# Patient Record
Sex: MALE | Race: WHITE | Employment: OTHER | URBAN - METROPOLITAN AREA
[De-identification: names, ages, dates, MRNs, and addresses within clinical notes are randomized per-mention and may not be internally consistent; named-entity substitution may affect disease eponyms.]

---

## 2019-08-26 ENCOUNTER — IP HISTORICAL/CONVERTED ENCOUNTER (OUTPATIENT)
Dept: OTHER | Age: 59
End: 2019-08-26

## 2019-11-04 ENCOUNTER — APPOINTMENT (OUTPATIENT)
Dept: NON INVASIVE DIAGNOSTICS | Age: 59
DRG: 286 | End: 2019-11-04
Attending: HOSPITALIST
Payer: COMMERCIAL

## 2019-11-04 ENCOUNTER — APPOINTMENT (OUTPATIENT)
Dept: GENERAL RADIOLOGY | Age: 59
DRG: 286 | End: 2019-11-04
Attending: EMERGENCY MEDICINE
Payer: COMMERCIAL

## 2019-11-04 ENCOUNTER — HOSPITAL ENCOUNTER (INPATIENT)
Age: 59
LOS: 3 days | Discharge: HOME | DRG: 286 | End: 2019-11-07
Attending: EMERGENCY MEDICINE | Admitting: INTERNAL MEDICINE
Payer: COMMERCIAL

## 2019-11-04 DIAGNOSIS — I50.31 ACUTE DIASTOLIC (CONGESTIVE) HEART FAILURE (HCC): ICD-10-CM

## 2019-11-04 DIAGNOSIS — R60.9 PERIPHERAL EDEMA: ICD-10-CM

## 2019-11-04 DIAGNOSIS — R06.02 SHORTNESS OF BREATH: ICD-10-CM

## 2019-11-04 DIAGNOSIS — I48.91 ATRIAL FIBRILLATION WITH RVR (HCC): Primary | ICD-10-CM

## 2019-11-04 PROBLEM — G47.33 OSA ON CPAP: Status: ACTIVE | Noted: 2019-11-04

## 2019-11-04 PROBLEM — Z99.89 OSA ON CPAP: Status: ACTIVE | Noted: 2019-11-04

## 2019-11-04 PROBLEM — E66.01 MORBID OBESITY (HCC): Status: ACTIVE | Noted: 2019-11-04

## 2019-11-04 PROBLEM — N17.9 AKI (ACUTE KIDNEY INJURY) (HCC): Status: ACTIVE | Noted: 2019-11-04

## 2019-11-04 LAB
ALBUMIN SERPL-MCNC: 3.5 G/DL (ref 3.5–5)
ALBUMIN/GLOB SERPL: 1.3 {RATIO} (ref 1.1–2.2)
ALP SERPL-CCNC: 80 U/L (ref 45–117)
ALT SERPL-CCNC: 136 U/L (ref 12–78)
ANION GAP SERPL CALC-SCNC: 6 MMOL/L (ref 5–15)
APPEARANCE UR: CLEAR
AST SERPL-CCNC: 37 U/L (ref 15–37)
ATRIAL RATE: 129 BPM
AV VELOCITY RATIO: 0.59
BACTERIA URNS QL MICRO: NEGATIVE /HPF
BASOPHILS # BLD: 0 K/UL (ref 0–0.1)
BASOPHILS NFR BLD: 0 % (ref 0–1)
BILIRUB SERPL-MCNC: 0.6 MG/DL (ref 0.2–1)
BILIRUB UR QL: NEGATIVE
BNP SERPL-MCNC: 1258 PG/ML
BUN SERPL-MCNC: 26 MG/DL (ref 6–20)
BUN/CREAT SERPL: 17 (ref 12–20)
CALCIUM SERPL-MCNC: 8.4 MG/DL (ref 8.5–10.1)
CALCULATED R AXIS, ECG10: -20 DEGREES
CALCULATED T AXIS, ECG11: 161 DEGREES
CHLORIDE SERPL-SCNC: 107 MMOL/L (ref 97–108)
CO2 SERPL-SCNC: 28 MMOL/L (ref 21–32)
COLOR UR: ABNORMAL
CREAT SERPL-MCNC: 1.52 MG/DL (ref 0.7–1.3)
DIAGNOSIS, 93000: NORMAL
DIFFERENTIAL METHOD BLD: ABNORMAL
ECHO AO ROOT DIAM: 3.78 CM
ECHO AV AREA PEAK VELOCITY: 2 CM2
ECHO AV AREA/BSA PEAK VELOCITY: 0.7 CM2/M2
ECHO AV PEAK GRADIENT: 6.5 MMHG
ECHO AV PEAK VELOCITY: 127.24 CM/S
ECHO EST RA PRESSURE: 10 MMHG
ECHO LA AREA 4C: 23.4 CM2
ECHO LA MAJOR AXIS: 4.72 CM
ECHO LA TO AORTIC ROOT RATIO: 1.25
ECHO LA VOL 4C: 60.12 ML (ref 18–58)
ECHO LA VOLUME INDEX A4C: 22.81 ML/M2 (ref 16–28)
ECHO LV EDV TEICHHOLZ: 0.88 ML
ECHO LV ESV TEICHHOLZ: 0.71 ML
ECHO LV INTERNAL DIMENSION DIASTOLIC: 5.78 CM (ref 4.2–5.9)
ECHO LV INTERNAL DIMENSION SYSTOLIC: 5.26 CM
ECHO LV IVSD: 2.89 CM (ref 0.6–1)
ECHO LV MASS 2D: 800.1 G (ref 88–224)
ECHO LV MASS INDEX 2D: 303.6 G/M2 (ref 49–115)
ECHO LV POSTERIOR WALL DIASTOLIC: 1.24 CM (ref 0.6–1)
ECHO LV POSTERIOR WALL SYSTOLIC: 1.42 CM
ECHO LVOT DIAM: 2.08 CM
ECHO LVOT PEAK GRADIENT: 2.2 MMHG
ECHO LVOT PEAK VELOCITY: 74.46 CM/S
ECHO LVOT SV: 44.4 ML
ECHO LVOT VTI: 13.07 CM
ECHO MV AREA VTI: 1.4 CM2
ECHO MV MAX VELOCITY: 192.54 CM/S
ECHO MV MEAN GRADIENT: 5.9 MMHG
ECHO MV MEAN INFLOW VELOCITY: 1.06 M/S
ECHO MV PEAK GRADIENT: 14.8 MMHG
ECHO MV REGURGITANT RADIUS PISA: 1.09 CM
ECHO MV VTI: 30.96 CM
ECHO PULMONARY ARTERY SYSTOLIC PRESSURE (PASP): 56.1 MMHG
ECHO RA AREA 4C: 20.34 CM2
ECHO RIGHT VENTRICULAR SYSTOLIC PRESSURE (RVSP): 56.1 MMHG
ECHO TV REGURGITANT MAX VELOCITY: 339.6 CM/S
ECHO TV REGURGITANT PEAK GRADIENT: 46.1 MMHG
EOSINOPHIL # BLD: 0 K/UL (ref 0–0.4)
EOSINOPHIL NFR BLD: 0 % (ref 0–7)
EPITH CASTS URNS QL MICRO: ABNORMAL /LPF
ERYTHROCYTE [DISTWIDTH] IN BLOOD BY AUTOMATED COUNT: 15.1 % (ref 11.5–14.5)
GLOBULIN SER CALC-MCNC: 2.6 G/DL (ref 2–4)
GLUCOSE SERPL-MCNC: 116 MG/DL (ref 65–100)
GLUCOSE UR STRIP.AUTO-MCNC: NEGATIVE MG/DL
HCT VFR BLD AUTO: 44.5 % (ref 36.6–50.3)
HGB BLD-MCNC: 14.4 G/DL (ref 12.1–17)
HGB UR QL STRIP: NEGATIVE
HYALINE CASTS URNS QL MICRO: ABNORMAL /LPF (ref 0–5)
IMM GRANULOCYTES # BLD AUTO: 0.1 K/UL (ref 0–0.04)
IMM GRANULOCYTES NFR BLD AUTO: 1 % (ref 0–0.5)
KETONES UR QL STRIP.AUTO: NEGATIVE MG/DL
LEUKOCYTE ESTERASE UR QL STRIP.AUTO: NEGATIVE
LVFS 2D: 9.09 %
LVOT MG: 1.29 MMHG
LVOT MV: 0.53 CM/S
LVSV (TEICH): 11.96 ML
LYMPHOCYTES # BLD: 0.8 K/UL (ref 0.8–3.5)
LYMPHOCYTES NFR BLD: 6 % (ref 12–49)
MCH RBC QN AUTO: 30.3 PG (ref 26–34)
MCHC RBC AUTO-ENTMCNC: 32.4 G/DL (ref 30–36.5)
MCV RBC AUTO: 93.7 FL (ref 80–99)
MONOCYTES # BLD: 0.8 K/UL (ref 0–1)
MONOCYTES NFR BLD: 6 % (ref 5–13)
NEUTS SEG # BLD: 12 K/UL (ref 1.8–8)
NEUTS SEG NFR BLD: 87 % (ref 32–75)
NITRITE UR QL STRIP.AUTO: NEGATIVE
NRBC # BLD: 0 K/UL (ref 0–0.01)
NRBC BLD-RTO: 0 PER 100 WBC
PH UR STRIP: 6.5 [PH] (ref 5–8)
PLATELET # BLD AUTO: 237 K/UL (ref 150–400)
PMV BLD AUTO: 11.4 FL (ref 8.9–12.9)
POTASSIUM SERPL-SCNC: 4.3 MMOL/L (ref 3.5–5.1)
PROT SERPL-MCNC: 6.1 G/DL (ref 6.4–8.2)
PROT UR STRIP-MCNC: 30 MG/DL
Q-T INTERVAL, ECG07: 352 MS
QRS DURATION, ECG06: 106 MS
QTC CALCULATION (BEZET), ECG08: 485 MS
RBC # BLD AUTO: 4.75 M/UL (ref 4.1–5.7)
RBC #/AREA URNS HPF: ABNORMAL /HPF (ref 0–5)
SODIUM SERPL-SCNC: 141 MMOL/L (ref 136–145)
SP GR UR REFRACTOMETRY: 1.02 (ref 1–1.03)
TROPONIN I SERPL-MCNC: 0.18 NG/ML
TROPONIN I SERPL-MCNC: 0.24 NG/ML
TSH SERPL DL<=0.05 MIU/L-ACNC: 2.08 UIU/ML (ref 0.36–3.74)
UA: UC IF INDICATED,UAUC: ABNORMAL
UROBILINOGEN UR QL STRIP.AUTO: 1 EU/DL (ref 0.2–1)
VENTRICULAR RATE, ECG03: 114 BPM
WBC # BLD AUTO: 13.7 K/UL (ref 4.1–11.1)
WBC URNS QL MICRO: ABNORMAL /HPF (ref 0–4)

## 2019-11-04 PROCEDURE — 65270000029 HC RM PRIVATE

## 2019-11-04 PROCEDURE — 99218 HC RM OBSERVATION: CPT

## 2019-11-04 PROCEDURE — 74011250637 HC RX REV CODE- 250/637: Performed by: EMERGENCY MEDICINE

## 2019-11-04 PROCEDURE — 81001 URINALYSIS AUTO W/SCOPE: CPT

## 2019-11-04 PROCEDURE — 93306 TTE W/DOPPLER COMPLETE: CPT

## 2019-11-04 PROCEDURE — 71046 X-RAY EXAM CHEST 2 VIEWS: CPT

## 2019-11-04 PROCEDURE — 74011250636 HC RX REV CODE- 250/636: Performed by: HOSPITALIST

## 2019-11-04 PROCEDURE — 83880 ASSAY OF NATRIURETIC PEPTIDE: CPT

## 2019-11-04 PROCEDURE — 94761 N-INVAS EAR/PLS OXIMETRY MLT: CPT

## 2019-11-04 PROCEDURE — 84484 ASSAY OF TROPONIN QUANT: CPT

## 2019-11-04 PROCEDURE — 85025 COMPLETE CBC W/AUTO DIFF WBC: CPT

## 2019-11-04 PROCEDURE — 74011000258 HC RX REV CODE- 258: Performed by: HOSPITALIST

## 2019-11-04 PROCEDURE — 96374 THER/PROPH/DIAG INJ IV PUSH: CPT

## 2019-11-04 PROCEDURE — 99285 EMERGENCY DEPT VISIT HI MDM: CPT

## 2019-11-04 PROCEDURE — 74011000250 HC RX REV CODE- 250: Performed by: HOSPITALIST

## 2019-11-04 PROCEDURE — 80053 COMPREHEN METABOLIC PANEL: CPT

## 2019-11-04 PROCEDURE — 94660 CPAP INITIATION&MGMT: CPT

## 2019-11-04 PROCEDURE — 74011250637 HC RX REV CODE- 250/637: Performed by: HOSPITALIST

## 2019-11-04 PROCEDURE — 84443 ASSAY THYROID STIM HORMONE: CPT

## 2019-11-04 PROCEDURE — 93005 ELECTROCARDIOGRAM TRACING: CPT

## 2019-11-04 PROCEDURE — 36415 COLL VENOUS BLD VENIPUNCTURE: CPT

## 2019-11-04 PROCEDURE — 96375 TX/PRO/DX INJ NEW DRUG ADDON: CPT

## 2019-11-04 PROCEDURE — 74011000250 HC RX REV CODE- 250: Performed by: EMERGENCY MEDICINE

## 2019-11-04 PROCEDURE — 74011250636 HC RX REV CODE- 250/636: Performed by: EMERGENCY MEDICINE

## 2019-11-04 RX ORDER — DILTIAZEM HYDROCHLORIDE 5 MG/ML
20 INJECTION INTRAVENOUS
Status: COMPLETED | OUTPATIENT
Start: 2019-11-04 | End: 2019-11-04

## 2019-11-04 RX ORDER — MONTELUKAST SODIUM 10 MG/1
10 TABLET ORAL EVERY EVENING
COMMUNITY
End: 2021-05-27

## 2019-11-04 RX ORDER — ASPIRIN 325 MG
325 TABLET ORAL
Status: COMPLETED | OUTPATIENT
Start: 2019-11-04 | End: 2019-11-04

## 2019-11-04 RX ORDER — MONTELUKAST SODIUM 10 MG/1
10 TABLET ORAL EVERY EVENING
Status: DISCONTINUED | OUTPATIENT
Start: 2019-11-04 | End: 2019-11-07 | Stop reason: HOSPADM

## 2019-11-04 RX ORDER — ASPIRIN 81 MG/1
81 TABLET ORAL DAILY
Status: DISCONTINUED | OUTPATIENT
Start: 2019-11-05 | End: 2019-11-07

## 2019-11-04 RX ORDER — DEXTROAMPHETAMINE SACCHARATE, AMPHETAMINE ASPARTATE, DEXTROAMPHETAMINE SULFATE AND AMPHETAMINE SULFATE 7.5; 7.5; 7.5; 7.5 MG/1; MG/1; MG/1; MG/1
30 TABLET ORAL 2 TIMES DAILY
COMMUNITY

## 2019-11-04 RX ORDER — SODIUM CHLORIDE 0.9 % (FLUSH) 0.9 %
5-40 SYRINGE (ML) INJECTION EVERY 8 HOURS
Status: DISCONTINUED | OUTPATIENT
Start: 2019-11-04 | End: 2019-11-07 | Stop reason: HOSPADM

## 2019-11-04 RX ORDER — METOPROLOL TARTRATE 5 MG/5ML
5 INJECTION INTRAVENOUS
Status: DISCONTINUED | OUTPATIENT
Start: 2019-11-04 | End: 2019-11-04

## 2019-11-04 RX ORDER — METHYLPREDNISOLONE 4 MG/1
6 TABLET ORAL 2 TIMES DAILY
Status: DISCONTINUED | OUTPATIENT
Start: 2019-11-04 | End: 2019-11-07 | Stop reason: HOSPADM

## 2019-11-04 RX ORDER — TRIAMTERENE AND HYDROCHLOROTHIAZIDE 37.5; 25 MG/1; MG/1
1 CAPSULE ORAL DAILY
COMMUNITY
End: 2019-11-07

## 2019-11-04 RX ORDER — PROPRANOLOL/HYDROCHLOROTHIAZID 40 MG-25MG
900 TABLET ORAL 2 TIMES DAILY
COMMUNITY
End: 2019-11-07

## 2019-11-04 RX ORDER — LORATADINE 10 MG/1
10 TABLET ORAL DAILY
Status: DISCONTINUED | OUTPATIENT
Start: 2019-11-05 | End: 2019-11-07 | Stop reason: HOSPADM

## 2019-11-04 RX ORDER — ENOXAPARIN SODIUM 100 MG/ML
40 INJECTION SUBCUTANEOUS EVERY 24 HOURS
Status: DISCONTINUED | OUTPATIENT
Start: 2019-11-04 | End: 2019-11-04

## 2019-11-04 RX ORDER — DOCUSATE SODIUM 100 MG/1
100 CAPSULE, LIQUID FILLED ORAL 2 TIMES DAILY
Status: DISCONTINUED | OUTPATIENT
Start: 2019-11-04 | End: 2019-11-07 | Stop reason: HOSPADM

## 2019-11-04 RX ORDER — NALTREXONE 100 %
3 POWDER (GRAM) MISCELLANEOUS
Status: DISCONTINUED | OUTPATIENT
Start: 2019-11-04 | End: 2019-11-07 | Stop reason: HOSPADM

## 2019-11-04 RX ORDER — METOPROLOL TARTRATE 5 MG/5ML
5 INJECTION INTRAVENOUS
Status: DISCONTINUED | OUTPATIENT
Start: 2019-11-04 | End: 2019-11-07 | Stop reason: HOSPADM

## 2019-11-04 RX ORDER — ASPIRIN 81 MG/1
81 TABLET ORAL DAILY
COMMUNITY
End: 2019-11-07

## 2019-11-04 RX ORDER — DESLORATADINE 5 MG/1
5 TABLET ORAL DAILY
COMMUNITY
End: 2021-05-27

## 2019-11-04 RX ORDER — DILTIAZEM HYDROCHLORIDE 30 MG/1
30 TABLET, FILM COATED ORAL EVERY 6 HOURS
Status: DISCONTINUED | OUTPATIENT
Start: 2019-11-04 | End: 2019-11-04

## 2019-11-04 RX ORDER — ALBUTEROL SULFATE 90 UG/1
2 AEROSOL, METERED RESPIRATORY (INHALATION)
COMMUNITY
End: 2020-02-18

## 2019-11-04 RX ORDER — METHYLPREDNISOLONE 4 MG/1
8 TABLET ORAL 2 TIMES DAILY
Status: DISCONTINUED | OUTPATIENT
Start: 2019-11-04 | End: 2019-11-04

## 2019-11-04 RX ORDER — SODIUM CHLORIDE 0.9 % (FLUSH) 0.9 %
5-40 SYRINGE (ML) INJECTION AS NEEDED
Status: DISCONTINUED | OUTPATIENT
Start: 2019-11-04 | End: 2019-11-07 | Stop reason: HOSPADM

## 2019-11-04 RX ORDER — ENOXAPARIN SODIUM 150 MG/ML
150 INJECTION SUBCUTANEOUS ONCE
Status: COMPLETED | OUTPATIENT
Start: 2019-11-04 | End: 2019-11-04

## 2019-11-04 RX ORDER — FUROSEMIDE 10 MG/ML
40 INJECTION INTRAMUSCULAR; INTRAVENOUS
Status: COMPLETED | OUTPATIENT
Start: 2019-11-04 | End: 2019-11-04

## 2019-11-04 RX ORDER — FUROSEMIDE 10 MG/ML
40 INJECTION INTRAMUSCULAR; INTRAVENOUS DAILY
Status: DISCONTINUED | OUTPATIENT
Start: 2019-11-05 | End: 2019-11-07 | Stop reason: HOSPADM

## 2019-11-04 RX ORDER — ENOXAPARIN SODIUM 150 MG/ML
150 INJECTION SUBCUTANEOUS EVERY 12 HOURS
Status: DISCONTINUED | OUTPATIENT
Start: 2019-11-05 | End: 2019-11-05

## 2019-11-04 RX ORDER — METHYLPREDNISOLONE 8 MG/1
4 TABLET ORAL 2 TIMES DAILY
COMMUNITY
End: 2019-12-04 | Stop reason: ALTCHOICE

## 2019-11-04 RX ORDER — DILTIAZEM HYDROCHLORIDE 30 MG/1
30 TABLET, FILM COATED ORAL
Status: DISCONTINUED | OUTPATIENT
Start: 2019-11-04 | End: 2019-11-04

## 2019-11-04 RX ORDER — ACETAMINOPHEN 325 MG/1
650 TABLET ORAL
Status: DISCONTINUED | OUTPATIENT
Start: 2019-11-04 | End: 2019-11-07 | Stop reason: HOSPADM

## 2019-11-04 RX ORDER — ONDANSETRON 2 MG/ML
4 INJECTION INTRAMUSCULAR; INTRAVENOUS
Status: DISCONTINUED | OUTPATIENT
Start: 2019-11-04 | End: 2019-11-07 | Stop reason: HOSPADM

## 2019-11-04 RX ADMIN — Medication 3 MG: at 21:06

## 2019-11-04 RX ADMIN — Medication 10 ML: at 17:13

## 2019-11-04 RX ADMIN — MONTELUKAST 10 MG: 10 TABLET, FILM COATED ORAL at 17:15

## 2019-11-04 RX ADMIN — ASPIRIN 325 MG: 325 TABLET, FILM COATED ORAL at 14:13

## 2019-11-04 RX ADMIN — DILTIAZEM HYDROCHLORIDE 7.5 MG/HR: 5 INJECTION INTRAVENOUS at 17:01

## 2019-11-04 RX ADMIN — Medication 10 ML: at 21:05

## 2019-11-04 RX ADMIN — METOPROLOL TARTRATE 5 MG: 5 INJECTION INTRAVENOUS at 17:11

## 2019-11-04 RX ADMIN — DOCUSATE SODIUM 100 MG: 100 CAPSULE, LIQUID FILLED ORAL at 17:15

## 2019-11-04 RX ADMIN — DILTIAZEM HYDROCHLORIDE 30 MG: 30 TABLET, FILM COATED ORAL at 14:13

## 2019-11-04 RX ADMIN — DILTIAZEM HYDROCHLORIDE 20 MG: 5 INJECTION INTRAVENOUS at 11:37

## 2019-11-04 RX ADMIN — ENOXAPARIN SODIUM 150 MG: 150 INJECTION SUBCUTANEOUS at 16:46

## 2019-11-04 RX ADMIN — FUROSEMIDE 40 MG: 10 INJECTION, SOLUTION INTRAMUSCULAR; INTRAVENOUS at 11:36

## 2019-11-04 RX ADMIN — METHYLPREDNISOLONE 6 MG: 4 TABLET ORAL at 21:04

## 2019-11-04 NOTE — ED PROVIDER NOTES
EMERGENCY DEPARTMENT HISTORY AND PHYSICAL EXAM      Date: 11/4/2019  Patient Name: Shelby Delgado  Patient Age and Sex: 60 y.o. male     History of Presenting Illness     Chief Complaint   Patient presents with    Ankle swelling     Bilateral over the past month    Hand Swelling    Shortness of Breath     On HCTZ and noticed a decrease in urine output over the past few weeks. History Provided By: Patient     HPI: Shelby Delgado Is a 62-year-old male with past medical history of hypertension presenting today with shortness of breath that is been steadily progressive over several years and acutely worsening in the past 2 months. He reports that he has had fluid buildup in his legs to the point that he cannot put his boots on. He reports waking up in the middle of the night gasping for breath. He has to get up and catch his breath for several minutes before trying to go back to sleep. He states that he has had atrial fibrillation before, but he has been poorly followed by physicians reporting a history of allergic reaction to gadolinium dye complicating his medical work-up. No fevers or cough. Denies chest pain. There are no other complaints, changes, or physical findings at this time. PCP: Jean Paul Christianson MD    No current facility-administered medications on file prior to encounter. Current Outpatient Medications on File Prior to Encounter   Medication Sig Dispense Refill    methylPREDNISolone (MEDROL) 8 mg tablet Take 4 mg by mouth two (2) times a day.  desloratadine (CLARINEX) 5 mg tablet Take 5 mg by mouth daily.  NALTREXONE Take 3 mg by mouth nightly.  triamterene-hydroCHLOROthiazide (DYAZIDE) 37.5-25 mg per capsule Take 1 Cap by mouth daily.  montelukast (SINGULAIR) 10 mg tablet Take 10 mg by mouth every evening.  aspirin delayed-release 81 mg tablet Take 81 mg by mouth daily.       albuterol (PROAIR HFA) 90 mcg/actuation inhaler Take 2 Puffs by inhalation every four (4) hours as needed for Wheezing or Shortness of Breath.  dextroamphetamine-amphetamine (ADDERALL) 30 mg tablet Take 30 mg by mouth two (2) times a day.  turmeric-turmeric root extract 450-50 mg cap Take 900 mg by mouth two (2) times a day. Past History     Past Medical History:  Past Medical History:   Diagnosis Date    Allergic reaction to contrast material     galodinium    Hypertension     Irregular heart rhythm     Mercury poisoning     ALYSSA on CPAP     Post concussion syndrome        Past Surgical History:  Past Surgical History:   Procedure Laterality Date    HX SHOULDER ARTHROSCOPY      HX UROLOGICAL Left     hydrocoele resection       Family History:  Family History   Problem Relation Age of Onset    Colon Cancer Mother     Hypertension Father     Other Father         multiple birth defects    Hypertension Paternal Grandfather        Social History:  Social History     Tobacco Use    Smoking status: Never Smoker    Smokeless tobacco: Never Used   Substance Use Topics    Alcohol use: Not Currently    Drug use: Never       Allergies: Allergies   Allergen Reactions    Gadolinium-Containing Contrast Media Rash         Review of Systems   Constitutional: No  fever,  No  headache  Skin: No  rash, No  jaundice  HEENT: No  nasal congestion, No  eye drainage. Resp: No cough,  No  Wheezing, + shortness of breath  CV: No chest pain, No  palpitations  GI: No vomiting,  No  diarrhea.,  No  constipation  : No dysuria,  No  hematuria  MSK: No joint pain,  No  trauma  Neuro: No numbness, No  tingling  Psych: No suicidal, No  paranoid      Physical Exam     Patient Vitals for the past 12 hrs:   Temp Pulse Resp BP SpO2   11/04/19 1145  (!) 108 14 (!) 140/91 95 %   11/04/19 1110  99 16 (!) 162/93 97 %   11/04/19 1053 97.7 °F (36.5 °C) 62 16 (!) 154/110 98 %     General: alert, No acute distress  Eyes: EOMI, normal conjunctiva  ENT: moist mucous membranes.   Neck: Active, full ROM of neck. Skin: No rashes. no jaundice              Lungs: Equal chest expansion. no respiratory distress. clear to auscultation bilaterally No accessory muscle usage  Heart: tachycardic with an irregularly irregular rhythm     2+ pitting edema bilaterally   2+ radial pulses and DPs bilaterally  Abd:  distended soft, nontender. No rebound tenderness. No guarding  Back: Full ROM  MSK: Full, active ROM in all 4 extremities. Neuro: Person, Place, Time and Situation; normal speech;   Psych: Cooperative with exam; Appropriate mood and affect             Diagnostic Study Results     Labs -     Recent Results (from the past 12 hour(s))   EKG, 12 LEAD, INITIAL    Collection Time: 11/04/19 10:59 AM   Result Value Ref Range    Ventricular Rate 114 BPM    Atrial Rate 129 BPM    QRS Duration 106 ms    Q-T Interval 352 ms    QTC Calculation (Bezet) 485 ms    Calculated R Axis -20 degrees    Calculated T Axis 161 degrees    Diagnosis       Atrial fibrillation with rapid ventricular response  Moderate voltage criteria for LVH, may be normal variant  ST & T wave abnormality, consider lateral ischemia  No previous ECGs available  Confirmed by El Martinez ([id number]) on 11/4/2019 12:32:05 PM     CBC WITH AUTOMATED DIFF    Collection Time: 11/04/19 11:20 AM   Result Value Ref Range    WBC 13.7 (H) 4.1 - 11.1 K/uL    RBC 4.75 4.10 - 5.70 M/uL    HGB 14.4 12.1 - 17.0 g/dL    HCT 44.5 36.6 - 50.3 %    MCV 93.7 80.0 - 99.0 FL    MCH 30.3 26.0 - 34.0 PG    MCHC 32.4 30.0 - 36.5 g/dL    RDW 15.1 (H) 11.5 - 14.5 %    PLATELET 663 438 - 888 K/uL    MPV 11.4 8.9 - 12.9 FL    NRBC 0.0 0  WBC    ABSOLUTE NRBC 0.00 0.00 - 0.01 K/uL    NEUTROPHILS 87 (H) 32 - 75 %    LYMPHOCYTES 6 (L) 12 - 49 %    MONOCYTES 6 5 - 13 %    EOSINOPHILS 0 0 - 7 %    BASOPHILS 0 0 - 1 %    IMMATURE GRANULOCYTES 1 (H) 0.0 - 0.5 %    ABS. NEUTROPHILS 12.0 (H) 1.8 - 8.0 K/UL    ABS. LYMPHOCYTES 0.8 0.8 - 3.5 K/UL    ABS. MONOCYTES 0.8 0.0 - 1.0 K/UL    ABS. EOSINOPHILS 0.0 0.0 - 0.4 K/UL    ABS. BASOPHILS 0.0 0.0 - 0.1 K/UL    ABS. IMM. GRANS. 0.1 (H) 0.00 - 0.04 K/UL    DF AUTOMATED     METABOLIC PANEL, COMPREHENSIVE    Collection Time: 11/04/19 11:20 AM   Result Value Ref Range    Sodium 141 136 - 145 mmol/L    Potassium 4.3 3.5 - 5.1 mmol/L    Chloride 107 97 - 108 mmol/L    CO2 28 21 - 32 mmol/L    Anion gap 6 5 - 15 mmol/L    Glucose 116 (H) 65 - 100 mg/dL    BUN 26 (H) 6 - 20 MG/DL    Creatinine 1.52 (H) 0.70 - 1.30 MG/DL    BUN/Creatinine ratio 17 12 - 20      GFR est AA 57 (L) >60 ml/min/1.73m2    GFR est non-AA 47 (L) >60 ml/min/1.73m2    Calcium 8.4 (L) 8.5 - 10.1 MG/DL    Bilirubin, total 0.6 0.2 - 1.0 MG/DL    ALT (SGPT) 136 (H) 12 - 78 U/L    AST (SGOT) 37 15 - 37 U/L    Alk. phosphatase 80 45 - 117 U/L    Protein, total 6.1 (L) 6.4 - 8.2 g/dL    Albumin 3.5 3.5 - 5.0 g/dL    Globulin 2.6 2.0 - 4.0 g/dL    A-G Ratio 1.3 1.1 - 2.2     TROPONIN I    Collection Time: 11/04/19 11:20 AM   Result Value Ref Range    Troponin-I, Qt. 0.18 (H) <0.05 ng/mL   NT-PRO BNP    Collection Time: 11/04/19 11:20 AM   Result Value Ref Range    NT pro-BNP 1,258 (H) <125 PG/ML   TSH 3RD GENERATION    Collection Time: 11/04/19 11:20 AM   Result Value Ref Range    TSH 2.08 0.36 - 3.74 uIU/mL       Radiologic Studies -   XR CHEST PA LAT   Final Result   IMPRESSION:   1. There is moderate cardiomegaly. The lungs are clear. CT Results  (Last 48 hours)    None        CXR Results  (Last 48 hours)               11/04/19 1208  XR CHEST PA LAT Final result    Impression:  IMPRESSION:   1. There is moderate cardiomegaly. The lungs are clear. Narrative:  Exam:  2 view chest       Indication: Dyspnea. COMPARISON: None       PA and lateral views demonstrate normal moderate cardiomegaly. The patient is on   a cardiac monitor. The lungs are clear. No pleural effusions. There are   degenerative changes of the thoracic spine.                    Medical Decision Making Differential Diagnosis: Atrial fibrillation with rapid ventricular response, ACS, pneumonia, pneumothorax, heart failure    I reviewed the vital signs, available nursing notes, past medical history, past surgical history, family history and social history and old medical records. On my interpretation, Laboratory workup is significant for white blood cell count is 13.7, normal hemoglobin, creatinine slightly elevated 1.52, troponin is 0.18, BNP 1258  On my interpretation of the radiology studies chest x-ray without significant evidence of fluid overload, cardia megaly  On my interpretation of the EKG atrial for ablation with RVR, T wave inversions in multiple leads, rate is 114, QTc 45, no ST elevation or depression    Management/ED course: Patient presents today with progressive dyspnea over the past several months, paroxysmal nocturnal dyspnea, and atrial for ablation with RVR. The patient has not been seeing a doctor for many years. I treated him with 1 dose of 20 mg IV diltiazem which improved his rate from the 120s to the high 100s. I followed this up with a immediate release p.o. dose of diltiazem. That improved his rate to the 80s. And fortunately his rate increased again, will rebolus and placed on diltiazem drip. Patient does have an elevation in the troponin 0.18, which I think is likely attributed to heart strain related to the atrial for ablation. Treated with 324 mg of aspirin. Patient is ultimately admitted to the inpatient service. I did speak with on-call cardiology who will evaluate the patient while he is admitted. ED Course:   Initial assessment performed. The patients presenting problems have been discussed, and they are in agreement with the care plan formulated and outlined with them. I have encouraged them to ask questions as they arise throughout their visit.          Procedures:    Critical Care Time:   CRITICAL CARE NOTE :    11:21 AM    IMPENDING DETERIORATION -Cardiovascular  ASSOCIATED RISK FACTORS - Dysrhythmia  MANAGEMENT- Bedside Assessment and Supervision of Care  INTERPRETATION -  Xrays, ECG and Blood Pressure  INTERVENTIONS - hemodynamic mngmt  CASE REVIEW - Hospitalist, Medical Sub-Specialist and Nursing  TREATMENT RESPONSE -Improved  PERFORMED BY - Self    NOTES   :    I have spent 42 minutes of critical care time involved in lab review, consultations with specialist, family decision- making, bedside attention and documentation. During this entire length of time I was immediately available to the patient . Disposition: Admitted    Admission Note:  Patient is being admitted to the hospital by Service: Hospitalist.  The results of their tests and reasons for their admission have been discussed with them and available family. They convey agreement and understanding for the need to be admitted and for their admission diagnosis. Diagnosis     Clinical Impression:   1. Atrial fibrillation with RVR ([address]    2. Shortness of breath    3. Peripheral edema        Attestations:  Scott Hernandes MD        Please note that this dictation was completed with [employer], the computer voice recognition software. Quite often unanticipated grammatical, syntax, homophones, and other interpretive errors are inadvertently transcribed by the computer software. Please disregard these errors. Please excuse any errors that have escaped final proofreading. Thank you.

## 2019-11-04 NOTE — PROGRESS NOTES
Pharmacy - Enoxaparin (Lovenox®) Monitoring      Indication: a fib     Current Dose: Enoxaparin 140 mg subcutaneously every 24 hours    Creatinine Clearance (mL/min): 60.8 ml/min    Labs:  Recent Labs     11/04/19  1120   CREA 1.52*   HGB 14.4        Wt Readings from Last 1 Encounters:   11/04/19 142.8 kg (314 lb 13.1 oz)     Ht Readings from Last 1 Encounters:   11/04/19 188 cm (74\")       Impression/Plan:   Change to 150 mg subcutaneously every 12 hours per enoxaparin dose rounding protocol    BMP daily, CBC w/o diff QOD     Thanks,  Noemi Goldberg, PHARMD

## 2019-11-04 NOTE — Clinical Note
Right radial clipped, prepped with ChloraPrep and draped. Wet prep solution applied at: 733. Wet prep solution dried at: 735. Wet prep elapsed drying time: 2 mins.

## 2019-11-04 NOTE — PROGRESS NOTES
Pharmacy Clarification of the Prior to Admission Medication Regimen Retrospective to the Admission Medication Reconciliation    The patient was interviewed regarding clarification of the prior to admission medication regimen. Leonora Taveras, present in room and obtained permission from patient to discuss drug regimen with visitor(s) present. Patient was questioned regarding use of any other inhalers, topical products, over the counter medications, herbal medications, vitamin products or ophthalmic/nasal/otic medication use. Information Obtained From: Patient, prescription bottles, RX Query    Recommendations/Findings: The following amendments were made to the patient's active medication list on file at [address] [facility]:     1) Additions:   albuterol (PROAIR HFA) 90 mcg/actuation inhaler  dextroamphetamine-amphetamine (ADDERALL) 30 mg tablet  turmeric-turmeric root extract 450-50 mg cap    2) Removals: NONE    3) Changes:  methylPREDNISolone (MEDROL) 8 mg tablet (Old regimen: 8 mg BID /New regimen: 4 mg BID)    4) Pertinent Pharmacy Findings:  Updated patients preferred outpatient pharmacy to: [address]   dextroamphetamine-amphetamine (ADDERALL) 30 mg tablet: Patient stated he has not taken this agent in about 2 weeks due to him not feeling well and not working     PTA medication list was corrected to the following:     Prior to Admission Medications   Prescriptions Last Dose Informant Patient Reported? Taking? NALTREXONE 11/3/2019 at Unknown time Other Yes Yes   Sig: Take 3 mg by mouth nightly. albuterol (PROAIR HFA) 90 mcg/actuation inhaler 11/2/2019 at Unknown time Self Yes Yes   Sig: Take 2 Puffs by inhalation every four (4) hours as needed for Wheezing or Shortness of Breath. aspirin delayed-release 81 mg tablet 11/4/2019 at Unknown time Self Yes Yes   Sig: Take 81 mg by mouth daily.    desloratadine (CLARINEX) 5 mg tablet 11/4/2019 at Unknown time Other Yes Yes Sig: Take 5 mg by mouth daily. dextroamphetamine-amphetamine (ADDERALL) 30 mg tablet 10/21/2019 at Unknown time Self Yes Yes   Sig: Take 30 mg by mouth two (2) times a day. methylPREDNISolone (MEDROL) 8 mg tablet 11/4/2019 at Unknown time Other Yes Yes   Sig: Take 4 mg by mouth two (2) times a day. montelukast (SINGULAIR) 10 mg tablet 11/3/2019 at Unknown time Other Yes Yes   Sig: Take 10 mg by mouth every evening. triamterene-hydroCHLOROthiazide (DYAZIDE) 37.5-25 mg per capsule 11/4/2019 at Unknown time Other Yes Yes   Sig: Take 1 Cap by mouth daily. turmeric-turmeric root extract 450-50 mg cap 11/3/2019 at Unknown time Self Yes Yes   Sig: Take 900 mg by mouth two (2) times a day.       Facility-Administered Medications: None          Thank you,  Zachery Adams CPhT  Medication History Pharmacy Technician

## 2019-11-04 NOTE — ED NOTES
TRANSFER - OUT REPORT:    Verbal report given to Polina Faith RN (name) on [facility]  being transferred to [facility] (unit) for routine progression of care       Report consisted of patients Situation, Background, Assessment and   Recommendations(SBAR). Information from the following report(s) SBAR, Kardex, ED Summary, Intake/Output, MAR, Recent Results and Med Rec Status was reviewed with the receiving nurse. Lines:   Peripheral IV 11/04/19 Left Antecubital (Active)   Site Assessment Clean, dry, & intact 11/4/2019 11:37 AM   Phlebitis Assessment 0 11/4/2019 11:37 AM   Infiltration Assessment 0 11/4/2019 11:37 AM   Dressing Status Clean, dry, & intact 11/4/2019 11:37 AM   Dressing Type Transparent;Tape 11/4/2019 11:37 AM   Hub Color/Line Status Capped;Flushed;Patent 11/4/2019 11:37 AM   Action Taken Blood drawn 11/4/2019 11:37 AM        Opportunity for questions and clarification was provided.       Patient transported with:   [employer]

## 2019-11-04 NOTE — Clinical Note
TRANSFER - OUT REPORT:  
 
Verbal report given to: Khris Rocha. Report consisted of patient's Situation, Background, Assessment and  
Recommendations(SBAR). Opportunity for questions and clarification was provided. Patient transported with a Registered Nurse. Patient transported to: [facility].

## 2019-11-04 NOTE — PROGRESS NOTES
Primary Nurse Vern Rosa RN and Micheline Olszewski, RN performed a dual skin assessment on this patient Impairment noted- see wound doc flow sheet    Back red and blanching  Heels and elbows red and jami in 4 seconds  Scattered scabbing over body  Scattered scarring over body  Calloused feet  Calloused knees  Toes pink-red and jami in 4 seconds  L upper back has small bump    Michel score is 19

## 2019-11-04 NOTE — PROGRESS NOTES
Pharmacy Dosing Services     Enoxaparin 1 mg/kg dose adjusted to 150mg per protocol for weight of 142.8kg.     Thanks,  Kyle Fernandez, PatrickD

## 2019-11-04 NOTE — H&P
Hospitalist Admission Note    NAME: Nichole Walker   :  1960   MRN:  [id number]     Date/Time:  2019 12:39 PM    Patient PCP: Elvin Ross MD  ______________________________________________________________________   Assessment & Plan:  New onset afib with RVR, POA  Acute diastolic chf, POA  HTN  Leukocytosis WBC 13.7  JUANJO Cr 1.5, baseline unknown  Morbid obesity  Hx mercury poisoning and gadolinium allergy, has had chelation therapy  ALYSSA on cpap    --observation  --start diltiazem 30mg q6h, lovenox 1mg/kg x 1, echo, tsh, cardiology consult  --diurese with lasix  --continue solumedrol (being treated for allergy by pcp) but would wean down to 6mg bid, singular, naltrexone, desloratidine  --hold triamterene hctz  --diurese with lasix 40mg IV daily  --cpap ordered [id number]. Neighbor cannot bring in machine today as he lives too far away. Body mass index is 40.42 kg/m². Code: full  DVT prophylaxis: lovenox  Surrogate decision maker:  Daughter Carine Coronado        Subjective:   CHIEF COMPLAINT:  Leg swelling, SOB    HISTORY OF PRESENT ILLNESS:     Nichole Walker is a 60 y.o. male with PMH mercury poisoning, gadolinium contrast allergy, HTN, \"irregular heart rhythm\" presents with 2 months of progressive SOB, feet swelling initially thought he has an allergic reaction. +orthopnea, ELIZONDO. Unable to fit feet into shoes due to edema. He reports his body has always reacted paradoxically to medications and has been weary of taking meds as a result. His pcp put him on solumedrol for past month for possible allergy. He was also recently put on triamterene-hctz but this has not helped with edema. He had chelation therapy last month in [place] which he gets periodically for prior mercury and gadolinium allergy. Found to be in afib with RVR. He denies having afib but has had irregular heart rhythm in past and told he should see cardiologist but he did not follow up.     We were asked to admit for work up and evaluation of the above problems. Past Medical History:   Diagnosis Date    Allergic reaction to contrast material     galodinium    Hypertension     Irregular heart rhythm     Mercury poisoning     Post concussion syndrome     hx recurrent uti    Past Surgical History:   Procedure Laterality Date    HX SHOULDER ARTHROSCOPY      HX UROLOGICAL Left     hydrocoele resection     Social History     Tobacco Use    Smoking status: Never Smoker    Smokeless tobacco: Never Used   Substance Use Topics    Alcohol use: Not Currently    Drug use:  Denies  Lives alone,     Family History   Problem Relation Age of Onset    Colon Cancer Mother     Hypertension Father     Other Father         multiple birth defects    Hypertension Paternal Grandfather       Allergies   Allergen Reactions    Gadolinium-Containing Contrast Media Rash        Prior to Admission medications    Medication Sig Start Date End Date Taking? Authorizing Provider   methylPREDNISolone (MEDROL) 8 mg tablet Take 4 mg by mouth two (2) times a day. Yes Provider, Historical   desloratadine (CLARINEX) 5 mg tablet Take 5 mg by mouth daily. Yes Provider, Historical   NALTREXONE Take 3 mg by mouth nightly. Yes Provider, Historical   triamterene-hydroCHLOROthiazide (DYAZIDE) 37.5-25 mg per capsule Take 1 Cap by mouth daily. Yes Provider, Historical   montelukast (SINGULAIR) 10 mg tablet Take 10 mg by mouth every evening. Yes Provider, Historical   aspirin delayed-release 81 mg tablet Take 81 mg by mouth daily. Yes Provider, Historical   albuterol (PROAIR HFA) 90 mcg/actuation inhaler Take 2 Puffs by inhalation every four (4) hours as needed for Wheezing or Shortness of Breath. Yes Provider, Historical   dextroamphetamine-amphetamine (ADDERALL) 30 mg tablet Take 30 mg by mouth two (2) times a day.    Yes Provider, Historical   turmeric-turmeric root extract 450-50 mg cap Take 900 mg by mouth two (2) times a day. Yes Provider, Historical     REVIEW OF SYSTEMS:  POSITIVE= Bold. Negative = normal text  General:  fever, chills, sweats, generalized weakness, weight loss/gain, loss of appetite  Eyes:  blurred vision, eye pain, loss of vision, diplopia  Ear Nose and Throat:  rhinorrhea, pharyngitis  Respiratory:   cough, sputum production, SOB, wheezing, ELIZONDO, pleuritic pain  Cardiology:  chest pain, palpitations, orthopnea, PND, edema, syncope   Gastrointestinal:  abdominal pain, N/V, dysphagia, diarrhea, constipation, bleeding  Genitourinary:  frequency, urgency, dysuria, hematuria, incontinence  Muskuloskeletal :  arthralgia, myalgia  Hematology:  easy bruising, bleeding, lymphadenopathy  Dermatological:  rash, ulceration, pruritis  Endocrine:  hot flashes or polydipsia  Neurological:  headache, dizziness, confusion, focal weakness, paresthesia, memory loss, gait disturbance  Psychological: anxiety, depression, agitation      Objective:   VITALS:    Visit Vitals  BP (!) 140/91   Pulse (!) 108   Temp 97.7 °F (36.5 °C)   Resp 14   Ht 6' 2\" (1.88 m)   Wt 142.8 kg (314 lb 13.1 oz)   SpO2 95%   BMI 40.42 kg/m²     Temp (24hrs), Av.7 °F (36.5 °C), Min:97.7 °F (36.5 °C), Max:97.7 °F (36.5 °C)    Body mass index is 40.42 kg/m². PHYSICAL EXAM:    General:    Alert, obese male, cooperative, no distress, appears stated age. HEENT: Atraumatic, anicteric sclerae, pink conjunctivae     No oral ulcers, mucosa moist, throat clear. Hearing intact. Neck:  Supple, symmetrical,  thyroid: non tender  Lungs:   Clear to auscultation bilaterally. No Wheezing or Rhonchi. No rales. Chest wall:  No tenderness  No Accessory muscle use. Heart:   irregular  rhythm,  No  murmur   No gallop. 2+ edema lower legs  Abdomen:   Soft, non-tender. Not distended. Bowel sounds normal. No masses  Extremities: No cyanosis.   No clubbing  Skin:     Not pale Not Jaundiced  Macular nonpruritic red rash on face and abdomen and right lower leg   Psych:  Not depressed. Not anxious or agitated. Neurologic: EOMs intact. No facial asymmetry. No aphasia or slurred speech. Symmetrical strength, Alert and oriented X 3. IMAGING RESULTS:   []       I have personally reviewed the actual   []     CXR  []     CT scan  CXR:  CT :  EKG:   ________________________________________________________________________  Care Plan discussed with:    Comments   Patient y    [facility]:      ________________________________________________________________________  Prophylaxis:  GI none   DVT lovenox   ________________________________________________________________________  Recommended Disposition:   Home with Family y   HH/PT/OT/RN y   SNF/LTC    HUANG    ________________________________________________________________________  Code Status:  Full Code y   DNR/DNI    ________________________________________________________________________  TOTAL TIME:  50 minutes    ______________________________________________________________________  Lyndon Strong MD      Procedures: see electronic medical records for all procedures/Xrays and details which were not copied into this note but were reviewed prior to creation of Plan.     LAB DATA REVIEWED:    Recent Results (from the past 24 hour(s))   EKG, 12 LEAD, INITIAL    Collection Time: 11/04/19 10:59 AM   Result Value Ref Range    Ventricular Rate 114 BPM    Atrial Rate 129 BPM    QRS Duration 106 ms    Q-T Interval 352 ms    QTC Calculation (Bezet) 485 ms    Calculated R Axis -20 degrees    Calculated T Axis 161 degrees    Diagnosis       Atrial fibrillation with rapid ventricular response  Moderate voltage criteria for LVH, may be normal variant  ST & T wave abnormality, consider lateral ischemia  No previous ECGs available  Confirmed by Heavenly Castro ([id number]) on 11/4/2019 12:32:05 PM     CBC WITH AUTOMATED DIFF    Collection Time: 11/04/19 11:20 AM   Result Value Ref Range    WBC 13.7 (H) 4.1 - 11.1 K/uL    RBC 4.75 4.10 - 5.70 M/uL    HGB 14.4 12.1 - 17.0 g/dL    HCT 44.5 36.6 - 50.3 %    MCV 93.7 80.0 - 99.0 FL    MCH 30.3 26.0 - 34.0 PG    MCHC 32.4 30.0 - 36.5 g/dL    RDW 15.1 (H) 11.5 - 14.5 %    PLATELET 613 620 - 002 K/uL    MPV 11.4 8.9 - 12.9 FL    NRBC 0.0 0  WBC    ABSOLUTE NRBC 0.00 0.00 - 0.01 K/uL    NEUTROPHILS 87 (H) 32 - 75 %    LYMPHOCYTES 6 (L) 12 - 49 %    MONOCYTES 6 5 - 13 %    EOSINOPHILS 0 0 - 7 %    BASOPHILS 0 0 - 1 %    IMMATURE GRANULOCYTES 1 (H) 0.0 - 0.5 %    ABS. NEUTROPHILS 12.0 (H) 1.8 - 8.0 K/UL    ABS. LYMPHOCYTES 0.8 0.8 - 3.5 K/UL    ABS. MONOCYTES 0.8 0.0 - 1.0 K/UL    ABS. EOSINOPHILS 0.0 0.0 - 0.4 K/UL    ABS. BASOPHILS 0.0 0.0 - 0.1 K/UL    ABS. IMM. GRANS. 0.1 (H) 0.00 - 0.04 K/UL    DF AUTOMATED     METABOLIC PANEL, COMPREHENSIVE    Collection Time: 11/04/19 11:20 AM   Result Value Ref Range    Sodium 141 136 - 145 mmol/L    Potassium 4.3 3.5 - 5.1 mmol/L    Chloride 107 97 - 108 mmol/L    CO2 28 21 - 32 mmol/L    Anion gap 6 5 - 15 mmol/L    Glucose 116 (H) 65 - 100 mg/dL    BUN 26 (H) 6 - 20 MG/DL    Creatinine 1.52 (H) 0.70 - 1.30 MG/DL    BUN/Creatinine ratio 17 12 - 20      GFR est AA 57 (L) >60 ml/min/1.73m2    GFR est non-AA 47 (L) >60 ml/min/1.73m2    Calcium 8.4 (L) 8.5 - 10.1 MG/DL    Bilirubin, total 0.6 0.2 - 1.0 MG/DL    ALT (SGPT) 136 (H) 12 - 78 U/L    AST (SGOT) 37 15 - 37 U/L    Alk.  phosphatase 80 45 - 117 U/L    Protein, total 6.1 (L) 6.4 - 8.2 g/dL    Albumin 3.5 3.5 - 5.0 g/dL    Globulin 2.6 2.0 - 4.0 g/dL    A-G Ratio 1.3 1.1 - 2.2     TROPONIN I    Collection Time: 11/04/19 11:20 AM   Result Value Ref Range    Troponin-I, Qt. 0.18 (H) <0.05 ng/mL   NT-PRO BNP    Collection Time: 11/04/19 11:20 AM   Result Value Ref Range    NT pro-BNP 1,258 (H) <125 PG/ML   TSH 3RD GENERATION    Collection Time: 11/04/19 11:20 AM   Result Value Ref Range    TSH 2.08 0.36 - 3.74 uIU/mL

## 2019-11-04 NOTE — Clinical Note
Single view of the left ventricle obtained using power injection. Total volume = 25 mL. Rate = 15 mL/sec. Pressure = 900 PSI.

## 2019-11-04 NOTE — PROGRESS NOTES
Bedside and Verbal shift change report GIVEN TO DEIDRE dunham. Report included the following information SBAR, Kardex, ED Summary, Procedure Summary, Intake/Output, MAR and Recent Results. 444: rn spoke to joseph in ED who will look for pt's dilt gtt and tube if present.      731: rn paged on call cards about trop of 0.24

## 2019-11-04 NOTE — CONSULTS
[address] [facility]     Date of  Admission: 11/4/2019 10:54 AM     Admission type:Emergency    Consult for: afib with RVR  Consult by: Hospitalist     Subjective:     Guevara Monaco is a 60 y.o. male admitted for Atrial fibrillation with RVR ([facility] [E02.19], Acute diastolic CHF (congestive heart failure) ([facility] [I50.31]  JUANJO (acute kidney injury) ([facility] [N17.9]. Admitted with c/o SOB, ELIZONDO x 2 months. ECG afib with RVR. Received IVP Dilt 20mg with slowing of rate, remains in afib. Patient denies CP, palpitations, dizziness, lightheadedness, fever, recent infection. States compliance with CPAP. Just started on Dyazide 3 days ago, does not like taking any medications due to sensitivities and adverse reactions. ECG afib with RVR, troponin 0.18, NTproBNP 1458, TSH normal.     No previous documented hx, patient states HTN but noncompliance with medications due to adverse reactions. Cardiac risk factors: obesity, sedentary life style, male gender, hypertension.       Patient Active Problem List    Diagnosis Date Noted    JUANJO (acute kidney injury) ([facility] 11/04/2019    Atrial fibrillation with RVR ([facility] 11/04/2019    Acute diastolic CHF (congestive heart failure) ([facility] 11/04/2019    Morbid obesity ([facility] 11/04/2019    ALYSSA on CPAP 11/04/2019      Delon Horan MD  Past Medical History:   Diagnosis Date    Allergic reaction to contrast material     galodinium    Hypertension     Irregular heart rhythm     Mercury poisoning     Morbid obesity ([facility] 11/4/2019    ALYSSA on CPAP     ALYSSA on CPAP 11/4/2019    Post concussion syndrome       Social History     Socioeconomic History    Marital status:      Spouse name: Not on file    Number of children: Not on file    Years of education: Not on file    Highest education level: Not on file   Occupational History    Occupation:  Tobacco Use    Smoking status: Never Smoker    Smokeless tobacco: Never Used   Substance and Sexual Activity    Alcohol use: Not Currently    Drug use: Never     Allergies   Allergen Reactions    Ace Inhibitors Cough    Gadolinium-Containing Contrast Media Rash      Family History   Problem Relation Age of Onset    Colon Cancer Mother     Hypertension Father     Other Father         multiple birth defects    Hypertension Paternal Grandfather       Current Facility-Administered Medications   Medication Dose Route Frequency    acetaminophen (TYLENOL) tablet 650 mg  650 mg Oral Q6H PRN    sodium chloride (NS) flush 5-40 mL  5-40 mL IntraVENous Q8H    sodium chloride (NS) flush 5-40 mL  5-40 mL IntraVENous PRN    ondansetron (ZOFRAN) injection 4 mg  4 mg IntraVENous Q6H PRN    docusate sodium (COLACE) capsule 100 mg  100 mg Oral BID    enoxaparin (LOVENOX) injection 150 mg  150 mg SubCUTAneous ONCE    [START ON 11/5/2019] aspirin delayed-release tablet 81 mg  81 mg Oral DAILY    montelukast (SINGULAIR) tablet 10 mg  10 mg Oral QPM    Naltrexone (Bulk) 100 % powd 3 mg (Patient Supplied)  3 mg Oral QHS    [START ON 11/5/2019] loratadine (CLARITIN) tablet 10 mg  10 mg Oral DAILY    dilTIAZem (CARDIZEM) IR tablet 30 mg  30 mg Oral Q6H    [START ON 11/5/2019] furosemide (LASIX) injection 40 mg  40 mg IntraVENous DAILY    methylPREDNISolone (MEDROL) tablet 6 mg  6 mg Oral BID    dilTIAZem (CARDIZEM) 125 mg in dextrose 5% 125 mL infusion  5 mg/hr IntraVENous TITRATE     Current Outpatient Medications   Medication Sig    methylPREDNISolone (MEDROL) 8 mg tablet Take 4 mg by mouth two (2) times a day.  desloratadine (CLARINEX) 5 mg tablet Take 5 mg by mouth daily.  NALTREXONE Take 3 mg by mouth nightly.  triamterene-hydroCHLOROthiazide (DYAZIDE) 37.5-25 mg per capsule Take 1 Cap by mouth daily.  montelukast (SINGULAIR) 10 mg tablet Take 10 mg by mouth every evening.     aspirin delayed-release 81 mg tablet Take 81 mg by mouth daily.  albuterol (PROAIR HFA) 90 mcg/actuation inhaler Take 2 Puffs by inhalation every four (4) hours as needed for Wheezing or Shortness of Breath.  dextroamphetamine-amphetamine (ADDERALL) 30 mg tablet Take 30 mg by mouth two (2) times a day.  turmeric-turmeric root extract 450-50 mg cap Take 900 mg by mouth two (2) times a day. Review of Symptoms:   11 systems reviewed, negative other than as stated in the HPI        Objective:      Visit Vitals  BP (!) 140/91   Pulse (!) 108   Temp 97.7 °F (36.5 °C)   Resp 14   Ht 6' 2\" (1.88 m)   Wt 142.8 kg (314 lb 13.1 oz)   SpO2 95%   BMI 40.42 kg/m²       Physical:   General: morbidly obese  male in no acute distress  Heart: irr, irr, , no m/S3  Lungs: diminished  Abdomen: morbidly obeses, Soft, +BS, NTND   Extremities: LE joe +2 edema, multiple scratch marks on both legs  Neurologic: Grossly normal    Data Review:   Recent Labs     11/04/19  1120   WBC 13.7*   HGB 14.4   HCT 44.5        Recent Labs     11/04/19  1120      K 4.3      CO2 28   *   BUN 26*   CREA 1.52*   CA 8.4*   ALB 3.5   TBILI 0.6   SGOT 37   *       Recent Labs     11/04/19  1120   TROIQ 0.18*         Intake/Output Summary (Last 24 hours) at 11/4/2019 1600  Last data filed at 11/4/2019 1413  Gross per 24 hour   Intake    Output 1900 ml   Net -1900 ml        Cardiographics    Telemetry: afib with RVR  ECG: afib with RVR, no other acute changes    Echocardiogram: pending    CXRAY:moderate cardiomegaly. The lungs are clear. Assessment:       Active Problems:    JUANJO (acute kidney injury) ([facility] (11/4/2019)      Atrial fibrillation with RVR ([facility] (11/4/2019)      Acute diastolic CHF (congestive heart failure) ([facility] (11/4/2019)      Morbid obesity ([facility] (11/4/2019)      ALYSSA on CPAP (11/4/2019)         Plan:     Afib with RVR:  Rate controlled now on PO Dilt.  PRN BB as needed  Lovenox full dose  CHADS2 vasc score:  2. Recommend NOAC if patient can afford. Will have CM verify  If patient does not convert, consider RUFINO and cardioversion. First, the patient will have to agree to compliance with medications, ie anticoagulants and AV pat blocking agents. Echo pending    Elevated troponin:  Follow trend, likely r/t supply/demand  Does have risk factors for CAD, will continue to monitor and consider further ischemic evaluation  Check lipid panel. Continue ASA. Thank you for consulting [address] [place] [address], NP       [facility]    11/4/2019         Patient seen, examined by me personally. Plan discussed as detailed. Agree with note as outlined by  NP. I confirm findings in history and physical exam. No additional findings noted. Agree with plan as outlined above. Difficult  due to multiple unknown allergies to medications per patient. Real v/s perceived. He is unable to name any except lisinopril and gadolinium.     Aveyr Mcneill MD

## 2019-11-04 NOTE — PROGRESS NOTES
TRANSFER - IN REPORT:    Verbal report received from ori (name) on Donnette Ormond  being received from ED (unit) for routine progression of care      Report consisted of patients Situation, Background, Assessment and   Recommendations(SBAR). Information from the following report(s) SBAR, Kardex, ED Summary, Procedure Summary, Intake/Output, MAR and Recent Results was reviewed with the receiving nurse. Opportunity for questions and clarification was provided. Assessment completed upon patients arrival to unit and care assumed.

## 2019-11-04 NOTE — PROGRESS NOTES
Reason for Admission:   Atrial fibrillation with RVR                   RRAT Score:   2                  Plan for utilizing home health:     Pt has never utilized home health in the past, pt states he is not interested in home health services at discharge and would prefer to go home with assistance of family and friends. Current Advanced Directive/Advance Care Plan: No ACP on file. Upon discussion with CM pt stated he is not interested in addressing ACP or MPOA at this time. Pt states he resides alone, is independent of ADL's/IADL's and drives. Pt states family(Lexus Frazier) or friend Maribel Miller) will be available to transport at [place]. Pt states he has access to cane or crutched in the home for DME if needed. Pt states he currently uses a home CPAP machine and would need CPAP while admitted. CM did make Attending, Dr. Farnaz Prieto aware of pt request for CPAP. Pt states he has never used SNF or HH in the past and did not feel these services will be needed at [place]. Pt states pharmacy of choice is [facility] [address] [place] [address]. Transition of Care Plan:             Pt in ED awaiting OBS admission  Disposition   Home with family  Family/friend available to transport at [place]  Pt would benefit from F/U apt at [facility] Duy Gov[facility]. [facility]  PCP Verified by CM: Yes  Mode of Transport at Discharge: Other (see comment)(family will be available to transport at [place])  Transition of Care Consult (CM Consult): Discharge Planning  Discharge Durable Medical Equipment: No(cane, crutches)  Physical Therapy Consult: No  Occupational Therapy Consult: No  Speech Therapy Consult: No  Current Support Network: Own Home, Family Lives Nearby, Lives Alone  Confirm Follow Up Transport: Self  Plan discussed with Pt/Family/Caregiver: Yes(CM spoke with pt at bedside)  Discharge Location  Discharge Placement: Home     CM to remain available for support as needed    Cheryle Rilee.  RN, [address] [place] [address] Manager  [phone]

## 2019-11-05 ENCOUNTER — APPOINTMENT (OUTPATIENT)
Dept: NON INVASIVE DIAGNOSTICS | Age: 59
DRG: 286 | End: 2019-11-05
Attending: NURSE PRACTITIONER
Payer: COMMERCIAL

## 2019-11-05 PROBLEM — I48.91 ATRIAL FIBRILLATION WITH RAPID VENTRICULAR RESPONSE (HCC): Status: ACTIVE | Noted: 2019-11-05

## 2019-11-05 PROBLEM — N17.9 ACUTE KIDNEY INJURY (HCC): Status: ACTIVE | Noted: 2019-11-05

## 2019-11-05 PROBLEM — I50.9 CHF (CONGESTIVE HEART FAILURE) (HCC): Status: ACTIVE | Noted: 2019-11-05

## 2019-11-05 LAB
ANION GAP SERPL CALC-SCNC: 5 MMOL/L (ref 5–15)
BUN SERPL-MCNC: 25 MG/DL (ref 6–20)
BUN/CREAT SERPL: 20 (ref 12–20)
CALCIUM SERPL-MCNC: 8.2 MG/DL (ref 8.5–10.1)
CHLORIDE SERPL-SCNC: 107 MMOL/L (ref 97–108)
CHOLEST SERPL-MCNC: 167 MG/DL
CO2 SERPL-SCNC: 27 MMOL/L (ref 21–32)
CREAT SERPL-MCNC: 1.23 MG/DL (ref 0.7–1.3)
ECHO EST RA PRESSURE: 10 MMHG
ECHO PULMONARY ARTERY SYSTOLIC PRESSURE (PASP): 33.9 MMHG
ECHO RIGHT VENTRICULAR SYSTOLIC PRESSURE (RVSP): 33.9 MMHG
ECHO TV REGURGITANT MAX VELOCITY: 244.24 CM/S
ECHO TV REGURGITANT PEAK GRADIENT: 23.9 MMHG
ERYTHROCYTE [DISTWIDTH] IN BLOOD BY AUTOMATED COUNT: 15.4 % (ref 11.5–14.5)
GLUCOSE SERPL-MCNC: 99 MG/DL (ref 65–100)
HCT VFR BLD AUTO: 43.4 % (ref 36.6–50.3)
HDLC SERPL-MCNC: 38 MG/DL
HDLC SERPL: 4.4 {RATIO} (ref 0–5)
HGB BLD-MCNC: 13.6 G/DL (ref 12.1–17)
LDLC SERPL CALC-MCNC: 113.6 MG/DL (ref 0–100)
LIPID PROFILE,FLP: ABNORMAL
MAGNESIUM SERPL-MCNC: 2 MG/DL (ref 1.6–2.4)
MCH RBC QN AUTO: 29.9 PG (ref 26–34)
MCHC RBC AUTO-ENTMCNC: 31.3 G/DL (ref 30–36.5)
MCV RBC AUTO: 95.4 FL (ref 80–99)
NRBC # BLD: 0 K/UL (ref 0–0.01)
NRBC BLD-RTO: 0 PER 100 WBC
PHOSPHATE SERPL-MCNC: 3 MG/DL (ref 2.6–4.7)
PLATELET # BLD AUTO: 223 K/UL (ref 150–400)
PMV BLD AUTO: 11.6 FL (ref 8.9–12.9)
POTASSIUM SERPL-SCNC: 4.3 MMOL/L (ref 3.5–5.1)
RBC # BLD AUTO: 4.55 M/UL (ref 4.1–5.7)
SODIUM SERPL-SCNC: 139 MMOL/L (ref 136–145)
TRIGL SERPL-MCNC: 77 MG/DL (ref ?–150)
TROPONIN I SERPL-MCNC: 0.11 NG/ML
VLDLC SERPL CALC-MCNC: 15.4 MG/DL
WBC # BLD AUTO: 13.1 K/UL (ref 4.1–11.1)

## 2019-11-05 PROCEDURE — 5A2204Z RESTORATION OF CARDIAC RHYTHM, SINGLE: ICD-10-PCS | Performed by: INTERNAL MEDICINE

## 2019-11-05 PROCEDURE — 99152 MOD SED SAME PHYS/QHP 5/>YRS: CPT | Performed by: INTERNAL MEDICINE

## 2019-11-05 PROCEDURE — 74011250636 HC RX REV CODE- 250/636: Performed by: HOSPITALIST

## 2019-11-05 PROCEDURE — 85027 COMPLETE CBC AUTOMATED: CPT

## 2019-11-05 PROCEDURE — 84100 ASSAY OF PHOSPHORUS: CPT

## 2019-11-05 PROCEDURE — 65660000000 HC RM CCU STEPDOWN

## 2019-11-05 PROCEDURE — 74011250637 HC RX REV CODE- 250/637: Performed by: NURSE PRACTITIONER

## 2019-11-05 PROCEDURE — 74011250637 HC RX REV CODE- 250/637: Performed by: HOSPITALIST

## 2019-11-05 PROCEDURE — 94760 N-INVAS EAR/PLS OXIMETRY 1: CPT

## 2019-11-05 PROCEDURE — 92960 CARDIOVERSION ELECTRIC EXT: CPT | Performed by: INTERNAL MEDICINE

## 2019-11-05 PROCEDURE — 99218 HC RM OBSERVATION: CPT

## 2019-11-05 PROCEDURE — 80061 LIPID PANEL: CPT

## 2019-11-05 PROCEDURE — 77010033678 HC OXYGEN DAILY

## 2019-11-05 PROCEDURE — 93312 ECHO TRANSESOPHAGEAL: CPT

## 2019-11-05 PROCEDURE — 84484 ASSAY OF TROPONIN QUANT: CPT

## 2019-11-05 PROCEDURE — 74011000258 HC RX REV CODE- 258: Performed by: HOSPITALIST

## 2019-11-05 PROCEDURE — 74011250636 HC RX REV CODE- 250/636: Performed by: INTERNAL MEDICINE

## 2019-11-05 PROCEDURE — 99153 MOD SED SAME PHYS/QHP EA: CPT | Performed by: INTERNAL MEDICINE

## 2019-11-05 PROCEDURE — 80048 BASIC METABOLIC PNL TOTAL CA: CPT

## 2019-11-05 PROCEDURE — 83735 ASSAY OF MAGNESIUM: CPT

## 2019-11-05 PROCEDURE — 77030018729 HC ELECTRD DEFIB PAD CARD -B: Performed by: INTERNAL MEDICINE

## 2019-11-05 PROCEDURE — 65270000029 HC RM PRIVATE

## 2019-11-05 PROCEDURE — 36415 COLL VENOUS BLD VENIPUNCTURE: CPT

## 2019-11-05 PROCEDURE — 93005 ELECTROCARDIOGRAM TRACING: CPT

## 2019-11-05 PROCEDURE — 74011000250 HC RX REV CODE- 250: Performed by: HOSPITALIST

## 2019-11-05 RX ORDER — SOTALOL HYDROCHLORIDE 80 MG/1
80 TABLET ORAL EVERY 12 HOURS
Status: DISCONTINUED | OUTPATIENT
Start: 2019-11-06 | End: 2019-11-07 | Stop reason: HOSPADM

## 2019-11-05 RX ORDER — SOTALOL HYDROCHLORIDE 80 MG/1
80 TABLET ORAL EVERY 12 HOURS
Status: DISCONTINUED | OUTPATIENT
Start: 2019-11-05 | End: 2019-11-05

## 2019-11-05 RX ORDER — FENTANYL CITRATE 50 UG/ML
INJECTION, SOLUTION INTRAMUSCULAR; INTRAVENOUS AS NEEDED
Status: DISCONTINUED | OUTPATIENT
Start: 2019-11-05 | End: 2019-11-06

## 2019-11-05 RX ORDER — MIDAZOLAM HYDROCHLORIDE 1 MG/ML
INJECTION, SOLUTION INTRAMUSCULAR; INTRAVENOUS AS NEEDED
Status: DISCONTINUED | OUTPATIENT
Start: 2019-11-05 | End: 2019-11-06

## 2019-11-05 RX ORDER — METOPROLOL TARTRATE 25 MG/1
25 TABLET, FILM COATED ORAL EVERY 12 HOURS
Status: DISCONTINUED | OUTPATIENT
Start: 2019-11-05 | End: 2019-11-05

## 2019-11-05 RX ORDER — LIDOCAINE HYDROCHLORIDE 20 MG/ML
SOLUTION OROPHARYNGEAL AS NEEDED
Status: DISCONTINUED | OUTPATIENT
Start: 2019-11-05 | End: 2019-11-06

## 2019-11-05 RX ORDER — ENOXAPARIN SODIUM 150 MG/ML
135 INJECTION SUBCUTANEOUS EVERY 12 HOURS
Status: DISCONTINUED | OUTPATIENT
Start: 2019-11-05 | End: 2019-11-05

## 2019-11-05 RX ORDER — ENOXAPARIN SODIUM 150 MG/ML
135 INJECTION SUBCUTANEOUS EVERY 12 HOURS
Status: COMPLETED | OUTPATIENT
Start: 2019-11-05 | End: 2019-11-05

## 2019-11-05 RX ADMIN — Medication 10 ML: at 18:26

## 2019-11-05 RX ADMIN — LIDOCAINE HYDROCHLORIDE 15 ML: 20 SOLUTION OROPHARYNGEAL at 16:00

## 2019-11-05 RX ADMIN — MIDAZOLAM HYDROCHLORIDE 1 MG: 1 INJECTION, SOLUTION INTRAMUSCULAR; INTRAVENOUS at 16:18

## 2019-11-05 RX ADMIN — FENTANYL CITRATE 25 MCG: 50 INJECTION, SOLUTION INTRAMUSCULAR; INTRAVENOUS at 16:02

## 2019-11-05 RX ADMIN — MONTELUKAST 10 MG: 10 TABLET, FILM COATED ORAL at 18:19

## 2019-11-05 RX ADMIN — MIDAZOLAM HYDROCHLORIDE 1 MG: 1 INJECTION, SOLUTION INTRAMUSCULAR; INTRAVENOUS at 16:22

## 2019-11-05 RX ADMIN — Medication 10 ML: at 21:58

## 2019-11-05 RX ADMIN — Medication 10 ML: at 05:09

## 2019-11-05 RX ADMIN — ENOXAPARIN SODIUM 135 MG: 150 INJECTION SUBCUTANEOUS at 18:19

## 2019-11-05 RX ADMIN — ENOXAPARIN SODIUM 150 MG: 150 INJECTION SUBCUTANEOUS at 05:09

## 2019-11-05 RX ADMIN — METHYLPREDNISOLONE 6 MG: 4 TABLET ORAL at 09:31

## 2019-11-05 RX ADMIN — DOCUSATE SODIUM 100 MG: 100 CAPSULE, LIQUID FILLED ORAL at 18:19

## 2019-11-05 RX ADMIN — METOPROLOL TARTRATE 25 MG: 25 TABLET ORAL at 09:34

## 2019-11-05 RX ADMIN — MIDAZOLAM HYDROCHLORIDE 1 MG: 1 INJECTION, SOLUTION INTRAMUSCULAR; INTRAVENOUS at 16:23

## 2019-11-05 RX ADMIN — FUROSEMIDE 40 MG: 10 INJECTION, SOLUTION INTRAMUSCULAR; INTRAVENOUS at 09:37

## 2019-11-05 RX ADMIN — METHYLPREDNISOLONE 6 MG: 4 TABLET ORAL at 21:56

## 2019-11-05 RX ADMIN — LORATADINE 10 MG: 10 TABLET ORAL at 09:34

## 2019-11-05 RX ADMIN — DILTIAZEM HYDROCHLORIDE 7.5 MG/HR: 5 INJECTION INTRAVENOUS at 05:34

## 2019-11-05 RX ADMIN — MIDAZOLAM HYDROCHLORIDE 1 MG: 1 INJECTION, SOLUTION INTRAMUSCULAR; INTRAVENOUS at 16:02

## 2019-11-05 RX ADMIN — FENTANYL CITRATE 25 MCG: 50 INJECTION, SOLUTION INTRAMUSCULAR; INTRAVENOUS at 16:18

## 2019-11-05 RX ADMIN — MIDAZOLAM HYDROCHLORIDE 2 MG: 1 INJECTION, SOLUTION INTRAMUSCULAR; INTRAVENOUS at 16:02

## 2019-11-05 RX ADMIN — Medication 3 MG: at 21:56

## 2019-11-05 RX ADMIN — ASPIRIN 81 MG: 81 TABLET, COATED ORAL at 09:31

## 2019-11-05 NOTE — PROGRESS NOTES
BRANDEE: Home with Follow-Up Appointments    1:30pm-CM met with pt and pt's friend at beside. CM introduced self and role. Oral and Written notification given to patient and/or caregiver informing them that they are currently an Outpatient receiving care in our facility. Outpatient services include Observation Services under [place] and [place] requirements. CM will continue to follow patient for discharge planning needs and arrange for services as deemed necessary.     Huma Barrera [address] [place] [address]  [phone]

## 2019-11-05 NOTE — PROGRESS NOTES
[address] [place] [address]  [phone]      Cardiology Progress Note      11/5/2019 9:00AM    Admit Date: 11/4/2019    Admit Diagnosis:   Atrial fibrillation with RVR ([facility] [C36.87]  Acute diastolic CHF (congestive heart failure) ([facility] [I50.31]  JUANJO (acute kidney injury) ([facility] [N17.9]    Subjective:     Ghazal Haider has no c/p CP, SOB. Remains in Afib, rate controlled, restarted IV Dilt last evening. Overnight did not sleep well due to alarms, attempted to wear CPAP. Long discussion with patient reference RUFINO/cardioversion, and to proceed with cardiac cath today as well for ischemic evaluation. Patient discussed at length his aversion to a cardiac cath due to possible elements within the dye used during cath, that may interact with the metal toxins in his blood. He produced a Lab result from a Dr. Carlie Lazaro in [facility] that he sees for chelation therapy. I have attempted to contact this MD, but there is no contact phone # and the email I attempted to send would not submit. Will not proceed with cardiac cath.         Visit Vitals  /88   Pulse 71   Temp 97.4 °F (36.3 °C)   Resp 20   Ht 6' 2\" (1.88 m)   Wt 138.6 kg (305 lb 8.9 oz)   SpO2 94%   BMI 39.23 kg/m²       Current Facility-Administered Medications   Medication Dose Route Frequency    metoprolol tartrate (LOPRESSOR) tablet 25 mg  25 mg Oral Q12H    [Held by provider] enoxaparin (LOVENOX) injection 135 mg ++partial syringe++  135 mg SubCUTAneous Q12H    acetaminophen (TYLENOL) tablet 650 mg  650 mg Oral Q6H PRN    sodium chloride (NS) flush 5-40 mL  5-40 mL IntraVENous Q8H    sodium chloride (NS) flush 5-40 mL  5-40 mL IntraVENous PRN    ondansetron (ZOFRAN) injection 4 mg  4 mg IntraVENous Q6H PRN    docusate sodium (COLACE) capsule 100 mg  100 mg Oral BID    aspirin delayed-release tablet 81 mg  81 mg Oral DAILY    montelukast (SINGULAIR) tablet 10 mg  10 mg Oral QPM    Naltrexone (Bulk) 100 % powd 3 mg  (Patient Supplied)  3 mg Oral QHS    loratadine (CLARITIN) tablet 10 mg  10 mg Oral DAILY    furosemide (LASIX) injection 40 mg  40 mg IntraVENous DAILY    methylPREDNISolone (MEDROL) tablet 6 mg  6 mg Oral BID    dilTIAZem (CARDIZEM) 125 mg in dextrose 5% 125 mL infusion  7.5 mg/hr IntraVENous CONTINUOUS    metoprolol (LOPRESSOR) injection 5 mg  5 mg IntraVENous Q6H PRN       Objective:      Physical Exam:  General Appearance:  morbidly obese  male in no acute distress  Chest:   basilar rales  Cardiovascular:  irr, irr  no murmur. Abdomen:   Soft, non-tender, bowel sounds are active. Extremities: joe LE edema +2  Skin:  Warm and dry. Data Review:   Recent Labs     11/05/19  0441 11/04/19  1120   WBC 13.1* 13.7*   HGB 13.6 14.4   HCT 43.4 44.5    237     Recent Labs     11/05/19  0441 11/04/19  1120    141   K 4.3 4.3    107   CO2 27 28   GLU 99 116*   BUN 25* 26*   CREA 1.23 1.52*   CA 8.2* 8.4*   MG 2.0  --    PHOS 3.0  --    ALB  --  3.5   TBILI  --  0.6   SGOT  --  37   ALT  --  136*       Recent Labs     11/05/19  0813 11/04/19  1827 11/04/19  1120   TROIQ 0.11* 0.24* 0.18*         Intake/Output Summary (Last 24 hours) at 11/5/2019 1210  Last data filed at 11/5/2019 0534  Gross per 24 hour   Intake 720 ml   Output 3425 ml   Net -2705 ml        Telemetry: afib rate controlled 90s    Echo: 11/4/19  · Left Ventricle: Normal cavity size. Mild concentric hypertrophy. Mild systolic dysfunction. Estimated left ventricular ejection fraction is 46 - 50%. · Right Ventricle: Mildly dilated right ventricle. Mildly reduced systolic function. · Left Atrium: Mildly dilated left atrium. · Mitral Valve: Mild mitral valve regurgitation is present. · Tricuspid Valve: Mild tricuspid valve regurgitation is present. · Pulmonary Artery: Moderate pulmonary hypertension. · Right Atrium: Mildly dilated right atrium.          Assessment:     Active Problems:    JUANJO (acute kidney injury) ([facility] (11/4/2019)      Atrial fibrillation with RVR ([facility] (11/4/2019)      Acute diastolic CHF (congestive heart failure) ([facility] (11/4/2019)      Morbid obesity ([facility] (11/4/2019)      ALYSSA on CPAP (11/4/2019)        Plan:       Afib with RVR:  Rate controlled now on IV Dilt. Starting PO BB this AM for rate and BP control. Lovenox full dose  CHADS2 vasc score:  2. Recommend NOAC if patient can afford. Will have CM verify  RUFINO and cardioversion today. The patient is very hesitant to take medications, c/o interactions with the different metal toxins within his body. He is agreeing currently to take the meds. Agree to compliance with medications, ie anticoagulants and AV pat blocking agents.      Elevated troponin:  Follow trend, likely r/t supply/demand  Does have risk factors for CAD, will continue to monitor and consider further ischemic evaluation  Offered cardiac cath today, but defer for a later date once patient clarifies his concerns with IV dye and possible interactions with metal toxins, specifically gadolinium. Continue ASA. Jacqueline Farrar ACNP  Cardiology      [facility]    11/5/2019         Patient seen, examined by me personally. Plan discussed as detailed. Agree with note as outlined by  NP. I confirm findings in history and physical exam. No additional findings noted. Agree with plan as outlined above. Literature reviewed. Does not lis any contra indications.  Will hold off per patient request.    Shantell Root MD

## 2019-11-05 NOTE — PROGRESS NOTES
RUFINO showed EF 50-55%. Moderate PML prolapse, moderate MR. No thrombus. [address] x 1 to sinus rhythm. Start sotalol. Cath/PCI in AM. Discussed with patient prior to sedation.

## 2019-11-05 NOTE — PROGRESS NOTES
Pharmacy Monitoring of Sotalol for Arrhythmias    Indication: AF    Sotalol dose ordered: 80 mg every 12 hours  Baseline QTc interval (on admission prior to dose) for new starts only: Ordered  Creatinine clearance (ml/min): 75    Labs:  Recent Labs     11/05/19  0441 11/04/19  1120   K 4.3 4.3   MG 2.0  --    CREA 1.23 1.52*       Pulse Readings from Last 3 Encounters:   11/05/19 71       Significant drug interactions: None    Electrolyte replacement:   Potassium supplementation ordered (Y/N): None  Magnesium supplementation ordered (Y/N): None      Impression/Plan:   QTc at 8 pm tonight before 1st dose and 2 hours post dose q12h  BMP/magnesium daily     Thanks,  Pradeep Aponte, [facility]    [email or web address]. pdf

## 2019-11-05 NOTE — PROGRESS NOTES
TRANSFER - OUT REPORT:    Verbal report given to Anish(name) on Shelby Delgado being transferred [id number](unit) for routine progression of care       Report consisted of patient's Situation, Background, Assessment and   Recommendations(SBAR). Information from the following report(s) SBAR was reviewed with the receiving nurse. Opportunity for questions and clarification was provided.       Patient transported with:  Nurse

## 2019-11-05 NOTE — PROGRESS NOTES
Hospitalist Progress Note    NAME: Kassidy Bocanegra   :  1960   MRN:  [id number]       Assessment / Plan:  New onset afib with RVR, POA c/w diltiazem drip and monitor. Due for RUFINO and Cardioversion today  Acute diastolic chf, POA: EF 95% with some systolic dysfunction, c/w diuretic, monitor I/O and weight. HTN on diltiazem drip at this time, monitor  Leukocytosis: no signs of infection, monitor  JUANJO Cr 1.5, baseline unknown, so far creatinine trending down, monitor  Morbid obesity BMI 40.42 counseled. Hx mercury poisoning and gadolinium allergy, has had chelation therapy  ALYSSA on cpap  Surrogate decision maker:  Daughter Lowell Levin  Code status: Full  Prophylaxis: Lovenox  Recommended Disposition: Home w/Family     Subjective:     Chief Complaint / Reason for Physician Visit  \"I feel a little better\". Discussed with RN events overnight. Review of Systems:  Symptom Y/N Comments  Symptom Y/N Comments   Fever/Chills    Chest Pain     Poor Appetite    Edema     Cough    Abdominal Pain     Sputum    Joint Pain     SOB/ELIZONDO y   Pruritis/Rash     Nausea/vomit    Tolerating PT/OT     Diarrhea    Tolerating Diet y    Constipation    Other       Could NOT obtain due to:      Objective:     VITALS:   Last 24hrs VS reviewed since prior progress note.  Most recent are:  Patient Vitals for the past 24 hrs:   Temp Pulse Resp BP SpO2   19 0931  71  142/88    19 0757 97.4 °F (36.3 °C) 62 20 (!) 148/112 94 %   19 0358     98 %   19 0330 97 °F (36.1 °C) 73 20 124/90 98 %   19 2315 97.1 °F (36.2 °C) 75 20 (!) 153/95 99 %   19 2125     99 %   19 1949 97.4 °F (36.3 °C) 86 22 124/67 95 %   19 1819    (!) 160/108    19 1742    (!) 170/117    19 1703    (!) 189/110    19 1637 97.2 °F (36.2 °C) 95 23 (!) 171/104 95 %   19 1515  (!) 101 28  93 %   19 1400  (!) 102 25  96 %   19 1300  90 23  96 % Intake/Output Summary (Last 24 hours) at 11/5/2019 1203  Last data filed at 11/5/2019 0534  Gross per 24 hour   Intake 720 ml   Output 3425 ml   Net -2705 ml        PHYSICAL EXAM:  General: WD, WN. Alert, cooperative, SOB   EENT:  EOMI. Anicteric sclerae. MMM  Resp:  Coarse BS, rhonchi  CV:  Regular  rhythm,  trace  edema  GI:  Soft, Non distended, Non tender.  +Bowel sounds  Neurologic:  Alert and oriented X 3, normal speech,   Psych:   Good insight. Not anxious nor agitated  Skin:  No rashes. No jaundice    Reviewed most current lab test results and cultures  YES  Reviewed most current radiology test results   YES  Review and summation of old records today    NO  Reviewed patient's current orders and MAR    YES  PMH/ reviewed - no change compared to H&P  ________________________________________________________________________  Care Plan discussed with:    Comments   Patient y    Family      RN y    Care Manager     Consultant                        Multidiciplinary team rounds were held today with , nursing, pharmacist and clinical coordinator. Patient's plan of care was discussed; medications were reviewed and discharge planning was addressed. ________________________________________________________________________  Total NON critical care TIME: 35   Minutes    Total CRITICAL CARE TIME Spent:   Minutes non procedure based      Comments   >50% of visit spent in counseling and coordination of care y    ________________________________________________________________________  Harshil Treadwell MD     Procedures: see electronic medical records for all procedures/Xrays and details which were not copied into this note but were reviewed prior to creation of Plan. LABS:  I reviewed today's most current labs and imaging studies.   Pertinent labs include:  Recent Labs     11/05/19  0441 11/04/19  1120   WBC 13.1* 13.7*   HGB 13.6 14.4   HCT 43.4 44.5    237     Recent Labs 11/05/19  0441 11/04/19  1120    141   K 4.3 4.3    107   CO2 27 28   GLU 99 116*   BUN 25* 26*   CREA 1.23 1.52*   CA 8.2* 8.4*   MG 2.0  --    PHOS 3.0  --    ALB  --  3.5   TBILI  --  0.6   SGOT  --  37   ALT  --  136*       Signed: Helen Posada MD

## 2019-11-05 NOTE — PROGRESS NOTES
Pharmacy - Enoxaparin (Lovenox®) Monitoring      Indication: a fib     Current Dose: Enoxaparin 150 mg subcutaneously every 24 hours    Creatinine Clearance (mL/min): 75 mL/min    Labs:  Recent Labs     11/05/19  0441 11/04/19  1120   CREA 1.23 1.52*   HGB 13.6 14.4    237     Wt Readings from Last 1 Encounters:   11/05/19 138.6 kg (305 lb 8.9 oz)     Ht Readings from Last 1 Encounters:   11/04/19 188 cm (74\")       Impression/Plan:   Change to 135 mg subcutaneously every 12 hours per enoxaparin dose rounding protocol    BMP daily, CBC w/o diff every other day      Thanks,  Mack Garcia, PHARMD      [email or web address]/[email or web address]%[email or web address]. pdf

## 2019-11-05 NOTE — PROGRESS NOTES
Problem: Falls - Risk of  Goal: *Absence of Falls  Description  Document Karen Dear Fall Risk and appropriate interventions in the flowsheet.   Outcome: Progressing Towards Goal  Note:   Fall Risk Interventions:            Medication Interventions: Teach patient to arise slowly, Evaluate medications/consider consulting pharmacy, Patient to call before getting OOB    Elimination Interventions: Call light in reach, Patient to call for help with toileting needs, Urinal in reach

## 2019-11-05 NOTE — PROGRESS NOTES
2030: Spoke with Dr. Terell Ortega about patient's increased troponin of 0.24. Patient not symptomatic and per Dr. Terell Ortega no other follow up troponin labs need to be drawn at this time. Will continue to monitor.

## 2019-11-05 NOTE — WOUND CARE
Wound and Skin Care Consult for this patient who has \"scabs and abrasions all over his skin\". This was present on admission and is a chronic skin problem with him as he is a . He wears steel toe shoes and has callouses from this. He cares for his feet as much as possible but I gave him some home remedies to try as well. Apparently patient has some kind of heavy metal toxicity that causes him to sweat profusely and to form small pimple like knots on the skin. When he \"pops\" them there is a cheesy drainage that is usually fairly large. He stated that he has seen dermatologist and podiatrist in the past but without relief from these symptoms. No pain currently and no open wounds at this time. Recommend daily skin cleansing and moisturize the skin.   
Yari Azul RN, BSN, [employer]

## 2019-11-05 NOTE — PROGRESS NOTES
Bedside shift change report GIVEN TO Liborio Phoenix RN. Report included the following information SBAR and Kardex. SIGNIFICANT CHANGES DURING SHIFT:   
2030: Spoke with Dr. Chelsea Cardenas about patient's increased troponin of 0.24. Patient not symptomatic and per Dr. Chelsea Cardenas no other follow up troponin labs need to be drawn at this time. Will continue to monitor. CONCERNS TO ADDRESS WITH MD:   
 
 
 
 
[facility] NURSING NOTE Admission Date 11/4/2019 Admission Diagnosis Atrial fibrillation with RVR ([facility] [I48.91] Acute diastolic CHF (congestive heart failure) ([facility] [I50.31] JUANJO (acute kidney injury) ([facility] [N17.9] Consults IP CONSULT TO CARDIOLOGY 
IP CONSULT TO HOSPITALIST Cardiac Monitoring [] Yes [] No  
  
Purposeful Hourly Rounding [] Yes   
Narciso Score Total Score: 1 Narciso score 3 or > [] Bed Alarm [] Avasys [] 1:1 sitter [] Patient refused (Signed refusal form in chart) Michel Score Michel Score: 19 Michel score 14 or < [] PMT consult [] Wound Care consult  
 []  Specialty bed  [] Nutrition consult Influenza Vaccine Received Flu Vaccine for Current Season (usually Sept-March): No  
 Patient/Guardian Refused (Notify MD): Yes Oxygen needs? [] Room air Oxygen @  []1L    []2L    []3L   []4L    []5L   []6L via NC Chronic home O2 use? [] Yes [] No 
Perform O2 challenge test and document in progress note using smartphrase (.Homeoxygen) Last bowel movement Last Bowel Movement Date: 11/03/19 Urinary Catheter LDAs Peripheral IV 11/04/19 Left Antecubital (Active) Site Assessment Clean, dry, & intact 11/4/2019  7:49 PM  
Phlebitis Assessment 0 11/4/2019  7:49 PM  
Infiltration Assessment 0 11/4/2019  7:49 PM  
Dressing Status Clean, dry, & intact 11/4/2019  7:49 PM  
Dressing Type Tape;Transparent 11/4/2019  7:49 PM  
Hub Color/Line Status Pink; Infusing 11/4/2019  7:49 PM  
Action Taken Blood drawn 11/4/2019 11:37 AM  
                  
  
 Readmission Risk Assessment Tool Score Low Risk   
      
 5 Total Score 3 Has Seen PCP in Last 6 Months (Yes=3, No=0) 2 Charlson Comorbidity Score (Age + Comorbid Conditions) Criteria that do not apply:  
 . Living with Significant Other. Assisted Living. LTAC. SNF. or  
Rehab Patient Length of Stay (>5 days = 3) IP Visits Last 12 Months (1-3=4, 4=9, >4=11) Pt. Coverage (Medicare=5 , Medicaid, or Self-Pay=4) Expected Length of Stay - - - Actual Length of Stay 0

## 2019-11-06 LAB
ALBUMIN SERPL-MCNC: 3.6 G/DL (ref 3.5–5)
ALBUMIN/GLOB SERPL: 1.4 {RATIO} (ref 1.1–2.2)
ALP SERPL-CCNC: 65 U/L (ref 45–117)
ALT SERPL-CCNC: 107 U/L (ref 12–78)
ANION GAP SERPL CALC-SCNC: 5 MMOL/L (ref 5–15)
AST SERPL-CCNC: 26 U/L (ref 15–37)
ATRIAL RATE: 79 BPM
ATRIAL RATE: 80 BPM
BASOPHILS # BLD: 0 K/UL (ref 0–0.1)
BASOPHILS NFR BLD: 0 % (ref 0–1)
BILIRUB SERPL-MCNC: 1.2 MG/DL (ref 0.2–1)
BUN SERPL-MCNC: 24 MG/DL (ref 6–20)
BUN/CREAT SERPL: 20 (ref 12–20)
CALCIUM SERPL-MCNC: 8.3 MG/DL (ref 8.5–10.1)
CALCULATED P AXIS, ECG09: -97 DEGREES
CALCULATED P AXIS, ECG09: 62 DEGREES
CALCULATED R AXIS, ECG10: -14 DEGREES
CALCULATED R AXIS, ECG10: -23 DEGREES
CALCULATED T AXIS, ECG11: 77 DEGREES
CALCULATED T AXIS, ECG11: 83 DEGREES
CHLORIDE SERPL-SCNC: 105 MMOL/L (ref 97–108)
CO2 SERPL-SCNC: 28 MMOL/L (ref 21–32)
CREAT SERPL-MCNC: 1.19 MG/DL (ref 0.7–1.3)
DIAGNOSIS, 93000: NORMAL
DIAGNOSIS, 93000: NORMAL
DIFFERENTIAL METHOD BLD: ABNORMAL
EOSINOPHIL # BLD: 0 K/UL (ref 0–0.4)
EOSINOPHIL NFR BLD: 0 % (ref 0–7)
ERYTHROCYTE [DISTWIDTH] IN BLOOD BY AUTOMATED COUNT: 15.1 % (ref 11.5–14.5)
GLOBULIN SER CALC-MCNC: 2.5 G/DL (ref 2–4)
GLUCOSE SERPL-MCNC: 96 MG/DL (ref 65–100)
HCT VFR BLD AUTO: 45.2 % (ref 36.6–50.3)
HGB BLD-MCNC: 14.5 G/DL (ref 12.1–17)
IMM GRANULOCYTES # BLD AUTO: 0.1 K/UL (ref 0–0.04)
IMM GRANULOCYTES NFR BLD AUTO: 1 % (ref 0–0.5)
LYMPHOCYTES # BLD: 0.8 K/UL (ref 0.8–3.5)
LYMPHOCYTES NFR BLD: 6 % (ref 12–49)
MAGNESIUM SERPL-MCNC: 2.2 MG/DL (ref 1.6–2.4)
MCH RBC QN AUTO: 30.5 PG (ref 26–34)
MCHC RBC AUTO-ENTMCNC: 32.1 G/DL (ref 30–36.5)
MCV RBC AUTO: 95.2 FL (ref 80–99)
MONOCYTES # BLD: 0.8 K/UL (ref 0–1)
MONOCYTES NFR BLD: 6 % (ref 5–13)
NEUTS SEG # BLD: 11.2 K/UL (ref 1.8–8)
NEUTS SEG NFR BLD: 87 % (ref 32–75)
NRBC # BLD: 0 K/UL (ref 0–0.01)
NRBC BLD-RTO: 0 PER 100 WBC
P-R INTERVAL, ECG05: 154 MS
P-R INTERVAL, ECG05: 158 MS
PLATELET # BLD AUTO: 245 K/UL (ref 150–400)
PMV BLD AUTO: 11.4 FL (ref 8.9–12.9)
POTASSIUM SERPL-SCNC: 4.2 MMOL/L (ref 3.5–5.1)
PROT SERPL-MCNC: 6.1 G/DL (ref 6.4–8.2)
Q-T INTERVAL, ECG07: 426 MS
Q-T INTERVAL, ECG07: 430 MS
QRS DURATION, ECG06: 102 MS
QRS DURATION, ECG06: 106 MS
QTC CALCULATION (BEZET), ECG08: 491 MS
QTC CALCULATION (BEZET), ECG08: 493 MS
RBC # BLD AUTO: 4.75 M/UL (ref 4.1–5.7)
SODIUM SERPL-SCNC: 138 MMOL/L (ref 136–145)
VENTRICULAR RATE, ECG03: 79 BPM
VENTRICULAR RATE, ECG03: 80 BPM
WBC # BLD AUTO: 12.9 K/UL (ref 4.1–11.1)

## 2019-11-06 PROCEDURE — 4A023N7 MEASUREMENT OF CARDIAC SAMPLING AND PRESSURE, LEFT HEART, PERCUTANEOUS APPROACH: ICD-10-PCS | Performed by: INTERNAL MEDICINE

## 2019-11-06 PROCEDURE — 74011250637 HC RX REV CODE- 250/637: Performed by: HOSPITALIST

## 2019-11-06 PROCEDURE — 77030010221 HC SPLNT WR POS TELE -B: Performed by: INTERNAL MEDICINE

## 2019-11-06 PROCEDURE — 99153 MOD SED SAME PHYS/QHP EA: CPT | Performed by: INTERNAL MEDICINE

## 2019-11-06 PROCEDURE — 74011636320 HC RX REV CODE- 636/320: Performed by: INTERNAL MEDICINE

## 2019-11-06 PROCEDURE — 77030019569 HC BND COMPR RAD TERU -B: Performed by: INTERNAL MEDICINE

## 2019-11-06 PROCEDURE — 93458 L HRT ARTERY/VENTRICLE ANGIO: CPT | Performed by: INTERNAL MEDICINE

## 2019-11-06 PROCEDURE — 74011000250 HC RX REV CODE- 250: Performed by: INTERNAL MEDICINE

## 2019-11-06 PROCEDURE — C1894 INTRO/SHEATH, NON-LASER: HCPCS | Performed by: INTERNAL MEDICINE

## 2019-11-06 PROCEDURE — 74011250636 HC RX REV CODE- 250/636: Performed by: INTERNAL MEDICINE

## 2019-11-06 PROCEDURE — 77010033678 HC OXYGEN DAILY

## 2019-11-06 PROCEDURE — 80053 COMPREHEN METABOLIC PANEL: CPT

## 2019-11-06 PROCEDURE — 77030008543 HC TBNG MON PRSS MRTM -A: Performed by: INTERNAL MEDICINE

## 2019-11-06 PROCEDURE — 65270000029 HC RM PRIVATE

## 2019-11-06 PROCEDURE — 74011000250 HC RX REV CODE- 250: Performed by: HOSPITALIST

## 2019-11-06 PROCEDURE — 94760 N-INVAS EAR/PLS OXIMETRY 1: CPT

## 2019-11-06 PROCEDURE — 77030015766: Performed by: INTERNAL MEDICINE

## 2019-11-06 PROCEDURE — 93005 ELECTROCARDIOGRAM TRACING: CPT

## 2019-11-06 PROCEDURE — 74011250637 HC RX REV CODE- 250/637: Performed by: INTERNAL MEDICINE

## 2019-11-06 PROCEDURE — 83735 ASSAY OF MAGNESIUM: CPT

## 2019-11-06 PROCEDURE — 99152 MOD SED SAME PHYS/QHP 5/>YRS: CPT | Performed by: INTERNAL MEDICINE

## 2019-11-06 PROCEDURE — 65660000000 HC RM CCU STEPDOWN

## 2019-11-06 PROCEDURE — 36415 COLL VENOUS BLD VENIPUNCTURE: CPT

## 2019-11-06 PROCEDURE — 85025 COMPLETE CBC W/AUTO DIFF WBC: CPT

## 2019-11-06 PROCEDURE — B2111ZZ FLUOROSCOPY OF MULTIPLE CORONARY ARTERIES USING LOW OSMOLAR CONTRAST: ICD-10-PCS | Performed by: INTERNAL MEDICINE

## 2019-11-06 PROCEDURE — 77030004549 HC CATH ANGI DX PRF MRTM -A: Performed by: INTERNAL MEDICINE

## 2019-11-06 PROCEDURE — C1887 CATHETER, GUIDING: HCPCS | Performed by: INTERNAL MEDICINE

## 2019-11-06 PROCEDURE — C1769 GUIDE WIRE: HCPCS | Performed by: INTERNAL MEDICINE

## 2019-11-06 PROCEDURE — 74011250636 HC RX REV CODE- 250/636: Performed by: HOSPITALIST

## 2019-11-06 RX ORDER — HEPARIN SODIUM 200 [USP'U]/100ML
INJECTION, SOLUTION INTRAVENOUS
Status: DISCONTINUED | OUTPATIENT
Start: 2019-11-06 | End: 2019-11-06

## 2019-11-06 RX ORDER — MIDAZOLAM HYDROCHLORIDE 1 MG/ML
INJECTION, SOLUTION INTRAMUSCULAR; INTRAVENOUS AS NEEDED
Status: DISCONTINUED | OUTPATIENT
Start: 2019-11-06 | End: 2019-11-06

## 2019-11-06 RX ORDER — LIDOCAINE HYDROCHLORIDE 10 MG/ML
INJECTION, SOLUTION EPIDURAL; INFILTRATION; INTRACAUDAL; PERINEURAL AS NEEDED
Status: DISCONTINUED | OUTPATIENT
Start: 2019-11-06 | End: 2019-11-06

## 2019-11-06 RX ORDER — VERAPAMIL HYDROCHLORIDE 2.5 MG/ML
INJECTION, SOLUTION INTRAVENOUS AS NEEDED
Status: DISCONTINUED | OUTPATIENT
Start: 2019-11-06 | End: 2019-11-06

## 2019-11-06 RX ORDER — GUAIFENESIN 100 MG/5ML
LIQUID (ML) ORAL AS NEEDED
Status: DISCONTINUED | OUTPATIENT
Start: 2019-11-06 | End: 2019-11-06

## 2019-11-06 RX ORDER — HEPARIN SODIUM 1000 [USP'U]/ML
INJECTION, SOLUTION INTRAVENOUS; SUBCUTANEOUS AS NEEDED
Status: DISCONTINUED | OUTPATIENT
Start: 2019-11-06 | End: 2019-11-06

## 2019-11-06 RX ADMIN — Medication 3 MG: at 21:22

## 2019-11-06 RX ADMIN — Medication 10 ML: at 05:04

## 2019-11-06 RX ADMIN — APIXABAN 5 MG: 5 TABLET, FILM COATED ORAL at 21:20

## 2019-11-06 RX ADMIN — DOCUSATE SODIUM 100 MG: 100 CAPSULE, LIQUID FILLED ORAL at 09:50

## 2019-11-06 RX ADMIN — METHYLPREDNISOLONE 6 MG: 4 TABLET ORAL at 23:55

## 2019-11-06 RX ADMIN — Medication 10 ML: at 23:55

## 2019-11-06 RX ADMIN — METOPROLOL TARTRATE 5 MG: 5 INJECTION INTRAVENOUS at 09:50

## 2019-11-06 RX ADMIN — FUROSEMIDE 40 MG: 10 INJECTION, SOLUTION INTRAMUSCULAR; INTRAVENOUS at 09:50

## 2019-11-06 RX ADMIN — SOTALOL HYDROCHLORIDE 80 MG: 80 TABLET ORAL at 23:55

## 2019-11-06 RX ADMIN — METHYLPREDNISOLONE 6 MG: 4 TABLET ORAL at 15:20

## 2019-11-06 RX ADMIN — LORATADINE 10 MG: 10 TABLET ORAL at 09:50

## 2019-11-06 RX ADMIN — MONTELUKAST 10 MG: 10 TABLET, FILM COATED ORAL at 17:41

## 2019-11-06 RX ADMIN — SOTALOL HYDROCHLORIDE 80 MG: 80 TABLET ORAL at 13:05

## 2019-11-06 RX ADMIN — ASPIRIN 81 MG: 81 TABLET, COATED ORAL at 09:50

## 2019-11-06 NOTE — PROGRESS NOTES
CM consults in place to determine patient insurance status and ability to pay for Eliquis. Chart review shows patient has Richard Laughter (a change in the chart this morning). [facility] in [place] requires hard scrip for pricing. NP Tye Velázquez sent escrip and provided 30 day free trial card. Call placed to pharmacy, Mariela 45 codes and ID provided for 30 day card. Patient's price for Eliquis will be $0 for the first month. Additional $10 co-pay card provided by cardiology will allow patient to pay $35 next month when he activates the card. NP updated. Patient provided with cards and explanation. 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 Patient states if he needs to go on short term disability because of this he would need documentation from the hospital.  CM encouraged patient to speak to physician about what work restrictions he will have on medication. 1400:  Patient asking physician for different PCP. List of BS physicians provided to patient per MD request.    0270:  CM consult for patient to discharge tomorrow, needs new PCP. BS physician list printed and provided to patient for follow up appointment.         Jana Trujillo RN, BSN  [facility]  [phone]

## 2019-11-06 NOTE — PROGRESS NOTES
Hospitalist Progress Note    NAME: Gordo Lind   :  1960   MRN:  [id number]       Assessment / Plan:  New onset afib with RVR, POA off diltiazem drip, s/p RUFINO and Cardioversion back on NSR, Cardiology help appreciated. Start Eliquis  Acute diastolic chf, POA: EF 43% with some systolic dysfunction, c/w diuretic, monitor I/O and weight. So far keeping negative balances  HTN off diltiazem drip at this time, monitor, c/w sotalol  Leukocytosis: no signs of infection, monitor  JUANJO Cr 1.5, baseline unknown, so far creatinine trending down, monitor  Morbid obesity BMI 40.42 counseled. Hx mercury poisoning and gadolinium allergy, has had chelation therapy  ALYSSA on cpap  Surrogate decision maker:  Daughter Jose Carlos Carmichael  Code status: Full  Prophylaxis: Eliquis  Recommended Disposition: Home w/Family     D/c plan likely for tomorrow     Subjective:     Chief Complaint / Reason for Physician Visit  \"I feel way better\". Discussed with RN events overnight. Review of Systems:  Symptom Y/N Comments  Symptom Y/N Comments   Fever/Chills    Chest Pain     Poor Appetite    Edema     Cough    Abdominal Pain     Sputum    Joint Pain     SOB/ELIZONDO n   Pruritis/Rash     Nausea/vomit    Tolerating PT/OT     Diarrhea    Tolerating Diet y    Constipation    Other       Could NOT obtain due to:      Objective:     VITALS:   Last 24hrs VS reviewed since prior progress note.  Most recent are:  Patient Vitals for the past 24 hrs:   Temp Pulse Resp BP SpO2   19 1300    124/76    19 1230    (!) 145/94 96 %   19 1200    145/64    19 1130  88  (!) 163/109 97 %   19 1100    (!) 198/111    19 1045    (!) 175/108 96 %   19 1030    (!) 195/119    19 1015    (!) 178/122    19 0955  83 18 (!) 192/175 96 %   19 0950    (!) 200/124    19 0945  90 26 (!) 183/135 97 %   19 0940    (!) 170/116    19 0935    (!) 182/116  11/06/19 0930  84 (!) 31 (!) 172/110 94 %   11/06/19 0915  86 23 (!) 178/116 93 %   11/06/19 0900  90 25 (!) 156/112 96 %   11/06/19 0847    (!) 154/98    11/06/19 0830  79 10 142/82 97 %   11/06/19 0822 97.2 °F (36.2 °C) 83 18 (!) 155/111 97 %   11/06/19 0403 98 °F (36.7 °C) 75 18 158/86 96 %   11/05/19 2325 97.7 °F (36.5 °C) 74 20 (!) 158/93 99 %   11/05/19 1948 97.5 °F (36.4 °C) 74 20 158/79 97 %   11/05/19 1704 97.5 °F (36.4 °C) 74 20 (!) 149/95 94 %       Intake/Output Summary (Last 24 hours) at 11/6/2019 1402  Last data filed at 11/6/2019 1207  Gross per 24 hour   Intake 400 ml   Output 3000 ml   Net -2600 ml        PHYSICAL EXAM:  General: WD, WN. Alert, cooperative, in no distress  EENT:  EOMI. Anicteric sclerae. MMM  Resp:  Coarse BS, rhonchi  CV:  Regular  rhythm,  trace  edema  GI:  Soft, Non distended, Non tender.  +Bowel sounds  Neurologic:  Alert and oriented X 3, normal speech,   Psych:   Good insight. Not anxious nor agitated  Skin:  No rashes. No jaundice    Reviewed most current lab test results and cultures  YES  Reviewed most current radiology test results   YES  Review and summation of old records today    NO  Reviewed patient's current orders and MAR    YES  PMH/ reviewed - no change compared to H&P  ________________________________________________________________________  Care Plan discussed with:    Comments   Patient y    Family      RN y    Care Manager     Consultant                        Multidiciplinary team rounds were held today with , nursing, pharmacist and clinical coordinator. Patient's plan of care was discussed; medications were reviewed and discharge planning was addressed.      ________________________________________________________________________  Total NON critical care TIME: 25   Minutes    Total CRITICAL CARE TIME Spent:   Minutes non procedure based      Comments   >50% of visit spent in counseling and coordination of care y ________________________________________________________________________  Harshil Treadwell MD     Procedures: see electronic medical records for all procedures/Xrays and details which were not copied into this note but were reviewed prior to creation of Plan. LABS:  I reviewed today's most current labs and imaging studies.   Pertinent labs include:  Recent Labs     11/06/19 0421 11/05/19 0441 11/04/19  1120   WBC 12.9* 13.1* 13.7*   HGB 14.5 13.6 14.4   HCT 45.2 43.4 44.5    223 237     Recent Labs     11/06/19 0421 11/05/19 0441 11/04/19  1120    139 141   K 4.2 4.3 4.3    107 107   CO2 28 27 28   GLU 96 99 116*   BUN 24* 25* 26*   CREA 1.19 1.23 1.52*   CA 8.3* 8.2* 8.4*   MG 2.2 2.0  --    PHOS  --  3.0  --    ALB 3.6  --  3.5   TBILI 1.2*  --  0.6   SGOT 26  --  37   *  --  136*       Signed: Harshil Treadwell MD

## 2019-11-06 NOTE — PROGRESS NOTES
Patient visited by [facility] Partner Volunteer on Interventional Cardiology Unit on 11/6/2019. Rev.  Ramírez Solano MDiv, [facility], [address] [facility] paging service: [phone])

## 2019-11-06 NOTE — PROGRESS NOTES
TRANSFER - OUT REPORT:    Verbal report given to nai(name) on [facility]  being transferred to ivcu (unit) for routine post - op       Report consisted of patients Situation, Background, Assessment and   Recommendations(SBAR). Information from the following report(s) SBAR, Kardex, ED Summary, Procedure Summary, Intake/Output and MAR was reviewed with the receiving nurse. Lines:   Peripheral IV 11/04/19 Left Antecubital (Active)   Site Assessment Clean, dry, & intact 11/6/2019  4:03 AM   Phlebitis Assessment 0 11/6/2019  4:03 AM   Infiltration Assessment 0 11/6/2019  4:03 AM   Dressing Status Clean, dry, & intact 11/6/2019  4:03 AM   Dressing Type Transparent 11/6/2019  4:03 AM   Hub Color/Line Status Pink 11/6/2019  4:03 AM   Action Taken Blood drawn 11/4/2019 11:37 AM       Peripheral IV 11/04/19 Right Antecubital (Active)   Site Assessment Clean, dry, & intact 11/6/2019  4:03 AM   Phlebitis Assessment 0 11/6/2019  4:03 AM   Infiltration Assessment 0 11/6/2019  4:03 AM   Dressing Status Clean, dry, & intact 11/6/2019  4:03 AM   Dressing Type Transparent 11/6/2019  4:03 AM   Hub Color/Line Status Pink 11/6/2019  4:03 AM        Opportunity for questions and clarification was provided.       Patient transported with:   Monitor

## 2019-11-06 NOTE — PROGRESS NOTES
Problem: Heart Failure: Day 2  Goal: Activity/Safety  Outcome: Progressing Towards Goal  Goal: Consults, if ordered  Outcome: Progressing Towards Goal  Goal: Diagnostic Test/Procedures  Outcome: Progressing Towards Goal  Goal: Nutrition/Diet  Outcome: Progressing Towards Goal  Goal: Medications  Outcome: Progressing Towards Goal  Goal: Respiratory  Outcome: Progressing Towards Goal

## 2019-11-06 NOTE — PROGRESS NOTES
Bedside shift change report GIVEN TO [facility] RN. Report included the following information SBAR. SIGNIFICANT CHANGES DURING SHIFT:    2000    [place] 0.15   QRS 0.10   RR 0.78   QT 0.43   QTc 0.48    2115 spoke to Marisa from pharmacy regarding sotalol verification. Was told based on QTc they are holding until AM until a pharmacist can speak to Dr. Radha Bernal. CONCERNS TO ADDRESS WITH MD:              [facility] NURSING NOTE   Admission Date 11/4/2019   Admission Diagnosis Atrial fibrillation with RVR (HCC) [J22.54]  Acute diastolic CHF (congestive heart failure) ([address] [I50.31]  JUANJO (acute kidney injury) ([facility] [N17.9]  Atrial fibrillation with rapid ventricular response (HCC) [I48.91]  Acute kidney injury ([facility] [N17.9]  CHF (congestive heart failure) ([facility] [I50.9]   Consults IP CONSULT TO CARDIOLOGY  IP CONSULT TO HOSPITALIST      Cardiac Monitoring [x] Yes [] No      Purposeful Hourly Rounding [x] Yes    Narciso Score Total Score: 1   Narciso score 3 or > [] Bed Alarm [] Avasys [] 1:1 sitter [] Patient refused (Signed refusal form in chart)   Michel Score Michel Score: 20   Michel score 14 or < [] PMT consult [] Wound Care consult    []  Specialty bed  [] Nutrition consult      Influenza Vaccine Received Flu Vaccine for Current Season (usually Sept-March): No    Patient/Guardian Refused (Notify MD): Yes      Oxygen needs? [x] Room air  CPAP Oxygen @  []1L    []2L    []3L   []4L    []5L   []6L via  NC   Chronic home O2 use?  [] Yes [] No  Perform O2 challenge test and document in progress note using smartphrase (.Homeoxygen)      Last bowel movement Last Bowel Movement Date: 11/04/19      Urinary Catheter             LDAs               Peripheral IV 11/04/19 Left Antecubital (Active)   Site Assessment Clean, dry, & intact 11/5/2019  7:48 PM   Phlebitis Assessment 0 11/5/2019  7:48 PM   Infiltration Assessment 0 11/5/2019  7:48 PM   Dressing Status Clean, dry, & intact 11/5/2019  7:48 PM   Dressing Type Transparent 11/5/2019  7:48 PM   Hub Color/Line Status Pink 11/5/2019  7:48 PM   Action Taken Blood drawn 11/4/2019 11:37 AM       Peripheral IV 11/04/19 Right Antecubital (Active)   Site Assessment Clean, dry, & intact 11/5/2019  7:48 PM   Phlebitis Assessment 0 11/5/2019  7:48 PM   Infiltration Assessment 0 11/5/2019  7:48 PM   Dressing Status Clean, dry, & intact 11/5/2019  7:48 PM   Dressing Type Transparent 11/5/2019  7:48 PM   Hub Color/Line Status Pink 11/5/2019  7:48 PM                         Readmission Risk Assessment Tool Score Low Risk            5       Total Score        3 Has Seen PCP in Last 6 Months (Yes=3, No=0)    2 Charlson Comorbidity Score (Age + Comorbid Conditions)        Criteria that do not apply:    . Living with Significant Other. Assisted Living. LTAC. SNF. or   Rehab    Patient Length of Stay (>5 days = 3)    IP Visits Last 12 Months (1-3=4, 4=9, >4=11)    Pt.  Coverage (Medicare=5 , Medicaid, or Self-Pay=4)       Expected Length of Stay - - -   Actual Length of Stay 0

## 2019-11-06 NOTE — PROGRESS NOTES
[address] [place] [address]  [phone]      Cardiology Progress Note      11/6/2019 9:00AM    Admit Date: 11/4/2019    Admit Diagnosis:   Atrial fibrillation with RVR ([facility] [F82.71]  Acute diastolic CHF (congestive heart failure) ([facility] [I50.31]  JUANJO (acute kidney injury) ([facility] [address] [facility] [N17.9]  Atrial fibrillation with rapid ventricular response ([facility] [facility] [I48.91]  Acute kidney injury ([facility] [address] [N17.9]  CHF (congestive heart failure) ([facility] [address] [I50.9]    Subjective:     Betty Guerrero has no c/p CP, SOB. S/p RUFINO and successful cardioversion yesterday, remains in SR. Tolerating sotalol. his AM had cardiac cath with nonobstructive disease.       Visit Vitals  BP (!) 153/111   Pulse 82   Temp 97.2 °F (36.2 °C)   Resp 18   Ht 6' 2\" (1.88 m)   Wt 136.4 kg (300 lb 12.8 oz)   SpO2 96%   BMI 38.62 kg/m²       Current Facility-Administered Medications   Medication Dose Route Frequency    apixaban (ELIQUIS) tablet 5 mg  5 mg Oral Q12H    Sotalol QTc Interval Measurement  1 Each Other Q12H    sotalol (BETAPACE) tablet 80 mg  80 mg Oral Q12H    acetaminophen (TYLENOL) tablet 650 mg  650 mg Oral Q6H PRN    sodium chloride (NS) flush 5-40 mL  5-40 mL IntraVENous Q8H    sodium chloride (NS) flush 5-40 mL  5-40 mL IntraVENous PRN    ondansetron (ZOFRAN) injection 4 mg  4 mg IntraVENous Q6H PRN    docusate sodium (COLACE) capsule 100 mg  100 mg Oral BID    aspirin delayed-release tablet 81 mg  81 mg Oral DAILY    montelukast (SINGULAIR) tablet 10 mg  10 mg Oral QPM    Naltrexone (Bulk) 100 % powd 3 mg  (Patient Supplied)  3 mg Oral QHS    loratadine (CLARITIN) tablet 10 mg  10 mg Oral DAILY    furosemide (LASIX) injection 40 mg  40 mg IntraVENous DAILY    methylPREDNISolone (MEDROL) tablet 6 mg  6 mg Oral BID    metoprolol (LOPRESSOR) injection 5 mg  5 mg IntraVENous Q6H PRN       Objective:      Physical Exam:  General Appearance:  morbidly obese  male in no acute distress  Chest:   basilar rales improving  Cardiovascular:  RRR  no murmur. Abdomen:   Soft, non-tender, bowel sounds are active. Extremities: joe LE edema +2  Skin:  Warm and dry. Data Review:   Recent Labs     11/06/19 0421 11/05/19  0441 11/04/19  1120   WBC 12.9* 13.1* 13.7*   HGB 14.5 13.6 14.4   HCT 45.2 43.4 44.5    223 237     Recent Labs     11/06/19  0421 11/05/19  0441 11/04/19  1120    139 141   K 4.2 4.3 4.3    107 107   CO2 28 27 28   GLU 96 99 116*   BUN 24* 25* 26*   CREA 1.19 1.23 1.52*   CA 8.3* 8.2* 8.4*   MG 2.2 2.0  --    PHOS  --  3.0  --    ALB 3.6  --  3.5   TBILI 1.2*  --  0.6   SGOT 26  --  37   *  --  136*       Recent Labs     11/05/19  0813 11/04/19  1827 11/04/19  1120   TROIQ 0.11* 0.24* 0.18*     Results for Galindo Jefferson (MRN [id number]) as of 11/6/2019 15:04   Ref. Range 11/5/2019 04:41   Triglyceride Latest Ref Range: <150 MG/DL 77   Cholesterol, total Latest Ref Range: <200 MG/   HDL Cholesterol Latest Units: MG/DL 38   CHOL/HDL Ratio Latest Ref Range: 0.0 - 5.0   4.4   LDL, calculated Latest Ref Range: 0 - 100 MG/.6 (H)       Intake/Output Summary (Last 24 hours) at 11/6/2019 1455  Last data filed at 11/6/2019 1207  Gross per 24 hour   Intake 400 ml   Output 3000 ml   Net -2600 ml        Telemetry: afib rate controlled 90s    Echo: 11/4/19  · Left Ventricle: Normal cavity size. Mild concentric hypertrophy. Mild systolic dysfunction. Estimated left ventricular ejection fraction is 46 - 50%. · Right Ventricle: Mildly dilated right ventricle. Mildly reduced systolic function. · Left Atrium: Mildly dilated left atrium. · Mitral Valve: Mild mitral valve regurgitation is present. · Tricuspid Valve: Mild tricuspid valve regurgitation is present. · Pulmonary Artery: Moderate pulmonary hypertension. · Right Atrium: Mildly dilated right atrium.          Assessment:     Active Problems:    JUANJO (acute kidney injury) ([facility] (11/4/2019)      Atrial fibrillation with RVR ([facility] (11/4/2019)      Acute diastolic CHF (congestive heart failure) ([address] (11/4/2019)      Morbid obesity ([address] (11/4/2019)      ALYSSA on CPAP (11/4/2019)      CHF (congestive heart failure) ([address] (11/5/2019)      Atrial fibrillation with rapid ventricular response ([address] (11/5/2019)      Acute kidney injury ([address] (11/5/2019)        Plan:     Afib with RVR:  Successful RUFINO and cardioversion, remains in SR. Starting on sotalol 80mg BID. QTc 436, instructed to only hold dose and call if there is a 15% increase in QT  CHADS2 vasc score:  2. Start on Eliquis 5mg BID, affordable. Elevated troponin:  Cardiac cath with no obstructive CAD  Elevation likely r/t supply/demand  Continue ASA. [facility]  [facility]      [facility]    11/6/2019         Patient seen, examined by me personally. Plan discussed as detailed. Agree with note as outlined by  NP. I confirm findings in history and physical exam. No additional findings noted. Agree with plan as outlined above. Cath normal. LVEDP elevated, continue diuresis. Home tomorrow if stable.     Jese Coffey MD

## 2019-11-06 NOTE — ROUTINE PROCESS
0900: Bedside report received from [facility] 
 
1800: Pt ambulated long distance in hallway with RN, tolerated well, site CDI, no complaints. Pt dyspneic w/exertion, this is baseline.

## 2019-11-07 VITALS
HEART RATE: 74 BPM | TEMPERATURE: 97.7 F | DIASTOLIC BLOOD PRESSURE: 97 MMHG | WEIGHT: 294.09 LBS | RESPIRATION RATE: 18 BRPM | HEIGHT: 74 IN | SYSTOLIC BLOOD PRESSURE: 146 MMHG | OXYGEN SATURATION: 98 % | BODY MASS INDEX: 37.74 KG/M2

## 2019-11-07 LAB
ALBUMIN SERPL-MCNC: 3.1 G/DL (ref 3.5–5)
ALBUMIN/GLOB SERPL: 1.5 {RATIO} (ref 1.1–2.2)
ALP SERPL-CCNC: 58 U/L (ref 45–117)
ALT SERPL-CCNC: 82 U/L (ref 12–78)
ANION GAP SERPL CALC-SCNC: 4 MMOL/L (ref 5–15)
AST SERPL-CCNC: 23 U/L (ref 15–37)
ATRIAL RATE: 68 BPM
BASOPHILS # BLD: 0 K/UL (ref 0–0.1)
BASOPHILS NFR BLD: 0 % (ref 0–1)
BILIRUB SERPL-MCNC: 1.5 MG/DL (ref 0.2–1)
BUN SERPL-MCNC: 25 MG/DL (ref 6–20)
BUN/CREAT SERPL: 19 (ref 12–20)
CALCIUM SERPL-MCNC: 7.9 MG/DL (ref 8.5–10.1)
CALCULATED P AXIS, ECG09: 50 DEGREES
CALCULATED R AXIS, ECG10: -17 DEGREES
CALCULATED T AXIS, ECG11: 83 DEGREES
CHLORIDE SERPL-SCNC: 106 MMOL/L (ref 97–108)
CO2 SERPL-SCNC: 31 MMOL/L (ref 21–32)
CREAT SERPL-MCNC: 1.31 MG/DL (ref 0.7–1.3)
DIAGNOSIS, 93000: NORMAL
DIFFERENTIAL METHOD BLD: ABNORMAL
EOSINOPHIL # BLD: 0 K/UL (ref 0–0.4)
EOSINOPHIL NFR BLD: 0 % (ref 0–7)
ERYTHROCYTE [DISTWIDTH] IN BLOOD BY AUTOMATED COUNT: 15 % (ref 11.5–14.5)
GLOBULIN SER CALC-MCNC: 2.1 G/DL (ref 2–4)
GLUCOSE SERPL-MCNC: 90 MG/DL (ref 65–100)
HCT VFR BLD AUTO: 42.6 % (ref 36.6–50.3)
HGB BLD-MCNC: 13.8 G/DL (ref 12.1–17)
IMM GRANULOCYTES # BLD AUTO: 0.1 K/UL (ref 0–0.04)
IMM GRANULOCYTES NFR BLD AUTO: 0 % (ref 0–0.5)
LYMPHOCYTES # BLD: 0.9 K/UL (ref 0.8–3.5)
LYMPHOCYTES NFR BLD: 7 % (ref 12–49)
MAGNESIUM SERPL-MCNC: 2.2 MG/DL (ref 1.6–2.4)
MCH RBC QN AUTO: 30.6 PG (ref 26–34)
MCHC RBC AUTO-ENTMCNC: 32.4 G/DL (ref 30–36.5)
MCV RBC AUTO: 94.5 FL (ref 80–99)
MONOCYTES # BLD: 0.7 K/UL (ref 0–1)
MONOCYTES NFR BLD: 6 % (ref 5–13)
NEUTS SEG # BLD: 10.6 K/UL (ref 1.8–8)
NEUTS SEG NFR BLD: 87 % (ref 32–75)
NRBC # BLD: 0 K/UL (ref 0–0.01)
NRBC BLD-RTO: 0 PER 100 WBC
P-R INTERVAL, ECG05: 162 MS
PLATELET # BLD AUTO: 223 K/UL (ref 150–400)
PMV BLD AUTO: 11.4 FL (ref 8.9–12.9)
POTASSIUM SERPL-SCNC: 4.2 MMOL/L (ref 3.5–5.1)
PROT SERPL-MCNC: 5.2 G/DL (ref 6.4–8.2)
Q-T INTERVAL, ECG07: 472 MS
QRS DURATION, ECG06: 106 MS
QTC CALCULATION (BEZET), ECG08: 501 MS
RBC # BLD AUTO: 4.51 M/UL (ref 4.1–5.7)
SODIUM SERPL-SCNC: 141 MMOL/L (ref 136–145)
VENTRICULAR RATE, ECG03: 68 BPM
WBC # BLD AUTO: 12.2 K/UL (ref 4.1–11.1)

## 2019-11-07 PROCEDURE — 74011250637 HC RX REV CODE- 250/637: Performed by: INTERNAL MEDICINE

## 2019-11-07 PROCEDURE — 74011250637 HC RX REV CODE- 250/637: Performed by: HOSPITALIST

## 2019-11-07 PROCEDURE — 83735 ASSAY OF MAGNESIUM: CPT

## 2019-11-07 PROCEDURE — 74011250637 HC RX REV CODE- 250/637: Performed by: NURSE PRACTITIONER

## 2019-11-07 PROCEDURE — 93005 ELECTROCARDIOGRAM TRACING: CPT

## 2019-11-07 PROCEDURE — 74011250636 HC RX REV CODE- 250/636: Performed by: HOSPITALIST

## 2019-11-07 PROCEDURE — 85025 COMPLETE CBC W/AUTO DIFF WBC: CPT

## 2019-11-07 PROCEDURE — 36415 COLL VENOUS BLD VENIPUNCTURE: CPT

## 2019-11-07 PROCEDURE — 80053 COMPREHEN METABOLIC PANEL: CPT

## 2019-11-07 RX ORDER — BISACODYL 5 MG
10 TABLET, DELAYED RELEASE (ENTERIC COATED) ORAL
Status: DISCONTINUED | OUTPATIENT
Start: 2019-11-07 | End: 2019-11-07 | Stop reason: HOSPADM

## 2019-11-07 RX ORDER — AMLODIPINE BESYLATE 10 MG/1
10 TABLET ORAL DAILY
Qty: 30 TAB | Refills: 11 | Status: SHIPPED | OUTPATIENT
Start: 2019-11-08 | End: 2020-02-25 | Stop reason: ALTCHOICE

## 2019-11-07 RX ORDER — SOTALOL HYDROCHLORIDE 80 MG/1
80 TABLET ORAL EVERY 12 HOURS
Qty: 60 TAB | Refills: 1 | Status: SHIPPED | OUTPATIENT
Start: 2019-11-07 | End: 2020-01-07

## 2019-11-07 RX ORDER — AMLODIPINE BESYLATE 5 MG/1
5 TABLET ORAL DAILY
Qty: 30 TAB | Refills: 1 | Status: SHIPPED | OUTPATIENT
Start: 2019-11-07 | End: 2019-11-07

## 2019-11-07 RX ORDER — CEFUROXIME AXETIL 500 MG/1
500 TABLET ORAL 2 TIMES DAILY
Qty: 14 TAB | Refills: 0 | Status: SHIPPED | OUTPATIENT
Start: 2019-11-07 | End: 2019-11-14

## 2019-11-07 RX ORDER — ACETAMINOPHEN 325 MG/1
650 TABLET ORAL
Qty: 40 TAB | Refills: 0 | Status: SHIPPED | OUTPATIENT
Start: 2019-11-07 | End: 2020-02-18

## 2019-11-07 RX ORDER — AMLODIPINE BESYLATE 5 MG/1
10 TABLET ORAL DAILY
Status: DISCONTINUED | OUTPATIENT
Start: 2019-11-07 | End: 2019-11-07 | Stop reason: HOSPADM

## 2019-11-07 RX ORDER — ASPIRIN 81 MG/1
81 TABLET ORAL DAILY
Qty: 30 TAB | Refills: 1 | Status: SHIPPED | OUTPATIENT
Start: 2019-11-07 | End: 2019-11-07

## 2019-11-07 RX ORDER — FUROSEMIDE 40 MG/1
40 TABLET ORAL DAILY
Qty: 30 TAB | Refills: 1 | Status: SHIPPED | OUTPATIENT
Start: 2019-11-07 | End: 2019-11-19

## 2019-11-07 RX ORDER — AMLODIPINE BESYLATE 5 MG/1
5 TABLET ORAL DAILY
Status: DISCONTINUED | OUTPATIENT
Start: 2019-11-07 | End: 2019-11-07

## 2019-11-07 RX ADMIN — METHYLPREDNISOLONE 6 MG: 4 TABLET ORAL at 08:44

## 2019-11-07 RX ADMIN — SOTALOL HYDROCHLORIDE 80 MG: 80 TABLET ORAL at 09:22

## 2019-11-07 RX ADMIN — LORATADINE 10 MG: 10 TABLET ORAL at 08:44

## 2019-11-07 RX ADMIN — APIXABAN 5 MG: 5 TABLET, FILM COATED ORAL at 08:42

## 2019-11-07 RX ADMIN — AMLODIPINE BESYLATE 10 MG: 5 TABLET ORAL at 08:42

## 2019-11-07 RX ADMIN — Medication 10 ML: at 06:16

## 2019-11-07 RX ADMIN — DOCUSATE SODIUM 100 MG: 100 CAPSULE, LIQUID FILLED ORAL at 08:42

## 2019-11-07 RX ADMIN — FUROSEMIDE 40 MG: 10 INJECTION, SOLUTION INTRAMUSCULAR; INTRAVENOUS at 08:44

## 2019-11-07 NOTE — PROGRESS NOTES
Problem: Falls - Risk of  Goal: *Absence of Falls  Description  Document Rea Carlson Fall Risk and appropriate interventions in the flowsheet.   Outcome: Progressing Towards Goal  Note:   Fall Risk Interventions:            Medication Interventions: Patient to call before getting OOB, Teach patient to arise slowly    Elimination Interventions: Call light in reach, Urinal in reach              Problem: Patient Education: Go to Patient Education Activity  Goal: Patient/Family Education  Outcome: Resolved/Met     Problem: Afib Pathway: Day 1  Goal: Off Pathway (Use only if patient is Off Pathway)  Outcome: Resolved/Met  Goal: Activity/Safety  Outcome: Resolved/Met  Goal: Consults, if ordered  Outcome: Resolved/Met  Goal: Diagnostic Test/Procedures  Outcome: Resolved/Met  Goal: Nutrition/Diet  Outcome: Resolved/Met  Goal: Discharge Planning  Outcome: Resolved/Met  Goal: Medications  Outcome: Resolved/Met  Goal: Respiratory  Outcome: Resolved/Met  Goal: Treatments/Interventions/Procedures  Outcome: Resolved/Met  Goal: Psychosocial  Outcome: Resolved/Met  Goal: *Optimal pain control at patient's stated goal  Outcome: Resolved/Met  Goal: *Hemodynamically stable  Outcome: Resolved/Met  Goal: *Stable cardiac rhythm  Outcome: Resolved/Met  Goal: *Lungs clear or at baseline  Outcome: Resolved/Met  Goal: *Labs within defined limits  Outcome: Resolved/Met  Goal: *Describes available resources and support systems  Outcome: Resolved/Met     Problem: Patient Education: Go to Patient Education Activity  Goal: Patient/Family Education  Outcome: Resolved/Met     Problem: Heart Failure: Day 1  Goal: Off Pathway (Use only if patient is Off Pathway)  Outcome: Resolved/Met  Goal: Activity/Safety  Outcome: Resolved/Met  Goal: Consults, if ordered  Outcome: Resolved/Met  Goal: Diagnostic Test/Procedures  Outcome: Resolved/Met  Goal: Nutrition/Diet  Outcome: Resolved/Met  Goal: Discharge Planning  Outcome: Resolved/Met  Goal: Medications  Outcome: Resolved/Met  Goal: Respiratory  Outcome: Resolved/Met  Goal: Treatments/Interventions/Procedures  Outcome: Resolved/Met  Goal: Psychosocial  Outcome: Resolved/Met  Goal: *Oxygen saturation within defined limits  Outcome: Resolved/Met  Goal: *Hemodynamically stable  Outcome: Resolved/Met  Goal: *Optimal pain control at patient's stated goal  Outcome: Resolved/Met  Goal: *Anxiety reduced or absent  Outcome: Resolved/Met     Problem: Cath Lab Procedures: Pre-Procedure  Goal: Off Pathway (Use only if patient is Off Pathway)  Outcome: Resolved/Met  Goal: Activity/Safety  Outcome: Resolved/Met  Goal: Consults, if ordered  Outcome: Resolved/Met  Goal: Diagnostic Test/Procedures  Outcome: Resolved/Met  Goal: Nutrition/Diet  Outcome: Resolved/Met  Goal: Discharge Planning  Outcome: Resolved/Met  Goal: Medications  Outcome: Resolved/Met  Goal: Respiratory  Outcome: Resolved/Met  Goal: Treatments/Interventions/Procedures  Outcome: Resolved/Met  Goal: Psychosocial  Outcome: Resolved/Met  Goal: *Verbalize description of procedure  Outcome: Resolved/Met  Goal: *Consent signed  Outcome: Resolved/Met     Problem: Cath Lab Procedures: Post-Cath Day of Procedure (Initiate SCIP Measures for Post-Op Care)  Goal: Off Pathway (Use only if patient is Off Pathway)  Outcome: Resolved/Met  Goal: Activity/Safety  Outcome: Resolved/Met  Goal: Consults, if ordered  Outcome: Resolved/Met  Goal: Diagnostic Test/Procedures  Outcome: Resolved/Met  Goal: Nutrition/Diet  Outcome: Resolved/Met  Goal: Discharge Planning  Outcome: Resolved/Met  Goal: Medications  Outcome: Resolved/Met  Goal: Respiratory  Outcome: Resolved/Met  Goal: Treatments/Interventions/Procedures  Outcome: Resolved/Met  Goal: Psychosocial  Outcome: Resolved/Met  Goal: *Procedure site is without bleeding and signs of infection six hours post sheath removal  Outcome: Resolved/Met  Goal: *Hemodynamically stable  Outcome: Resolved/Met  Goal: *Optimal pain control at patient's stated goal  Outcome: Resolved/Met

## 2019-11-07 NOTE — PROGRESS NOTES
09:15 - Spoke with Flory Ledbetter NP regarding rhythm overnight with intermittent inverted P waves. QTc 470 now, ok to give AM dose of Sotalol. 11:00 - QTc 470.      11:10 - Discharge instructions given to patient. Verbalized understanding, however, he does not think he can adhere to the 1500FR. He is also upset that he cannot take the turmeric for his joint pain. He was advised of the need to limit his fluid intake and rationale. Also,if his joint pain becomes severe and Tylenol does not help, to call his PCP before taking the turmeric. PIVs and tele removed. Rx given, patient has Eliquis cards.

## 2019-11-07 NOTE — DISCHARGE INSTRUCTIONS
HOSPITALIST DISCHARGE INSTRUCTIONS  NAME: Rashi Faustin   :  1960   MRN:  [id number]     Date/Time:  2019 7:43 AM    ADMIT DATE: 2019     DISCHARGE DATE: 2019     DIAGNOSIS:  A Fib RVR, CHF, Leukocytosis, Rash, JUANJO, Morbid Obesity, H/O Mercury and Gadolinium Allergy, ALYSSA,     MEDICATIONS:                As per medication reconciliation    · It is important that you take the medication exactly as they are prescribed. · Keep your medication in the bottles provided by the pharmacist and keep a list of the medication names, dosages, and times to be taken in your wallet. · Do not take other medications without consulting your doctor. Pain Management: per above medications    What to do at Home    Recommended diet:  Cardiac Diet    Recommended activity: Activity as tolerated and No lifting, Driving, or Strenuous exercise for 2 weeks    If you experience any of the following symptoms then please call your primary care physician or return to the emergency room if you cannot get hold of your doctor:  Fever, chills, nausea, vomiting, diarrhea, change in mentation, falling, bleeding, shortness of breath. Weight:  · Daily weights, Notify your Doctor if Wt gain of 3 lb in a day or 5 lb in a week  · Daily Intake & Output    Diet :  · Low salt cardiac diet   · Fluid restriction of 1500 ml daily                           [address]  [phone]        Patient ID:  Rashi Faustin  [id number]  72 y.o.  1960    Admit Date: 2019    Discharge Date: 2019       Admission Diagnoses:   Atrial fibrillation with RVR ([facility] [W82.13]  Acute diastolic CHF (congestive heart failure) ([facility] [address] [facility] [I50.31]  JUANJO (acute kidney injury) ([facility] [address] [facility] [N17.9]  Atrial fibrillation with rapid ventricular response (HCC) [I48.91]  Acute kidney injury ([address] [N17.9]  CHF (congestive heart failure) ([facility] [I50.9]    Discharge Diagnoses:    Active Problems:    JUANJO (acute kidney [facility]) ([facility] (11/4/2019)      Atrial fibrillation with RVR ([facility] (11/4/2019)      Acute diastolic CHF (congestive heart failure) ([facility] (11/4/2019)      Morbid obesity ([facility] (11/4/2019)      ALYSSA on CPAP (11/4/2019)      CHF (congestive heart failure) ([facility] (11/5/2019)      Atrial fibrillation with rapid ventricular response ([facility] (11/5/2019)      Acute kidney injury ([facility] (11/5/2019)    Cardiology Procedures this Admission:  Diagnostic left heart catheterization  EchoCardiogram  RUFINO/Cardioversion    Disposition: home    Reference discharge instructions provided by nursing for diet and activity. Signed:  Lonnie Conner NP  11/7/2019  11:03 AM      Radial Cardiac Catheterization/Angiography Discharge Instructions    It is normal to feel tired the first couple days. Take it easy and follow the physicians instructions. CHECK THE CATHETER INSERTION SITE DAILY:    Remove the wrist dressing 24 hours after the procedure. You may shower 24 hours after the procedure. Wash with soap and water and pat dry. Gentle cleaning of the site with soap and water is sufficient, cover with a dry clean dressing or bandage. Do not apply creams or powders to the area. No soaking the wrist for 3 days. Leave the puncture site open to air after 24 hours post-procedure. CALL THE PHYSICIANS:     If the site becomes red, swollen or feels warm to the touch  If there is bleeding or drainage or if there is unusual pain at the radial site. If there is any minor oozing, you may apply a band-aid and remove after 12 hours. If the bleeding continues, hold pressure with the middle finger against the puncture site and the thumb against the back of the wrist,call 911 to be transported to the hospital.  DO NOT DRIVE YOURSELF, [place] [address].     ACTIVITY:   For the first 24 hours do not manipulate the wrist.  No lifting, pushing or pulling over 3-5 pounds with the affected wrist for 7 daysand no straining the insertion site. Do not life grocery bags or the garbage can, do not run the vacuum  or  for 7 days. Start with short walks as in the hospital and gradually increase as tolerated each day. It is recommended to walk 30 minutes 5-7 days per week. Follow your physicians instructions on activity. Avoid walking outside in extremes of heat or cold. Walk inside when it is cold and windy or hot and humid. Things to keep in mind:  No driving for at least 24 hours, or as designated by your physician. Limit the number of times you go up and down the stairs  Take rests and pace yourself with activity. Be careful and do not strain with bowel movements. MEDICATIONS:    Take all medications as prescribed  Call your physician if you have any questions  Keep an updated list of your medications with you at all times and give a list to your physician and pharmacist    SIGNS AND SYMPTOMS:   Be cautious of symptoms of angina or recurrent symptoms such as chest discomfort, unusual shortness of breath or fatigue. These could be symptoms of restenosis, a new blockage or a heart attack. If your symptoms are relieved with rest it is still recommended that you notify your physician of recurrent chest pain or discomfort. For CHEST PAIN or symptoms of angina not relieved with rest:  If the discomfort is not relieved with rest, and you have been prescribed Nitroglycerin, take as directed (taken under the tongue, one at a time 5 minutes apart for a total of 3 doses). If the discomfort is not relieved after the 3rd nitroglycerin, call 911. If you have not been prescribed Nitroglycerin  and your chest discomfort is not relieved with rest, call 911. AFTER CARE:   Follow up with your physician as instructed. Follow a heart healthy diet with proper portion control, daily stress management, daily exercise, blood pressure and cholesterol control , and smoking cessation.                     Follow Up:   PCP you are to call and set up an appointment to see them in 2 week. F/U Cardiology      Information obtained by :  I understand that if any problems occur once I am at home I am to contact my physician. I understand and acknowledge receipt of the instructions indicated above.                                                                                                                                            Physician's or R.N.'s Signature                                                                  Date/Time                                                                                                                                              Patient or Representative Signature                                                          Date/Time

## 2019-11-07 NOTE — DISCHARGE SUMMARY
Hospitalist Discharge Summary     Patient ID:  Remedios Conde  [id number]  60 y.o.  1960 11/4/2019    PCP on record: Francisco Nguyen MD    Admit date: 11/4/2019  Discharge date and time: 11/7/2019    DISCHARGE DIAGNOSIS:    A Fib RVR, CHF, Leukocytosis, Rash, JUANJO, Morbid Obesity, H/O Mercury and Gadolinium Allergy, ALYSSA,     CONSULTATIONS:  IP CONSULT TO CARDIOLOGY  IP CONSULT TO HOSPITALIST    Excerpted HPI from H&P of Marcell Lou MD:  Remedios Conde is a 60 y.o. male with PMH mercury poisoning, gadolinium contrast allergy, HTN, \"irregular heart rhythm\" presents with 2 months of progressive SOB, feet swelling initially thought he has an allergic reaction. +orthopnea, ELIZONDO. Unable to fit feet into shoes due to edema.       He reports his body has always reacted paradoxically to medications and has been weary of taking meds as a result. His pcp put him on solumedrol for past month for possible allergy. He was also recently put on triamterene-hctz but this has not helped with edema. He had chelation therapy last month in [place] which he gets periodically for prior mercury and gadolinium allergy.     Found to be in afib with RVR. He denies having afib but has had irregular heart rhythm in past and told he should see cardiologist but he did not follow up.     We were asked to admit for work up and evaluation of the above problems. ______________________________________________________________________  DISCHARGE SUMMARY/HOSPITAL COURSE:  for full details see H&P, daily progress notes, labs, consult notes. New onset afib with RVR, POA off diltiazem drip, s/p RUFINO and Cardioversion back on NSR, Cardiology help appreciated. Start Eliquis  Acute diastolic chf, POA: EF 91% with some systolic dysfunction, c/w diuretic, monitor I/O and weight.  So far keeping negative balances  HTN off diltiazem drip at this time, monitor, c/w sotalol  Leukocytosis: no signs of infection, monitor  Pustular lesions in hands: will give a course of Ceftin  JUANJO Cr 1.5, baseline unknown, so far creatinine trending down, monitor  Morbid obesity BMI 40.42 counseled. Hx mercury poisoning and gadolinium allergy, has had chelation therapy  ALYSSA on cpap  Surrogate decision maker:  Daughter Palak Mckeon  Code status: Full  Prophylaxis: Eliquis  Recommended Disposition: Home w/Family      D/c Home and F/U with PCP and Cardiology    _______________________________________________________________________  Patient seen and examined by me on discharge day. Pertinent Findings:  Gen:    Not in distress  Chest: Clear lungs  CVS:   Regular rhythm. trace edema  Abd:  Soft, not distended, not tender  Neuro:  Alert, GCS 15  _______________________________________________________________________  DISCHARGE MEDICATIONS:   Current Discharge Medication List      START taking these medications    Details   acetaminophen (TYLENOL) 325 mg tablet Take 2 Tabs by mouth every six (6) hours as needed for Pain or Fever. Qty: 40 Tab, Refills: 0      furosemide (LASIX) 40 mg tablet Take 1 Tab by mouth daily. Qty: 30 Tab, Refills: 1      sotalol (BETAPACE) 80 mg tablet Take 1 Tab by mouth every twelve (12) hours. Qty: 60 Tab, Refills: 1      apixaban (ELIQUIS) 5 mg tablet Take 1 Tab by mouth two (2) times a day. Qty: 60 Tab, Refills: 1      cefUROXime (CEFTIN) 500 mg tablet Take 1 Tab by mouth two (2) times a day for 7 days. Qty: 14 Tab, Refills: 0      amLODIPine (NORVASC) 10 mg tablet Take 1 Tab by mouth daily. Qty: 30 Tab, Refills: 11         CONTINUE these medications which have NOT CHANGED    Details   methylPREDNISolone (MEDROL) 8 mg tablet Take 4 mg by mouth two (2) times a day. desloratadine (CLARINEX) 5 mg tablet Take 5 mg by mouth daily. NALTREXONE Take 3 mg by mouth nightly. montelukast (SINGULAIR) 10 mg tablet Take 10 mg by mouth every evening.       albuterol (PROAIR HFA) 90 mcg/actuation inhaler Take 2 Puffs by inhalation every four (4) hours as needed for Wheezing or Shortness of Breath. dextroamphetamine-amphetamine (ADDERALL) 30 mg tablet Take 30 mg by mouth two (2) times a day. STOP taking these medications       triamterene-hydroCHLOROthiazide (DYAZIDE) 37.5-25 mg per capsule Comments:   Reason for Stopping:         aspirin delayed-release 81 mg tablet Comments:   Reason for Stopping:         turmeric-turmeric root extract 450-50 mg cap Comments:   Reason for Stopping:                 Patient Follow Up Instructions: Activity: Activity as tolerated  Diet: Cardiac Diet  Wound Care: None needed    Follow-up with PCP and Cardiology in 2 weeks. Follow-up tests/labs none  Follow-up Information     Follow up With Specialties Details Why Contact Info    Patient's Own Medication is located in the Patient's:  Robin Cohen MD General Practice Go on 11/12/2019 Hospital follow-up scheduled at 2:00pm ( If you have questions or need to reschedule please call [facility] 110 [facility] 87      Farhad Decker NP Nurse Practitioner Go on 11/19/2019 Cardiology follow up at 1:45pm [address] [address] [phone] [facility]  On 1/9/2020 PCP - New Patient - Arrive 30 mins early. January 9th first available.   2:00 pm  with Sina Flores NP [address]  [phone]        ________________________________________________________________    Risk of deterioration: Moderate    Condition at Discharge:  Stable  __________________________________________________________________    Disposition  Home with family, no needs    ____________________________________________________________________    Code Status: Full Code  ___________________________________________________________________      Total time in minutes spent coordinating this discharge (includes going over instructions, follow-up, prescriptions, and preparing report for sign off to her PCP) :  35 minutes    Signed:  Jae Dueñas MD

## 2019-11-07 NOTE — PROGRESS NOTES
PCP BRANDEE appt scheduled with Dr. Adryan Mascorro on 11/12/2019 at 2:00pm  Appt added to AVS. Kate Murrell, CM Specialist

## 2019-11-07 NOTE — PROGRESS NOTES
BRANDEE  Home  Follow up appointments    Chart review. Patient is requesting a new PCP. This CM met with patient to confirm. He has requested [facility].  informed. CM verified with [facility] we do not service [facility], patient location for [facility]. Nursing is scheduling Cardiology follow up.    1036am  This CM contacted [facility] at patient request.  First NP appointment January 2020. This CM will ask patient to keep present follow up appointment with current PCP and transition to new provider. Appt with New provider noted on AVS.     1044am  Patient is agreeable to plan. Nursing informed CM tasks complete.      [address] [facility]  Ext [phone]

## 2019-11-07 NOTE — PROGRESS NOTES
[address] [facility]  [phone]  Fax: [phone]        To Whom may it concern. This letter is to certify that  Wilmer Cat   was admitted  11/04/19  and Discharged   11/07/19               . Wilmer Cat  may return to work/school on 11/11/19      If you have any questions, please do not hesitate to contact me.             Dr. Ainsley Holloway  [id number]  [phone]

## 2019-11-07 NOTE — PROGRESS NOTES
[address] [place] [address]  [phone]      Cardiology Progress Note      11/7/2019 9:00AM    Admit Date: 11/4/2019    Admit Diagnosis:   Atrial fibrillation with RVR ([facility] [W76.42]  Acute diastolic CHF (congestive heart failure) ([address] [I50.31]  JUANJO (acute kidney injury) ([facility] [N17.9]  Atrial fibrillation with rapid ventricular response ([address] [I48.91]  Acute kidney injury ([address] [N17.9]  CHF (congestive heart failure) ([facility] [I50.9]    Subjective:     Skylar Navas has no c/p CP, SOB. S/p RUFINO and successful cardioversion, normal cardiac cath with nonobstructive disease.       Visit Vitals  BP (!) 146/97   Pulse 74   Temp 97.7 °F (36.5 °C)   Resp 18   Ht 6' 2\" (1.88 m)   Wt 133.4 kg (294 lb 1.5 oz)   SpO2 98%   BMI 37.76 kg/m²       Current Facility-Administered Medications   Medication Dose Route Frequency    bisacodyl (DULCOLAX) tablet 10 mg  10 mg Oral NOW    amLODIPine (NORVASC) tablet 10 mg  10 mg Oral DAILY    apixaban (ELIQUIS) tablet 5 mg  5 mg Oral Q12H    Sotalol QTc Interval Measurement  1 Each Other Q12H    sotalol (BETAPACE) tablet 80 mg  80 mg Oral Q12H    acetaminophen (TYLENOL) tablet 650 mg  650 mg Oral Q6H PRN    sodium chloride (NS) flush 5-40 mL  5-40 mL IntraVENous Q8H    sodium chloride (NS) flush 5-40 mL  5-40 mL IntraVENous PRN    ondansetron (ZOFRAN) injection 4 mg  4 mg IntraVENous Q6H PRN    docusate sodium (COLACE) capsule 100 mg  100 mg Oral BID    montelukast (SINGULAIR) tablet 10 mg  10 mg Oral QPM    Naltrexone (Bulk) 100 % powd 3 mg  (Patient Supplied)  3 mg Oral QHS    loratadine (CLARITIN) tablet 10 mg  10 mg Oral DAILY    furosemide (LASIX) injection 40 mg  40 mg IntraVENous DAILY    methylPREDNISolone (MEDROL) tablet 6 mg  6 mg Oral BID    metoprolol (LOPRESSOR) injection 5 mg  5 mg IntraVENous Q6H PRN       Objective:      Physical Exam:  General Appearance:  morbidly obese  male in no acute distress  Chest:   basilar rales resolved  Cardiovascular:  RRR  no murmur. Abdomen:   Soft, non-tender, bowel sounds are active. Extremities: joe LE edema +2  Skin:  Warm and dry. Data Review:   Recent Labs     11/07/19  0600 11/06/19  0421 11/05/19  0441   WBC 12.2* 12.9* 13.1*   HGB 13.8 14.5 13.6   HCT 42.6 45.2 43.4    245 223     Recent Labs     11/07/19  0600 11/06/19  0421 11/05/19  0441 11/04/19  1120    138 139 141   K 4.2 4.2 4.3 4.3    105 107 107   CO2 31 28 27 28   GLU 90 96 99 116*   BUN 25* 24* 25* 26*   CREA 1.31* 1.19 1.23 1.52*   CA 7.9* 8.3* 8.2* 8.4*   MG 2.2 2.2 2.0  --    PHOS  --   --  3.0  --    ALB 3.1* 3.6  --  3.5   TBILI 1.5* 1.2*  --  0.6   SGOT 23 26  --  37   ALT 82* 107*  --  136*       Recent Labs     11/05/19  0813 11/04/19  1827 11/04/19  1120   TROIQ 0.11* 0.24* 0.18*     Results for Mary Shen (MRN [id number]) as of 11/6/2019 15:04   Ref. Range 11/5/2019 04:41   Triglyceride Latest Ref Range: <150 MG/DL 77   Cholesterol, total Latest Ref Range: <200 MG/   HDL Cholesterol Latest Units: MG/DL 38   CHOL/HDL Ratio Latest Ref Range: 0.0 - 5.0   4.4   LDL, calculated Latest Ref Range: 0 - 100 MG/.6 (H)       Intake/Output Summary (Last 24 hours) at 11/7/2019 1058  Last data filed at 11/7/2019 1677  Gross per 24 hour   Intake 380 ml   Output 2950 ml   Net -2570 ml        Telemetry: SR, with PACs, and occ possible retrograde p waves vs junctional rhythm    Echo: 11/4/19  · Left Ventricle: Normal cavity size. Mild concentric hypertrophy. Mild systolic dysfunction. Estimated left ventricular ejection fraction is 46 - 50%. · Right Ventricle: Mildly dilated right ventricle. Mildly reduced systolic function. · Left Atrium: Mildly dilated left atrium. · Mitral Valve: Mild mitral valve regurgitation is present. · Tricuspid Valve: Mild tricuspid valve regurgitation is present. · Pulmonary Artery: Moderate pulmonary hypertension.   · Right Atrium: Mildly dilated right atrium. Assessment:     Active Problems:    JUANJO (acute kidney injury) ([facility] (11/4/2019)      Atrial fibrillation with RVR ([address] (11/4/2019)      Acute diastolic CHF (congestive heart failure) ([address] (11/4/2019)      Morbid obesity ([address] (11/4/2019)      ALYSSA on CPAP (11/4/2019)      CHF (congestive heart failure) ([address] (11/5/2019)      Atrial fibrillation with rapid ventricular response ([address] (11/5/2019)      Acute kidney injury ([address] (11/5/2019)        Plan:     Afib with RVR:  Successful RUFINO and cardioversion, remains in SR. Continue on sotalol 80mg BID. QTc stable,  CHADS2 vasc score:  2.  continue Eliquis 5mg BID, affordable. Elevated troponin:  Cardiac cath with no obstructive CAD  Elevation likely r/t supply/demand  No ASA indicated. OK for discharge. F/u with Dr. Ny Beck office 2 weeks. [facility]  Cardiology      [facility]    11/7/2019         Patient seen, examined by me personally. Plan discussed as detailed. Agree with note as outlined by  NP. I confirm findings in history and physical exam. No additional findings noted. Agree with plan as outlined above.      Justina Nath MD

## 2019-11-19 ENCOUNTER — OFFICE VISIT (OUTPATIENT)
Dept: CARDIOLOGY CLINIC | Age: 59
End: 2019-11-19

## 2019-11-19 VITALS
SYSTOLIC BLOOD PRESSURE: 160 MMHG | RESPIRATION RATE: 20 BRPM | DIASTOLIC BLOOD PRESSURE: 90 MMHG | BODY MASS INDEX: 38.37 KG/M2 | WEIGHT: 299 LBS | OXYGEN SATURATION: 96 % | HEART RATE: 73 BPM | HEIGHT: 74 IN

## 2019-11-19 DIAGNOSIS — I25.10 CORONARY ARTERY DISEASE DUE TO LIPID RICH PLAQUE: ICD-10-CM

## 2019-11-19 DIAGNOSIS — I50.30 DIASTOLIC CONGESTIVE HEART FAILURE, UNSPECIFIED HF CHRONICITY (HCC): ICD-10-CM

## 2019-11-19 DIAGNOSIS — I25.83 CORONARY ARTERY DISEASE DUE TO LIPID RICH PLAQUE: ICD-10-CM

## 2019-11-19 DIAGNOSIS — R06.02 SOB (SHORTNESS OF BREATH): ICD-10-CM

## 2019-11-19 DIAGNOSIS — G47.33 OSA (OBSTRUCTIVE SLEEP APNEA): ICD-10-CM

## 2019-11-19 DIAGNOSIS — E78.2 MIXED HYPERLIPIDEMIA: ICD-10-CM

## 2019-11-19 DIAGNOSIS — I48.0 PAF (PAROXYSMAL ATRIAL FIBRILLATION) (HCC): Primary | ICD-10-CM

## 2019-11-19 DIAGNOSIS — I10 ESSENTIAL HYPERTENSION: ICD-10-CM

## 2019-11-19 RX ORDER — TRIAMTERENE/HYDROCHLOROTHIAZID 37.5-25 MG
TABLET ORAL
Refills: 0 | COMMUNITY
Start: 2019-10-31 | End: 2019-11-19

## 2019-11-19 RX ORDER — POTASSIUM CHLORIDE 750 MG/1
CAPSULE, EXTENDED RELEASE ORAL
Refills: 0 | COMMUNITY
Start: 2019-10-07 | End: 2019-11-19

## 2019-11-19 RX ORDER — ANASTROZOLE 1 MG/1
1 TABLET ORAL
Refills: 0 | COMMUNITY
Start: 2019-10-09 | End: 2021-05-27

## 2019-11-19 RX ORDER — DIPHENHYDRAMINE HYDROCHLORIDE 12.5 MG/5ML
LIQUID ORAL
Refills: 0 | COMMUNITY
Start: 2019-08-23 | End: 2019-11-19

## 2019-11-19 RX ORDER — FLUTICASONE PROPIONATE AND SALMETEROL 50; 250 UG/1; UG/1
POWDER RESPIRATORY (INHALATION)
Refills: 0 | COMMUNITY
Start: 2019-08-20 | End: 2019-11-19

## 2019-11-19 RX ORDER — FUROSEMIDE 40 MG/1
40 TABLET ORAL 2 TIMES DAILY
Qty: 60 TAB | Refills: 1 | Status: SHIPPED | OUTPATIENT
Start: 2019-11-19 | End: 2020-01-03

## 2019-11-19 NOTE — PROGRESS NOTES
1. Have you been to the ER, urgent care clinic since your last visit? Hospitalized since your last visit? Yes, on 11//19 for left heart cath    2. Have you seen or consulted any other health care providers outside of the [address] [place] [address] since your last visit? Include any pap smears or colon screening. No     Chief Complaint   Patient presents with    Leg Swelling     bilateral    Breathing Problem     gets sob on exertion     Per pt, since hospitalization on 11/6/19 lost about 25 lbs.

## 2019-11-19 NOTE — PROGRESS NOTES
[address] [address] [address] [facility]  [phone]     Subjective:      Clement Posadas is a 60 y.o. male presents for post hospital f/u where he was admitted 11/4/19 - 11/7/19 for new onset AF underwent success RUFINO/DCC. Cardiac cath showed nonsobstructive CAD. He reports feeling better at discharge, lost about 22 lbs. However he started retaining fluid last Sunday, has some more swelling on his legs and c/o sob. The patient denies chest pain, orthopnea, PND,  palpitations, syncope, or presyncope.        Patient Active Problem List    Diagnosis Date Noted    CHF (congestive heart failure) ([facility] 11/05/2019    Atrial fibrillation with rapid ventricular response ([address] 11/05/2019    Acute kidney injury ([address] 11/05/2019    JUANJO (acute kidney injury) ([address] 11/04/2019    Atrial fibrillation with RVR ([address] 11/04/2019    Acute diastolic CHF (congestive heart failure) ([address] 11/04/2019    Morbid obesity ([address] 11/04/2019    ALYSSA on CPAP 11/04/2019      Archie Leslie MD  Past Medical History:   Diagnosis Date    Allergic reaction to contrast material     galodinium    Hypertension     Irregular heart rhythm     Mercury poisoning     Morbid obesity ([address] 11/4/2019    ALYSSA on CPAP     ALYSSA on CPAP 11/4/2019    Post concussion syndrome       Past Surgical History:   Procedure Laterality Date    HX SHOULDER ARTHROSCOPY      HX UROLOGICAL Left     hydrocoele resection     Allergies   Allergen Reactions    Ace Inhibitors Cough    Gadolinium-Containing Contrast Media Rash      Family History   Problem Relation Age of Onset    Colon Cancer Mother     Hypertension Father     Other Father         multiple birth defects    Hypertension Paternal Grandfather       Social History     Socioeconomic History    Marital status:      Spouse name: Not on file    Number of children: Not on file    Years of education: Not on file    Highest education level: Not on file Occupational History    Occupation:    Social Needs    Financial resource strain: Not on file    Food insecurity:     Worry: Not on file     Inability: Not on file    Transportation needs:     Medical: Not on file     Non-medical: Not on file   Tobacco Use    Smoking status: Never Smoker    Smokeless tobacco: Never Used   Substance and Sexual Activity    Alcohol use: Not Currently    Drug use: Never    Sexual activity: Not on file   Lifestyle    Physical activity:     Days per week: Not on file     Minutes per session: Not on file    Stress: Not on file   Relationships    Social connections:     Talks on phone: Not on file     Gets together: Not on file     Attends Judaism service: Not on file     Active member of club or organization: Not on file     Attends meetings of clubs or organizations: Not on file     Relationship status: Not on file    Intimate partner violence:     Fear of current or ex partner: Not on file     Emotionally abused: Not on file     Physically abused: Not on file     Forced sexual activity: Not on file   Other Topics Concern    Not on file   Social History Narrative    Not on file      Current Outpatient Medications   Medication Sig    anastrozole (ARIMIDEX) 1 mg tablet take 1 tablet by mouth once daily    triamterene-hydroCHLOROthiazide (MAXZIDE) 37.5-25 mg per tablet take 1 tablet by mouth once daily    acetaminophen (TYLENOL) 325 mg tablet Take 2 Tabs by mouth every six (6) hours as needed for Pain or Fever.  furosemide (LASIX) 40 mg tablet Take 1 Tab by mouth daily.  sotalol (BETAPACE) 80 mg tablet Take 1 Tab by mouth every twelve (12) hours.  apixaban (ELIQUIS) 5 mg tablet Take 1 Tab by mouth two (2) times a day.  amLODIPine (NORVASC) 10 mg tablet Take 1 Tab by mouth daily.  methylPREDNISolone (MEDROL) 8 mg tablet Take 4 mg by mouth two (2) times a day.  desloratadine (CLARINEX) 5 mg tablet Take 5 mg by mouth daily.     NALTREXONE Take 3 mg by mouth nightly.  montelukast (SINGULAIR) 10 mg tablet Take 10 mg by mouth every evening.  albuterol (PROAIR HFA) 90 mcg/actuation inhaler Take 2 Puffs by inhalation every four (4) hours as needed for Wheezing or Shortness of Breath.  dextroamphetamine-amphetamine (ADDERALL) 30 mg tablet Take 30 mg by mouth two (2) times a day. No current facility-administered medications for this visit. Review of Symptoms:  11 systems reviewed, negative other than as stated in the HPI    Physical ExamPhysical Exam:    Vitals:    11/19/19 1036 11/19/19 1048   BP: 152/82 160/90   Pulse: 73    Resp: 20    SpO2: 96%    Weight: 299 lb (135.6 kg)    Height: 6' 2\" (1.88 m)      Body mass index is 38.39 kg/m². General PE  Gen:  NAD  Mental Status - Alert. General Appearance - Not in acute distress. HEENT:  PERRL, no carotid bruits or JVD  Chest and Lung Exam   Inspection: Accessory muscles - No use of accessory muscles in breathing. Auscultation:   Breath sounds: - Normal.   Cardiovascular   Inspection: Jugular vein - Bilateral - Inspection Normal.   Palpation/Percussion:   Apical Impulse: - Normal.   Auscultation: Rhythm - Regular. Heart Sounds - S1 WNL and S2 WNL. No S3 or S4. Murmurs & Other Heart Sounds: Auscultation of the heart reveals - No Murmurs. Peripheral Vascular   Upper Extremity: Inspection - Bilateral - No Cyanotic nailbeds or Digital clubbing. Lower Extremity:   Palpation: Edema - Bilateral - + 2   Abdomen:   Soft, non-tender, bowel sounds are active.   Neuro: A&O times 3, CN and motor grossly WNL right radial cardiac cath site soft/nt    Labs:   Lab Results   Component Value Date/Time    Cholesterol, total 167 11/05/2019 04:41 AM    HDL Cholesterol 38 11/05/2019 04:41 AM    LDL, calculated 113.6 (H) 11/05/2019 04:41 AM    Triglyceride 77 11/05/2019 04:41 AM    CHOL/HDL Ratio 4.4 11/05/2019 04:41 AM     No results found for: CPK, CPKX, CPX  Lab Results   Component Value Date/Time    Sodium 141 11/07/2019 06:00 AM    Potassium 4.2 11/07/2019 06:00 AM    Chloride 106 11/07/2019 06:00 AM    CO2 31 11/07/2019 06:00 AM    Anion gap 4 (L) 11/07/2019 06:00 AM    Glucose 90 11/07/2019 06:00 AM    BUN 25 (H) 11/07/2019 06:00 AM    Creatinine 1.31 (H) 11/07/2019 06:00 AM    BUN/Creatinine ratio 19 11/07/2019 06:00 AM    GFR est AA >60 11/07/2019 06:00 AM    GFR est non-AA 56 (L) 11/07/2019 06:00 AM    Calcium 7.9 (L) 11/07/2019 06:00 AM    Bilirubin, total 1.5 (H) 11/07/2019 06:00 AM    AST (SGOT) 23 11/07/2019 06:00 AM    Alk. phosphatase 58 11/07/2019 06:00 AM    Protein, total 5.2 (L) 11/07/2019 06:00 AM    Albumin 3.1 (L) 11/07/2019 06:00 AM    Globulin 2.1 11/07/2019 06:00 AM    A-G Ratio 1.5 11/07/2019 06:00 AM    ALT (SGPT) 82 (H) 11/07/2019 06:00 AM       EKG:  SR PAC     Assessment:     Assessment:      1. PAF (paroxysmal atrial fibrillation) ([facility]    2. SOB (shortness of breath)    3. Essential hypertension    4. Diastolic congestive heart failure, unspecified HF chronicity ([facility]    5. ALYSSA (obstructive sleep apnea)    6. Coronary artery disease due to lipid rich plaque    7.  Mixed hyperlipidemia        Orders Placed This Encounter    AMB POC EKG ROUTINE W/ 12 LEADS, INTER & REP     Order Specific Question:   Reason for Exam:     Answer:   Routine    DISCONTD: ALLERGY MEDICATION 25 mg capsule     Sig: take 1 capsule by mouth IN THE EVENING     Refill:  0    anastrozole (ARIMIDEX) 1 mg tablet     Sig: take 1 tablet by mouth once daily     Refill:  0    DISCONTD: ADVAIR DISKUS 250-50 mcg/dose diskus inhaler     Refill:  0    DISCONTD: potassium chloride SA (MICRO-K) 10 mEq capsule     Sig: take 1 capsule by mouth twice a day     Refill:  0    triamterene-hydroCHLOROthiazide (MAXZIDE) 37.5-25 mg per tablet     Sig: take 1 tablet by mouth once daily     Refill:  0        Plan:     Patient presents for post hospital f/u where he was admitted 11/4/19 - 11/7/19 for new onset AF underwent success RUFINO/DCC. Cardiac cath showed nonsobstructive CAD. He reports feeling better at discharge, lost about 22 lbs. However he started retaining fluid last Sunday, has some more swelling on his legs and c/o sob. PAF s/p CV  Maintaining SR  Continue Sotalol 80 mg BID  Continue Eliquis 5 mg BID    Nonobstructive CAD per cath 11/19: 1st Mrg lesion 30% stenosed. Continue BB  Defers statin    Diastolic HF  EF 73-57 per RUFINO 11/19  C/o leg swelling, wt gain  Cr 1.3 in 11/7/19, stable  Increase Lasix 40 mg BID until swelling improves then can go back to daily dose  Check labs prior to f/u    HTN  Should improve with diuretic adjustment  Continue BB Amlodipine  Reiterated low sodium diet    HLD  11/19 LDL at 113. Defers statin therapy    ALYSSA  On cpap therapy      Continue current care and f/u in 12/4/19      Corinna Noguera NP       [address] [facility]    11/19/2019         Patient seen, examined by me personally. Plan discussed as detailed. Agree with note as outlined by  NP. I confirm findings in history and physical exam. No additional findings noted. Agree with plan as outlined above. He can resume his OTC turmeric, helps with his arthritis.     Carolina Ojeda MD

## 2019-11-29 DIAGNOSIS — I10 ESSENTIAL HYPERTENSION: ICD-10-CM

## 2019-11-29 DIAGNOSIS — E78.2 MIXED HYPERLIPIDEMIA: ICD-10-CM

## 2019-11-29 DIAGNOSIS — I25.83 CORONARY ARTERY DISEASE DUE TO LIPID RICH PLAQUE: ICD-10-CM

## 2019-11-29 DIAGNOSIS — R06.02 SOB (SHORTNESS OF BREATH): ICD-10-CM

## 2019-11-29 DIAGNOSIS — I48.0 PAF (PAROXYSMAL ATRIAL FIBRILLATION) (HCC): ICD-10-CM

## 2019-11-29 DIAGNOSIS — I25.10 CORONARY ARTERY DISEASE DUE TO LIPID RICH PLAQUE: ICD-10-CM

## 2019-11-29 DIAGNOSIS — G47.33 OSA (OBSTRUCTIVE SLEEP APNEA): ICD-10-CM

## 2019-11-29 DIAGNOSIS — I50.30 DIASTOLIC CONGESTIVE HEART FAILURE, UNSPECIFIED HF CHRONICITY (HCC): ICD-10-CM

## 2019-12-04 ENCOUNTER — OFFICE VISIT (OUTPATIENT)
Dept: CARDIOLOGY CLINIC | Age: 59
End: 2019-12-04

## 2019-12-04 VITALS
RESPIRATION RATE: 16 BRPM | OXYGEN SATURATION: 96 % | DIASTOLIC BLOOD PRESSURE: 80 MMHG | BODY MASS INDEX: 38.18 KG/M2 | SYSTOLIC BLOOD PRESSURE: 158 MMHG | HEIGHT: 74 IN | WEIGHT: 297.5 LBS | HEART RATE: 83 BPM

## 2019-12-04 DIAGNOSIS — I50.31 ACUTE DIASTOLIC CHF (CONGESTIVE HEART FAILURE) (HCC): Primary | ICD-10-CM

## 2019-12-04 DIAGNOSIS — I48.0 PAF (PAROXYSMAL ATRIAL FIBRILLATION) (HCC): ICD-10-CM

## 2019-12-04 DIAGNOSIS — I10 ESSENTIAL HYPERTENSION: ICD-10-CM

## 2019-12-04 NOTE — PROGRESS NOTES
1. Have you been to the ER, urgent care clinic since your last visit? Hospitalized since your last visit? NO    2. Have you seen or consulted any other health care providers outside of the [address] [place] [address] since your last visit? Include any pap smears or colon screening. YES, PCP. HOSPITAL FOLLOW. C/O SWELLING IN BLE, SOB.

## 2019-12-04 NOTE — PROGRESS NOTES
Ludin Eisenberg, GRISELDA-BC    Subjective/HPI:     Mr. Roman is a 60 y.o. male is here for routine f/u. He has a PMHx of PAF, HTN, HLD, ALYSSA on CPAP and galodinium poisoning. At last visit, we increased lasix to 40 mg BID for lower extremity edema. He has noted improvement in symptoms, but still has some minimal edema. He cannot follow a low sodium diet because this causes abdominal and leg cramps. He drinks plenty of water throughout the day. He reports all his issues stem from his heavy metal poisoning, for which he is seeking treatment at [facility].          PCP Provider  Tanner Goltz, MD  Past Medical History:   Diagnosis Date    Allergic reaction to contrast material     galodinium    Hypertension     Irregular heart rhythm     Mercury poisoning     Morbid obesity ([facility] [address] 11/4/2019    ALYSSA on CPAP     ALYSSA on CPAP 11/4/2019    Post concussion syndrome       Past Surgical History:   Procedure Laterality Date    HX SHOULDER ARTHROSCOPY      HX UROLOGICAL Left     hydrocoele resection     Family History   Problem Relation Age of Onset    Colon Cancer Mother     Hypertension Father     Other Father         multiple birth defects    Hypertension Paternal Grandfather      Social History     Socioeconomic History    Marital status:      Spouse name: Not on file    Number of children: Not on file    Years of education: Not on file    Highest education level: Not on file   Occupational History    Occupation:    Social Needs    Financial resource strain: Not on file    Food insecurity:     Worry: Not on file     Inability: Not on file    Transportation needs:     Medical: Not on file     Non-medical: Not on file   Tobacco Use    Smoking status: Never Smoker    Smokeless tobacco: Never Used   Substance and Sexual Activity    Alcohol use: Not Currently    Drug use: Never    Sexual activity: Not on file   Lifestyle    Physical activity: Days per week: Not on file     Minutes per session: Not on file    Stress: Not on file   Relationships    Social connections:     Talks on phone: Not on file     Gets together: Not on file     Attends [employer] service: Not on file     Active member of club or organization: Not on file     Attends meetings of clubs or organizations: Not on file     Relationship status: Not on file    Intimate partner violence:     Fear of current or ex partner: Not on file     Emotionally abused: Not on file     Physically abused: Not on file     Forced sexual activity: Not on file   Other Topics Concern    Not on file   Social History Narrative    Not on file       Allergies   Allergen Reactions    Ace Inhibitors Cough    Gadolinium-Containing Contrast Media Rash        Current Outpatient Medications   Medication Sig    anastrozole (ARIMIDEX) 1 mg tablet take 1 tablet by mouth once daily    furosemide (LASIX) 40 mg tablet Take 1 Tab by mouth two (2) times a day.  acetaminophen (TYLENOL) 325 mg tablet Take 2 Tabs by mouth every six (6) hours as needed for Pain or Fever.  sotalol (BETAPACE) 80 mg tablet Take 1 Tab by mouth every twelve (12) hours.  apixaban (ELIQUIS) 5 mg tablet Take 1 Tab by mouth two (2) times a day.  amLODIPine (NORVASC) 10 mg tablet Take 1 Tab by mouth daily.  desloratadine (CLARINEX) 5 mg tablet Take 5 mg by mouth daily.  montelukast (SINGULAIR) 10 mg tablet Take 10 mg by mouth every evening.  albuterol (PROAIR HFA) 90 mcg/actuation inhaler Take 2 Puffs by inhalation every four (4) hours as needed for Wheezing or Shortness of Breath.  dextroamphetamine-amphetamine (ADDERALL) 30 mg tablet Take 30 mg by mouth two (2) times a day. No current facility-administered medications for this visit. I have reviewed the problem list, allergy list, medical history, family, social history and medications.     Review of Symptoms:    Review of Systems   Constitutional: Negative for chills, fever and weight loss. HENT: Negative for nosebleeds. Eyes: Negative for blurred vision and double vision. Respiratory: Negative for cough, shortness of breath and wheezing. Cardiovascular: Positive for leg swelling. Negative for chest pain, palpitations, orthopnea and PND. Gastrointestinal: Negative for abdominal pain, blood in stool, diarrhea, nausea and vomiting. Musculoskeletal: Negative for joint pain. Skin: Negative for rash. Neurological: Negative for dizziness, tingling and loss of consciousness. Endo/Heme/Allergies: Does not bruise/bleed easily. Physical Exam:      General: Well developed, in no acute distress, cooperative and alert  HEENT: No carotid bruits, no JVD, trach is midline. Neck Supple, PEERL, EOM intact. Heart:  reg rate and rhythm; normal S1/S2; no murmurs, gallops or rubs. Respiratory: Clear bilaterally x 4, no wheezing or rales  Abdomen:   Soft, non-tender, no distention, no masses. + BS. Extremities:  Normal cap refill, no cyanosis, atraumatic. Mild LE edema, bilaterally  Neuro: A&Ox3, speech clear, gait stable. Skin: Skin color is normal. No rashes or lesions.  Non diaphoretic  Vascular: 2+ pulses symmetric in all extremities    Vitals:    12/04/19 1150 12/04/19 1157   BP: 160/82 158/80   Pulse: 83    Resp: 16    SpO2: 96%    Weight: 297 lb 8 oz (134.9 kg)    Height: 6' 2\" (1.88 m)        Cardiographics    ECG: sinus rhythm  Results for orders placed or performed during the hospital encounter of 11/04/19   EKG, 12 LEAD, INITIAL   Result Value Ref Range    Ventricular Rate 68 BPM    Atrial Rate 68 BPM    P-R Interval 162 ms    QRS Duration 106 ms    Q-T Interval 472 ms    QTC Calculation (Bezet) 501 ms    Calculated P Axis 50 degrees    Calculated R Axis -17 degrees    Calculated T Axis 83 degrees    Diagnosis       Sinus rhythm with marked sinus arrhythmia  Left ventricular hypertrophy      Prolonged QT    When compared with ECG of 06-NOV-2019 04:28,  No significant change was found  Confirmed by Marybeth Ryan ([id number]) on 11/7/2019 9:01:28 AM         Cardiology Labs:  Lab Results   Component Value Date/Time    Cholesterol, total 167 11/05/2019 04:41 AM    HDL Cholesterol 38 11/05/2019 04:41 AM    LDL, calculated 113.6 (H) 11/05/2019 04:41 AM    Triglyceride 77 11/05/2019 04:41 AM    CHOL/HDL Ratio 4.4 11/05/2019 04:41 AM       Lab Results   Component Value Date/Time    Sodium 141 11/07/2019 06:00 AM    Potassium 4.2 11/07/2019 06:00 AM    Chloride 106 11/07/2019 06:00 AM    CO2 31 11/07/2019 06:00 AM    Anion gap 4 (L) 11/07/2019 06:00 AM    Glucose 90 11/07/2019 06:00 AM    BUN 25 (H) 11/07/2019 06:00 AM    Creatinine 1.31 (H) 11/07/2019 06:00 AM    BUN/Creatinine ratio 19 11/07/2019 06:00 AM    GFR est AA >60 11/07/2019 06:00 AM    GFR est non-AA 56 (L) 11/07/2019 06:00 AM    Calcium 7.9 (L) 11/07/2019 06:00 AM    Bilirubin, total 1.5 (H) 11/07/2019 06:00 AM    AST (SGOT) 23 11/07/2019 06:00 AM    Alk. phosphatase 58 11/07/2019 06:00 AM    Protein, total 5.2 (L) 11/07/2019 06:00 AM    Albumin 3.1 (L) 11/07/2019 06:00 AM    Globulin 2.1 11/07/2019 06:00 AM    A-G Ratio 1.5 11/07/2019 06:00 AM    ALT (SGPT) 82 (H) 11/07/2019 06:00 AM           Assessment:     Assessment:       ICD-10-CM ICD-9-CM    1. Acute diastolic CHF (congestive heart failure) (HCC) I50.31 428.31 AMB POC EKG ROUTINE W/ 12 LEADS, INTER & REP     428.0    2. PAF (paroxysmal atrial fibrillation) (HCC) I48.0 427.31    3. Essential hypertension I10 401.9         Plan:     1. Acute diastolic CHF (congestive heart failure) (HCC)  Edema improved; obtain labs ordered  Will not make further changes as he cites multiple intolerances to medications due to his \"heavy metal poisoning\". Discussed use of compression stockings to minimize lower extremity edema  Continue conservative measures    2.  PAF (paroxysmal atrial fibrillation) (HCC)  Maintaining sinus rhythm  S/p DCCV  Continue sotalol & eliquis    3. Essential hypertension  Not following low sodium diet  Intolerant to multiple medications  Continue amlodipine    F/u with Dr. Chavo Wallace in 3 months    Lita Salamanca NP       [facility]    12/5/2019         Patient seen, examined by me personally. Plan discussed as detailed. Agree with note as outlined by  NP. I confirm findings in history and physical exam. No additional findings noted. Agree with plan as outlined above.      Milton Hogan MD

## 2019-12-05 LAB
BUN SERPL-MCNC: 15 MG/DL (ref 6–24)
BUN/CREAT SERPL: 17 (ref 9–20)
CALCIUM SERPL-MCNC: 10.8 MG/DL (ref 8.7–10.2)
CHLORIDE SERPL-SCNC: 97 MMOL/L (ref 96–106)
CO2 SERPL-SCNC: 28 MMOL/L (ref 20–29)
CREAT SERPL-MCNC: 0.9 MG/DL (ref 0.76–1.27)
GLUCOSE SERPL-MCNC: 93 MG/DL (ref 65–99)
POTASSIUM SERPL-SCNC: 4.3 MMOL/L (ref 3.5–5.2)
SODIUM SERPL-SCNC: 142 MMOL/L (ref 134–144)

## 2019-12-06 NOTE — PROGRESS NOTES
Left message for patient to return my call.
Left message on pt's v/m per his request advising him to continue taking Lasix 40 mg BID.
Tamara Alva,    Please call patient and inform that labwork looks good. Kidney function is back to normal.  Would continue with Lasix 40 mg BID for edema.     Thanks,  [employer]
FEVER

## 2020-01-09 ENCOUNTER — OFFICE VISIT (OUTPATIENT)
Dept: FAMILY MEDICINE CLINIC | Age: 60
End: 2020-01-09

## 2020-01-09 VITALS
BODY MASS INDEX: 38.76 KG/M2 | WEIGHT: 302 LBS | HEIGHT: 74 IN | OXYGEN SATURATION: 98 % | SYSTOLIC BLOOD PRESSURE: 153 MMHG | RESPIRATION RATE: 20 BRPM | DIASTOLIC BLOOD PRESSURE: 71 MMHG | TEMPERATURE: 97.7 F | HEART RATE: 80 BPM

## 2020-01-09 DIAGNOSIS — I48.91 ATRIAL FIBRILLATION WITH RAPID VENTRICULAR RESPONSE (HCC): Primary | ICD-10-CM

## 2020-01-09 DIAGNOSIS — I50.9 CONGESTIVE HEART FAILURE, UNSPECIFIED HF CHRONICITY, UNSPECIFIED HEART FAILURE TYPE (HCC): ICD-10-CM

## 2020-01-09 RX ORDER — DEXTROAMPHETAMINE SACCHARATE, AMPHETAMINE ASPARTATE, DEXTROAMPHETAMINE SULFATE AND AMPHETAMINE SULFATE 7.5; 7.5; 7.5; 7.5 MG/1; MG/1; MG/1; MG/1
30 TABLET ORAL 2 TIMES DAILY
Status: CANCELLED | OUTPATIENT
Start: 2020-01-09

## 2020-01-09 RX ORDER — TESTOSTERONE CYPIONATE 100 MG/ML
200 INJECTION, SOLUTION INTRAMUSCULAR EVERY 2 WEEKS
COMMUNITY

## 2020-01-09 RX ORDER — METHYLPREDNISOLONE 8 MG/1
TABLET ORAL
COMMUNITY
End: 2020-01-09 | Stop reason: ALTCHOICE

## 2020-01-09 RX ORDER — CEFUROXIME AXETIL 500 MG/1
TABLET ORAL 2 TIMES DAILY
COMMUNITY
End: 2020-01-09 | Stop reason: ALTCHOICE

## 2020-01-09 NOTE — PROGRESS NOTES
Chief Complaint   Patient presents with    Establish Care     Pt in [facility] today for establish care  -pt states he had poisoning and was in the hospital  Pt needs refill of adderall  Pt states he also was seen because of heart issues  1. Have you been to the ER, urgent care clinic since your last visit? Hospitalized since your last visit? yes    2. Have you seen or consulted any other health care providers outside of the [address] [place] [address] since your last visit? Include any pap smears or colon screening.  No    Pt has no other concerns

## 2020-01-10 ENCOUNTER — OFFICE VISIT (OUTPATIENT)
Dept: CARDIOLOGY CLINIC | Age: 60
End: 2020-01-10

## 2020-01-10 VITALS
SYSTOLIC BLOOD PRESSURE: 170 MMHG | OXYGEN SATURATION: 98 % | HEART RATE: 83 BPM | HEIGHT: 74 IN | BODY MASS INDEX: 38.76 KG/M2 | DIASTOLIC BLOOD PRESSURE: 94 MMHG | RESPIRATION RATE: 20 BRPM | WEIGHT: 302 LBS

## 2020-01-10 DIAGNOSIS — G47.33 OSA (OBSTRUCTIVE SLEEP APNEA): ICD-10-CM

## 2020-01-10 DIAGNOSIS — I48.0 PAF (PAROXYSMAL ATRIAL FIBRILLATION) (HCC): ICD-10-CM

## 2020-01-10 DIAGNOSIS — I10 ESSENTIAL HYPERTENSION: ICD-10-CM

## 2020-01-10 DIAGNOSIS — I48.91 ATRIAL FIBRILLATION WITH RVR (HCC): Primary | ICD-10-CM

## 2020-01-10 DIAGNOSIS — E78.2 MIXED HYPERLIPIDEMIA: ICD-10-CM

## 2020-01-10 NOTE — PROGRESS NOTES
NAME:  Marilin Cisneros   :   1960   MRN:   [id number]   PCP:  Dianna Arroyo MD           Subjective: The patient is a 60y.o. year old male  who returns for a routine follow-up. Since the last visit, patient reports no change in exercise tolerance, chest pain, edema, medication intolerance, palpitations, shortness of breath, PND/orthopnea wheezing, sputum, syncope, dizziness or light headedness. Ran out of sotalol few days ago. Just  started taking it yesterday. Past Medical History:   Diagnosis Date    Allergic reaction to contrast material     galodinium    Hypertension     Irregular heart rhythm     Mercury poisoning     Morbid obesity ([facility] [address] 2019    ALYSSA on CPAP     ALYSSA on CPAP 2019    Post concussion syndrome         ICD-10-CM ICD-9-CM    1. Atrial fibrillation with RVR ([facility]) I48.91 427.31 AMB POC EKG ROUTINE W/ 12 LEADS, INTER & REP   2. PAF (paroxysmal atrial fibrillation) ([facility]) I48.0 427.31    3. Mixed hyperlipidemia E78.2 272.2    4. Essential hypertension I10 401.9    5. ALYSSA (obstructive sleep apnea) G47.33 327.23       Social History     Tobacco Use    Smoking status: Never Smoker    Smokeless tobacco: Never Used   Substance Use Topics    Alcohol use: Not Currently      Family History   Problem Relation Age of Onset    Colon Cancer Mother     Hypertension Father     Other Father         multiple birth defects    Hypertension Paternal Grandfather         Review of Systems  General: Pt denies excessive weight gain or loss. Pt is able to conduct ADL's  HEENT: Denies blurred vision, headaches, epistaxis and difficulty swallowing. Respiratory: Denies shortness of breath, ELIZONDO, wheezing or stridor.   Cardiovascular: Denies precordial pain, palpitations, edema or PND  Gastrointestinal: Denies poor appetite, indigestion, abdominal pain or blood in stool  Musculoskeletal: Denies pain or swelling from muscles or joints  Neurologic: Denies tremor, paresthesias, or sensory motor disturbance  Skin: Denies rash, itching or texture change. Objective:       Vitals:    01/10/20 1040 01/10/20 1041 01/10/20 1042   BP: 162/88 152/86 (!) 170/94   Pulse: 72  83   Resp: 20     SpO2: 98%     Weight: 302 lb (137 kg)     Height: 6' 2\" (1.88 m)      Body mass index is 38.77 kg/m². General PE  Mental Status - Alert. General Appearance - Not in acute distress. Chest and Lung Exam   Inspection: Accessory muscles - No use of accessory muscles in breathing. Auscultation:   Breath sounds: - Normal.    Cardiovascular   Inspection: Jugular vein - Bilateral - Inspection Normal.  Palpation/Percussion:   Apical Impulse: - Normal.  Auscultation: Rhythm - Regular. Heart Sounds - S1 WNL and S2 WNL. No S3 or S4. Murmurs & Other Heart Sounds: Auscultation of the heart reveals - No Murmurs. Peripheral Vascular   Upper Extremity: Inspection - Bilateral - No Cyanotic nailbeds or Digital clubbing. Lower Extremity:   Palpation: Edema - Bilateral - No edema. Data Review:     EKG -EKG: atrial fibrillation, rate 100. Medications reviewed  Current Outpatient Medications   Medication Sig    testosterone cypionate (DEPO-TESTOSTERONE) 100 mg/mL injection 200 mg by IntraMUSCular route Once every 2 weeks.  sotalol (BETAPACE) 80 mg tablet take 1 tablet by mouth every 12 hours    furosemide (LASIX) 40 mg tablet take 1 tablet by mouth twice a day    anastrozole (ARIMIDEX) 1 mg tablet Take 1 mg by mouth every seven (7) days.  acetaminophen (TYLENOL) 325 mg tablet Take 2 Tabs by mouth every six (6) hours as needed for Pain or Fever.  apixaban (ELIQUIS) 5 mg tablet Take 1 Tab by mouth two (2) times a day.  amLODIPine (NORVASC) 10 mg tablet Take 1 Tab by mouth daily.  desloratadine (CLARINEX) 5 mg tablet Take 5 mg by mouth daily.  montelukast (SINGULAIR) 10 mg tablet Take 10 mg by mouth every evening.     albuterol (PROAIR HFA) 90 mcg/actuation inhaler Take 2 Puffs by inhalation every four (4) hours as needed for Wheezing or Shortness of Breath.  dextroamphetamine-amphetamine (ADDERALL) 30 mg tablet Take 30 mg by mouth two (2) times a day. No current facility-administered medications for this visit. Assessment:       ICD-10-CM ICD-9-CM    1. Atrial fibrillation with RVR ([facility]) I48.91 427.31 AMB POC EKG ROUTINE W/ 12 LEADS, INTER & REP   2. PAF (paroxysmal atrial fibrillation) ([facility]) I48.0 427.31    3. Mixed hyperlipidemia E78.2 272.2    4. Essential hypertension I10 401.9    5. ALYSSA (obstructive sleep apnea) G47.33 327.23         Plan:     Patient presents doing well and stable from cardiac standpoint. He is in rate controlled afib, asymptomatic. Back on sotalol since yesterday. Taking eliquis. Continue current care and follow up in 2-3 weeks. Repeat DCC if still in afib.     Maryuri Brian MD

## 2020-01-10 NOTE — PROGRESS NOTES
Verified patient with 2 identifiers   Reviewed record in preparation for visit and obtained necessary documentation. Chief Complaint   Patient presents with    Irregular Heart Beat     Saw Tavon Ortiz Np yesterday - patient states she would not write script for Adderall because he was in a fib- states she said he could die right now and that he needed to go to hospital. Patient dtates he did not feel anyhing-states he was off the sotalol     Dizziness     when bends over to tie shoes and stands back up      1. Have you been to the ER, urgent care clinic since your last visit? Hospitalized since your last visit? No     2. Have you seen or consulted any other health care providers outside of the [address] [place] [address] since your last visit? Include any pap smears or colon screening.   No

## 2020-01-13 NOTE — PROGRESS NOTES
HISTORY OF PRESENT ILLNESS  Randa More is a 60 y.o. male. HPI  New patient to the practice today  He is requesting refills of Adderall  Reviewed med list and has significant heart dx, afib and CHF  Is down playing the severity of his heart dx, states that all of his illnesses are related to copper/gadalinium poisoning from dental work  He states that having chelation therapy will cure his heart disease  Has been having sweats, fatigue, sob spells  Refuses to take any accountability for his health, very argumentative during the visit and we spent a lot of time talking over eachother. Advised that I would not filled controlled without ADD testing and given his heart health would not be safe to take anyway. The FamHx, SocHx, MedHx, Medication, and Allergy lists have been reviewed and updated in the chart. ROS  A comprehensive review of system was obtained and negative except findings in the HPI    Visit Vitals  /71 (BP 1 Location: Left arm, BP Patient Position: Sitting)   Pulse 80   Temp 97.7 °F (36.5 °C) (Oral)   Resp 20   Ht 6' 2\" (1.88 m)   Wt 302 lb (137 kg)   SpO2 98%   BMI 38.77 kg/m²     Physical Exam  Vitals signs and nursing note reviewed. Constitutional:       Appearance: Normal appearance. He is obese. He is diaphoretic. Cardiovascular:      Rate and Rhythm: Normal rate. Rhythm irregular. Heart sounds: No murmur. Comments: He is in active a fib - he states he was cardioverted inpatient at [facility]  Pulmonary:      Breath sounds: Normal breath sounds. Skin:     General: Skin is warm. Neurological:      General: No focal deficit present. Mental Status: He is alert and oriented to person, place, and time. ASSESSMENT and PLAN  Encounter Diagnoses   Name Primary?     Atrial fibrillation with rapid ventricular response (HCC) Yes    Congestive heart failure, unspecified HF chronicity, unspecified heart failure type ([address]      Advised that he needs to go to the ER for seymour for a. Fib  He refused and states that he is not interested in giving the hospital more money  \"might go on Monday\" - advised of risks of not going: MI/CVA  He announced he wouldn't be back since I did not listen to his poisoning concerns    I have discussed the diagnosis with the patient and the intended plan as seen in the above orders. The patient has received an after-visit summary and questions were answered concerning future plans. Patient conveyed understanding of the plan at the time of the visit.     Charla Quezada, MSN, ANP  1/12/2020

## 2020-01-29 ENCOUNTER — OFFICE VISIT (OUTPATIENT)
Dept: CARDIOLOGY CLINIC | Age: 60
End: 2020-01-29

## 2020-01-29 VITALS
BODY MASS INDEX: 39.66 KG/M2 | OXYGEN SATURATION: 96 % | HEIGHT: 74 IN | DIASTOLIC BLOOD PRESSURE: 100 MMHG | WEIGHT: 309 LBS | HEART RATE: 120 BPM | SYSTOLIC BLOOD PRESSURE: 170 MMHG | RESPIRATION RATE: 18 BRPM

## 2020-01-29 DIAGNOSIS — I48.91 ATRIAL FIBRILLATION WITH RVR (HCC): Primary | ICD-10-CM

## 2020-01-29 DIAGNOSIS — I10 ESSENTIAL HYPERTENSION: ICD-10-CM

## 2020-01-29 DIAGNOSIS — E78.2 MIXED HYPERLIPIDEMIA: ICD-10-CM

## 2020-01-29 DIAGNOSIS — I25.10 CORONARY ARTERY DISEASE DUE TO LIPID RICH PLAQUE: ICD-10-CM

## 2020-01-29 DIAGNOSIS — G47.33 OSA (OBSTRUCTIVE SLEEP APNEA): ICD-10-CM

## 2020-01-29 DIAGNOSIS — I48.0 PAF (PAROXYSMAL ATRIAL FIBRILLATION) (HCC): ICD-10-CM

## 2020-01-29 DIAGNOSIS — I25.83 CORONARY ARTERY DISEASE DUE TO LIPID RICH PLAQUE: ICD-10-CM

## 2020-01-29 RX ORDER — SOTALOL HYDROCHLORIDE 120 MG/1
120 TABLET ORAL 2 TIMES DAILY
Qty: 60 TAB | Refills: 3 | Status: SHIPPED | OUTPATIENT
Start: 2020-01-29 | End: 2020-03-05

## 2020-01-29 NOTE — LETTER
1/29/20 Patient: Lena Davis YOB: 1960 Date of Visit: 1/29/2020 Santos Jeter MD 
[address] [address] VIA Facsimile: [phone] Dear Santos Jeter MD, Thank you for referring Mr. Donald Bowden to [address] [place] [address] for evaluation. My notes for this consultation are attached. If you have questions, please do not hesitate to call me. I look forward to following your patient along with you. Sincerely, Monik Larsen MD

## 2020-01-29 NOTE — PROGRESS NOTES
1. Have you been to the ER, urgent care clinic since your last visit? Hospitalized since your last visit? No    2. Have you seen or consulted any other health care providers outside of the [address] [place] [address] since your last visit? Include any pap smears or colon screening. No    Chief Complaint   Patient presents with    Irregular Heart Beat     2 wk fu back on Sotalol. C/O SOB with exertion.

## 2020-01-29 NOTE — PROGRESS NOTES
[address] [place] [address], [address] [facility]  [phone]     Subjective:      Theadore Sender is a [address] y.o. male is here for 2 week follow up. He has a PMHx of PAF, HTN, HLD, ALYSSA on CPAP and galodinium poisoning. He remains in AF, rate 116. He is compliant with his medication regimen  Including eliquis. He reports improved leg swelling, still has some sob with heavy exertion. He feels better after chelation therapy last week. The patient denies chest pain, orthopnea, PND,  palpitations, syncope, or presyncope.        Patient Active Problem List    Diagnosis Date Noted    Essential hypertension 12/04/2019    CHF (congestive heart failure) ([address] 11/05/2019    Atrial fibrillation with rapid ventricular response ([address] 11/05/2019    Acute kidney injury ([address] 11/05/2019    JUANJO (acute kidney injury) ([address] 11/04/2019    Atrial fibrillation with RVR ([address] 11/04/2019    Acute diastolic CHF (congestive heart failure) ([address] 11/04/2019    Morbid obesity ([address] 11/04/2019    ALYSSA on CPAP 11/04/2019      Mary Ruiz MD  Past Medical History:   Diagnosis Date    Allergic reaction to contrast material     galodinium    Hypertension     Irregular heart rhythm     Mercury poisoning     Morbid obesity ([address] 11/4/2019    ALYSSA on CPAP     ALYSSA on CPAP 11/4/2019    Post concussion syndrome       Past Surgical History:   Procedure Laterality Date    HX SHOULDER ARTHROSCOPY      HX UROLOGICAL Left     hydrocoele resection     Allergies   Allergen Reactions    Ace Inhibitors Cough    Gadolinium-Containing Contrast Media Rash      Family History   Problem Relation Age of Onset    Colon Cancer Mother     Hypertension Father     Other Father         multiple birth defects    Hypertension Paternal Grandfather       Social History     Socioeconomic History    Marital status:      Spouse name: Not on file    Number of children: Not on file    Years of education: Not on file   Dori Hernandez Highest education level: Not on file   Occupational History    Occupation:    Social Needs    Financial resource strain: Not on file    Food insecurity:     Worry: Not on file     Inability: Not on file    Transportation needs:     Medical: Not on file     Non-medical: Not on file   Tobacco Use    Smoking status: Never Smoker    Smokeless tobacco: Never Used   Substance and Sexual Activity    Alcohol use: Not Currently    Drug use: Never    Sexual activity: Not on file   Lifestyle    Physical activity:     Days per week: Not on file     Minutes per session: Not on file    Stress: Not on file   Relationships    Social connections:     Talks on phone: Not on file     Gets together: Not on file     Attends Druze service: Not on file     Active member of club or organization: Not on file     Attends meetings of clubs or organizations: Not on file     Relationship status: Not on file    Intimate partner violence:     Fear of current or ex partner: Not on file     Emotionally abused: Not on file     Physically abused: Not on file     Forced sexual activity: Not on file   Other Topics Concern    Not on file   Social History Narrative    Not on file      Current Outpatient Medications   Medication Sig    HCG 8000IU/4 ML 2,000 Units by SubCUTAneous route. HCG   Benzyl Alcohol   Sodium phosphate   Sterile water   (may use HCl or NaOH  to make adjustments to the [address])    testosterone cypionate (DEPO-TESTOSTERONE) 100 mg/mL injection 200 mg by IntraMUSCular route Once every 2 weeks.  sotalol (BETAPACE) 80 mg tablet take 1 tablet by mouth every 12 hours    furosemide (LASIX) 40 mg tablet take 1 tablet by mouth twice a day    anastrozole (ARIMIDEX) 1 mg tablet Take 1 mg by mouth every seven (7) days.  acetaminophen (TYLENOL) 325 mg tablet Take 2 Tabs by mouth every six (6) hours as needed for Pain or Fever.  apixaban (ELIQUIS) 5 mg tablet Take 1 Tab by mouth two (2) times a day.  amLODIPine (NORVASC) 10 mg tablet Take 1 Tab by mouth daily.  desloratadine (CLARINEX) 5 mg tablet Take 5 mg by mouth daily.  montelukast (SINGULAIR) 10 mg tablet Take 10 mg by mouth every evening.  albuterol (PROAIR HFA) 90 mcg/actuation inhaler Take 2 Puffs by inhalation every four (4) hours as needed for Wheezing or Shortness of Breath.  dextroamphetamine-amphetamine (ADDERALL) 30 mg tablet Take 30 mg by mouth two (2) times a day. No current facility-administered medications for this visit. Review of Symptoms:  11 systems reviewed, negative other than as stated in the HPI    Physical ExamPhysical Exam:    Vitals:    01/29/20 1117 01/29/20 1125   BP: (!) 180/100 (!) 170/100   Pulse: (!) 120    Resp: 18    SpO2: 96%    Weight: 309 lb (140.2 kg)    Height: 6' 2\" (1.88 m)      Body mass index is 39.67 kg/m². General PE  Gen:  NAD  Mental Status - Alert. General Appearance - Not in acute distress. HEENT:  PERRL, no carotid bruits or JVD  Chest and Lung Exam   Inspection: Accessory muscles - No use of accessory muscles in breathing. Auscultation:   Breath sounds: - Normal.   Cardiovascular   Inspection: Jugular vein - Bilateral - Inspection Normal.   Palpation/Percussion:   Apical Impulse: - Normal.   Auscultation: Rhythm - IRRegular. Heart Sounds - S1 WNL and S2 WNL. No S3 or S4. Murmurs & Other Heart Sounds: Auscultation of the heart reveals - No Murmurs. Peripheral Vascular   Upper Extremity: Inspection - Bilateral - No Cyanotic nailbeds or Digital clubbing. Lower Extremity:   Palpation: Edema - Bilateral -  + 1  Abdomen:   Soft, non-tender, bowel sounds are active.   Neuro: A&O times 3, CN and motor grossly WNL    Labs:   Lab Results   Component Value Date/Time    Cholesterol, total 167 11/05/2019 04:41 AM    HDL Cholesterol 38 11/05/2019 04:41 AM    LDL, calculated 113.6 (H) 11/05/2019 04:41 AM    Triglyceride 77 11/05/2019 04:41 AM    CHOL/HDL Ratio 4.4 11/05/2019 04:41 AM No results found for: CPK, CPKX, CPX  Lab Results   Component Value Date/Time    Sodium 142 2019 12:35 PM    Potassium 4.3 2019 12:35 PM    Chloride 97 2019 12:35 PM    CO2 28 2019 12:35 PM    Anion gap 4 (L) 2019 06:00 AM    Glucose 93 2019 12:35 PM    BUN 15 2019 12:35 PM    Creatinine 0.90 2019 12:35 PM    BUN/Creatinine ratio 17 2019 12:35 PM    GFR est  2019 12:35 PM    GFR est non-AA 93 2019 12:35 PM    Calcium 10.8 (H) 2019 12:35 PM    Bilirubin, total 1.5 (H) 2019 06:00 AM    AST (SGOT) 23 2019 06:00 AM    Alk. phosphatase 58 2019 06:00 AM    Protein, total 5.2 (L) 2019 06:00 AM    Albumin 3.1 (L) 2019 06:00 AM    Globulin 2.1 2019 06:00 AM    A-G Ratio 1.5 2019 06:00 AM    ALT (SGPT) 82 (H) 2019 06:00 AM       EKG:  AF     Assessment:     Assessment:      1. Atrial fibrillation with RVR ([facility]    2. PAF (paroxysmal atrial fibrillation) ([facility]    3. Mixed hyperlipidemia    4. Essential hypertension    5. ALYSSA (obstructive sleep apnea)    6. Coronary artery disease due to lipid rich plaque        Orders Placed This Encounter    AMB POC EKG ROUTINE W/ 12 LEADS, INTER & REP     Order Specific Question:   Reason for Exam:     Answer:   routine    HCG 8000IU/4 ML     Si,000 Units by SubCUTAneous route. HCG   Benzyl Alcohol   Sodium phosphate   Sterile water   (may use HCl or NaOH  to make adjustments to the [facility])        Plan:     Patient presents for 2 weeks f/u. He remains in AF, rate 116. He is compliant with his medication regimen  Including eliquis. He reports improved leg swelling, still has some sob with heavy exertion. He feels better after chelation therapy last week.       PAF s/p CV in   Back in AF, rate 116  Will increase Sotalol 120 mg BID  Continue Eliquis 5 mg BID  Will check labs and repeat DCC--cpt code 79304    Diastolic HFpEF 61-80 per RUFINO   Wt up 7 lbs Cr 0.9 in 12/19, stable  Continue Lasix 40 mg BID       Nonobstructive CAD per cath 11/19: 1st Mrg lesion 30% stenosed. Continue BB  Defers statin     ALYSSA  On cpap therapy       Continue current care and f/u after procedure      [place] NP       [facility]    1/29/2020         Patient seen, examined by me personally. Plan discussed as detailed. Agree with note as outlined by  NP. I confirm findings in history and physical exam. No additional findings noted. Agree with plan as outlined above. Has undergone chelation in [place] since last visit. Reluctant to do any med changes for BP but agrees to take higher dose of sotalol, DCC. He will bring noted from [place] physician regarding his chelation therapy.     Lindsey Cool MD

## 2020-01-30 LAB
BUN SERPL-MCNC: 25 MG/DL (ref 8–27)
BUN/CREAT SERPL: 17 (ref 10–24)
CALCIUM SERPL-MCNC: 9.4 MG/DL (ref 8.6–10.2)
CHLORIDE SERPL-SCNC: 98 MMOL/L (ref 96–106)
CO2 SERPL-SCNC: 23 MMOL/L (ref 20–29)
CREAT SERPL-MCNC: 1.43 MG/DL (ref 0.76–1.27)
ERYTHROCYTE [DISTWIDTH] IN BLOOD BY AUTOMATED COUNT: 14.9 % (ref 11.6–15.4)
GLUCOSE SERPL-MCNC: 97 MG/DL (ref 65–99)
HCT VFR BLD AUTO: 42.8 % (ref 37.5–51)
HGB BLD-MCNC: 14.6 G/DL (ref 13–17.7)
INR PPP: 1 (ref 0.8–1.2)
INTERPRETATION: NORMAL
MAGNESIUM SERPL-MCNC: 2.2 MG/DL (ref 1.6–2.3)
MCH RBC QN AUTO: 32.2 PG (ref 26.6–33)
MCHC RBC AUTO-ENTMCNC: 34.1 G/DL (ref 31.5–35.7)
MCV RBC AUTO: 94 FL (ref 79–97)
PLATELET # BLD AUTO: 351 X10E3/UL (ref 150–450)
POTASSIUM SERPL-SCNC: 4.6 MMOL/L (ref 3.5–5.2)
PROTHROMBIN TIME: 10.5 SEC (ref 9.1–12)
RBC # BLD AUTO: 4.54 X10E6/UL (ref 4.14–5.8)
SODIUM SERPL-SCNC: 140 MMOL/L (ref 134–144)
WBC # BLD AUTO: 20.1 X10E3/UL (ref 3.4–10.8)

## 2020-02-03 ENCOUNTER — DOCUMENTATION ONLY (OUTPATIENT)
Dept: CARDIOLOGY CLINIC | Age: 60
End: 2020-02-03

## 2020-02-03 DIAGNOSIS — I48.91 ATRIAL FIBRILLATION WITH RVR (HCC): Primary | ICD-10-CM

## 2020-02-03 NOTE — PROGRESS NOTES
WBC up -- ? From his recent chelation therapy. Will need repeat CBC -- he does have mild leukocytosis chronically. Order placed; please have this done this week, before procedure.

## 2020-02-06 ENCOUNTER — HOSPITAL ENCOUNTER (OUTPATIENT)
Dept: NON INVASIVE DIAGNOSTICS | Age: 60
Discharge: HOME | End: 2020-02-06
Payer: SELF-PAY

## 2020-02-06 VITALS
DIASTOLIC BLOOD PRESSURE: 94 MMHG | HEIGHT: 74 IN | RESPIRATION RATE: 18 BRPM | OXYGEN SATURATION: 94 % | HEART RATE: 76 BPM | SYSTOLIC BLOOD PRESSURE: 138 MMHG | WEIGHT: 308 LBS | BODY MASS INDEX: 39.53 KG/M2

## 2020-02-06 DIAGNOSIS — I48.91 ATRIAL FIBRILLATION, UNSPECIFIED TYPE (HCC): ICD-10-CM

## 2020-02-06 LAB
ATRIAL RATE: 79 BPM
CALCULATED P AXIS, ECG09: 37 DEGREES
CALCULATED R AXIS, ECG10: -16 DEGREES
CALCULATED T AXIS, ECG11: 141 DEGREES
DIAGNOSIS, 93000: NORMAL
P-R INTERVAL, ECG05: 136 MS
Q-T INTERVAL, ECG07: 456 MS
QRS DURATION, ECG06: 110 MS
QTC CALCULATION (BEZET), ECG08: 522 MS
VENTRICULAR RATE, ECG03: 79 BPM

## 2020-02-06 PROCEDURE — 93005 ELECTROCARDIOGRAM TRACING: CPT

## 2020-02-06 PROCEDURE — 92960 CARDIOVERSION ELECTRIC EXT: CPT

## 2020-02-06 PROCEDURE — 77030018729 HC ELECTRD DEFIB PAD CARD -B

## 2020-02-06 PROCEDURE — 74011250636 HC RX REV CODE- 250/636: Performed by: INTERNAL MEDICINE

## 2020-02-06 RX ORDER — FENTANYL CITRATE 50 UG/ML
12.5-5 INJECTION, SOLUTION INTRAMUSCULAR; INTRAVENOUS
Status: DISCONTINUED | OUTPATIENT
Start: 2020-02-06 | End: 2020-02-06

## 2020-02-06 RX ORDER — MIDAZOLAM HYDROCHLORIDE 1 MG/ML
.5-2 INJECTION, SOLUTION INTRAMUSCULAR; INTRAVENOUS
Status: DISCONTINUED | OUTPATIENT
Start: 2020-02-06 | End: 2020-02-06

## 2020-02-06 RX ADMIN — FENTANYL CITRATE 25 MCG: 50 INJECTION, SOLUTION INTRAMUSCULAR; INTRAVENOUS at 08:13

## 2020-02-06 RX ADMIN — MIDAZOLAM 2 MG: 1 INJECTION INTRAMUSCULAR; INTRAVENOUS at 08:12

## 2020-02-06 RX ADMIN — MIDAZOLAM 2 MG: 1 INJECTION INTRAMUSCULAR; INTRAVENOUS at 08:14

## 2020-02-06 NOTE — DISCHARGE INSTRUCTIONS
DISCHARGE SUMMARY       The following personal items collected during your admission are returned to you:    Clothing:  Suspenders, shirt        PATIENT INSTRUCTIONS: Continue taking all the same medications as previously directed. The cardioversion procedure can cause redness to the skin where the patches were placed. You may treat this with Aloe or Cortisone cream over the counter lotion. If the skin appears very reddened or blistered contact your physician. Call to make an appointment with Dr. Kiet Bernal in 2 week(s). What to do at Home:  Recommended activity: No driving today      The discharge information has been reviewed with the PATIENT/friend . The PATIENT  verbalized understanding.

## 2020-02-06 NOTE — PROGRESS NOTES
Pt sedated with 4mg Versed and 25mcg Fentanyl for Cardioversion, given 1 synchronized shock(s) at 360 Joules, Afib converted to NSR. (monitored sedation from 0811 to 0820)

## 2020-02-06 NOTE — PROGRESS NOTES
Discharge instructions reviewed with patient and friend. Allowed adequate time to ask questions, all questions answered. Printed copy of AVS given to patient. All belongings gathered, IV and tele discontinued. Transported via wheelchair to main entrance and into care of family.

## 2020-02-18 ENCOUNTER — OFFICE VISIT (OUTPATIENT)
Dept: CARDIOLOGY CLINIC | Age: 60
End: 2020-02-18

## 2020-02-18 VITALS
HEART RATE: 130 BPM | SYSTOLIC BLOOD PRESSURE: 150 MMHG | OXYGEN SATURATION: 97 % | HEIGHT: 74 IN | WEIGHT: 314.5 LBS | RESPIRATION RATE: 20 BRPM | BODY MASS INDEX: 40.36 KG/M2 | DIASTOLIC BLOOD PRESSURE: 84 MMHG

## 2020-02-18 DIAGNOSIS — G47.33 OSA (OBSTRUCTIVE SLEEP APNEA): ICD-10-CM

## 2020-02-18 DIAGNOSIS — I25.83 CORONARY ARTERY DISEASE DUE TO LIPID RICH PLAQUE: ICD-10-CM

## 2020-02-18 DIAGNOSIS — I10 ESSENTIAL HYPERTENSION: ICD-10-CM

## 2020-02-18 DIAGNOSIS — I48.0 PAF (PAROXYSMAL ATRIAL FIBRILLATION) (HCC): Primary | ICD-10-CM

## 2020-02-18 DIAGNOSIS — I25.10 CORONARY ARTERY DISEASE DUE TO LIPID RICH PLAQUE: ICD-10-CM

## 2020-02-18 DIAGNOSIS — I50.30 DIASTOLIC CONGESTIVE HEART FAILURE, UNSPECIFIED HF CHRONICITY (HCC): ICD-10-CM

## 2020-02-18 DIAGNOSIS — E78.2 MIXED HYPERLIPIDEMIA: ICD-10-CM

## 2020-02-18 NOTE — PROGRESS NOTES
1. Have you been to the ER, urgent care clinic since your last visit? Hospitalized since your last visit? Yes 2/2020    2. Have you seen or consulted any other health care providers outside of the [address] [place] [address] since your last visit? Include any pap smears or colon screening. Quirino CALI for integrative medical clinic prn for infusion . Chief Complaint   Patient presents with   [facility] Follow Up     S/P cardioversion     Patient C/O unable to breathe due to coughing and vocal cords irritated believes this is medication related and Betapace more stomach irritation. He has noted abnormal heart rhythm with activity.

## 2020-02-18 NOTE — PROGRESS NOTES
[address] [place] [address] [address] [facility]  [phone]     Subjective:      Sara Patton is a 60 y.o. male  presents for hospital follow up: underwent [address] on 2/6/2020. He is maintaining SR. Last seen in clinic 1/29/20: was in AF, rate 116. Sotalol was increased to 120 mg BID. Had chelation therapy in [place] week of 1/20/20. Today he is back in AF, rate 130s. He denies any palpitation, reports unchanged tucker with heavy exertion, improved leg swelling. He is going back to [place] on Thursday for his 4th chelation therapy. The patient denies chest pain, orthopnea, PND, LE edema, palpitations, syncope, or presyncope.        Patient Active Problem List    Diagnosis Date Noted    Essential hypertension 12/04/2019    CHF (congestive heart failure) ([address] 11/05/2019    Atrial fibrillation with rapid ventricular response ([address] 11/05/2019    Acute kidney injury ([address] 11/05/2019    JUANJO (acute kidney injury) ([address] 11/04/2019    Atrial fibrillation with RVR ([address] 11/04/2019    Acute diastolic CHF (congestive heart failure) ([address] 11/04/2019    Morbid obesity ([address] 11/04/2019    ALYSSA on CPAP 11/04/2019      Krista Robin MD  Past Medical History:   Diagnosis Date    A-fib [facility])     Allergic reaction to contrast material     galodinium    Hypertension     Irregular heart rhythm     Mercury poisoning     Morbid obesity ([address] 11/4/2019    ALYSSA on CPAP     ALYSSA on CPAP 11/4/2019    Post concussion syndrome       Past Surgical History:   Procedure Laterality Date    HX SHOULDER ARTHROSCOPY      HX UROLOGICAL Left     hydrocoele resection     Allergies   Allergen Reactions    Ace Inhibitors Cough    Gadolinium-Containing Contrast Media Rash      Family History   Problem Relation Age of Onset    Colon Cancer Mother     Hypertension Father     Other Father         multiple birth defects    Hypertension Paternal Grandfather       Social History     Socioeconomic History    Marital status:      Spouse name: Not on file    Number of children: Not on file    Years of education: Not on file    Highest education level: Not on file   Occupational History    Occupation:    Social Needs    Financial resource strain: Not on file    Food insecurity:     Worry: Not on file     Inability: Not on file    Transportation needs:     Medical: Not on file     Non-medical: Not on file   Tobacco Use    Smoking status: Never Smoker    Smokeless tobacco: Never Used   Substance and Sexual Activity    Alcohol use: Not Currently    Drug use: Never    Sexual activity: Not on file   Lifestyle    Physical activity:     Days per week: Not on file     Minutes per session: Not on file    Stress: Not on file   Relationships    Social connections:     Talks on phone: Not on file     Gets together: Not on file     Attends Restoration service: Not on file     Active member of club or organization: Not on file     Attends meetings of clubs or organizations: Not on file     Relationship status: Not on file    Intimate partner violence:     Fear of current or ex partner: Not on file     Emotionally abused: Not on file     Physically abused: Not on file     Forced sexual activity: Not on file   Other Topics Concern    Not on file   Social History Narrative    Not on file      Current Outpatient Medications   Medication Sig    HCG 8000IU/4 ML 2,000 Units by SubCUTAneous route. HCG   Benzyl Alcohol   Sodium phosphate   Sterile water   (may use HCl or NaOH  to make adjustments to the PH)    sotalol (BETAPACE) 120 mg tablet Take 1 Tab by mouth two (2) times a day.  testosterone cypionate (DEPO-TESTOSTERONE) 100 mg/mL injection 200 mg by IntraMUSCular route Once every 2 weeks.  furosemide (LASIX) 40 mg tablet take 1 tablet by mouth twice a day    anastrozole (ARIMIDEX) 1 mg tablet Take 1 mg by mouth every seven (7) days.     apixaban (ELIQUIS) 5 mg tablet Take 1 Tab by mouth two (2) times a day.  amLODIPine (NORVASC) 10 mg tablet Take 1 Tab by mouth daily.  desloratadine (CLARINEX) 5 mg tablet Take 5 mg by mouth daily.  montelukast (SINGULAIR) 10 mg tablet Take 10 mg by mouth every evening.  dextroamphetamine-amphetamine (ADDERALL) 30 mg tablet Take 30 mg by mouth two (2) times a day. No current facility-administered medications for this visit. Review of Symptoms:  11 systems reviewed, negative other than as stated in the HPI    Physical ExamPhysical Exam:    Vitals:    02/18/20 1359 02/18/20 1413   BP: (!) 180/100 150/84   Pulse: (!) 130    Resp: 20    SpO2: 97%    Weight: 314 lb 8 oz (142.7 kg)    Height: 6' 2\" (1.88 m)      Body mass index is 40.38 kg/m². General PE  Gen:  NAD  Mental Status - Alert. General Appearance - Not in acute distress. HEENT:  PERRL, no carotid bruits or JVD  Chest and Lung Exam   Inspection: Accessory muscles - No use of accessory muscles in breathing. Auscultation:   Breath sounds: - Normal.   Cardiovascular   Inspection: Jugular vein - Bilateral - Inspection Normal.   Palpation/Percussion:   Apical Impulse: - Normal.   Auscultation: Rhythm - IRRegular. Heart Sounds - S1 WNL and S2 WNL. No S3 or S4. Murmurs & Other Heart Sounds: Auscultation of the heart reveals - No Murmurs. Peripheral Vascular   Upper Extremity: Inspection - Bilateral - No Cyanotic nailbeds or Digital clubbing. Lower Extremity:   Palpation: Edema - Bilateral - + 1  Abdomen:   Soft, non-tender, bowel sounds are active.   Neuro: A&O times 3, CN and motor grossly WNL    Labs:   Lab Results   Component Value Date/Time    Cholesterol, total 167 11/05/2019 04:41 AM    HDL Cholesterol 38 11/05/2019 04:41 AM    LDL, calculated 113.6 (H) 11/05/2019 04:41 AM    Triglyceride 77 11/05/2019 04:41 AM    CHOL/HDL Ratio 4.4 11/05/2019 04:41 AM     No results found for: CPK, CPKX, CPX  Lab Results   Component Value Date/Time    Sodium 140 01/29/2020 01:01 PM    Potassium 4.6 01/29/2020 01:01 PM    Chloride 98 01/29/2020 01:01 PM    CO2 23 01/29/2020 01:01 PM    Anion gap 4 (L) 11/07/2019 06:00 AM    Glucose 97 01/29/2020 01:01 PM    BUN 25 01/29/2020 01:01 PM    Creatinine 1.43 (H) 01/29/2020 01:01 PM    BUN/Creatinine ratio 17 01/29/2020 01:01 PM    GFR est AA 61 01/29/2020 01:01 PM    GFR est non-AA 53 (L) 01/29/2020 01:01 PM    Calcium 9.4 01/29/2020 01:01 PM    Bilirubin, total 1.5 (H) 11/07/2019 06:00 AM    AST (SGOT) 23 11/07/2019 06:00 AM    Alk. phosphatase 58 11/07/2019 06:00 AM    Protein, total 5.2 (L) 11/07/2019 06:00 AM    Albumin 3.1 (L) 11/07/2019 06:00 AM    Globulin 2.1 11/07/2019 06:00 AM    A-G Ratio 1.5 11/07/2019 06:00 AM    ALT (SGPT) 82 (H) 11/07/2019 06:00 AM       EKG:  AF     Assessment:     Assessment:      1. PAF (paroxysmal atrial fibrillation) ([facility]    2. Essential hypertension    3. ALYSSA (obstructive sleep apnea)    4. Mixed hyperlipidemia    5. Diastolic congestive heart failure, unspecified HF chronicity ([facility]    6. Coronary artery disease due to lipid rich plaque        Orders Placed This Encounter    AMB POC EKG ROUTINE W/ 12 LEADS, INTER & REP     Order Specific Question:   Reason for Exam:     Answer:   routine        Plan:     Patient presents for hospital follow up: underwent [address] on 2/6/2020. He is maintaining SR. Last seen in clinic 1/29/20: was in AF, rate 116. Sotalol was increased to 120 mg BID. Had chelation therapy in [place] week of 1/20/20. Today he is back in AF, rate 130s. He denies any palpitation, reports unchanged tucker with heavy exertion, improved leg swelling. He is going back to [place] on Thursday for his 4th chelation therapy.          PAF s/p CV in 11/19  Back in AF rate 130  Continue Sotalol 120 mg BID--defers adjustment   Continue Eliquis 5 mg BID  Recommend adding diltiazem for rate control, defers  He does not want to take any more medications as he believes that the chelation therapy will resolve  His issues     Diastolic HFpEF 51-55 per RUFINO 11/19  Wt up 5 lbs, leg swelling has improved  Cr 1.43 in 1/2020, mildly up but stable; Cr 0.9 in 12/19, Cr 1.3 in 11/19  Continue Lasix 40 mg BID      HTN  Elevated --- defers any adjustment to his therapy and wants to come off of all his meds     Nonobstructive CAD per cath 11/19: 1st Mrg lesion 30% stenosed.    Continue BB  Defers statin     ALYSSA  On cpap therapy        Continue current care and f/u with Dr Juliano Guillaume next week    Ray Pollack, LUISANA

## 2020-02-25 ENCOUNTER — OFFICE VISIT (OUTPATIENT)
Dept: CARDIOLOGY CLINIC | Age: 60
End: 2020-02-25

## 2020-02-25 VITALS
HEIGHT: 74 IN | OXYGEN SATURATION: 95 % | WEIGHT: 315 LBS | DIASTOLIC BLOOD PRESSURE: 96 MMHG | SYSTOLIC BLOOD PRESSURE: 146 MMHG | HEART RATE: 125 BPM | RESPIRATION RATE: 16 BRPM | BODY MASS INDEX: 40.43 KG/M2

## 2020-02-25 DIAGNOSIS — N17.9 AKI (ACUTE KIDNEY INJURY) (HCC): ICD-10-CM

## 2020-02-25 DIAGNOSIS — E78.2 MIXED HYPERLIPIDEMIA: ICD-10-CM

## 2020-02-25 DIAGNOSIS — I10 ESSENTIAL HYPERTENSION: ICD-10-CM

## 2020-02-25 DIAGNOSIS — I48.91 ATRIAL FIBRILLATION WITH RVR (HCC): Primary | ICD-10-CM

## 2020-02-25 DIAGNOSIS — I73.9 PERIPHERAL VASCULAR DISEASE (HCC): ICD-10-CM

## 2020-02-25 RX ORDER — DILTIAZEM HYDROCHLORIDE 120 MG/1
120 CAPSULE, COATED, EXTENDED RELEASE ORAL DAILY
Qty: 30 CAP | Refills: 3 | Status: SHIPPED | OUTPATIENT
Start: 2020-02-25 | End: 2020-04-04

## 2020-02-25 NOTE — PROGRESS NOTES
NAME:  nAa Collins   :   1960   MRN:   [id number]   PCP:  Zenia Quesada MD           Subjective: The patient is a 60y.o. year old male  who returns for a routine follow-up. Since the last visit, patient reports no change in exercise tolerance, chest pain, edema, medication intolerance, palpitations, shortness of breath, PND/orthopnea wheezing, sputum, syncope, dizziness or light headedness. Has chelation therapy in [place] last week and feels better. Past Medical History:   Diagnosis Date    A-fib [facility])     Allergic reaction to contrast material     galodinium    Hypertension     Irregular heart rhythm     Mercury poisoning     Morbid obesity ([facility] [address] 2019    ALYSSA on CPAP     ALYSSA on CPAP 2019    Post concussion syndrome         ICD-10-CM ICD-9-CM    1. Atrial fibrillation with RVR (HCC) I48.91 427.31    2. Mixed hyperlipidemia E78.2 272.2 AMB POC EKG ROUTINE W/ 12 LEADS, INTER & REP   3. JUANJO (acute kidney injury) ([facility] Q80.5 141.1 METABOLIC PANEL, BASIC   4. Peripheral vascular disease (HCC) I73.9 443.9    5. Essential hypertension I10 401.9       Social History     Tobacco Use    Smoking status: Never Smoker    Smokeless tobacco: Never Used   Substance Use Topics    Alcohol use: Not Currently      Family History   Problem Relation Age of Onset    Colon Cancer Mother     Hypertension Father     Other Father         multiple birth defects    Hypertension Paternal Grandfather         Review of Systems  General: Pt denies excessive weight gain or loss. Pt is able to conduct ADL's  HEENT: Denies blurred vision, headaches, epistaxis and difficulty swallowing. Respiratory: Denies shortness of breath, ELIZONDO, wheezing or stridor.   Cardiovascular: Denies precordial pain, palpitations, edema or PND  Gastrointestinal: Denies poor appetite, indigestion, abdominal pain or blood in stool  Musculoskeletal: Denies pain or swelling from muscles or joints  Neurologic: Denies tremor, paresthesias, or sensory motor disturbance  Skin: Denies rash, itching or texture change. Objective:       Vitals:    02/25/20 1009   BP: (!) 146/96   Pulse: (!) 125   Resp: 16   SpO2: 95%   Weight: 315 lb 11.2 oz (143.2 kg)   Height: 6' 2\" (1.88 m)    Body mass index is 40.53 kg/m². General PE  Mental Status - Alert. General Appearance - Not in acute distress. Chest and Lung Exam   Inspection: Accessory muscles - No use of accessory muscles in breathing. Auscultation:   Breath sounds: - Normal.    Cardiovascular   Inspection: Jugular vein - Bilateral - Inspection Normal.  Palpation/Percussion:   Apical Impulse: - Normal.  Auscultation: Rhythm - Regular. Heart Sounds - S1 WNL and S2 WNL. No S3 or S4. Murmurs & Other Heart Sounds: Auscultation of the heart reveals - No Murmurs. Peripheral Vascular   Upper Extremity: Inspection - Bilateral - No Cyanotic nailbeds or Digital clubbing. Lower Extremity:   Palpation: Edema - Bilateral - No edema. Data Review:     EKG -EKG: atrial fibrillation, rate 120. Medications reviewed  Current Outpatient Medications   Medication Sig    naltrexone HCl (NALTREXONE PO) Take  by mouth daily. Dose unknown prescribed Dr. Hakeem More    dilTIAZem CD (CARDIZEM CD) 120 mg ER capsule Take 1 Cap by mouth daily.  HCG 8000IU/4 ML 2,000 Units by SubCUTAneous route. HCG   Benzyl Alcohol   Sodium phosphate   Sterile water   (may use HCl or NaOH  to make adjustments to the PH)    sotalol (BETAPACE) 120 mg tablet Take 1 Tab by mouth two (2) times a day.  testosterone cypionate (DEPO-TESTOSTERONE) 100 mg/mL injection 200 mg by IntraMUSCular route Once every 2 weeks.  furosemide (LASIX) 40 mg tablet take 1 tablet by mouth twice a day    anastrozole (ARIMIDEX) 1 mg tablet Take 1 mg by mouth every seven (7) days.  apixaban (ELIQUIS) 5 mg tablet Take 1 Tab by mouth two (2) times a day.     desloratadine (CLARINEX) 5 mg tablet Take 5 mg by mouth daily.  montelukast (SINGULAIR) 10 mg tablet Take 10 mg by mouth every evening.  dextroamphetamine-amphetamine (ADDERALL) 30 mg tablet Take 30 mg by mouth two (2) times a day. No current facility-administered medications for this visit. Assessment:       ICD-10-CM ICD-9-CM    1. Atrial fibrillation with RVR (HCC) I48.91 427.31    2. Mixed hyperlipidemia E78.2 272.2 AMB POC EKG ROUTINE W/ 12 LEADS, INTER & REP   3. JUANJO (acute kidney injury) ([facility] [address] N56.7 374.8 METABOLIC PANEL, BASIC   4. Peripheral vascular disease (HCC) I73.9 443.9    5. Essential hypertension I10 401.9         Plan:     Patient presents with continued afib with RVR. Had chelation last week and feels somewhat better. Advised to see EP for RFA, declined. Thinks that he may have cervical disc problem which may be causing his afib. He has built a chair at home where he can invert himself and hang by the ceilinig. he will try that for few weeks. Will add cardizem for better rate control and d/c amlodipine. Strongly advised against self diagnosis and treatment. F/u in a month.     Tessy Napoles MD

## 2020-02-25 NOTE — PROGRESS NOTES
1. Have you been to the ER, urgent care clinic since your last visit? Hospitalized since your last visit? No    2. Have you seen or consulted any other health care providers outside of the [address] [place] [address] since your last visit? Include any pap smears or colon screening. No     Chief Complaint   Patient presents with    Irregular Heart Beat     1 week f/u    Shortness of Breath     exertional    Palpitations     daily     Leg Swelling     bilateral     Pt reports he had recent bloodwork done for testosterone, kidney, and LFT ordered by provider at [facility]   Also reports sotalol gives him constipation.      SOB:  On exertion; denies chest pain/tightness/pressure

## 2020-02-26 LAB
BUN SERPL-MCNC: 25 MG/DL (ref 8–27)
BUN/CREAT SERPL: 17 (ref 10–24)
CALCIUM SERPL-MCNC: 9.3 MG/DL (ref 8.6–10.2)
CHLORIDE SERPL-SCNC: 102 MMOL/L (ref 96–106)
CO2 SERPL-SCNC: 23 MMOL/L (ref 20–29)
CREAT SERPL-MCNC: 1.44 MG/DL (ref 0.76–1.27)
GLUCOSE SERPL-MCNC: 95 MG/DL (ref 65–99)
INTERPRETATION: NORMAL
POTASSIUM SERPL-SCNC: 4.3 MMOL/L (ref 3.5–5.2)
SODIUM SERPL-SCNC: 145 MMOL/L (ref 134–144)

## 2020-02-26 NOTE — PROGRESS NOTES
Kiera Burrell,    Please call the patient and inform that renal function is stable. Normal sodium and potassium levels. No changes. Keep f/u with Dr. Alena Francois in 1 month.     Thanks,  [employer]

## 2020-02-27 ENCOUNTER — DOCUMENTATION ONLY (OUTPATIENT)
Dept: CARDIOLOGY CLINIC | Age: 60
End: 2020-02-27

## 2020-02-27 ENCOUNTER — TELEPHONE (OUTPATIENT)
Dept: CARDIOLOGY CLINIC | Age: 60
End: 2020-02-27

## 2020-02-27 NOTE — PROGRESS NOTES
Patient walked into office, stating he did not feel well and needed to speak with a nurse. Pt states he feels nauseous and weak and is unable to go to work. He wants to have Atrial fibrillation ablation procedure (he previously declined this) as long as he will not have any metal placed in his body which would interfere with his chelation treatments. Pt hooked to a monitor-HR was 130's atrial fibrillation. /90. Arti Carver  N.P in to see patient. Pt scheduled to see Dr. Sean Purvis next week to discuss ablation. Pt instructed to go to the Emergency room if his symptoms get worse.

## 2020-02-27 NOTE — TELEPHONE ENCOUNTER
Patient would like to proceed with ablation what is the next step    Leave message on phone with surgery date and instructions patient will then calllback.     [phone]    Thanks  Ney Gonsalez

## 2020-02-27 NOTE — PROGRESS NOTES
Discussed with patient face-to-face. He wants to have an ablation done. He says he cannot breathe and is fatigued all the time and can no longer work. He says his medications are making him nauseous. He says all the nurses he has been seeing in [place] are telling him that he is going into liver and kidney failure and he should be coming off his Sotalol. He cannot be seen by Dr. Sheila Garner until next week. BMP done 2/25/2020 shows SCr 1.4  Last CMP done 11/2019 with normal ALT/AST, AP. I offered him the option of being admitted to the hospital for RUFINO/cardioversion and attempting rate control in an inpatient setting and having an ablation done next week, however he does not want to be admitted. Discussed further rate control, offered to increase Diltiazem further, but he does not want to take any more medications as he believes this to be the cause of his nausea. Discussed his symptoms are more likely due to persistent atrial fibrillation with tachycardia and not the medications, but he does not believe me. I ultimately told him if he is not interested in following with our recommendations, the best option I can give him is to see Dr. Sheila Garner next week to discuss AF ablation. He then tells me he will not go through with AF ablation if he has to have a pacemaker or any metal inserted as this will keep him from getting his chelation therapy. I advised him to discuss this with Dr. Sheila Garner next week. Patient was brought to the  and appointment was made to see Dr. Sheila Garner next week. Advised patient if he feels worse in the interim to go to the ER.     Edenilson Padron NP  2/27/2020

## 2020-03-05 ENCOUNTER — OFFICE VISIT (OUTPATIENT)
Dept: CARDIOLOGY CLINIC | Age: 60
End: 2020-03-05

## 2020-03-05 ENCOUNTER — HOSPITAL ENCOUNTER (OUTPATIENT)
Dept: GENERAL RADIOLOGY | Age: 60
Discharge: HOME | End: 2020-03-05
Payer: COMMERCIAL

## 2020-03-05 VITALS
SYSTOLIC BLOOD PRESSURE: 152 MMHG | OXYGEN SATURATION: 96 % | WEIGHT: 312.7 LBS | BODY MASS INDEX: 40.13 KG/M2 | HEIGHT: 74 IN | HEART RATE: 110 BPM | DIASTOLIC BLOOD PRESSURE: 120 MMHG | RESPIRATION RATE: 18 BRPM

## 2020-03-05 DIAGNOSIS — I73.9 PERIPHERAL VASCULAR DISEASE (HCC): ICD-10-CM

## 2020-03-05 DIAGNOSIS — I10 ESSENTIAL HYPERTENSION: ICD-10-CM

## 2020-03-05 DIAGNOSIS — Z86.79 HISTORY OF IRREGULAR HEARTBEAT: ICD-10-CM

## 2020-03-05 DIAGNOSIS — N17.9 ACUTE KIDNEY INJURY (HCC): ICD-10-CM

## 2020-03-05 DIAGNOSIS — I48.91 ATRIAL FIBRILLATION WITH RVR (HCC): ICD-10-CM

## 2020-03-05 DIAGNOSIS — I48.3 TYPICAL ATRIAL FLUTTER (HCC): ICD-10-CM

## 2020-03-05 DIAGNOSIS — Z86.79 HISTORY OF IRREGULAR HEARTBEAT: Primary | ICD-10-CM

## 2020-03-05 PROCEDURE — 71046 X-RAY EXAM CHEST 2 VIEWS: CPT

## 2020-03-05 NOTE — PROGRESS NOTES
Subjective:      Rebeca Malloy is a 60 y.o. male is here for EP consult. Mr Ale Lara is having sob with exertion.      Patient Active Problem List    Diagnosis Date Noted    [facility] 02/25/2020    Essential hypertension 12/04/2019    CHF (congestive heart failure) ([facility] 11/05/2019    Atrial fibrillation with rapid ventricular response ([facility] 11/05/2019    Acute kidney injury ([facility] 11/05/2019    JUANJO (acute kidney injury) ([address] 11/04/2019    Atrial fibrillation with RVR ([facility] 11/04/2019    Acute diastolic CHF (congestive heart failure) ([facility] 11/04/2019    Morbid obesity ([facility] 11/04/2019    ALYSSA on CPAP 11/04/2019      Neetu Carlos MD  Past Medical History:   Diagnosis Date    A-fib [facility])     Allergic reaction to contrast material     galodinium    Hypertension     Irregular heart rhythm     Mercury poisoning     Morbid obesity ([facility] 11/4/2019    ALYSSA on CPAP     ALYSSA on CPAP 11/4/2019    Post concussion syndrome       Past Surgical History:   Procedure Laterality Date    HX SHOULDER ARTHROSCOPY      HX UROLOGICAL Left     hydrocoele resection     Allergies   Allergen Reactions    Ace Inhibitors Cough    Gadolinium-Containing Contrast Media Rash      Family History   Problem Relation Age of Onset    Colon Cancer Mother     Hypertension Father     Other Father         multiple birth defects    Hypertension Paternal Grandfather     negative for cardiac disease  Social History     Socioeconomic History    Marital status:      Spouse name: Not on file    Number of children: Not on file    Years of education: Not on file    Highest education level: Not on file   Occupational History    Occupation:    Tobacco Use    Smoking status: Never Smoker    Smokeless tobacco: Never Used   Substance and Sexual Activity    Alcohol use: Not Currently    Drug use: Never     Current Outpatient Medications   Medication Sig    naltrexone HCl (NALTREXONE PO) Take  by mouth daily. Dose unknown prescribed Dr. Tegaue Sites    dilTIAZem CD (CARDIZEM CD) 120 mg ER capsule Take 1 Cap by mouth daily.  HCG 8000IU/4 ML 2,000 Units by SubCUTAneous route. HCG   Benzyl Alcohol   Sodium phosphate   Sterile water   (may use HCl or NaOH  to make adjustments to the [address])    testosterone cypionate (DEPO-TESTOSTERONE) 100 mg/mL injection 200 mg by IntraMUSCular route Once every 2 weeks.  furosemide (LASIX) 40 mg tablet take 1 tablet by mouth twice a day    anastrozole (ARIMIDEX) 1 mg tablet Take 1 mg by mouth every seven (7) days.  apixaban (ELIQUIS) 5 mg tablet Take 1 Tab by mouth two (2) times a day.  desloratadine (CLARINEX) 5 mg tablet Take 5 mg by mouth daily.  montelukast (SINGULAIR) 10 mg tablet Take 10 mg by mouth every evening.  dextroamphetamine-amphetamine (ADDERALL) 30 mg tablet Take 30 mg by mouth two (2) times a day. No current facility-administered medications for this visit. Vitals:    03/05/20 0925 03/05/20 0943 03/05/20 0945   BP: (!) 158/112 (!) 140/120 (!) 152/120   Pulse: (!) 110     Resp: 18     SpO2: 96%     Weight: 312 lb 11.2 oz (141.8 kg)     Height: 6' 2\" (1.88 m)         I have reviewed the nurses notes, vitals, problem list, allergy list, medical history, family, social history and medications. Review of Symptoms:    General: Pt denies excessive weight gain or loss. Pt is able to conduct ADL's  HEENT: Denies blurred vision, headaches, epistaxis and difficulty swallowing. Respiratory: + shortness of breath, ELIZONDO, denies wheezing or stridor. Cardiovascular: Denies precordial pain, palpitations, edema or PND  Gastrointestinal: Denies poor appetite, indigestion, abdominal pain or blood in stool  Urinary: Denies dysuria, pyuria  Musculoskeletal: Denies pain or swelling from muscles or joints  Neurologic: Denies tremor, paresthesias, or sensory motor disturbance  Skin: Denies rash, itching or texture change.   Psych: Denies depression      Physical Exam:      General: Well developed, in no acute distress. HEENT: Eyes - PERRL, no jvd  Heart:  Normal S1/S2 negative S3 or S4. Regular, no murmur, gallop or rub.   Respiratory: Clear bilaterally x 4, no wheezing or rales  Abdomen:   Soft, non-tender, bowel sounds are active.   Extremities:  No edema, normal cap refill, no cyanosis. Musculoskeletal: No clubbing  Neuro: A&Ox3, speech clear, gait stable. Skin: Skin color is normal. No rashes or lesions.  Non diaphoretic  Vascular: 2+ pulses symmetric in all extremities    Cardiographics    Ekg: Atypical atrial flutter with variable block    Results for orders placed or performed during the hospital encounter of 02/06/20   EKG, 12 LEAD, INITIAL   Result Value Ref Range    Ventricular Rate 79 BPM    Atrial Rate 79 BPM    P-R Interval 136 ms    QRS Duration 110 ms    Q-T Interval 456 ms    QTC Calculation (Bezet) 522 ms    Calculated P Axis 37 degrees    Calculated R Axis -16 degrees    Calculated T Axis 141 degrees    Diagnosis       Sinus rhythm with premature atrial complexes  Left ventricular hypertrophy with repolarization abnormality ( R in aVL ,   [place] product )  Prolonged QT  When compared with ECG of 07-NOV-2019 05:51,  premature atrial complexes are now present  Confirmed by Neymar Strong P.VZaira ([id number]) on 2/6/2020 3:23:30 PM           Lab Results   Component Value Date/Time    WBC 20.1 (HH) 01/29/2020 01:01 PM    HGB 14.6 01/29/2020 01:01 PM    HCT 42.8 01/29/2020 01:01 PM    PLATELET 609 09/43/3929 01:01 PM    MCV 94 01/29/2020 01:01 PM      Lab Results   Component Value Date/Time    Sodium 145 (H) 02/25/2020 11:40 AM    Potassium 4.3 02/25/2020 11:40 AM    Chloride 102 02/25/2020 11:40 AM    CO2 23 02/25/2020 11:40 AM    Anion gap 4 (L) 11/07/2019 06:00 AM    Glucose 95 02/25/2020 11:40 AM    BUN 25 02/25/2020 11:40 AM    Creatinine 1.44 (H) 02/25/2020 11:40 AM    BUN/Creatinine ratio 17 02/25/2020 11:40 AM    GFR est AA 61 02/25/2020 11:40 AM    GFR est non-AA 52 (L) 02/25/2020 11:40 AM    Calcium 9.3 02/25/2020 11:40 AM    Bilirubin, total 1.5 (H) 11/07/2019 06:00 AM    AST (SGOT) 23 11/07/2019 06:00 AM    Alk. phosphatase 58 11/07/2019 06:00 AM    Protein, total 5.2 (L) 11/07/2019 06:00 AM    Albumin 3.1 (L) 11/07/2019 06:00 AM    Globulin 2.1 11/07/2019 06:00 AM    A-G Ratio 1.5 11/07/2019 06:00 AM    ALT (SGPT) 82 (H) 11/07/2019 06:00 AM         Assessment:     Assessment:        ICD-10-CM ICD-9-CM    1. History of irregular heartbeat Z86.79 V12.59 AMB POC EKG ROUTINE W/ 12 LEADS, INTER & REP      PROTHROMBIN TIME + INR      CBC WITH AUTOMATED DIFF      METABOLIC PANEL, COMPREHENSIVE      XR CHEST PA LAT   2. Essential hypertension I10 401.9 PROTHROMBIN TIME + INR      CBC WITH AUTOMATED DIFF      METABOLIC PANEL, COMPREHENSIVE      XR CHEST PA LAT   3. Atrial fibrillation with RVR (HCC) I48.91 427.31 PROTHROMBIN TIME + INR      CBC WITH AUTOMATED DIFF      METABOLIC PANEL, COMPREHENSIVE      XR CHEST PA LAT   4. Peripheral vascular disease (HCC) I73.9 443.9 PROTHROMBIN TIME + INR      CBC WITH AUTOMATED DIFF      METABOLIC PANEL, COMPREHENSIVE      XR CHEST PA LAT   5. Acute kidney injury ([facility] N17.9 584.9 PROTHROMBIN TIME + INR      CBC WITH AUTOMATED DIFF      METABOLIC PANEL, COMPREHENSIVE      XR CHEST PA LAT   6.  Typical atrial flutter (HCC) I48.3 427.32 PROTHROMBIN TIME + INR      CBC WITH AUTOMATED DIFF      METABOLIC PANEL, COMPREHENSIVE      XR CHEST PA LAT     Orders Placed This Encounter    XR CHEST PA LAT     Standing Status:   Future     Standing Expiration Date:   4/5/2020     Order Specific Question:   Reason for Exam     Answer:   pre op    PROTHROMBIN TIME + INR    CBC WITH AUTOMATED DIFF    METABOLIC PANEL, COMPREHENSIVE    AMB POC EKG ROUTINE W/ 12 LEADS, INTER & REP     Order Specific Question:   Reason for Exam:     Answer:   routine        Plan:   Mr Coco Prado is a pleasant gentleman with symptomatic AF - now appears to be in atypical afl with variable block. He is on oac. Will stop sotalol due to decreased GRF. He is a candidate for an AF/AFL ablation. I discussed the risks/benefits/alternatives of the procedure with the patient. Risks include (but are not limited to) bleeding, heart block, infection, cva/mi/tamponade/esophageal perforation/pv stenosis/death. The patient understands that there is a 2-9% major complication rate. He believes his issues are due to elevated mercury due to dental fillings and believes that chelation therapy will help. He will tentatively schedule ablation. Thank you for this interesting consultation. Continue medical management for htn and arf. Thank you for allowing me to participate in Kristina Goncalves 's care.     Donice Osler, MD, [facility] [place], [address] [facility] [address], [id number], [id number], [id number]

## 2020-03-05 NOTE — LETTER
3/5/20 Patient: Nati Roque YOB: 1960 Date of Visit: 3/5/2020 Kadeem Leone MD 
[address] [address] VIA Facsimile: [phone] Dear Kadeem Leone MD, Thank you for referring Mr. Jyotsna Stokes to [address] [place] [address] for evaluation. My notes for this consultation are attached. If you have questions, please do not hesitate to call me. I look forward to following your patient along with you. Sincerely, Elba Bray MD

## 2020-03-05 NOTE — PROGRESS NOTES
1. Have you been to the ER, urgent care clinic since your last visit? Hospitalized since your last visit? No    2. Have you seen or consulted any other health care providers outside of the [address] [place] [address] since your last visit? Include any pap smears or colon screening. No    Chief Complaint   Patient presents with    Irregular Heart Beat     NP, ref by Caridad Pritchett NP for a fib. C/O SOB at rest or exertion. C/O dizziness.

## 2020-03-06 LAB
ALBUMIN SERPL-MCNC: 4.7 G/DL (ref 3.8–4.9)
ALBUMIN/GLOB SERPL: 2 {RATIO} (ref 1.2–2.2)
ALP SERPL-CCNC: 64 IU/L (ref 39–117)
ALT SERPL-CCNC: 38 IU/L (ref 0–44)
AST SERPL-CCNC: 24 IU/L (ref 0–40)
BASOPHILS # BLD AUTO: 0.1 X10E3/UL (ref 0–0.2)
BASOPHILS NFR BLD AUTO: 0 %
BILIRUB SERPL-MCNC: 0.5 MG/DL (ref 0–1.2)
BUN SERPL-MCNC: 21 MG/DL (ref 8–27)
BUN/CREAT SERPL: 14 (ref 10–24)
CALCIUM SERPL-MCNC: 9.5 MG/DL (ref 8.6–10.2)
CHLORIDE SERPL-SCNC: 97 MMOL/L (ref 96–106)
CO2 SERPL-SCNC: 24 MMOL/L (ref 20–29)
CREAT SERPL-MCNC: 1.5 MG/DL (ref 0.76–1.27)
EOSINOPHIL # BLD AUTO: 0 X10E3/UL (ref 0–0.4)
EOSINOPHIL NFR BLD AUTO: 0 %
ERYTHROCYTE [DISTWIDTH] IN BLOOD BY AUTOMATED COUNT: 13.4 % (ref 11.6–15.4)
GLOBULIN SER CALC-MCNC: 2.3 G/DL (ref 1.5–4.5)
GLUCOSE SERPL-MCNC: 112 MG/DL (ref 65–99)
HCT VFR BLD AUTO: 49.2 % (ref 37.5–51)
HGB BLD-MCNC: 17 G/DL (ref 13–17.7)
IMM GRANULOCYTES # BLD AUTO: 0.3 X10E3/UL (ref 0–0.1)
IMM GRANULOCYTES NFR BLD AUTO: 2 %
INR PPP: 1 (ref 0.8–1.2)
INTERPRETATION: NORMAL
LYMPHOCYTES # BLD AUTO: 1.1 X10E3/UL (ref 0.7–3.1)
LYMPHOCYTES NFR BLD AUTO: 5 %
MCH RBC QN AUTO: 32.4 PG (ref 26.6–33)
MCHC RBC AUTO-ENTMCNC: 34.6 G/DL (ref 31.5–35.7)
MCV RBC AUTO: 94 FL (ref 79–97)
MONOCYTES # BLD AUTO: 1 X10E3/UL (ref 0.1–0.9)
MONOCYTES NFR BLD AUTO: 4 %
NEUTROPHILS # BLD AUTO: 20.7 X10E3/UL (ref 1.4–7)
NEUTROPHILS NFR BLD AUTO: 89 %
PLATELET # BLD AUTO: 257 X10E3/UL (ref 150–450)
POTASSIUM SERPL-SCNC: 4 MMOL/L (ref 3.5–5.2)
PROT SERPL-MCNC: 7 G/DL (ref 6–8.5)
PROTHROMBIN TIME: 10.9 SEC (ref 9.1–12)
RBC # BLD AUTO: 5.25 X10E6/UL (ref 4.14–5.8)
SODIUM SERPL-SCNC: 144 MMOL/L (ref 134–144)
WBC # BLD AUTO: 23.2 X10E3/UL (ref 3.4–10.8)

## 2020-03-29 ENCOUNTER — APPOINTMENT (OUTPATIENT)
Dept: GENERAL RADIOLOGY | Age: 60
DRG: 242 | End: 2020-03-29
Attending: EMERGENCY MEDICINE
Payer: COMMERCIAL

## 2020-03-29 ENCOUNTER — HOSPITAL ENCOUNTER (INPATIENT)
Age: 60
LOS: 6 days | Discharge: HOME | DRG: 242 | End: 2020-04-04
Attending: EMERGENCY MEDICINE | Admitting: FAMILY MEDICINE
Payer: COMMERCIAL

## 2020-03-29 ENCOUNTER — APPOINTMENT (OUTPATIENT)
Dept: ULTRASOUND IMAGING | Age: 60
DRG: 242 | End: 2020-03-29
Attending: EMERGENCY MEDICINE
Payer: COMMERCIAL

## 2020-03-29 ENCOUNTER — APPOINTMENT (OUTPATIENT)
Dept: CT IMAGING | Age: 60
DRG: 242 | End: 2020-03-29
Attending: EMERGENCY MEDICINE
Payer: COMMERCIAL

## 2020-03-29 DIAGNOSIS — R79.89 ELEVATED LFTS: ICD-10-CM

## 2020-03-29 DIAGNOSIS — I48.91 ATRIAL FIBRILLATION, UNSPECIFIED TYPE (HCC): Primary | ICD-10-CM

## 2020-03-29 DIAGNOSIS — R17 ELEVATED BILIRUBIN: ICD-10-CM

## 2020-03-29 DIAGNOSIS — I50.30 DIASTOLIC CONGESTIVE HEART FAILURE, UNSPECIFIED HF CHRONICITY (HCC): ICD-10-CM

## 2020-03-29 PROBLEM — G47.33 OSA ON CPAP: Chronic | Status: ACTIVE | Noted: 2019-11-04

## 2020-03-29 PROBLEM — I10 ESSENTIAL HYPERTENSION: Chronic | Status: ACTIVE | Noted: 2019-12-04

## 2020-03-29 PROBLEM — E66.01 MORBID OBESITY (HCC): Chronic | Status: ACTIVE | Noted: 2019-11-04

## 2020-03-29 PROBLEM — Z99.89 OSA ON CPAP: Chronic | Status: ACTIVE | Noted: 2019-11-04

## 2020-03-29 LAB
ALBUMIN SERPL-MCNC: 3.6 G/DL (ref 3.5–5)
ALBUMIN/GLOB SERPL: 1.1 {RATIO} (ref 1.1–2.2)
ALP SERPL-CCNC: 166 U/L (ref 45–117)
ALT SERPL-CCNC: 1838 U/L (ref 12–78)
AMMONIA PLAS-SCNC: 32 UMOL/L
AMPHET UR QL SCN: NEGATIVE
ANION GAP SERPL CALC-SCNC: 14 MMOL/L (ref 5–15)
APAP SERPL-MCNC: <2 UG/ML (ref 10–30)
APTT PPP: 28.6 SEC (ref 22.1–32)
AST SERPL-CCNC: 1577 U/L (ref 15–37)
ATRIAL RATE: 163 BPM
BARBITURATES UR QL SCN: NEGATIVE
BASOPHILS # BLD: 0 K/UL (ref 0–0.1)
BASOPHILS # BLD: 0.1 K/UL (ref 0–0.1)
BASOPHILS NFR BLD: 0 % (ref 0–1)
BASOPHILS NFR BLD: 0 % (ref 0–1)
BENZODIAZ UR QL: NEGATIVE
BILIRUB SERPL-MCNC: 3.7 MG/DL (ref 0.2–1)
BNP SERPL-MCNC: 3871 PG/ML
BUN SERPL-MCNC: 35 MG/DL (ref 6–20)
BUN/CREAT SERPL: 15 (ref 12–20)
CALCIUM SERPL-MCNC: 8.7 MG/DL (ref 8.5–10.1)
CALCULATED R AXIS, ECG10: -19 DEGREES
CALCULATED T AXIS, ECG11: 158 DEGREES
CANNABINOIDS UR QL SCN: NEGATIVE
CHLORIDE SERPL-SCNC: 96 MMOL/L (ref 97–108)
CK SERPL-CCNC: 149 U/L (ref 39–308)
CK SERPL-CCNC: 194 U/L (ref 39–308)
CO2 SERPL-SCNC: 22 MMOL/L (ref 21–32)
COCAINE UR QL SCN: NEGATIVE
COMMENT, HOLDF: NORMAL
CREAT SERPL-MCNC: 2.28 MG/DL (ref 0.7–1.3)
CREAT UR-MCNC: 95.6 MG/DL
DIAGNOSIS, 93000: NORMAL
DIFFERENTIAL METHOD BLD: ABNORMAL
DIFFERENTIAL METHOD BLD: ABNORMAL
DRUG SCRN COMMENT,DRGCM: NORMAL
EOSINOPHIL # BLD: 0 K/UL (ref 0–0.4)
EOSINOPHIL # BLD: 0.3 K/UL (ref 0–0.4)
EOSINOPHIL NFR BLD: 0 % (ref 0–7)
EOSINOPHIL NFR BLD: 1 % (ref 0–7)
ERYTHROCYTE [DISTWIDTH] IN BLOOD BY AUTOMATED COUNT: 14.8 % (ref 11.5–14.5)
ERYTHROCYTE [DISTWIDTH] IN BLOOD BY AUTOMATED COUNT: 15.2 % (ref 11.5–14.5)
FERRITIN SERPL-MCNC: 1323 NG/ML (ref 26–388)
GLOBULIN SER CALC-MCNC: 3.4 G/DL (ref 2–4)
GLUCOSE SERPL-MCNC: 104 MG/DL (ref 65–100)
HCT VFR BLD AUTO: 40.1 % (ref 36.6–50.3)
HCT VFR BLD AUTO: 46.7 % (ref 36.6–50.3)
HGB BLD-MCNC: 13.3 G/DL (ref 12.1–17)
HGB BLD-MCNC: 15.5 G/DL (ref 12.1–17)
IMM GRANULOCYTES # BLD AUTO: 0 K/UL (ref 0–0.04)
IMM GRANULOCYTES # BLD AUTO: 0.4 K/UL (ref 0–0.04)
IMM GRANULOCYTES NFR BLD AUTO: 0 % (ref 0–0.5)
IMM GRANULOCYTES NFR BLD AUTO: 2 % (ref 0–0.5)
INR PPP: 1.6 (ref 0.9–1.1)
LACTATE SERPL-SCNC: 2.4 MMOL/L (ref 0.4–2)
LACTATE SERPL-SCNC: 3.7 MMOL/L (ref 0.4–2)
LIPASE SERPL-CCNC: 162 U/L (ref 73–393)
LYMPHOCYTES # BLD: 1.2 K/UL (ref 0.8–3.5)
LYMPHOCYTES # BLD: 1.4 K/UL (ref 0.8–3.5)
LYMPHOCYTES NFR BLD: 5 % (ref 12–49)
LYMPHOCYTES NFR BLD: 6 % (ref 12–49)
MCH RBC QN AUTO: 32.3 PG (ref 26–34)
MCH RBC QN AUTO: 32.5 PG (ref 26–34)
MCHC RBC AUTO-ENTMCNC: 33.2 G/DL (ref 30–36.5)
MCHC RBC AUTO-ENTMCNC: 33.2 G/DL (ref 30–36.5)
MCV RBC AUTO: 97.3 FL (ref 80–99)
MCV RBC AUTO: 97.9 FL (ref 80–99)
METAMYELOCYTES NFR BLD MANUAL: 2 %
METHADONE UR QL: NEGATIVE
MONOCYTES # BLD: 0.7 K/UL (ref 0–1)
MONOCYTES # BLD: 0.9 K/UL (ref 0–1)
MONOCYTES NFR BLD: 3 % (ref 5–13)
MONOCYTES NFR BLD: 3 % (ref 5–13)
MYELOCYTES NFR BLD MANUAL: 1 %
NEUTS BAND NFR BLD MANUAL: 8 %
NEUTS SEG # BLD: 18.5 K/UL (ref 1.8–8)
NEUTS SEG # BLD: 25.4 K/UL (ref 1.8–8)
NEUTS SEG NFR BLD: 80 % (ref 32–75)
NEUTS SEG NFR BLD: 89 % (ref 32–75)
NRBC # BLD: 0.34 K/UL (ref 0–0.01)
NRBC # BLD: 0.36 K/UL (ref 0–0.01)
NRBC BLD-RTO: 1.2 PER 100 WBC
NRBC BLD-RTO: 1.7 PER 100 WBC
OPIATES UR QL: NEGATIVE
PCP UR QL: NEGATIVE
PLATELET # BLD AUTO: 256 K/UL (ref 150–400)
PLATELET # BLD AUTO: 357 K/UL (ref 150–400)
PMV BLD AUTO: 11.7 FL (ref 8.9–12.9)
PMV BLD AUTO: 12.1 FL (ref 8.9–12.9)
POTASSIUM SERPL-SCNC: 4.6 MMOL/L (ref 3.5–5.1)
PROT SERPL-MCNC: 7 G/DL (ref 6.4–8.2)
PROTHROMBIN TIME: 15.8 SEC (ref 9–11.1)
Q-T INTERVAL, ECG07: 314 MS
QRS DURATION, ECG06: 98 MS
QTC CALCULATION (BEZET), ECG08: 512 MS
RBC # BLD AUTO: 4.12 M/UL (ref 4.1–5.7)
RBC # BLD AUTO: 4.77 M/UL (ref 4.1–5.7)
RBC MORPH BLD: ABNORMAL
SAMPLES BEING HELD,HOLD: NORMAL
SODIUM SERPL-SCNC: 132 MMOL/L (ref 136–145)
SODIUM UR-SCNC: 41 MMOL/L
THERAPEUTIC RANGE,PTTT: NORMAL SECS (ref 58–77)
TROPONIN I SERPL-MCNC: 0.44 NG/ML
TSH SERPL DL<=0.05 MIU/L-ACNC: 2.43 UIU/ML (ref 0.36–3.74)
VENTRICULAR RATE, ECG03: 160 BPM
WBC # BLD AUTO: 20.8 K/UL (ref 4.1–11.1)
WBC # BLD AUTO: 28.9 K/UL (ref 4.1–11.1)
WBC MORPH BLD: ABNORMAL

## 2020-03-29 PROCEDURE — 74011000250 HC RX REV CODE- 250: Performed by: EMERGENCY MEDICINE

## 2020-03-29 PROCEDURE — 71045 X-RAY EXAM CHEST 1 VIEW: CPT

## 2020-03-29 PROCEDURE — 74011000250 HC RX REV CODE- 250: Performed by: FAMILY MEDICINE

## 2020-03-29 PROCEDURE — 99252 IP/OBS CONSLTJ NEW/EST SF 35: CPT | Performed by: SURGERY

## 2020-03-29 PROCEDURE — 83516 IMMUNOASSAY NONANTIBODY: CPT

## 2020-03-29 PROCEDURE — 96375 TX/PRO/DX INJ NEW DRUG ADDON: CPT

## 2020-03-29 PROCEDURE — 82728 ASSAY OF FERRITIN: CPT

## 2020-03-29 PROCEDURE — 82550 ASSAY OF CK (CPK): CPT

## 2020-03-29 PROCEDURE — 74011000258 HC RX REV CODE- 258: Performed by: INTERNAL MEDICINE

## 2020-03-29 PROCEDURE — 74011250637 HC RX REV CODE- 250/637: Performed by: INTERNAL MEDICINE

## 2020-03-29 PROCEDURE — 36415 COLL VENOUS BLD VENIPUNCTURE: CPT

## 2020-03-29 PROCEDURE — 86038 ANTINUCLEAR ANTIBODIES: CPT

## 2020-03-29 PROCEDURE — 74011250636 HC RX REV CODE- 250/636: Performed by: FAMILY MEDICINE

## 2020-03-29 PROCEDURE — 84484 ASSAY OF TROPONIN QUANT: CPT

## 2020-03-29 PROCEDURE — 65660000000 HC RM CCU STEPDOWN

## 2020-03-29 PROCEDURE — 82390 ASSAY OF CERULOPLASMIN: CPT

## 2020-03-29 PROCEDURE — 83605 ASSAY OF LACTIC ACID: CPT

## 2020-03-29 PROCEDURE — 76705 ECHO EXAM OF ABDOMEN: CPT

## 2020-03-29 PROCEDURE — 83880 ASSAY OF NATRIURETIC PEPTIDE: CPT

## 2020-03-29 PROCEDURE — 74011250636 HC RX REV CODE- 250/636

## 2020-03-29 PROCEDURE — 74011250636 HC RX REV CODE- 250/636: Performed by: INTERNAL MEDICINE

## 2020-03-29 PROCEDURE — 86695 HERPES SIMPLEX TYPE 1 TEST: CPT

## 2020-03-29 PROCEDURE — 82140 ASSAY OF AMMONIA: CPT

## 2020-03-29 PROCEDURE — 93005 ELECTROCARDIOGRAM TRACING: CPT

## 2020-03-29 PROCEDURE — 84443 ASSAY THYROID STIM HORMONE: CPT

## 2020-03-29 PROCEDURE — 85730 THROMBOPLASTIN TIME PARTIAL: CPT

## 2020-03-29 PROCEDURE — 80074 ACUTE HEPATITIS PANEL: CPT

## 2020-03-29 PROCEDURE — 96365 THER/PROPH/DIAG IV INF INIT: CPT

## 2020-03-29 PROCEDURE — 96367 TX/PROPH/DG ADDL SEQ IV INF: CPT

## 2020-03-29 PROCEDURE — 86644 CMV ANTIBODY: CPT

## 2020-03-29 PROCEDURE — 94761 N-INVAS EAR/PLS OXIMETRY MLT: CPT

## 2020-03-29 PROCEDURE — 74011250636 HC RX REV CODE- 250/636: Performed by: EMERGENCY MEDICINE

## 2020-03-29 PROCEDURE — 80307 DRUG TEST PRSMV CHEM ANLYZR: CPT

## 2020-03-29 PROCEDURE — 85610 PROTHROMBIN TIME: CPT

## 2020-03-29 PROCEDURE — 74176 CT ABD & PELVIS W/O CONTRAST: CPT

## 2020-03-29 PROCEDURE — 87040 BLOOD CULTURE FOR BACTERIA: CPT

## 2020-03-29 PROCEDURE — 74011000258 HC RX REV CODE- 258: Performed by: EMERGENCY MEDICINE

## 2020-03-29 PROCEDURE — 83690 ASSAY OF LIPASE: CPT

## 2020-03-29 PROCEDURE — 80053 COMPREHEN METABOLIC PANEL: CPT

## 2020-03-29 PROCEDURE — 82570 ASSAY OF URINE CREATININE: CPT

## 2020-03-29 PROCEDURE — 84300 ASSAY OF URINE SODIUM: CPT

## 2020-03-29 PROCEDURE — 85025 COMPLETE CBC W/AUTO DIFF WBC: CPT

## 2020-03-29 PROCEDURE — 82103 ALPHA-1-ANTITRYPSIN TOTAL: CPT

## 2020-03-29 PROCEDURE — 96366 THER/PROPH/DIAG IV INF ADDON: CPT

## 2020-03-29 PROCEDURE — 86664 EPSTEIN-BARR NUCLEAR ANTIGEN: CPT

## 2020-03-29 PROCEDURE — 99285 EMERGENCY DEPT VISIT HI MDM: CPT

## 2020-03-29 RX ORDER — SODIUM CHLORIDE 0.9 % (FLUSH) 0.9 %
5-40 SYRINGE (ML) INJECTION EVERY 8 HOURS
Status: DISCONTINUED | OUTPATIENT
Start: 2020-03-29 | End: 2020-04-04 | Stop reason: HOSPADM

## 2020-03-29 RX ORDER — HEPARIN SODIUM 5000 [USP'U]/ML
4000 INJECTION, SOLUTION INTRAVENOUS; SUBCUTANEOUS ONCE
Status: COMPLETED | OUTPATIENT
Start: 2020-03-29 | End: 2020-03-29

## 2020-03-29 RX ORDER — MAGNESIUM SULFATE HEPTAHYDRATE 40 MG/ML
2 INJECTION, SOLUTION INTRAVENOUS ONCE
Status: COMPLETED | OUTPATIENT
Start: 2020-03-29 | End: 2020-03-29

## 2020-03-29 RX ORDER — ONDANSETRON 2 MG/ML
INJECTION INTRAMUSCULAR; INTRAVENOUS
Status: COMPLETED
Start: 2020-03-29 | End: 2020-03-29

## 2020-03-29 RX ORDER — FUROSEMIDE 10 MG/ML
60 INJECTION INTRAMUSCULAR; INTRAVENOUS
Status: COMPLETED | OUTPATIENT
Start: 2020-03-29 | End: 2020-03-29

## 2020-03-29 RX ORDER — DILTIAZEM HYDROCHLORIDE 5 MG/ML
25 INJECTION INTRAVENOUS
Status: COMPLETED | OUTPATIENT
Start: 2020-03-29 | End: 2020-03-29

## 2020-03-29 RX ORDER — HEPARIN SODIUM 5000 [USP'U]/ML
2000 INJECTION, SOLUTION INTRAVENOUS; SUBCUTANEOUS AS NEEDED
Status: DISCONTINUED | OUTPATIENT
Start: 2020-03-29 | End: 2020-03-31 | Stop reason: ALTCHOICE

## 2020-03-29 RX ORDER — DILTIAZEM HYDROCHLORIDE 5 MG/ML
20 INJECTION INTRAVENOUS
Status: COMPLETED | OUTPATIENT
Start: 2020-03-29 | End: 2020-03-29

## 2020-03-29 RX ORDER — HEPARIN SODIUM 5000 [USP'U]/ML
4000 INJECTION, SOLUTION INTRAVENOUS; SUBCUTANEOUS AS NEEDED
Status: DISCONTINUED | OUTPATIENT
Start: 2020-03-29 | End: 2020-03-31 | Stop reason: ALTCHOICE

## 2020-03-29 RX ORDER — HEPARIN SODIUM 10000 [USP'U]/100ML
6-25 INJECTION, SOLUTION INTRAVENOUS
Status: DISCONTINUED | OUTPATIENT
Start: 2020-03-29 | End: 2020-03-31 | Stop reason: ALTCHOICE

## 2020-03-29 RX ORDER — SODIUM CHLORIDE 0.9 % (FLUSH) 0.9 %
5-40 SYRINGE (ML) INJECTION AS NEEDED
Status: DISCONTINUED | OUTPATIENT
Start: 2020-03-29 | End: 2020-04-04 | Stop reason: HOSPADM

## 2020-03-29 RX ORDER — SODIUM CHLORIDE 0.9 % (FLUSH) 0.9 %
SYRINGE (ML) INJECTION
Status: COMPLETED
Start: 2020-03-29 | End: 2020-03-29

## 2020-03-29 RX ORDER — ONDANSETRON 2 MG/ML
4 INJECTION INTRAMUSCULAR; INTRAVENOUS
Status: COMPLETED | OUTPATIENT
Start: 2020-03-29 | End: 2020-03-29

## 2020-03-29 RX ADMIN — HEPARIN SODIUM 4000 UNITS: 5000 INJECTION INTRAVENOUS; SUBCUTANEOUS at 11:21

## 2020-03-29 RX ADMIN — Medication 10 ML: at 21:42

## 2020-03-29 RX ADMIN — DEXTROSE MONOHYDRATE 15 MG/HR: 50 INJECTION, SOLUTION INTRAVENOUS at 15:54

## 2020-03-29 RX ADMIN — MAGNESIUM SULFATE HEPTAHYDRATE 2 G: 40 INJECTION, SOLUTION INTRAVENOUS at 10:25

## 2020-03-29 RX ADMIN — DEXTROSE MONOHYDRATE 15 MG/HR: 50 INJECTION, SOLUTION INTRAVENOUS at 22:48

## 2020-03-29 RX ADMIN — DILTIAZEM HYDROCHLORIDE 20 MG: 5 INJECTION INTRAVENOUS at 09:11

## 2020-03-29 RX ADMIN — PHYTONADIONE 5 MG: 10 INJECTION, EMULSION INTRAMUSCULAR; INTRAVENOUS; SUBCUTANEOUS at 16:32

## 2020-03-29 RX ADMIN — HEPARIN SODIUM 10 UNITS/KG/HR: 10000 INJECTION, SOLUTION INTRAVENOUS at 21:42

## 2020-03-29 RX ADMIN — ONDANSETRON 4 MG: 2 INJECTION INTRAMUSCULAR; INTRAVENOUS at 08:45

## 2020-03-29 RX ADMIN — HEPARIN SODIUM 4000 UNITS: 5000 INJECTION INTRAVENOUS; SUBCUTANEOUS at 21:44

## 2020-03-29 RX ADMIN — ACETYLCYSTEINE 15000 MG: 200 INJECTION, SOLUTION INTRAVENOUS at 16:32

## 2020-03-29 RX ADMIN — FUROSEMIDE 60 MG: 10 INJECTION, SOLUTION INTRAMUSCULAR; INTRAVENOUS at 09:50

## 2020-03-29 RX ADMIN — DILTIAZEM HYDROCHLORIDE 25 MG: 5 INJECTION INTRAVENOUS at 10:15

## 2020-03-29 RX ADMIN — ACETYLCYSTEINE 5000 MG: 200 INJECTION, SOLUTION INTRAVENOUS at 17:55

## 2020-03-29 RX ADMIN — DEXTROSE MONOHYDRATE 2.5 MG/HR: 50 INJECTION, SOLUTION INTRAVENOUS at 09:19

## 2020-03-29 RX ADMIN — PIPERACILLIN AND TAZOBACTAM 3.38 G: 3; .375 INJECTION, POWDER, LYOPHILIZED, FOR SOLUTION INTRAVENOUS at 14:21

## 2020-03-29 RX ADMIN — ACETYLCYSTEINE 10000 MG: 200 INJECTION, SOLUTION INTRAVENOUS at 22:49

## 2020-03-29 RX ADMIN — MAGNESIUM SULFATE HEPTAHYDRATE 2 G: 40 INJECTION, SOLUTION INTRAVENOUS at 08:45

## 2020-03-29 RX ADMIN — HEPARIN SODIUM 6 UNITS/KG/HR: 10000 INJECTION, SOLUTION INTRAVENOUS at 11:20

## 2020-03-29 NOTE — CONSULTS
Subjective:                 [address], [place], [address] [facility]  [phone]    Date of  Admission: 3/29/2020  8:15 AM     Admission type:Emergency    Dia Velarde is a 60 y.o. male presented with nausea/emesis for the last 4-5 days. He is in af with rvr. Was seen as outpt and discussed treatment options for AF but he decided that this was secondary to metal intoxication and he was going to treat it with chelation therapy.  Denies cp    Patient Active Problem List    Diagnosis Date Noted    Peripheral vascular disease ([address] 02/25/2020    Essential hypertension 12/04/2019    CHF (congestive heart failure) ([address] 11/05/2019    Atrial fibrillation with rapid ventricular response ([address] 11/05/2019    Acute kidney injury ([address] 11/05/2019    JUNAJO (acute kidney injury) ([address] 11/04/2019    Atrial fibrillation with RVR ([address] 11/04/2019    Acute diastolic CHF (congestive heart failure) ([address] 11/04/2019    Morbid obesity ([address] 11/04/2019    ALYSSA on CPAP 11/04/2019      Tabatha Acosta MD  Past Medical History:   Diagnosis Date    A-fib [facility])     Allergic reaction to contrast material     galodinium    Hypertension     Irregular heart rhythm     Mercury poisoning     Morbid obesity ([address] 11/4/2019    ALYSSA on CPAP     ALYSSA on CPAP 11/4/2019    Post concussion syndrome       Past Surgical History:   Procedure Laterality Date    HX SHOULDER ARTHROSCOPY      HX UROLOGICAL Left     hydrocoele resection     Allergies   Allergen Reactions    Ace Inhibitors Cough    Gadolinium-Containing Contrast Media Rash      Social History     Tobacco Use    Smoking status: Never Smoker    Smokeless tobacco: Never Used   Substance Use Topics    Alcohol use: Not Currently    Drug use: Never     Family History   Problem Relation Age of Onset    Colon Cancer Mother     Hypertension Father     Other Father         multiple birth defects    Hypertension Paternal Grandfather       Current Facility-Administered Medications   Medication Dose Route Frequency    dilTIAZem (CARDIZEM) 100 mg in dextrose 5% (MBP/ADV) 100 mL infusion  0-15 mg/hr IntraVENous TITRATE    heparin (porcine) injection 4,000 Units  4,000 Units IntraVENous ONCE    heparin 25,000 units in D5W 250 ml infusion  6-25 Units/kg/hr IntraVENous TITRATE    magnesium sulfate 2 g/50 ml IVPB (premix or compounded)  2 g IntraVENous ONCE    heparin (porcine) injection 4,000 Units  4,000 Units IntraVENous PRN    heparin (porcine) injection 2,000 Units  2,000 Units IntraVENous PRN     Current Outpatient Medications   Medication Sig    naltrexone HCl (NALTREXONE PO) Take  by mouth daily. Dose unknown prescribed Dr. Alissa Bravo    dilTIAZem CD (CARDIZEM CD) 120 mg ER capsule Take 1 Cap by mouth daily.  HCG 8000IU/4 ML 2,000 Units by SubCUTAneous route. HCG   Benzyl Alcohol   Sodium phosphate   Sterile water   (may use HCl or NaOH  to make adjustments to the [facility])    testosterone cypionate (DEPO-TESTOSTERONE) 100 mg/mL injection 200 mg by IntraMUSCular route Once every 2 weeks.  furosemide (LASIX) 40 mg tablet take 1 tablet by mouth twice a day    anastrozole (ARIMIDEX) 1 mg tablet Take 1 mg by mouth every seven (7) days.  apixaban (ELIQUIS) 5 mg tablet Take 1 Tab by mouth two (2) times a day.  desloratadine (CLARINEX) 5 mg tablet Take 5 mg by mouth daily.  montelukast (SINGULAIR) 10 mg tablet Take 10 mg by mouth every evening.  dextroamphetamine-amphetamine (ADDERALL) 30 mg tablet Take 30 mg by mouth two (2) times a day.          Review of Symptoms:  Constitutional: negative  Eyes: negative  Ears, nose, mouth, throat, and face: negative  Respiratory: negative  Cardiovascular: negative  Gastrointestinal: n/v  Genitourinary: negative  Musculoskeletal: negative  Neurological: negative  Behvioral/Psych: negative  Endocrine: negative     Subjective:      Visit Vitals  BP (!) 163/101   Pulse (!) 128   Temp 99.4 °F (37.4 °C)   Resp 25   Ht 6' 2\" (1.88 m) Wt 336 lb 13.8 oz (152.8 kg)   SpO2 97%   BMI 43.25 kg/m²       Physical Exam  Abdomen: soft, non-tender. Extremities: extremities normal  Heart: irr irr, tachy  Lungs: clear to auscultation bilaterally  Pulses: 2+ and symmetric    Cardiographics    Telemetry: afib with rvr    ECG: afib with rvr      Labs:  Recent Labs     03/29/20  0834   WBC 28.9*   HGB 15.5   HCT 46.7        Recent Labs     03/29/20  0842 03/29/20  0834   NA  --  132*   K  --  4.6   CL  --  96*   CO2  --  22   GLU  --  104*   BUN  --  35*   CREA  --  2.28*   CA  --  8.7   ALB  --  3.6   TBILI  --  3.7*   SGOT  --  1,577*   ALT  --  1,838*   INR 1.6*  --        Recent Labs     03/29/20  0834   TROIQ 0.44*       No intake or output data in the 24 hours ending 03/29/20 1101      Assessment:     Assessment:       Active Problems: Morbid obesity ([address] (11/4/2019)      ALYSSA on CPAP (11/4/2019)      Atrial fibrillation with rapid ventricular response ([address] (11/5/2019)      Essential hypertension (12/4/2019)         Plan:     Marcy Swenson is with n/v, increased lfts and permanent af with rvr. Has trop leak in the setting of worsening renal fcn - had nl [facility] earlier this year. Not a candidate for av node abl/ppm bc he states that he has a metal allergy. Not a candidate for af abl due to hx of non compliance, morbid obesity and decreased efficacy in this situation (risks outweigh the benefits). Will keep npo p mn in case Dr Christina Cleveland wants to proceed with katie/cardioversion. In the meantime, will cont with iv heparin and iv cardizem gtts. Not a candidate for dig due to arg or iv amio due to non compliance with oac. Tachycardia will resolve as we treat the underlying problem (gi issue). Thank you for this consultation.      Jenna Carter MD, [facility]    3/29/2020

## 2020-03-29 NOTE — CONSULTS
Gastroenterology Consult Note  NAME: Shalonda Bhatt : 1960 MRN: [id number]   ATTG: [unfilled] PCP: Montez Smith MD  Date/Time:  3/29/2020 2:44 PM  Subjective:   REASON FOR CONSULT:      Richard Hull is a 60 y.o.  male who I was asked to see for elevated LFTs. He is a reportedly non complaint pt with a hx of getting 'chelation  therapy in [place] for dental mercury'. He is a poor hx and comes in with worseming edema, SOB and Nausea/Vomiting for the last few days. Labs are very abnormal with a WBC of 29 k, ALT of 1838,AST of 1577 and T bili of 3.7. INR is 1.6 on no anticoagulation. Lactic acid is 3.7. He denies RUQ pain. Very plethoric appearing(?Baseline). Had an [address] done showing     Thegallbladder is contracted, thick-walled with intramural fluid without apparent  stones. Correlate clinically for possible acute or chronic acalculous  cholecystitis. The bile ducts are not enlarged. Liver demonstrates normal, uniform echotexture. Right kidney appears normal.    Then had a CT showing  Features suggestive of chronic gallbladder disease-correlate clinically. 2. Nonobstructing right nephrolithiasis. 3. Generalized body wall edema. Surgery and cardiology has seen pt already. No previous hx of liver disease reportedly.       Past Medical History:   Diagnosis Date    A-fib [facility])     Allergic reaction to contrast material     galodinium    Hypertension     Irregular heart rhythm     Mercury poisoning     Morbid obesity ([facility] 2019    ALYSSA on CPAP     ALYSSA on CPAP 2019    Post concussion syndrome       Past Surgical History:   Procedure Laterality Date    HX SHOULDER ARTHROSCOPY      HX UROLOGICAL Left     hydrocoele resection     Social History     Tobacco Use    Smoking status: Never Smoker    Smokeless tobacco: Never Used   Substance Use Topics    Alcohol use: Not Currently      Family History   Problem Relation Age of Onset    Colon Cancer Mother     Hypertension Father     Other Father         multiple birth defects    Hypertension Paternal Grandfather       Allergies   Allergen Reactions    Ace Inhibitors Cough    Gadolinium-Containing Contrast Media Rash      Home Medications:  Prior to Admission Medications   Prescriptions Last Dose Informant Patient Reported? Taking? HCG 8000IU/4 ML   Yes No   Si,000 Units by SubCUTAneous route. HCG   Benzyl Alcohol   Sodium phosphate   Sterile water   (may use HCl or NaOH  to make adjustments to the [address])   METHYLPREDNISOLONE PO   Yes Yes   Sig: Take  by mouth two (2) times a day. anastrozole (ARIMIDEX) 1 mg tablet   Yes No   Sig: Take 1 mg by mouth every seven (7) days. apixaban (ELIQUIS) 5 mg tablet Not Taking at Unknown time  No No   Sig: Take 1 Tab by mouth two (2) times a day. desloratadine (CLARINEX) 5 mg tablet Unknown at Unknown time Other Yes No   Sig: Take 5 mg by mouth daily. dextroamphetamine-amphetamine (ADDERALL) 30 mg tablet Not Taking at Unknown time Self Yes No   Sig: Take 30 mg by mouth two (2) times a day. dilTIAZem CD (CARDIZEM CD) 120 mg ER capsule Not Taking at Unknown time  No No   Sig: Take 1 Cap by mouth daily. furosemide (LASIX) 40 mg tablet Not Taking at Unknown time  No No   Sig: take 1 tablet by mouth twice a day   montelukast (SINGULAIR) 10 mg tablet Unknown at Unknown time Other Yes No   Sig: Take 10 mg by mouth every evening. naltrexone HCl (NALTREXONE PO)   Yes No   Sig: Take  by mouth daily. Dose unknown prescribed Dr. George Silva   sotalol HCl (SOTALOL PO) Not Taking at Unknown time  Yes No   Sig: Take  by mouth two (2) times a day. testosterone cypionate (DEPO-TESTOSTERONE) 100 mg/mL injection   Yes No   Si mg by IntraMUSCular route Once every 2 weeks.       Facility-Administered Medications: None     Hospital medications:  Current Facility-Administered Medications   Medication Dose Route Frequency    dilTIAZem (CARDIZEM) 100 mg in dextrose 5% (MBP/ADV) 100 mL infusion 0-15 mg/hr IntraVENous TITRATE    heparin 25,000 units in D5W 250 ml infusion  6-25 Units/kg/hr IntraVENous TITRATE    heparin (porcine) injection 4,000 Units  4,000 Units IntraVENous PRN    heparin (porcine) injection 2,000 Units  2,000 Units IntraVENous PRN    piperacillin-tazobactam (ZOSYN) 3.375 g in 0.9% sodium chloride (MBP/ADV) 100 mL  3.375 g IntraVENous NOW     Current Outpatient Medications   Medication Sig    METHYLPREDNISOLONE PO Take  by mouth two (2) times a day.  sotalol HCl (SOTALOL PO) Take  by mouth two (2) times a day.  naltrexone HCl (NALTREXONE PO) Take  by mouth daily. Dose unknown prescribed Dr. Giuliana Norman    dilTIAZem CD (CARDIZEM CD) 120 mg ER capsule Take 1 Cap by mouth daily.  HCG 8000IU/4 ML 2,000 Units by SubCUTAneous route. HCG   Benzyl Alcohol   Sodium phosphate   Sterile water   (may use HCl or NaOH  to make adjustments to the [facility])    testosterone cypionate (DEPO-TESTOSTERONE) 100 mg/mL injection 200 mg by IntraMUSCular route Once every 2 weeks.  furosemide (LASIX) 40 mg tablet take 1 tablet by mouth twice a day    anastrozole (ARIMIDEX) 1 mg tablet Take 1 mg by mouth every seven (7) days.  apixaban (ELIQUIS) 5 mg tablet Take 1 Tab by mouth two (2) times a day.  desloratadine (CLARINEX) 5 mg tablet Take 5 mg by mouth daily.  montelukast (SINGULAIR) 10 mg tablet Take 10 mg by mouth every evening.  dextroamphetamine-amphetamine (ADDERALL) 30 mg tablet Take 30 mg by mouth two (2) times a day.      REVIEW OF SYSTEMS:     []     Unable to obtain  ROS due to  []    mental status change  []    sedated   []    intubated   []    Total of 11 systems reviewed as follows:  Const:  negative fever, negative chills, negative weight loss  Eyes:   negative diplopia or visual changes, negative eye pain  ENT:   negative coryza, negative sore throat  Resp:   negative cough, hemoptysis, dyspnea  Cards:  negative for chest pain, palpitations, lower extremity edema  :  negative for frequency, dysuria and hematuria  Skin:   negative for rash and pruritus  Heme:  negative for easy bruising and gum/nose bleeding  MS:  negative for myalgias, arthralgias, back pain and muscle weakness  Neurolo:  negative for headaches, dizziness, vertigo, memory problems   Psych:  negative for feelings of anxiety, depression     Pertinent Positives include :    Objective:   VITALS:    Visit Vitals  BP (!) 144/95   Pulse (!) 130   Temp 98.1 °F (36.7 °C)   Resp 28   Ht 6' 2\" (1.88 m)   Wt 152.8 kg (336 lb 13.8 oz)   SpO2 99%   BMI 43.25 kg/m²     Temp (24hrs), Av.8 °F (37.1 °C), Min:98.1 °F (36.7 °C), Max:99.4 °F (37.4 °C)    PHYSICAL EXAM:     General: mild distress ; wearing a mask. plethora  Eyes: + icterus; extraocular movements intact,   ENMT: wearing a surg mask  Chest:  Not examined  Heart: NA  Abdomen: Non-tender; bowel sounds present. Detailed exam not done 2/ COVID 19 Pandemic and his SOB. Lymphatic: NA  Neurologic: Alert and oriented. 'My liver was fine last I checked! \"  Psyc: Affect is appropriate. Some anxiety  Extremities: ++ edema       LAB DATA REVIEWED:    Recent Results (from the past 48 hour(s))   EKG, 12 LEAD, INITIAL    Collection Time: 20  8:21 AM   Result Value Ref Range    Ventricular Rate 160 BPM    Atrial Rate 163 BPM    QRS Duration 98 ms    Q-T Interval 314 ms    QTC Calculation (Bezet) 512 ms    Calculated R Axis -19 degrees    Calculated T Axis 158 degrees    Diagnosis       ** Poor data quality, interpretation may be adversely affected  Atrial fibrillation with rapid ventricular response  Minimal voltage criteria for LVH, may be normal variant  Cannot rule out Anterior infarct , age undetermined  ST & T wave abnormality, consider lateral ischemia  When compared with ECG of 2020 08:27,  Atrial fibrillation has replaced Sinus rhythm  Vent.  rate has increased BY  81 BPM  Nonspecific T wave abnormality now evident in Inferior leads  T wave inversion more evident in Lateral leads  Confirmed by Sosa Pollack ([id number]) on 3/29/2020 10:46:49 AM     CBC WITH AUTOMATED DIFF    Collection Time: 03/29/20  8:34 AM   Result Value Ref Range    WBC 28.9 (H) 4.1 - 11.1 K/uL    RBC 4.77 4.10 - 5.70 M/uL    HGB 15.5 12.1 - 17.0 g/dL    HCT 46.7 36.6 - 50.3 %    MCV 97.9 80.0 - 99.0 FL    MCH 32.5 26.0 - 34.0 PG    MCHC 33.2 30.0 - 36.5 g/dL    RDW 14.8 (H) 11.5 - 14.5 %    PLATELET 408 258 - 686 K/uL    MPV 11.7 8.9 - 12.9 FL    NRBC 1.2 (H) 0  WBC    ABSOLUTE NRBC 0.34 (H) 0.00 - 0.01 K/uL    NEUTROPHILS 80 (H) 32 - 75 %    BAND NEUTROPHILS 8 %    LYMPHOCYTES 5 (L) 12 - 49 %    MONOCYTES 3 (L) 5 - 13 %    EOSINOPHILS 1 0 - 7 %    BASOPHILS 0 0 - 1 %    METAMYELOCYTES 2 %    MYELOCYTES 1 %    IMMATURE GRANULOCYTES 0 0.0 - 0.5 %    ABS. NEUTROPHILS 25.4 (H) 1.8 - 8.0 K/UL    ABS. LYMPHOCYTES 1.4 0.8 - 3.5 K/UL    ABS. MONOCYTES 0.9 0.0 - 1.0 K/UL    ABS. EOSINOPHILS 0.3 0.0 - 0.4 K/UL    ABS. BASOPHILS 0.0 0.0 - 0.1 K/UL    ABS. IMM. GRANS. 0.0 0.00 - 0.04 K/UL    DF MANUAL      RBC COMMENTS NORMOCYTIC, NORMOCHROMIC      WBC COMMENTS TOXIC GRANULATION     METABOLIC PANEL, COMPREHENSIVE    Collection Time: 03/29/20  8:34 AM   Result Value Ref Range    Sodium 132 (L) 136 - 145 mmol/L    Potassium 4.6 3.5 - 5.1 mmol/L    Chloride 96 (L) 97 - 108 mmol/L    CO2 22 21 - 32 mmol/L    Anion gap 14 5 - 15 mmol/L    Glucose 104 (H) 65 - 100 mg/dL    BUN 35 (H) 6 - 20 MG/DL    Creatinine 2.28 (H) 0.70 - 1.30 MG/DL    BUN/Creatinine ratio 15 12 - 20      GFR est AA 36 (L) >60 ml/min/1.73m2    GFR est non-AA 29 (L) >60 ml/min/1.73m2    Calcium 8.7 8.5 - 10.1 MG/DL    Bilirubin, total 3.7 (H) 0.2 - 1.0 MG/DL    ALT (SGPT) 1,838 (H) 12 - 78 U/L    AST (SGOT) 1,577 (H) 15 - 37 U/L    Alk.  phosphatase 166 (H) 45 - 117 U/L    Protein, total 7.0 6.4 - 8.2 g/dL    Albumin 3.6 3.5 - 5.0 g/dL    Globulin 3.4 2.0 - 4.0 g/dL    A-G Ratio 1.1 1.1 - 2.2     CK W/ REFLX CKMB    Collection Time: 03/29/20  8:34 AM   Result Value Ref Range     39 - 308 U/L   TROPONIN I    Collection Time: 03/29/20  8:34 AM   Result Value Ref Range    Troponin-I, Qt. 0.44 (H) <0.05 ng/mL   NT-PRO BNP    Collection Time: 03/29/20  8:34 AM   Result Value Ref Range    NT pro-BNP 3,871 (H) <125 PG/ML   TSH 3RD GENERATION    Collection Time: 03/29/20  8:42 AM   Result Value Ref Range    TSH 2.43 0.36 - 3.74 uIU/mL   SAMPLES BEING HELD    Collection Time: 03/29/20  8:42 AM   Result Value Ref Range    SAMPLES BEING HELD  1 LAV, 1 BLUE     COMMENT        Add-on orders for these samples will be processed based on acceptable specimen integrity and analyte stability, which may vary by analyte.    PROTHROMBIN TIME + INR    Collection Time: 03/29/20  8:42 AM   Result Value Ref Range    INR 1.6 (H) 0.9 - 1.1      Prothrombin time 15.8 (H) 9.0 - 11.1 sec   LIPASE    Collection Time: 03/29/20  8:42 AM   Result Value Ref Range    Lipase 162 73 - 393 U/L   CK    Collection Time: 03/29/20  8:42 AM   Result Value Ref Range     39 - 308 U/L   LACTIC ACID    Collection Time: 03/29/20 10:31 AM   Result Value Ref Range    Lactic acid 3.7 (HH) 0.4 - 2.0 MMOL/L   ACETAMINOPHEN    Collection Time: 03/29/20 11:14 AM   Result Value Ref Range    Acetaminophen level <2 (L) 10 - 30 ug/mL   CREATININE, UR, RANDOM    Collection Time: 03/29/20 11:14 AM   Result Value Ref Range    Creatinine, urine 95.60 mg/dL     IMAGING RESULTS:   []      I have personally reviewed the actual   []    CXR  []    CT  []     US    Recommendations/Plan:    Elevated LFTs/WBC  lactic acidosis    Active Problems:    Atrial fibrillation with RVR ([facility] (11/4/2019)      Morbid obesity ([facility] (11/4/2019)      ALYSSA on CPAP (11/4/2019)      Atrial fibrillation with rapid ventricular response ([facility] (11/5/2019)      Essential hypertension (12/4/2019)       ___________________________________________________  RECOMMENDATIONS:      I spoke with BROOKLYN HERNANDEZ, Dr Melisa Kearns, Dr Jackson(cardiology) and Edwige Rebecca/Tyree. LFTs trend is suggestive of acute hepatitis and likely not biliary etiology (Such high AST/ALT are seen w/ a DDx of toxin mediated, DILI, virus/es,  lack of perfusion, Budd-Chiari, trauma ). I suggest a tox screen ASAP including Acetaminophen level. Need more hx regarding his 'chelation therapy' and OTC products he may be using. Reportedly he is a 'difficult' patient. Start IV Mucomyst/Glutathione  ASAP,  Trial of vitamin K,  Check NH3,  Repeat lactic acid tomorrow. Daily INR/LFTs  Send serologies for metabolic,genetic, autoimmune and viral causes of hepatitis. Guarded prognosis if liver gets worse. [facility] I spoke w/ hospitalist in person. Avoid hepatotoxic medications. No Tylenol  Close neuro checks daily    Thank you for entrusting me with this patient's care. Please do not hesitate to contact me with any questions or if I can be of assistance with this patient or any of your other patients' GI needs. Discussed Code Status:    [x]    Full Code      []    DNR    ___________________________________________________  Care Plan discussed with:    [x]    Patient   []    Family   [x]   ER  Nursing   [x]    Attendings     ___________________________________________________  GI: Katey Plummer MD

## 2020-03-29 NOTE — ED NOTES
MD Mata notified that pt had to be placed on 2L [place] because O2 sats dropped to 88. Pt attempting to sleep, uses CPAP at home. Patient medicated per MAR. Agreeable to taking to diltiazem after discussing risks and benefits. Patient diaphoretic which he stated is normal for him. Patient responding appropriately. Will continue to monitor and assess patient needs.

## 2020-03-29 NOTE — CONSULTS
Surgery Consult    Subjective:      Flavia Krishnan is a 60 y.o. male who presents for evaluation of worsening pedal edema. He is found to have elevated LFTs and imaging showing a contracted thick walled gallbladder and surgery is consulted to exclude cholecystitis. Pt denies any abdominal pain. He has been having emesis for 4 days. He denies any cough, fevers or chills. Past Medical History:   Diagnosis Date    A-fib ([facility] [address]     Allergic reaction to contrast material     galodinium    Hypertension     Irregular heart rhythm     Mercury poisoning     Morbid obesity ([facility] [address] 11/4/2019    ALYSSA on CPAP     ALYSSA on CPAP 11/4/2019    Post concussion syndrome      Past Surgical History:   Procedure Laterality Date    HX SHOULDER ARTHROSCOPY      HX UROLOGICAL Left     hydrocoele resection      Family History   Problem Relation Age of Onset    Colon Cancer Mother     Hypertension Father     Other Father         multiple birth defects    Hypertension Paternal Grandfather      Social History     Tobacco Use    Smoking status: Never Smoker    Smokeless tobacco: Never Used   Substance Use Topics    Alcohol use: Not Currently      Prior to Admission medications    Medication Sig Start Date End Date Taking? Authorizing Provider   METHYLPREDNISOLONE PO Take  by mouth two (2) times a day. Yes Other, MD Gavin   sotalol HCl (SOTALOL PO) Take  by mouth two (2) times a day. Other, MD Gavin   naltrexone HCl (NALTREXONE PO) Take  by mouth daily. Dose unknown prescribed Dr. Afshin Delarosa    Provider, Historical   dilTIAZem CD (CARDIZEM CD) 120 mg ER capsule Take 1 Cap by mouth daily. 2/25/20   Nohemi Negron MD   HCG 8000IU/4 ML 2,000 Units by SubCUTAneous route.   HCG   Benzyl Alcohol   Sodium phosphate   Sterile water   (may use HCl or NaOH  to make adjustments to the [facility])    Provider, Historical   testosterone cypionate (DEPO-TESTOSTERONE) 100 mg/mL injection 200 mg by IntraMUSCular route Once every 2 weeks.    Provider, Historical   furosemide (LASIX) 40 mg tablet take 1 tablet by mouth twice a day 1/3/20   Sanford Saravia MD   anastrozole (ARIMIDEX) 1 mg tablet Take 1 mg by mouth every seven (7) days. 10/9/19   Provider, Historical   apixaban (ELIQUIS) 5 mg tablet Take 1 Tab by mouth two (2) times a day. 11/7/19   Minal Curiel MD   desloratadine (CLARINEX) 5 mg tablet Take 5 mg by mouth daily. Provider, Historical   montelukast (SINGULAIR) 10 mg tablet Take 10 mg by mouth every evening. Provider, Historical   dextroamphetamine-amphetamine (ADDERALL) 30 mg tablet Take 30 mg by mouth two (2) times a day. Provider, Historical      Allergies   Allergen Reactions    Ace Inhibitors Cough    Gadolinium-Containing Contrast Media Rash       Review of Systems   Constitutional: Positive for appetite change. Negative for chills, diaphoresis and fever. Respiratory: Negative for shortness of breath and wheezing. Cardiovascular: Positive for leg swelling. Negative for chest pain and palpitations. Gastrointestinal: Negative for abdominal pain, diarrhea, nausea and vomiting. Musculoskeletal: Negative for myalgias. Hematological: Does not bruise/bleed easily.        Objective:     Patient Vitals for the past 8 hrs:   BP Temp Pulse Resp SpO2 Height Weight   03/29/20 1242 (!) 148/99  (!) 140 26 98 %     03/29/20 1215 (!) 172/120  (!) 136 25 99 %     03/29/20 1146 (!) 157/130  (!) 137 30 99 %     03/29/20 1116 (!) 154/110  (!) 145 (!) 31 98 %     03/29/20 1113 (!) 180/115  (!) 138 25 98 %     03/29/20 1054 (!) 151/126  (!) 130 28 99 %     03/29/20 1031 (!) 163/101  (!) 128 25 97 %     03/29/20 1015 (!) 142/118  (!) 152 29 99 %     03/29/20 1001 (!) 159/117  (!) 147 (!) 31 98 %     03/29/20 0946 (!) 154/130  (!) 152 26 98 %     03/29/20 0930 (!) 143/97  (!) 152 25 98 %     03/29/20 0911 (!) 161/145  (!) 154       03/29/20 0909   (!) 158 29 97 %   20 0908 (!) 179/139  (!) 156 25 97 %     20 0905   (!) 155 27 (!) 88 %     20 0830 (!) 131/117  (!) 152 29 95 %     20 0829  99.4 °F (37.4 °C)        20 0814 (!) 166/133  (!) 162 24 96 % 6' 2\" (1.88 m) 336 lb 13.8 oz (152.8 kg)       Temp (24hrs), Av.4 °F (37.4 °C), Min:99.4 °F (37.4 °C), Max:99.4 °F (37.4 °C)      Physical Exam  Constitutional:       General: He is not in acute distress. Appearance: He is well-developed. HENT:      Head: Normocephalic and atraumatic. Cardiovascular:      Rate and Rhythm: Normal rate and regular rhythm. Heart sounds: Normal heart sounds. Pulmonary:      Breath sounds: Normal breath sounds. No wheezing or rales. Abdominal:      General: Abdomen is protuberant. Bowel sounds are normal. There is no distension. Palpations: Abdomen is soft. There is no mass. Tenderness: There is no abdominal tenderness. There is no guarding or rebound. Negative signs include Granados's sign. Musculoskeletal: Normal range of motion. Comments: BLE pedal edema   Lymphadenopathy:      Cervical: No cervical adenopathy. Assessment:     61 yo man with markedly elevated transaminases and Tbili 3.7. No abdominal pain and no tenderness. Contracted gallbladder on imaging with no hard signs for cholecystitis. No ductal dilation. This is more consistent with a hepatitis picture than obstructive jaundice/cholecystitis picture. WBC 28, baseline WBC 20. No findings suggesting acute infectious process. Plan:     Recommend admission to medical service and serial LFTs. Hepatitis panel. May require MRCP and or ERCP if bili continues to rise. No indication for cholecystectomy or percutaneous drainage at this time. This is not cholecystitis. Discussed with Dr Saran Narvaez, GI, who is also consulting on patient.

## 2020-03-29 NOTE — ED NOTES
Unable to obtain patient manual BP due to HR despite attempts by two RNs. MD Mata aware. Most recent manual systolic in 843M. Will continue to monitor.

## 2020-03-29 NOTE — Clinical Note
TRANSFER - IN REPORT:     Verbal report received from: Floor RN. Report consisted of patient's Situation, Background, Assessment and   Recommendations(SBAR). Opportunity for questions and clarification was provided. Assessment completed upon patient's arrival to unit and care assumed. Patient transported with a Registered Nurse.

## 2020-03-29 NOTE — H&P
Hospitalist Admission Note    NAME: Jesús Foster   :  1960   MRN:  [id number]     Date/Time:  3/29/2020 2:12 PM    Patient PCP: Luzma Clark MD  ______________________________________________________________________  Given the patient's current clinical presentation, I have a high level of concern for decompensation if discharged from the emergency department. Complex decision making was performed, which includes reviewing the patient's available past medical records, laboratory results, and x-ray films. My assessment of this patient's clinical condition and my plan of care is as follows. Assessment / Plan:  Patient 79-year-old male admitted for atrial fibrillation RVR, acute renal injury, acute liver injury. 1.  A. fib RVR: Patient is started on Cardizem drip, heparin drip. Cardiology following, echocardiogram.  Trend troponin. Intake output. 2.  Acute hepatitis; etiology being investigated. Sent acute hepatitis panel.,  CT scan reviewed. No obstruction. LFT pattern consistent with acute liver injury. GI following  3. Hypertension; patient hypertensive resume home medication  4. Diastolic congestive heart failure; Lasix, echocardiogram, daily weight, intake output, cardiac diet        Code Status: Full code  Surrogate Decision Maker:    DVT Prophylaxis: Heparin  GI Prophylaxis: not indicated    Baseline: Independent      Subjective:   CHIEF COMPLAINT: Nausea vomiting shortness of breath    HISTORY OF PRESENT ILLNESS:     Keri Brooks is a 60 y.o.  male who presents with worsening shortness of breath, nausea vomiting. Patient reports symptoms started for 5 days ago progressively worse now is severe. 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 He states that he had medically poisoning after dental procedure. 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 He was seen by multiple specialties for heavy metal poisoning.   He has completed cheerleading agent treatment. He reports that his multiple rash on upper arms which is a his body reaction to heavy metal poisoning. His body tries to expel heavy metals. He states that he has not been able to eat even a small amount of food for last several days. Ms. states his dose of sotalol was increased recently. He states that his elevated liver enzyme is due to increased dose of sotalol. No EtOH for last 40 years. Not been taking any Tylenol. No sick contact. He also been suffering from chest pain shortness of breath inability to sleep. Upon arrival in the emergency room he has A. fib with RVR. He is found to have acute renal injury and acute hepatitis. Hospitalist consulted to admit the patient. Cardiology seeing him for A. fib RVR, GI has seen for elevated liver enzyme. Acute hepatitis panel sent. Surgery advised medical management by GI and hospitalist.  Past Medical History:   Diagnosis Date    A-fib ([facility] [address]     Allergic reaction to contrast material     galodinium    Hypertension     Irregular heart rhythm     Mercury poisoning     Morbid obesity ([facility] [address] 11/4/2019    ALYSSA on CPAP     ALYSSA on CPAP 11/4/2019    Post concussion syndrome         Past Surgical History:   Procedure Laterality Date    HX SHOULDER ARTHROSCOPY      HX UROLOGICAL Left     hydrocoele resection       Social History     Tobacco Use    Smoking status: Never Smoker    Smokeless tobacco: Never Used   Substance Use Topics    Alcohol use: Not Currently        Family History   Problem Relation Age of Onset    Colon Cancer Mother     Hypertension Father     Other Father         multiple birth defects    Hypertension Paternal Grandfather      Allergies   Allergen Reactions    Ace Inhibitors Cough    Gadolinium-Containing Contrast Media Rash        Prior to Admission medications    Medication Sig Start Date End Date Taking?  Authorizing Provider   METHYLPREDNISOLONE PO Take  by mouth two (2) times a day.   Yes Other, MD Gavin   sotalol HCl (SOTALOL PO) Take  by mouth two (2) times a day. Other, MD Gavin   naltrexone HCl (NALTREXONE PO) Take  by mouth daily. Dose unknown prescribed Dr. Yoko Olivier    Provider, Historical   dilTIAZem CD (CARDIZEM CD) 120 mg ER capsule Take 1 Cap by mouth daily. 2/25/20   Nohemi Negron MD   HCG 8000IU/4 ML 2,000 Units by SubCUTAneous route. HCG   Benzyl Alcohol   Sodium phosphate   Sterile water   (may use HCl or NaOH  to make adjustments to the [address])    Provider, Historical   testosterone cypionate (DEPO-TESTOSTERONE) 100 mg/mL injection 200 mg by IntraMUSCular route Once every 2 weeks. Provider, Historical   furosemide (LASIX) 40 mg tablet take 1 tablet by mouth twice a day 1/3/20   Cara Schuler MD   anastrozole (ARIMIDEX) 1 mg tablet Take 1 mg by mouth every seven (7) days. 10/9/19   Provider, Historical   apixaban (ELIQUIS) 5 mg tablet Take 1 Tab by mouth two (2) times a day. 11/7/19   Marah Curiel MD   desloratadine (CLARINEX) 5 mg tablet Take 5 mg by mouth daily. Provider, Historical   montelukast (SINGULAIR) 10 mg tablet Take 10 mg by mouth every evening. Provider, Historical   dextroamphetamine-amphetamine (ADDERALL) 30 mg tablet Take 30 mg by mouth two (2) times a day. Provider, Historical       REVIEW OF SYSTEMS:     I am not able to complete the review of systems because:    The patient is intubated and sedated    The patient has altered mental status due to his acute medical problems    The patient has baseline aphasia from prior stroke(s)    The patient has baseline dementia and is not reliable historian    The patient is in acute medical distress and unable to provide information           Total of 12 systems reviewed as follows:       POSITIVE= underlined text  Negative = text not underlined  General:  fever, chills, sweats, generalized weakness, weight loss/gain,      loss of appetite   Eyes:    blurred vision, eye pain, loss of vision, double vision  ENT:    rhinorrhea, pharyngitis   Respiratory:   cough, sputum production, SOB, ELIZONDO, wheezing, pleuritic pain   Cardiology:   chest pain, palpitations, orthopnea, PND, edema, syncope   Gastrointestinal:  abdominal pain , N/V, diarrhea, dysphagia, constipation, bleeding   Genitourinary:  frequency, urgency, dysuria, hematuria, incontinence   Muskuloskeletal :  arthralgia, myalgia, back pain  Hematology:  easy bruising, nose or gum bleeding, lymphadenopathy   Dermatological: rash, ulceration, pruritis, color change / jaundice  Endocrine:   hot flashes or polydipsia   Neurological:  headache, dizziness, confusion, focal weakness, paresthesia,     Speech difficulties, memory loss, gait difficulty  Psychological: Feelings of anxiety, depression, agitation    Objective:   VITALS:    Visit Vitals  BP (!) 166/121   Pulse (!) 137   Temp 98.1 °F (36.7 °C)   Resp 26   Ht 6' 2\" (1.88 m)   Wt 152.8 kg (336 lb 13.8 oz)   SpO2 98%   BMI 43.25 kg/m²       PHYSICAL EXAM:    General:    Alert, cooperative, no distress, appears stated age. HEENT: Atraumatic, anicteric sclerae, pink conjunctivae     No oral ulcers, mucosa moist, throat clear, dentition fair  Neck:  Supple, symmetrical,  thyroid: non tender  Lungs:   Clear to auscultation bilaterally. No Wheezing or Rhonchi. No rales. Chest wall:  No tenderness  No Accessory muscle use. Heart:   Regular  rhythm,  No  murmur   3+ edema  Abdomen:   Soft, non-tender. Not distended. Bowel sounds normal  Extremities: No cyanosis. No clubbing,      Skin turgor normal, Capillary refill normal, Radial dial pulse 2+  Skin:     Not pale. Not Jaundiced  No rashes   Psych:  Good insight. Not depressed. Not anxious or agitated. Neurologic: EOMs intact. No facial asymmetry. No aphasia or slurred speech. Symmetrical strength, Sensation grossly intact. Alert and oriented X 4. _______________________________________________________________________  Care Plan discussed with:    Comments   Patient     Family      RN     Care Manager                    Consultant:      _______________________________________________________________________  Expected  Disposition:   Home with Family    HH/PT/OT/RN    SNF/LTC    HUANG    ________________________________________________________________________  TOTAL TIME: 40 minutes    Critical Care Provided     Minutes non procedure based      Comments     Reviewed previous records   >50% of visit spent in counseling and coordination of care  Discussion with patient and/or family and questions answered       ________________________________________________________________________  Signed: Karishma Odom MD    Procedures: see electronic medical records for all procedures/Xrays and details which were not copied into this note but were reviewed prior to creation of Plan. LAB DATA REVIEWED:    Recent Results (from the past 24 hour(s))   EKG, 12 LEAD, INITIAL    Collection Time: 03/29/20  8:21 AM   Result Value Ref Range    Ventricular Rate 160 BPM    Atrial Rate 163 BPM    QRS Duration 98 ms    Q-T Interval 314 ms    QTC Calculation (Bezet) 512 ms    Calculated R Axis -19 degrees    Calculated T Axis 158 degrees    Diagnosis       ** Poor data quality, interpretation may be adversely affected  Atrial fibrillation with rapid ventricular response  Minimal voltage criteria for LVH, may be normal variant  Cannot rule out Anterior infarct , age undetermined  ST & T wave abnormality, consider lateral ischemia  When compared with ECG of 06-FEB-2020 08:27,  Atrial fibrillation has replaced Sinus rhythm  Vent.  rate has increased BY  81 BPM  Nonspecific T wave abnormality now evident in Inferior leads  T wave inversion more evident in Lateral leads  Confirmed by Gomez Wilhelm ([id number]) on 3/29/2020 10:46:49 AM     CBC WITH AUTOMATED DIFF    Collection Time: 03/29/20 8:34 AM   Result Value Ref Range    WBC 28.9 (H) 4.1 - 11.1 K/uL    RBC 4.77 4.10 - 5.70 M/uL    HGB 15.5 12.1 - 17.0 g/dL    HCT 46.7 36.6 - 50.3 %    MCV 97.9 80.0 - 99.0 FL    MCH 32.5 26.0 - 34.0 PG    MCHC 33.2 30.0 - 36.5 g/dL    RDW 14.8 (H) 11.5 - 14.5 %    PLATELET 633 492 - 721 K/uL    MPV 11.7 8.9 - 12.9 FL    NRBC 1.2 (H) 0  WBC    ABSOLUTE NRBC 0.34 (H) 0.00 - 0.01 K/uL    NEUTROPHILS 80 (H) 32 - 75 %    BAND NEUTROPHILS 8 %    LYMPHOCYTES 5 (L) 12 - 49 %    MONOCYTES 3 (L) 5 - 13 %    EOSINOPHILS 1 0 - 7 %    BASOPHILS 0 0 - 1 %    METAMYELOCYTES 2 %    MYELOCYTES 1 %    IMMATURE GRANULOCYTES 0 0.0 - 0.5 %    ABS. NEUTROPHILS 25.4 (H) 1.8 - 8.0 K/UL    ABS. LYMPHOCYTES 1.4 0.8 - 3.5 K/UL    ABS. MONOCYTES 0.9 0.0 - 1.0 K/UL    ABS. EOSINOPHILS 0.3 0.0 - 0.4 K/UL    ABS. BASOPHILS 0.0 0.0 - 0.1 K/UL    ABS. IMM. GRANS. 0.0 0.00 - 0.04 K/UL    DF MANUAL      RBC COMMENTS NORMOCYTIC, NORMOCHROMIC      WBC COMMENTS TOXIC GRANULATION     METABOLIC PANEL, COMPREHENSIVE    Collection Time: 03/29/20  8:34 AM   Result Value Ref Range    Sodium 132 (L) 136 - 145 mmol/L    Potassium 4.6 3.5 - 5.1 mmol/L    Chloride 96 (L) 97 - 108 mmol/L    CO2 22 21 - 32 mmol/L    Anion gap 14 5 - 15 mmol/L    Glucose 104 (H) 65 - 100 mg/dL    BUN 35 (H) 6 - 20 MG/DL    Creatinine 2.28 (H) 0.70 - 1.30 MG/DL    BUN/Creatinine ratio 15 12 - 20      GFR est AA 36 (L) >60 ml/min/1.73m2    GFR est non-AA 29 (L) >60 ml/min/1.73m2    Calcium 8.7 8.5 - 10.1 MG/DL    Bilirubin, total 3.7 (H) 0.2 - 1.0 MG/DL    ALT (SGPT) 1,838 (H) 12 - 78 U/L    AST (SGOT) 1,577 (H) 15 - 37 U/L    Alk.  phosphatase 166 (H) 45 - 117 U/L    Protein, total 7.0 6.4 - 8.2 g/dL    Albumin 3.6 3.5 - 5.0 g/dL    Globulin 3.4 2.0 - 4.0 g/dL    A-G Ratio 1.1 1.1 - 2.2     CK W/ REFLX CKMB    Collection Time: 03/29/20  8:34 AM   Result Value Ref Range     39 - 308 U/L   TROPONIN I    Collection Time: 03/29/20  8:34 AM   Result Value Ref Range Troponin-I, Qt. 0.44 (H) <0.05 ng/mL   NT-PRO BNP    Collection Time: 03/29/20  8:34 AM   Result Value Ref Range    NT pro-BNP 3,871 (H) <125 PG/ML   TSH 3RD GENERATION    Collection Time: 03/29/20  8:42 AM   Result Value Ref Range    TSH 2.43 0.36 - 3.74 uIU/mL   SAMPLES BEING HELD    Collection Time: 03/29/20  8:42 AM   Result Value Ref Range    SAMPLES BEING HELD  1 LAV, 1 BLUE     COMMENT        Add-on orders for these samples will be processed based on acceptable specimen integrity and analyte stability, which may vary by analyte.    PROTHROMBIN TIME + INR    Collection Time: 03/29/20  8:42 AM   Result Value Ref Range    INR 1.6 (H) 0.9 - 1.1      Prothrombin time 15.8 (H) 9.0 - 11.1 sec   LIPASE    Collection Time: 03/29/20  8:42 AM   Result Value Ref Range    Lipase 162 73 - 393 U/L   CK    Collection Time: 03/29/20  8:42 AM   Result Value Ref Range     39 - 308 U/L   LACTIC ACID    Collection Time: 03/29/20 10:31 AM   Result Value Ref Range    Lactic acid 3.7 (HH) 0.4 - 2.0 MMOL/L   ACETAMINOPHEN    Collection Time: 03/29/20 11:14 AM   Result Value Ref Range    Acetaminophen level <2 (L) 10 - 30 ug/mL   CREATININE, UR, RANDOM    Collection Time: 03/29/20 11:14 AM   Result Value Ref Range    Creatinine, urine 95.60 mg/dL

## 2020-03-29 NOTE — Clinical Note
TRANSFER - OUT REPORT:     Verbal report given to: IVCU staff/ bedside. Report consisted of patient's Situation, Background, Assessment and   Recommendations(SBAR). Opportunity for questions and clarification was provided. Patient transported with a Registered Nurse, Monitor and Oxygen. Oxygen used for patient = nasal cannula. Patient transported to: [facility].

## 2020-03-29 NOTE — ED NOTES
Patient presents to ED with c/o SOB, nausea and vomiting for several days. Patient reported that he has been unable to sleep for several days due to vomiting when laying flat. Patient reported that he has not been taking his sotolol, diltiazem, or eliquis since 3/17. Patient is alert, reported feeling nauseated. EKG being done and pt placed on monitor. Will attempt IV access.

## 2020-03-29 NOTE — ED NOTES
Spoke with MD Ezekiel Lockett regarding patient refusal. Per MD, pt is being accepted by Dr. Oneida Wolf who is coming to see the patient. Dr. Ezekiel Lockett stated that he would come by to talk with the patient also when he had time.

## 2020-03-29 NOTE — ED NOTES
Ok to run patient heparin and zosyn together per [employer] in pharmacy. Patient other IV in use and Zosyn not compatible with diltiazem.

## 2020-03-29 NOTE — Clinical Note
Sheath #1: sheath exchanged for [facility] SWRTZ 0.038IN 8FR -- . Hemostasis achieved.  8fr sheath RFV removed/ SR0 advanced over package wire/ aspirated and flushed

## 2020-03-29 NOTE — ED NOTES
Pt BP reading with SBp in 200s. Pt c/o SANDY. MD Mata notified. Heparin drip held from starting per MD Mata.  Will continue to monitor and assess patient needs

## 2020-03-29 NOTE — ED PROVIDER NOTES
EMERGENCY DEPARTMENT HISTORY AND PHYSICAL EXAM      Date: 3/29/2020  Patient Name: Aaron Carballo    Please note that this dictation was completed with [employer]Map, the computer voice recognition software. Quite often unanticipated grammatical, syntax, homophones, and other interpretive errors are inadvertently transcribed by the computer software. Please disregard these errors. Please excuse any errors that have escaped final proofreading. History of Presenting Illness     Chief Complaint   Patient presents with    Vomiting     vomiting all night. feels short of breath. \"I got this heart AFib thing\"    Shortness of Breath     feels so weak       History Provided By: Patient    HPI: Aaron Carballo, 60 y.o. male, with a past medical history significant for atrial fibrillation, most recent cath showed no significant coronary disease and a normal left ventricular function. Patient has been in A. fib with RVR intermittently for a long period of time, not taking anticoagulation. He states yesterday he began to have nausea and vomiting and reports inability to tolerate anything by mouth. Denies any chest pain, but admits to feeling short of breath and generally weak. Admits to lower extremity edema. He is not on any rate control medicines at this time. He reports that he is allergic to \"all the blood pressure medicines \". He states that his allergies are due to [facility] metal poisoning \". He states he cannot tolerate sotalol, cannot tire tolerate Cardizem, cannot tolerate \"any blood pressure medicine. Vomiting is made worse by taking p.o. No abdominal pain. No other exacerbating or relieving factors or associated symptoms. PCP: Ar Montenegro MD    No current facility-administered medications on file prior to encounter. Current Outpatient Medications on File Prior to Encounter   Medication Sig Dispense Refill    naltrexone HCl (NALTREXONE PO) Take  by mouth daily.  Dose unknown prescribed  [place]      dilTIAZem CD (CARDIZEM CD) 120 mg ER capsule Take 1 Cap by mouth daily. 30 Cap 3    HCG 8000IU/4 ML 2,000 Units by SubCUTAneous route. HCG   Benzyl Alcohol   Sodium phosphate   Sterile water   (may use HCl or NaOH  to make adjustments to the [address])      testosterone cypionate (DEPO-TESTOSTERONE) 100 mg/mL injection 200 mg by IntraMUSCular route Once every 2 weeks.  furosemide (LASIX) 40 mg tablet take 1 tablet by mouth twice a day 60 Tab 1    anastrozole (ARIMIDEX) 1 mg tablet Take 1 mg by mouth every seven (7) days. 0    apixaban (ELIQUIS) 5 mg tablet Take 1 Tab by mouth two (2) times a day. 60 Tab 1    desloratadine (CLARINEX) 5 mg tablet Take 5 mg by mouth daily.  montelukast (SINGULAIR) 10 mg tablet Take 10 mg by mouth every evening.  dextroamphetamine-amphetamine (ADDERALL) 30 mg tablet Take 30 mg by mouth two (2) times a day. Past History     Past Medical History:  Past Medical History:   Diagnosis Date    A-fib ([facility]     Allergic reaction to contrast material     galodinium    Hypertension     Irregular heart rhythm     Mercury poisoning     Morbid obesity ([facility] 11/4/2019    ALYSSA on CPAP     ALYSSA on CPAP 11/4/2019    Post concussion syndrome        Past Surgical History:  Past Surgical History:   Procedure Laterality Date    HX SHOULDER ARTHROSCOPY      HX UROLOGICAL Left     hydrocoele resection       Family History:  Family History   Problem Relation Age of Onset    Colon Cancer Mother     Hypertension Father     Other Father         multiple birth defects    Hypertension Paternal Grandfather        Social History:  Social History     Tobacco Use    Smoking status: Never Smoker    Smokeless tobacco: Never Used   Substance Use Topics    Alcohol use: Not Currently    Drug use: Never       Allergies:   Allergies   Allergen Reactions    Ace Inhibitors Cough    Gadolinium-Containing Contrast Media Rash         Review of Systems   Review of Systems Constitutional: Positive for fatigue. Negative for fever. Respiratory: Positive for shortness of breath. Cardiovascular: Positive for leg swelling. Negative for chest pain. Gastrointestinal: Positive for abdominal pain, nausea and vomiting. Physical Exam   Physical Exam  Vitals signs and nursing note reviewed. Constitutional:       Appearance: He is ill-appearing and diaphoretic. Comments: Morbidly obese male moderate distress   HENT:      Head: Normocephalic and atraumatic. Eyes:      General:         Right eye: No discharge. Left eye: No discharge. Conjunctiva/sclera: Conjunctivae normal.      Pupils: Pupils are equal, round, and reactive to light. Neck:      Musculoskeletal: Normal range of motion. Trachea: No tracheal deviation. Comments: Unable to assess for JVD secondary to obesity  Cardiovascular:      Heart sounds: Normal heart sounds. No murmur. Comments: Irregularly irregular rhythm with a tachycardic rate  Pulmonary:      Effort: Pulmonary effort is normal. No respiratory distress. Breath sounds: Rales present. No wheezing. Abdominal:      General: Bowel sounds are normal.      Palpations: Abdomen is soft. Tenderness: There is no abdominal tenderness. There is no guarding or rebound. Musculoskeletal: Normal range of motion. General: No tenderness or deformity. Skin:     Capillary Refill: Capillary refill takes more than 3 seconds. Comments: Pitting (3+) edema in the bilateral lower extremities up to the level of the abdomen. Neurological:      Mental Status: He is alert and oriented to person, place, and time.    Psychiatric:         Behavior: Behavior normal.         Diagnostic Study Results     Labs -     Recent Results (from the past 12 hour(s))   EKG, 12 LEAD, INITIAL    Collection Time: 03/29/20  8:21 AM   Result Value Ref Range    Ventricular Rate 160 BPM    Atrial Rate 163 BPM    QRS Duration 98 ms    Q-T Interval 314 ms    QTC Calculation (Bezet) 512 ms    Calculated R Axis -19 degrees    Calculated T Axis 158 degrees    Diagnosis       ** Poor data quality, interpretation may be adversely affected  Atrial fibrillation with rapid ventricular response  Minimal voltage criteria for LVH, may be normal variant  Cannot rule out Anterior infarct , age undetermined  ST & T wave abnormality, consider lateral ischemia  When compared with ECG of 06-FEB-2020 08:27,  Atrial fibrillation has replaced Sinus rhythm  Vent. rate has increased BY  81 BPM  Nonspecific T wave abnormality now evident in Inferior leads  T wave inversion more evident in Lateral leads         Radiologic Studies -   XR CHEST PORT    (Results Pending)     CT Results  (Last 48 hours)    None        CXR Results  (Last 48 hours)    None            Medical Decision Making   I am the first provider for this patient. I reviewed the vital signs, available nursing notes, past medical history, past surgical history, family history and social history. Vital Signs-Reviewed the patient's vital signs. Patient Vitals for the past 12 hrs:   Pulse Resp BP SpO2   03/29/20 0814 (!) 162 24 (!) 166/133 96 %       Pulse Oximetry Analysis -95% on room air    Cardiac Monitor:   A. fib with RVR, rate 160s    EKG interpretation: (Preliminary)  EKG shows atrial fibrillation with RVR, rate 160. Lateral ST changes. No evidence of ST segment elevation MI. Records Reviewed: Nursing Notes and Old Medical Records    Provider Notes (Medical Decision Making): Morbidly obese male in A. fib with RVR, he looks ill with delayed cap refill, he is diaphoretic. He does not tolerate the A. fib with RVR well at this time. He is relating to me that he is \"allergic \"to all the blood pressure medicines. He is not anticoagulated, so he does have high risk for thromboembolic event should he be cardioverted. We will try to rate control the patient.   Will assess for heart failure with labs, imaging. Will give antiemetics for vomiting. Given the significant fluid overload will hold on IV fluids at this point. I have high concern that he may be in worsening renal function. Will consult cardiology for further direction given patient's report that he has allergies to \"multiple medicines \". At this point I believe it is safer to try to rate control the patient despite his report of allergies. He states he does not develop side effects until he is on the medicine for \"months \". ED Course:   Initial assessment performed. The patients presenting problems have been discussed, and they are in agreement with the care plan formulated and outlined with them. I have encouraged them to ask questions as they arise throughout their visit. ED Course as of Mar 30 1803   Sun Mar 29, 2020   0966 Discussed with Dr. Anibal Hodge nurse practitioner. She agrees with continuing dilt. Agrees the patient will likely need another RUFINO cardioversion, recommends against cardioverting at this time due to risk of thromboembolic event. Agrees with heparin.    [AR]   [id number] Lab interpretation. Patient has a leukocytosis, but this is chronic. He does have worsening renal dysfunction and evidence of acute liver failure. We will check a right upper quadrant ultrasound to assess for gallbladder disease. Will obtain CT of the abdomen and pelvis without contrast that we will assess for further pathology and assess for ureteral or urinary tract obstruction.    [AR]   65 Paging Dr. Anselmo Caputo, General surgery regarding gallbladder disease.    [AR]      ED Course User Index  [AR] Vel Hutson DO           Critical Care Time:   I have spent 45 minutes of critical care time in evaluating and treating this patient. This includes time spent at bedside, time with family and decision makers, documentation, review of labs and imaging, and/or consultation with specialists.   It does not include time spent on separately billed procedures. This patient presents with a critical illness or injury that acutely impairs one or more vital organ systems such that there is a high probability of imminent or life threatening deterioration in the patient's condition. This case involved decision making of high complexity to assess, manipulate, and support vital organ system failure and/or to prevent further life threatening deterioration of the patient's condition. Failure to initiate these interventions on an urgent basis would likely result in sudden, clinically significant or life threatening deterioration in the patient's condition. Abnormal findings supporting critical care: Atrial fibrillation with RVR, leukocytosis, elevated liver enzymes,  Interventions to support critical care: Cardiac monitoring, rate control, EKG interpretation, lab interpretation, specialist consultation  Failure to intervene may result in: Heart failure, death, arrhythmia    Disposition:  Patient is being admitted to the hospital by Dr. Bere Siegel. The results of their tests and reasons for their admission have been discussed with them and/or available family. They convey agreement and understanding for the need to be admitted and for their admission diagnosis. Consultation has been made with the inpatient physician specialist for hospitalization. PLAN:  1. Current Discharge Medication List        2. Follow-up Information    None         Return to ED if worse     Diagnosis     Clinical Impression:   1. Atrial fibrillation, unspecified type ([address]    2. Diastolic congestive heart failure, unspecified HF chronicity (HCC)    3. Elevated LFTs    4. Elevated bilirubin        Attestations:   This note was completed by Doni Metzger DO

## 2020-03-29 NOTE — ED NOTES
Patient taken to CT, accompanied by RN. Just prior to leaving after starting heparin drip pt reported having seen blood in his stool this am. Patient heparin drip paused while talking with MD Elliott who was notified of this information and that patient had previously denied any signs of bleeding. Per MD will continue with Heparin drip and continue to monitor patient for s/s of bleeding. Patient also reported that his HA was improving.

## 2020-03-29 NOTE — ED NOTES
TRANSFER - OUT REPORT:    Verbal report given to [facility] on Sophie Black  being transferred to PCU(unit) for routine progression of care       Report consisted of patients Situation, Background, Assessment and   Recommendations(SBAR). Information from the following report(s) SBAR was reviewed with the receiving nurse. Lines:   Peripheral IV 03/29/20 Left Antecubital (Active)       Peripheral IV 03/29/20 Right;Upper Antecubital (Active)        Opportunity for questions and clarification was provided.       Patient transported with:   Monitor  Registered Nurse  Tech

## 2020-03-30 ENCOUNTER — APPOINTMENT (OUTPATIENT)
Dept: NON INVASIVE DIAGNOSTICS | Age: 60
DRG: 242 | End: 2020-03-30
Attending: FAMILY MEDICINE
Payer: COMMERCIAL

## 2020-03-30 ENCOUNTER — APPOINTMENT (OUTPATIENT)
Dept: GENERAL RADIOLOGY | Age: 60
DRG: 242 | End: 2020-03-30
Attending: FAMILY MEDICINE
Payer: COMMERCIAL

## 2020-03-30 PROBLEM — I50.33 ACUTE ON CHRONIC DIASTOLIC HF (HEART FAILURE) (HCC): Status: ACTIVE | Noted: 2019-11-04

## 2020-03-30 LAB
ALBUMIN SERPL-MCNC: 2.7 G/DL (ref 3.5–5)
ALBUMIN/GLOB SERPL: 1 {RATIO} (ref 1.1–2.2)
ALP SERPL-CCNC: 109 U/L (ref 45–117)
ALT SERPL-CCNC: 1085 U/L (ref 12–78)
ANION GAP SERPL CALC-SCNC: 7 MMOL/L (ref 5–15)
APTT PPP: 60.9 SEC (ref 22.1–32)
APTT PPP: 61.7 SEC (ref 22.1–32)
AST SERPL-CCNC: 498 U/L (ref 15–37)
BILIRUB SERPL-MCNC: 2.5 MG/DL (ref 0.2–1)
BUN SERPL-MCNC: 28 MG/DL (ref 6–20)
BUN/CREAT SERPL: 19 (ref 12–20)
CALCIUM SERPL-MCNC: 8 MG/DL (ref 8.5–10.1)
CHLORIDE SERPL-SCNC: 95 MMOL/L (ref 97–108)
CO2 SERPL-SCNC: 32 MMOL/L (ref 21–32)
CREAT SERPL-MCNC: 1.49 MG/DL (ref 0.7–1.3)
ECHO AO ROOT DIAM: 3.55 CM
ECHO LA MAJOR AXIS: 4.32 CM
ECHO LA TO AORTIC ROOT RATIO: 1.22
ECHO LV EDV TEICHHOLZ: 59.14 ML
ECHO LV ESV TEICHHOLZ: 32.31 ML
ECHO LV INTERNAL DIMENSION DIASTOLIC: 5.8 CM (ref 4.2–5.9)
ECHO LV INTERNAL DIMENSION SYSTOLIC: 4.47 CM
ECHO LV IVSD: 1.97 CM (ref 0.6–1)
ECHO LV IVSS: 2.22 CM
ECHO LV MASS 2D: 754.1 G (ref 88–224)
ECHO LV MASS INDEX 2D: 277.3 G/M2 (ref 49–115)
ECHO LV POSTERIOR WALL DIASTOLIC: 2 CM (ref 0.6–1)
ECHO LV POSTERIOR WALL SYSTOLIC: 2.43 CM
ECHO LVOT DIAM: 2.16 CM
ECHO MV AREA PHT: 6.1 CM2
ECHO MV E DECELERATION TIME (DT): 120.2 MS
ECHO MV E VELOCITY: 109.66 CM/S
ECHO MV PRESSURE HALF TIME (PHT): 36.3 MS
ECHO MV REGURGITANT PEAK GRADIENT: 42.8 MMHG
ECHO MV REGURGITANT PEAK VELOCITY: 326.97 CM/S
ECHO PV MAX VELOCITY: 97.56 CM/S
ECHO PV PEAK GRADIENT: 3.8 MMHG
FERRITIN SERPL-MCNC: 1224 NG/ML (ref 26–388)
GLOBULIN SER CALC-MCNC: 2.8 G/DL (ref 2–4)
GLUCOSE SERPL-MCNC: 103 MG/DL (ref 65–100)
HAV IGM SER QL: NONREACTIVE
HBV CORE IGM SER QL: NONREACTIVE
HBV SURFACE AG SER QL: 0.11 INDEX
HBV SURFACE AG SER QL: NEGATIVE
HCV AB SERPL QL IA: NONREACTIVE
HCV COMMENT,HCGAC: NORMAL
LACTATE SERPL-SCNC: 2.1 MMOL/L (ref 0.4–2)
LACTATE SERPL-SCNC: 4 MMOL/L (ref 0.4–2)
LVFS 2D: 22.93 %
LVSV (TEICH): 26.83 ML
MV DEC SLOPE: 9.12
POTASSIUM SERPL-SCNC: 2.7 MMOL/L (ref 3.5–5.1)
PROT SERPL-MCNC: 5.5 G/DL (ref 6.4–8.2)
SODIUM SERPL-SCNC: 134 MMOL/L (ref 136–145)
SP1: NORMAL
SP2: NORMAL
SP3: NORMAL
THERAPEUTIC RANGE,PTTT: ABNORMAL SECS (ref 58–77)
THERAPEUTIC RANGE,PTTT: ABNORMAL SECS (ref 58–77)

## 2020-03-30 PROCEDURE — 74011250636 HC RX REV CODE- 250/636: Performed by: FAMILY MEDICINE

## 2020-03-30 PROCEDURE — 36573 INSJ PICC RS&I 5 YR+: CPT | Performed by: NURSE PRACTITIONER

## 2020-03-30 PROCEDURE — 65660000000 HC RM CCU STEPDOWN

## 2020-03-30 PROCEDURE — 74011250636 HC RX REV CODE- 250/636: Performed by: HOSPITALIST

## 2020-03-30 PROCEDURE — 36415 COLL VENOUS BLD VENIPUNCTURE: CPT

## 2020-03-30 PROCEDURE — C1751 CATH, INF, PER/CENT/MIDLINE: HCPCS

## 2020-03-30 PROCEDURE — 77030018786 HC NDL GD F/USND BARD -B

## 2020-03-30 PROCEDURE — 76937 US GUIDE VASCULAR ACCESS: CPT

## 2020-03-30 PROCEDURE — 74011250636 HC RX REV CODE- 250/636: Performed by: NURSE PRACTITIONER

## 2020-03-30 PROCEDURE — 71045 X-RAY EXAM CHEST 1 VIEW: CPT

## 2020-03-30 PROCEDURE — 80053 COMPREHEN METABOLIC PANEL: CPT

## 2020-03-30 PROCEDURE — 85730 THROMBOPLASTIN TIME PARTIAL: CPT

## 2020-03-30 PROCEDURE — 99232 SBSQ HOSP IP/OBS MODERATE 35: CPT | Performed by: SURGERY

## 2020-03-30 PROCEDURE — 93306 TTE W/DOPPLER COMPLETE: CPT

## 2020-03-30 PROCEDURE — 74011250637 HC RX REV CODE- 250/637: Performed by: NURSE PRACTITIONER

## 2020-03-30 PROCEDURE — 74011000250 HC RX REV CODE- 250: Performed by: FAMILY MEDICINE

## 2020-03-30 PROCEDURE — 83605 ASSAY OF LACTIC ACID: CPT

## 2020-03-30 RX ORDER — POTASSIUM CHLORIDE 14.9 MG/ML
10 INJECTION INTRAVENOUS
Status: COMPLETED | OUTPATIENT
Start: 2020-03-30 | End: 2020-03-30

## 2020-03-30 RX ORDER — ONDANSETRON 2 MG/ML
4 INJECTION INTRAMUSCULAR; INTRAVENOUS
Status: DISCONTINUED | OUTPATIENT
Start: 2020-03-30 | End: 2020-04-04 | Stop reason: HOSPADM

## 2020-03-30 RX ORDER — POTASSIUM CHLORIDE 750 MG/1
40 TABLET, FILM COATED, EXTENDED RELEASE ORAL 2 TIMES DAILY
Status: DISCONTINUED | OUTPATIENT
Start: 2020-03-30 | End: 2020-03-31

## 2020-03-30 RX ORDER — METOPROLOL TARTRATE 25 MG/1
12.5 TABLET, FILM COATED ORAL EVERY 6 HOURS
Status: DISCONTINUED | OUTPATIENT
Start: 2020-03-30 | End: 2020-04-03

## 2020-03-30 RX ORDER — BACITRACIN 500 UNIT/G
1 PACKET (EA) TOPICAL AS NEEDED
Status: DISCONTINUED | OUTPATIENT
Start: 2020-03-30 | End: 2020-04-04 | Stop reason: HOSPADM

## 2020-03-30 RX ORDER — HEPARIN 100 UNIT/ML
300 SYRINGE INTRAVENOUS AS NEEDED
Status: DISCONTINUED | OUTPATIENT
Start: 2020-03-30 | End: 2020-03-31 | Stop reason: ALTCHOICE

## 2020-03-30 RX ORDER — MORPHINE SULFATE 2 MG/ML
1 INJECTION, SOLUTION INTRAMUSCULAR; INTRAVENOUS
Status: DISCONTINUED | OUTPATIENT
Start: 2020-03-30 | End: 2020-04-04 | Stop reason: HOSPADM

## 2020-03-30 RX ORDER — FUROSEMIDE 10 MG/ML
60 INJECTION INTRAMUSCULAR; INTRAVENOUS 2 TIMES DAILY
Status: DISCONTINUED | OUTPATIENT
Start: 2020-03-30 | End: 2020-03-31

## 2020-03-30 RX ADMIN — DEXTROSE MONOHYDRATE 15 MG/HR: 50 INJECTION, SOLUTION INTRAVENOUS at 20:16

## 2020-03-30 RX ADMIN — POTASSIUM CHLORIDE 10 MEQ: 200 INJECTION, SOLUTION INTRAVENOUS at 18:13

## 2020-03-30 RX ADMIN — POTASSIUM CHLORIDE 40 MEQ: 750 TABLET, FILM COATED, EXTENDED RELEASE ORAL at 22:06

## 2020-03-30 RX ADMIN — METOPROLOL TARTRATE 12.5 MG: 25 TABLET ORAL at 17:09

## 2020-03-30 RX ADMIN — FUROSEMIDE 60 MG: 10 INJECTION, SOLUTION INTRAMUSCULAR; INTRAVENOUS at 17:08

## 2020-03-30 RX ADMIN — POTASSIUM CHLORIDE 40 MEQ: 750 TABLET, FILM COATED, EXTENDED RELEASE ORAL at 15:21

## 2020-03-30 RX ADMIN — FUROSEMIDE 60 MG: 10 INJECTION, SOLUTION INTRAMUSCULAR; INTRAVENOUS at 12:40

## 2020-03-30 RX ADMIN — POTASSIUM CHLORIDE 10 MEQ: 200 INJECTION, SOLUTION INTRAVENOUS at 16:16

## 2020-03-30 RX ADMIN — POTASSIUM CHLORIDE 10 MEQ: 200 INJECTION, SOLUTION INTRAVENOUS at 17:12

## 2020-03-30 RX ADMIN — ONDANSETRON 4 MG: 2 INJECTION INTRAMUSCULAR; INTRAVENOUS at 02:43

## 2020-03-30 RX ADMIN — DEXTROSE MONOHYDRATE 15 MG/HR: 50 INJECTION, SOLUTION INTRAVENOUS at 04:31

## 2020-03-30 RX ADMIN — DEXTROSE MONOHYDRATE 15 MG/HR: 50 INJECTION, SOLUTION INTRAVENOUS at 12:30

## 2020-03-30 RX ADMIN — Medication 10 ML: at 22:08

## 2020-03-30 RX ADMIN — POTASSIUM CHLORIDE 10 MEQ: 200 INJECTION, SOLUTION INTRAVENOUS at 15:22

## 2020-03-30 RX ADMIN — METOPROLOL TARTRATE: 25 TABLET ORAL at 09:54

## 2020-03-30 RX ADMIN — MORPHINE SULFATE 1 MG: 2 INJECTION, SOLUTION INTRAMUSCULAR; INTRAVENOUS at 02:43

## 2020-03-30 RX ADMIN — HEPARIN SODIUM 10 UNITS/KG/HR: 10000 INJECTION, SOLUTION INTRAVENOUS at 08:02

## 2020-03-30 RX ADMIN — Medication 10 ML: at 05:00

## 2020-03-30 NOTE — PROGRESS NOTES
PICC (Peripherally Inserted Central Catheter) line insertion  procedure note     1150 : Procedure explained to patient    along with risks and benefits and  patient    agreed to proceed. Informed consent obtained from patient    Patient teaching completed. Timeout completed. Pre-procedure assessment done. Maximum sterile barrier precautions observed throughout procedure. Lidocaine 1%  3.0    ml SQ given prior to cannulation. Cannulated   basilic  vein using ultrasound guidance and modified seldinger technique. Inserted   6  Norwegian  triple  lumen PICC to   right  arm using [employer] Tip Location system. Blood return verified and flushed with 20 ml normal saline in each port. Sterile dressing applied with Biopatch, StatLock and occlusive dressing as per protocol. Curos caps applied to each port. Patient tolerated procedure well with minimal blood loss. Patient education material provided. PICC procedure performed by  :  Rick Lynn RN. PICC nurse  Assisted by  : Chapis Chaidez RN. PICC nurse  Reason for access : MD order / Reliable access / Limited vascular access     Complications related to insertion  : none  Total Trimmed Length    46  cm   External Length :    0   cm     PICC line site arm circumference:     38  cm   PICC catheter occupies    30  %  of vein  Type of PICC: Bard Power PICC SOLO  Ref # :  [id number]    Lot # :  [id number]  Expiration Date :  2020-07-31  Portable Chest X-Ray ordered. Pt has  A fib  rhythm. Primary nurse   Briseida Baker  RN aware not use until  PICC Tip placement  Is confirmed by  Radiologist.  200 : PICC line placement : tip of PICC line lies over the  superior vena cava  per Dr Christiano Trevino  radiologist.    Notified to primary nurse  Briseida Baker RN that PICC line is in satisfactory position per radiologist and  it   can be used now. Rick Lynn RN. BSN. MIREYA,CMSRN.  PICC Nurse, Vascular Access Team

## 2020-03-30 NOTE — PROGRESS NOTES
Bedside shift change report given to moreno (oncoming nurse) by Bhavik Amaya (offgoing nurse). Report included the following information SBAR, Kardex, MAR, Med Rec Status and Cardiac Rhythm afib.

## 2020-03-30 NOTE — PROGRESS NOTES
Bedside and Verbal shift change report given to Carol Castellano (oncoming nurse) by Khloe Da Silva (offgoing nurse). Report included the following information SBAR, Kardex, MAR and Recent Results.

## 2020-03-30 NOTE — PROGRESS NOTES
1967 [address]  [phone]      Cardiology Progress Note      3/30/2020 9:00 AM    Admit Date: 3/29/2020    Admit Diagnosis:   Atrial fibrillation with RVR ([address] [I48.91]  Atrial fibrillation with RVR ([address] [I48.91]    Subjective:     Chhaya Berry has c/o SOB, weight gain, LE edema. Patient admits to stop taking all medication \" a while ago\" as the side effects were problematic. He receives chelation therapy monthly in [place] for metal toxicity, but hasnt had x 1 month. Received IV lasix x 1 with neg 2.3L neg/24 hours, mildly elevated troponin. K 2.7    On IV Dilt at 15mg rate remains uncontrolled at 120s, addedda BB.      Neg cardiac cath 11/2019, successful cardioversion 2/6/2020      Visit Vitals  /67 (BP 1 Location: Left arm, BP Patient Position: At rest)   Pulse 96   Temp 97.8 °F (36.6 °C)   Resp 16   Ht 6' 2\" (1.88 m)   Wt 153.8 kg (339 lb)   SpO2 100%   BMI 43.53 kg/m²       Current Facility-Administered Medications   Medication Dose Route Frequency    ondansetron (ZOFRAN) injection 4 mg  4 mg IntraVENous Q6H PRN    morphine injection 1 mg  1 mg IntraVENous Q3H PRN    metoprolol tartrate (LOPRESSOR) tablet 12.5 mg  12.5 mg Oral Q6H    bacitracin 500 unit/gram packet 1 Packet  1 Packet Topical PRN    heparin (porcine) pf 300 Units  300 Units InterCATHeter PRN    furosemide (LASIX) injection 60 mg  60 mg IntraVENous BID    dilTIAZem (CARDIZEM) 100 mg in dextrose 5% (MBP/ADV) 100 mL infusion  0-15 mg/hr IntraVENous TITRATE    heparin 25,000 units in D5W 250 ml infusion  6-25 Units/kg/hr IntraVENous TITRATE    heparin (porcine) injection 4,000 Units  4,000 Units IntraVENous PRN    heparin (porcine) injection 2,000 Units  2,000 Units IntraVENous PRN    sodium chloride (NS) flush 5-40 mL  5-40 mL IntraVENous Q8H    sodium chloride (NS) flush 5-40 mL  5-40 mL IntraVENous PRN    acetylcysteine (ACETADOTE) 10,000 mg in dextrose 5% 1,000 mL infusion 10,000 mg IntraVENous ONCE       Objective:      Physical Exam:  General Appearance:  super morbidly obese  male in no acute distress  Chest:   Clear and diminished  Cardiovascular:  irr, irr no murmur. Abdomen:   morbidly obese, anasarca, +BS, tense    Extremities: joe LE +4 pitting edema; errythmic, warm to touch, edema to waist  Skin:  Warm and dry. Data Review:   Recent Labs     03/29/20  1709 03/29/20  0834   WBC 20.8* 28.9*   HGB 13.3 15.5   HCT 40.1 46.7    357     Recent Labs     03/30/20  0805 03/29/20  0842 03/29/20  0834   *  --  132*   K 2.7*  --  4.6   CL 95*  --  96*   CO2 32  --  22   *  --  104*   BUN 28*  --  35*   CREA 1.49*  --  2.28*   CA 8.0*  --  8.7   ALB 2.7*  --  3.6   TBILI 2.5*  --  3.7*   SGOT 498*  --  1,577*   ALT 1,085*  --  1,838*   INR  --  1.6*  --        Recent Labs     03/29/20  0842 03/29/20  0834   TROIQ  --  0.44*     --          Intake/Output Summary (Last 24 hours) at 3/30/2020 1251  Last data filed at 3/30/2020 0437  Gross per 24 hour   Intake 1300 ml   Output 2250 ml   Net -950 ml        Telemetry: afib with RVR  Cxray:    Echo:  11/2019  · Left Ventricle: Normal cavity size. Mild concentric hypertrophy. Mild systolic dysfunction. Estimated left ventricular ejection fraction is 46 - 50%. · Right Ventricle: Mildly dilated right ventricle. Mildly reduced systolic function. · Left Atrium: Mildly dilated left atrium. · Mitral Valve: Mild mitral valve regurgitation is present. · Tricuspid Valve: Mild tricuspid valve regurgitation is present. · Pulmonary Artery: Moderate pulmonary hypertension. · Right Atrium: Mildly dilated right atrium. Assessment:     Active Problems:     Morbid obesity ([facility] (11/4/2019)      ALYSSA on CPAP (11/4/2019)      Atrial fibrillation with rapid ventricular response ([address] (11/5/2019)      Essential hypertension (12/4/2019)      Per[facility] vascular disease ([facility] (2/25/2020)        Plan:     Afib with RVR:  Continues on IV Dilt at 15mg without rate control. Add metoprolol 12.5mg Q6H. If this does not control, with change to IV  Patient had been on Sotalol, but discontinued due to \"side effects\", made him feel worse. Patient was offered pacemaker and AV apt by Dr. Edelmira Case, but patient is concerned with the potential for a reaction due to his sensitivities to metals. He is not a candidate for PVI afib ablation due to his noncompliance. RUFINO and cardioversion? ? Successful cardioversion 2/6/2020, had been on NOAC and did not require RUFINO  Patient stopped meds some time ago, therefore would require a RUFINO before a cardioversion. During this COVID19 time, it has been recommended not to perform RUFINO unless absolutely necessary due to increased risk of airborne particles. Therefore will continue to attempt to control with medications as above. Digoxin not an option due to ARF (which has improved but now hypokalemic), and amiodarone not an option due to noncompliance.     Continues on IV heparin    Acute on chronic diastolic HF/HFpEF (EF 88% 11/2019)  Restart diuretics, Lasix 60mg BID, monitor I/Os, daily weights on stand up scales, and labs  Supplementing K+ as needed, checking labs in AM    ALYSSA with CPAP:  States compliance with CPAP        Will Staff ACNP  Cardiology

## 2020-03-30 NOTE — PROGRESS NOTES
Hospitalist Progress Note    NAME: Alex Silva   :  1960   MRN:  [id number]       Assessment / Plan:  Patient 69-year-old male admitted for atrial fibrillation RVR, acute renal injury, acute liver injury. 1.  A. fib RVR: Patient is started on Cardizem drip, heparin drip. Cardiology following, echocardiogram.  Trend troponin. Intake output. Rate control, Heparin gtt, cardiology following   2. Acute hepatitis; etiology being investigated. Sent acute hepatitis panel.,  CT scan reviewed. No obstruction. LFT pattern consistent with acute liver injury. GI following, LFT  improving still critically high   3. Hypertension; patient hypertensive resume home medication  4. Diastolic congestive heart failure; Lasix, echocardiogram, daily weight, intake output, cardiac diet           Code Status: Full code  Surrogate Decision Maker:     DVT Prophylaxis: Heparin  GI Prophylaxis: not indicated     Baseline: Independent     Subjective:     Chief Complaint / Reason for Physician Visit  \" feeling same, not sure how he developed hepatitis\". Discussed with RN events overnight. Review of Systems:  Symptom Y/N Comments  Symptom Y/N Comments   Fever/Chills    Chest Pain     Poor Appetite    Edema     Cough    Abdominal Pain     Sputum    Joint Pain     SOB/ELIZONDO    Pruritis/Rash     Nausea/vomit    Tolerating PT/OT     Diarrhea    Tolerating Diet     Constipation    Other       Could NOT obtain due to:      Objective:     VITALS:   Last 24hrs VS reviewed since prior progress note.  Most recent are:  Patient Vitals for the past 24 hrs:   Temp Pulse Resp BP SpO2   20 1240  96      20 1200  (!) 109      20 1141 97.8 °F (36.6 °C) 92 16 122/67 100 %   20 0954  (!) 127      20 0846 97.6 °F (36.4 °C) (!) 121 16 153/77 98 %   20 0800  (!) 120      20 0452 98.8 °F (37.1 °C) (!) 119 20 134/79 94 %   20 0400  (!) 130      20 0334 98.7 °F (37.1 °C) (!) 134 20 (!) 144/97 97 %   03/30/20 0000  (!) 111      03/29/20 2328 99.1 °F (37.3 °C) (!) 118 20 136/69 96 %   03/29/20 1957 99.3 °F (37.4 °C) (!) 111 18 114/74 95 %   03/29/20 1633 98.3 °F (36.8 °C) (!) 128 18 (!) 133/100 95 %   03/29/20 1500  (!) 124 19 (!) 119/91 98 %   03/29/20 1445 97.7 °F (36.5 °C) (!) 132 29 (!) 165/118 100 %   03/29/20 1430  (!) 130 28 (!) 144/95 99 %   03/29/20 1420  (!) 124 27 (!) 154/95 100 %   03/29/20 1345  (!) 137 26 (!) 166/121 98 %   03/29/20 1330 98.1 °F (36.7 °C) (!) 135 22 (!) 162/128 99 %   03/29/20 1312  (!) 134 21 (!) 152/106 98 %       Intake/Output Summary (Last 24 hours) at 3/30/2020 1303  Last data filed at 3/30/2020 0437  Gross per 24 hour   Intake 1300 ml   Output 1700 ml   Net -400 ml        PHYSICAL EXAM:  General: WD, WN. Alert, cooperative, no acute distress    EENT:  EOMI. Anicteric sclerae. MMM  Resp:  CTA bilaterally, no wheezing or rales. No accessory muscle use  CV:  IRRegular  rhythm,  3+ edema  GI:  Soft, Non distended, Non tender.  +Bowel sounds  Neurologic:  Alert and oriented X 3, normal speech,   Psych:   Good insight. Not anxious nor agitated  Skin:  No rashes. No jaundice    Reviewed most current lab test results and cultures  YES  Reviewed most current radiology test results   YES  Review and summation of old records today    NO  Reviewed patient's current orders and MAR    YES  PMH/SH reviewed - no change compared to H&P  ________________________________________________________________________  Care Plan discussed with:    Comments   Patient     Family      RN     Care Manager     Consultant                        Multidiciplinary team rounds were held today with , nursing, pharmacist and clinical coordinator. Patient's plan of care was discussed; medications were reviewed and discharge planning was addressed.      ________________________________________________________________________  Total NON critical care TIME:  35 Minutes    Total CRITICAL CARE TIME Spent:   Minutes non procedure based      Comments   >50% of visit spent in counseling and coordination of care     ________________________________________________________________________  Nessa Sandoval MD     Procedures: see electronic medical records for all procedures/Xrays and details which were not copied into this note but were reviewed prior to creation of Plan. LABS:  I reviewed today's most current labs and imaging studies.   Pertinent labs include:  Recent Labs     03/29/20  1709 03/29/20  0834   WBC 20.8* 28.9*   HGB 13.3 15.5   HCT 40.1 46.7    357     Recent Labs     03/30/20  0805 03/29/20  0842 03/29/20  0834   *  --  132*   K 2.7*  --  4.6   CL 95*  --  96*   CO2 32  --  22   *  --  104*   BUN 28*  --  35*   CREA 1.49*  --  2.28*   CA 8.0*  --  8.7   ALB 2.7*  --  3.6   TBILI 2.5*  --  3.7*   SGOT 498*  --  1,577*   ALT 1,085*  --  1,838*   INR  --  1.6*  --        Signed: Nessa Sandoval MD

## 2020-03-30 NOTE — PROGRESS NOTES
Admit Date: 3/29/2020    Subjective:     Patient has complaints of BLE severe pain. Denies any abdominal pain    Objective:     Blood pressure 134/79, pulse (!) 119, temperature 98.8 °F (37.1 °C), resp. rate 20, height 6' 2\" (1.88 m), weight 339 lb (153.8 kg), SpO2 94 %. Temp (24hrs), Av.6 °F (37 °C), Min:97.7 °F (36.5 °C), Max:99.3 °F (37.4 °C)      Physical Exam:  GENERAL: alert, cooperative, appears stated age, LUNG: clear to auscultation bilaterally, HEART: regular rate and rhythm, ABDOMEN: soft, non-tender. Bowel sounds normal. No masses,  no organomegaly, EXTREMITIES:  Bilateral pedal edema with cellulitis    Labs:   Recent Results (from the past 24 hour(s))   CBC WITH AUTOMATED DIFF    Collection Time: 20  8:34 AM   Result Value Ref Range    WBC 28.9 (H) 4.1 - 11.1 K/uL    RBC 4.77 4.10 - 5.70 M/uL    HGB 15.5 12.1 - 17.0 g/dL    HCT 46.7 36.6 - 50.3 %    MCV 97.9 80.0 - 99.0 FL    MCH 32.5 26.0 - 34.0 PG    MCHC 33.2 30.0 - 36.5 g/dL    RDW 14.8 (H) 11.5 - 14.5 %    PLATELET 238 489 - 876 K/uL    MPV 11.7 8.9 - 12.9 FL    NRBC 1.2 (H) 0  WBC    ABSOLUTE NRBC 0.34 (H) 0.00 - 0.01 K/uL    NEUTROPHILS 80 (H) 32 - 75 %    BAND NEUTROPHILS 8 %    LYMPHOCYTES 5 (L) 12 - 49 %    MONOCYTES 3 (L) 5 - 13 %    EOSINOPHILS 1 0 - 7 %    BASOPHILS 0 0 - 1 %    METAMYELOCYTES 2 %    MYELOCYTES 1 %    IMMATURE GRANULOCYTES 0 0.0 - 0.5 %    ABS. NEUTROPHILS 25.4 (H) 1.8 - 8.0 K/UL    ABS. LYMPHOCYTES 1.4 0.8 - 3.5 K/UL    ABS. MONOCYTES 0.9 0.0 - 1.0 K/UL    ABS. EOSINOPHILS 0.3 0.0 - 0.4 K/UL    ABS. BASOPHILS 0.0 0.0 - 0.1 K/UL    ABS. IMM.  GRANS. 0.0 0.00 - 0.04 K/UL    DF MANUAL      RBC COMMENTS NORMOCYTIC, NORMOCHROMIC      WBC COMMENTS TOXIC GRANULATION     METABOLIC PANEL, COMPREHENSIVE    Collection Time: 20  8:34 AM   Result Value Ref Range    Sodium 132 (L) 136 - 145 mmol/L    Potassium 4.6 3.5 - 5.1 mmol/L    Chloride 96 (L) 97 - 108 mmol/L    CO2 22 21 - 32 mmol/L    Anion gap 14 5 - 15 mmol/L    Glucose 104 (H) 65 - 100 mg/dL    BUN 35 (H) 6 - 20 MG/DL    Creatinine 2.28 (H) 0.70 - 1.30 MG/DL    BUN/Creatinine ratio 15 12 - 20      GFR est AA 36 (L) >60 ml/min/1.73m2    GFR est non-AA 29 (L) >60 ml/min/1.73m2    Calcium 8.7 8.5 - 10.1 MG/DL    Bilirubin, total 3.7 (H) 0.2 - 1.0 MG/DL    ALT (SGPT) 1,838 (H) 12 - 78 U/L    AST (SGOT) 1,577 (H) 15 - 37 U/L    Alk. phosphatase 166 (H) 45 - 117 U/L    Protein, total 7.0 6.4 - 8.2 g/dL    Albumin 3.6 3.5 - 5.0 g/dL    Globulin 3.4 2.0 - 4.0 g/dL    A-G Ratio 1.1 1.1 - 2.2     CK W/ REFLX CKMB    Collection Time: 03/29/20  8:34 AM   Result Value Ref Range     39 - 308 U/L   TROPONIN I    Collection Time: 03/29/20  8:34 AM   Result Value Ref Range    Troponin-I, Qt. 0.44 (H) <0.05 ng/mL   NT-PRO BNP    Collection Time: 03/29/20  8:34 AM   Result Value Ref Range    NT pro-BNP 3,871 (H) <125 PG/ML   TSH 3RD GENERATION    Collection Time: 03/29/20  8:42 AM   Result Value Ref Range    TSH 2.43 0.36 - 3.74 uIU/mL   SAMPLES BEING HELD    Collection Time: 03/29/20  8:42 AM   Result Value Ref Range    SAMPLES BEING HELD  1 LAV, 1 BLUE     COMMENT        Add-on orders for these samples will be processed based on acceptable specimen integrity and analyte stability, which may vary by analyte.    PROTHROMBIN TIME + INR    Collection Time: 03/29/20  8:42 AM   Result Value Ref Range    INR 1.6 (H) 0.9 - 1.1      Prothrombin time 15.8 (H) 9.0 - 11.1 sec   LIPASE    Collection Time: 03/29/20  8:42 AM   Result Value Ref Range    Lipase 162 73 - 393 U/L   CK    Collection Time: 03/29/20  8:42 AM   Result Value Ref Range     39 - 308 U/L   CULTURE, BLOOD, PAIRED    Collection Time: 03/29/20 10:03 AM   Result Value Ref Range    Special Requests: NO SPECIAL REQUESTS      Culture result: NO GROWTH AFTER 20 HOURS     LACTIC ACID    Collection Time: 03/29/20 10:31 AM   Result Value Ref Range    Lactic acid 3.7 (HH) 0.4 - 2.0 MMOL/L   ACETAMINOPHEN    Collection Time: 03/29/20 11:14 AM   Result Value Ref Range    Acetaminophen level <2 (L) 10 - 30 ug/mL   SODIUM, UR, RANDOM    Collection Time: 03/29/20 11:14 AM   Result Value Ref Range    Sodium,urine random 41 MMOL/L   CREATININE, UR, RANDOM    Collection Time: 03/29/20 11:14 AM   Result Value Ref Range    Creatinine, urine 95.60 mg/dL   CBC WITH AUTOMATED DIFF    Collection Time: 03/29/20  5:09 PM   Result Value Ref Range    WBC 20.8 (H) 4.1 - 11.1 K/uL    RBC 4.12 4.10 - 5.70 M/uL    HGB 13.3 12.1 - 17.0 g/dL    HCT 40.1 36.6 - 50.3 %    MCV 97.3 80.0 - 99.0 FL    MCH 32.3 26.0 - 34.0 PG    MCHC 33.2 30.0 - 36.5 g/dL    RDW 15.2 (H) 11.5 - 14.5 %    PLATELET 498 894 - 662 K/uL    MPV 12.1 8.9 - 12.9 FL    NRBC 1.7 (H) 0  WBC    ABSOLUTE NRBC 0.36 (H) 0.00 - 0.01 K/uL    NEUTROPHILS 89 (H) 32 - 75 %    LYMPHOCYTES 6 (L) 12 - 49 %    MONOCYTES 3 (L) 5 - 13 %    EOSINOPHILS 0 0 - 7 %    BASOPHILS 0 0 - 1 %    IMMATURE GRANULOCYTES 2 (H) 0.0 - 0.5 %    ABS. NEUTROPHILS 18.5 (H) 1.8 - 8.0 K/UL    ABS. LYMPHOCYTES 1.2 0.8 - 3.5 K/UL    ABS. MONOCYTES 0.7 0.0 - 1.0 K/UL    ABS. EOSINOPHILS 0.0 0.0 - 0.4 K/UL    ABS. BASOPHILS 0.1 0.0 - 0.1 K/UL    ABS. IMM. GRANS. 0.4 (H) 0.00 - 0.04 K/UL    DF AUTOMATED     HEPATITIS PANEL, ACUTE    Collection Time: 03/29/20  5:09 PM   Result Value Ref Range    Hepatitis A, IgM NONREACTIVE NR      __          Hepatitis B surface Ag 0.11 Index    Hep B surface Ag Interp. NEGATIVE  NEG      __          Hepatitis B core, IgM NONREACTIVE NR      __          Hep C  virus Ab Interp.  NONREACTIVE NR      Hep C  virus Ab comment Method used is [facility] [facility]     DRUG SCREEN, URINE    Collection Time: 03/29/20  5:09 PM   Result Value Ref Range    AMPHETAMINES NEGATIVE  NEG      BARBITURATES NEGATIVE  NEG      BENZODIAZEPINES NEGATIVE  NEG      COCAINE NEGATIVE  NEG      METHADONE NEGATIVE  NEG      OPIATES NEGATIVE  NEG      PCP(PHENCYCLIDINE) NEGATIVE  NEG      THC (TH-CANNABINOL) NEGATIVE  NEG Drug screen comment (NOTE)    FERRITIN    Collection Time: 03/29/20  5:09 PM   Result Value Ref Range    Ferritin 1,323 (H) 26 - 388 NG/ML   PTT    Collection Time: 03/29/20  7:23 PM   Result Value Ref Range    aPTT 28.6 22.1 - 32.0 sec    aPTT, therapeutic range     58.0 - 77.0 SECS   LACTIC ACID    Collection Time: 03/29/20  7:23 PM   Result Value Ref Range    Lactic acid 2.4 (HH) 0.4 - 2.0 MMOL/L   AMMONIA    Collection Time: 03/29/20  7:23 PM   Result Value Ref Range    Ammonia 32 (H) <32 UMOL/L   FERRITIN    Collection Time: 03/29/20  8:48 PM   Result Value Ref Range    Ferritin 1,224 (H) 26 - 388 NG/ML   PTT    Collection Time: 03/30/20  1:34 AM   Result Value Ref Range    aPTT 61.7 (H) 22.1 - 32.0 sec    aPTT, therapeutic range     58.0 - 77.0 SECS   LACTIC ACID    Collection Time: 03/30/20  1:34 AM   Result Value Ref Range    Lactic acid 4.0 (HH) 0.4 - 2.0 MMOL/L       Data Review images and reports reviewed    Assessment:     Active Problems:    Atrial fibrillation with RVR ([address] (11/4/2019)      Morbid obesity ([address] (11/4/2019)      ALYSSA on CPAP (11/4/2019)      Atrial fibrillation with rapid ventricular response ([address] (11/5/2019)      Essential hypertension (12/4/2019)        Plan/Recommendations/Medical Decision Making:     Viral hepatitis panel negative. Labs pending this am, lactate back at 4 which is up. This is not cholecystitis, no surgical indications. Will follow peripherally. Oral Kerr.  Noah Leigh MD, [facility]

## 2020-03-30 NOTE — PROGRESS NOTES
Multiple lab specimens were sent for analysis since 1715hrs, majority of labs hemolyzed    Another set of labs sent for PTT/Lactid/Ammonia @ 2015hrs    [facility] in process

## 2020-03-30 NOTE — PROGRESS NOTES
Gastroenterology Progress Note  RUBI Snider   for Dr. Pretty Session    3/30/2020    Admit Date: 3/29/2020    Subjective: Follow up for:  1)  Elevated LFTs    2) Sepsis with lactic acidosis    3) Acute hepatitis    Patient is on NPO. Pain: Patient complains of abdominal pain no. Bowel Movements: 2 days ago, normal per pt, passing flatus    There is no bleeding    LFTs are trending down, no repeat INR as of yet. Current Facility-Administered Medications   Medication Dose Route Frequency    ondansetron (ZOFRAN) injection 4 mg  4 mg IntraVENous Q6H PRN    morphine injection 1 mg  1 mg IntraVENous Q3H PRN    dilTIAZem (CARDIZEM) 100 mg in dextrose 5% (MBP/ADV) 100 mL infusion  0-15 mg/hr IntraVENous TITRATE    heparin 25,000 units in D5W 250 ml infusion  6-25 Units/kg/hr IntraVENous TITRATE    heparin (porcine) injection 4,000 Units  4,000 Units IntraVENous PRN    heparin (porcine) injection 2,000 Units  2,000 Units IntraVENous PRN    sodium chloride (NS) flush 5-40 mL  5-40 mL IntraVENous Q8H    sodium chloride (NS) flush 5-40 mL  5-40 mL IntraVENous PRN    acetylcysteine (ACETADOTE) 10,000 mg in dextrose 5% 1,000 mL infusion  10,000 mg IntraVENous ONCE        Objective:     Blood pressure 134/79, pulse (!) 119, temperature 98.8 °F (37.1 °C), resp. rate 20, height 6' 2\" (1.88 m), weight 153.8 kg (339 lb), SpO2 94 %. No intake/output data recorded.     03/28 1901 - 03/30 0700  In: 1300 [I.V.:1300]  Out: 2250 [Urine:2250]    EXAM:   GEN: obese, WM, NAD  HEENT: NCAT, slcera anicteric  Abdomen: Obese, distended, non tender, normal BS  Ext: pitting edema, BLE erythematous  Neuro: alert and oriented x4, no asterixis     Data Review    Recent Results (from the past 24 hour(s))   CBC WITH AUTOMATED DIFF    Collection Time: 03/29/20  8:34 AM   Result Value Ref Range    WBC 28.9 (H) 4.1 - 11.1 K/uL    RBC 4.77 4.10 - 5.70 M/uL    HGB 15.5 12.1 - 17.0 g/dL    HCT 46.7 36.6 - 50.3 %    MCV 97.9 80.0 - 99.0 FL    MCH 32.5 26.0 - 34.0 PG    MCHC 33.2 30.0 - 36.5 g/dL    RDW 14.8 (H) 11.5 - 14.5 %    PLATELET 719 025 - 756 K/uL    MPV 11.7 8.9 - 12.9 FL    NRBC 1.2 (H) 0  WBC    ABSOLUTE NRBC 0.34 (H) 0.00 - 0.01 K/uL    NEUTROPHILS 80 (H) 32 - 75 %    BAND NEUTROPHILS 8 %    LYMPHOCYTES 5 (L) 12 - 49 %    MONOCYTES 3 (L) 5 - 13 %    EOSINOPHILS 1 0 - 7 %    BASOPHILS 0 0 - 1 %    METAMYELOCYTES 2 %    MYELOCYTES 1 %    IMMATURE GRANULOCYTES 0 0.0 - 0.5 %    ABS. NEUTROPHILS 25.4 (H) 1.8 - 8.0 K/UL    ABS. LYMPHOCYTES 1.4 0.8 - 3.5 K/UL    ABS. MONOCYTES 0.9 0.0 - 1.0 K/UL    ABS. EOSINOPHILS 0.3 0.0 - 0.4 K/UL    ABS. BASOPHILS 0.0 0.0 - 0.1 K/UL    ABS. IMM. GRANS. 0.0 0.00 - 0.04 K/UL    DF MANUAL      RBC COMMENTS NORMOCYTIC, NORMOCHROMIC      WBC COMMENTS TOXIC GRANULATION     METABOLIC PANEL, COMPREHENSIVE    Collection Time: 03/29/20  8:34 AM   Result Value Ref Range    Sodium 132 (L) 136 - 145 mmol/L    Potassium 4.6 3.5 - 5.1 mmol/L    Chloride 96 (L) 97 - 108 mmol/L    CO2 22 21 - 32 mmol/L    Anion gap 14 5 - 15 mmol/L    Glucose 104 (H) 65 - 100 mg/dL    BUN 35 (H) 6 - 20 MG/DL    Creatinine 2.28 (H) 0.70 - 1.30 MG/DL    BUN/Creatinine ratio 15 12 - 20      GFR est AA 36 (L) >60 ml/min/1.73m2    GFR est non-AA 29 (L) >60 ml/min/1.73m2    Calcium 8.7 8.5 - 10.1 MG/DL    Bilirubin, total 3.7 (H) 0.2 - 1.0 MG/DL    ALT (SGPT) 1,838 (H) 12 - 78 U/L    AST (SGOT) 1,577 (H) 15 - 37 U/L    Alk.  phosphatase 166 (H) 45 - 117 U/L    Protein, total 7.0 6.4 - 8.2 g/dL    Albumin 3.6 3.5 - 5.0 g/dL    Globulin 3.4 2.0 - 4.0 g/dL    A-G Ratio 1.1 1.1 - 2.2     CK W/ REFLX CKMB    Collection Time: 03/29/20  8:34 AM   Result Value Ref Range     39 - 308 U/L   TROPONIN I    Collection Time: 03/29/20  8:34 AM   Result Value Ref Range    Troponin-I, Qt. 0.44 (H) <0.05 ng/mL   NT-PRO BNP    Collection Time: 03/29/20  8:34 AM   Result Value Ref Range    NT pro-BNP 3,871 (H) <125 PG/ML   TSH 3RD GENERATION    Collection Time: 03/29/20  8:42 AM   Result Value Ref Range    TSH 2.43 0.36 - 3.74 uIU/mL   SAMPLES BEING HELD    Collection Time: 03/29/20  8:42 AM   Result Value Ref Range    SAMPLES BEING HELD  1 LAV, 1 BLUE     COMMENT        Add-on orders for these samples will be processed based on acceptable specimen integrity and analyte stability, which may vary by analyte.    PROTHROMBIN TIME + INR    Collection Time: 03/29/20  8:42 AM   Result Value Ref Range    INR 1.6 (H) 0.9 - 1.1      Prothrombin time 15.8 (H) 9.0 - 11.1 sec   LIPASE    Collection Time: 03/29/20  8:42 AM   Result Value Ref Range    Lipase 162 73 - 393 U/L   CK    Collection Time: 03/29/20  8:42 AM   Result Value Ref Range     39 - 308 U/L   CULTURE, BLOOD, PAIRED    Collection Time: 03/29/20 10:03 AM   Result Value Ref Range    Special Requests: NO SPECIAL REQUESTS      Culture result: NO GROWTH AFTER 20 HOURS     LACTIC ACID    Collection Time: 03/29/20 10:31 AM   Result Value Ref Range    Lactic acid 3.7 (HH) 0.4 - 2.0 MMOL/L   ACETAMINOPHEN    Collection Time: 03/29/20 11:14 AM   Result Value Ref Range    Acetaminophen level <2 (L) 10 - 30 ug/mL   SODIUM, UR, RANDOM    Collection Time: 03/29/20 11:14 AM   Result Value Ref Range    Sodium,urine random 41 MMOL/L   CREATININE, UR, RANDOM    Collection Time: 03/29/20 11:14 AM   Result Value Ref Range    Creatinine, urine 95.60 mg/dL   CBC WITH AUTOMATED DIFF    Collection Time: 03/29/20  5:09 PM   Result Value Ref Range    WBC 20.8 (H) 4.1 - 11.1 K/uL    RBC 4.12 4.10 - 5.70 M/uL    HGB 13.3 12.1 - 17.0 g/dL    HCT 40.1 36.6 - 50.3 %    MCV 97.3 80.0 - 99.0 FL    MCH 32.3 26.0 - 34.0 PG    MCHC 33.2 30.0 - 36.5 g/dL    RDW 15.2 (H) 11.5 - 14.5 %    PLATELET 906 768 - 938 K/uL    MPV 12.1 8.9 - 12.9 FL    NRBC 1.7 (H) 0  WBC    ABSOLUTE NRBC 0.36 (H) 0.00 - 0.01 K/uL    NEUTROPHILS 89 (H) 32 - 75 %    LYMPHOCYTES 6 (L) 12 - 49 %    MONOCYTES 3 (L) 5 - 13 %    EOSINOPHILS 0 0 - 7 %    BASOPHILS 0 0 - 1 %    IMMATURE GRANULOCYTES 2 (H) 0.0 - 0.5 %    ABS. NEUTROPHILS 18.5 (H) 1.8 - 8.0 K/UL    ABS. LYMPHOCYTES 1.2 0.8 - 3.5 K/UL    ABS. MONOCYTES 0.7 0.0 - 1.0 K/UL    ABS. EOSINOPHILS 0.0 0.0 - 0.4 K/UL    ABS. BASOPHILS 0.1 0.0 - 0.1 K/UL    ABS. IMM. GRANS. 0.4 (H) 0.00 - 0.04 K/UL    DF AUTOMATED     HEPATITIS PANEL, ACUTE    Collection Time: 03/29/20  5:09 PM   Result Value Ref Range    Hepatitis A, IgM NONREACTIVE NR      __          Hepatitis B surface Ag 0.11 Index    Hep B surface Ag Interp. NEGATIVE  NEG      __          Hepatitis B core, IgM NONREACTIVE NR      __          Hep C  virus Ab Interp.  NONREACTIVE NR      Hep C  virus Ab comment Method used is [employer]ens [employer]     DRUG SCREEN, URINE    Collection Time: 03/29/20  5:09 PM   Result Value Ref Range    AMPHETAMINES NEGATIVE  NEG      BARBITURATES NEGATIVE  NEG      BENZODIAZEPINES NEGATIVE  NEG      COCAINE NEGATIVE  NEG      METHADONE NEGATIVE  NEG      OPIATES NEGATIVE  NEG      PCP(PHENCYCLIDINE) NEGATIVE  NEG      THC (TH-CANNABINOL) NEGATIVE  NEG      Drug screen comment (NOTE)    FERRITIN    Collection Time: 03/29/20  5:09 PM   Result Value Ref Range    Ferritin 1,323 (H) 26 - 388 NG/ML   PTT    Collection Time: 03/29/20  7:23 PM   Result Value Ref Range    aPTT 28.6 22.1 - 32.0 sec    aPTT, therapeutic range     58.0 - 77.0 SECS   LACTIC ACID    Collection Time: 03/29/20  7:23 PM   Result Value Ref Range    Lactic acid 2.4 (HH) 0.4 - 2.0 MMOL/L   AMMONIA    Collection Time: 03/29/20  7:23 PM   Result Value Ref Range    Ammonia 32 (H) <32 UMOL/L   FERRITIN    Collection Time: 03/29/20  8:48 PM   Result Value Ref Range    Ferritin 1,224 (H) 26 - 388 NG/ML   PTT    Collection Time: 03/30/20  1:34 AM   Result Value Ref Range    aPTT 61.7 (H) 22.1 - 32.0 sec    aPTT, therapeutic range     58.0 - 77.0 SECS   LACTIC ACID    Collection Time: 03/30/20  1:34 AM   Result Value Ref Range    Lactic acid 4.0 (HH) 0.4 - 2.0 MMOL/L     Recent Labs     03/29/20  1709 03/29/20  0834   WBC 20.8* 28.9*   HGB 13.3 15.5   HCT 40.1 46.7    357     Recent Labs     03/29/20  0834   *   K 4.6   CL 96*   CO2 22   BUN 35*   CREA 2.28*   *   CA 8.7     Recent Labs     03/29/20  0842 03/29/20  0834   SGOT  --  1,577*   ALT  --  1,838*   AP  --  166*   TBILI  --  3.7*   TP  --  7.0   ALB  --  3.6   GLOB  --  3.4   LPSE 162  --      Recent Labs     03/30/20  0134 03/29/20  1923 03/29/20  0842   INR  --   --  1.6*   PTP  --   --  15.8*   APTT 61.7* 28.6  --       Recent Labs     03/29/20 2048 03/29/20  1709   FERR 1,224* 1,323*      No results found for: FOL, RBCF   No results for input(s): PH, PCO2, PO2 in the last 72 hours.   Recent Labs     03/29/20  0842 03/29/20  0834     --    TROIQ  --  0.44*     Lab Results   Component Value Date/Time    Cholesterol, total 167 11/05/2019 04:41 AM    HDL Cholesterol 38 11/05/2019 04:41 AM    LDL, calculated 113.6 (H) 11/05/2019 04:41 AM    Triglyceride 77 11/05/2019 04:41 AM    CHOL/HDL Ratio 4.4 11/05/2019 04:41 AM     No results found for: [facility]  Lab Results   Component Value Date/Time    Color YELLOW/STRAW 11/04/2019 03:59 PM    Appearance CLEAR 11/04/2019 03:59 PM    Specific gravity 1.017 11/04/2019 03:59 PM    pH (UA) 6.5 11/04/2019 03:59 PM    Protein 30 (A) 11/04/2019 03:59 PM    Glucose NEGATIVE  11/04/2019 03:59 PM    Ketone NEGATIVE  11/04/2019 03:59 PM    Bilirubin NEGATIVE  11/04/2019 03:59 PM    Urobilinogen 1.0 11/04/2019 03:59 PM    Nitrites NEGATIVE  11/04/2019 03:59 PM    Leukocyte Esterase NEGATIVE  11/04/2019 03:59 PM    Epithelial cells FEW 11/04/2019 03:59 PM    Bacteria NEGATIVE  11/04/2019 03:59 PM    WBC 0-4 11/04/2019 03:59 PM    RBC 0-5 11/04/2019 03:59 PM           Assessment:     Active Problems:    Atrial fibrillation with RVR ([facility] [address] (11/4/2019)      Morbid obesity ([address] (11/4/2019)      ALYSSA on CPAP (11/4/2019)      Atrial fibrillation with rapid ventricular response ([facility] [address] (11/5/2019)      Essential hypertension (12/4/2019)        Plan: This appears to be a severe acute hepatitis possibly DILI from holistic remedies for elevated heavy metal levels. Other considerations include worsening heart failure contributing to congestion of the liver as well as hereditary/auto immune causes however less likely. Awaiting full serological w/u. Low threshold to move to higher level of care for worsening liver failure however appears to be improving today. We will continue to follow. RUBI Rodriguez    03/30/20  12:18 PM  I have examined the patient. I have reviewed the chart and agree with the documentation recorded by the NP, including the assessment, treatment plan, and disposition. ASSESSMENT AND PLAN:  Severe Drug induced hepatitis with jaundice and coagulopathy. He appears to be improving with decreasing WBC and lactic acid level. Check INR today, hepatitis panel negative.   Follow LFTs  If he develops fulminant hepatic failure, we will consider transfer to a tertiary care facility    Siva Patel MD  [address] [facility], [address] [place] [address] [place]: [phone]

## 2020-03-31 LAB
A1AT SERPL-MCNC: 141 MG/DL (ref 101–187)
ACTIN IGG SERPL-ACNC: 3 UNITS (ref 0–19)
ALBUMIN SERPL-MCNC: 2.7 G/DL (ref 3.5–5)
ALBUMIN/GLOB SERPL: 1.1 {RATIO} (ref 1.1–2.2)
ALP SERPL-CCNC: 108 U/L (ref 45–117)
ALT SERPL-CCNC: 794 U/L (ref 12–78)
ANA SER QL: NEGATIVE
ANION GAP SERPL CALC-SCNC: 6 MMOL/L (ref 5–15)
APPEARANCE UR: CLEAR
APTT PPP: 51 SEC (ref 22.1–32)
AST SERPL-CCNC: 236 U/L (ref 15–37)
BACTERIA URNS QL MICRO: NEGATIVE /HPF
BASOPHILS # BLD: 0.1 K/UL (ref 0–0.1)
BASOPHILS NFR BLD: 0 % (ref 0–1)
BILIRUB DIRECT SERPL-MCNC: 0.6 MG/DL (ref 0–0.2)
BILIRUB SERPL-MCNC: 1.4 MG/DL (ref 0.2–1)
BILIRUB UR QL: NEGATIVE
BUN SERPL-MCNC: 23 MG/DL (ref 6–20)
BUN/CREAT SERPL: 17 (ref 12–20)
CALCIUM SERPL-MCNC: 8.1 MG/DL (ref 8.5–10.1)
CERULOPLASMIN SERPL-MCNC: 24.5 MG/DL (ref 16–31)
CHLORIDE SERPL-SCNC: 96 MMOL/L (ref 97–108)
CMV IGG SERPL IA-ACNC: <0.6 U/ML (ref 0–0.59)
CMV IGM SERPL IA-ACNC: <30 AU/ML (ref 0–29.9)
CO2 SERPL-SCNC: 34 MMOL/L (ref 21–32)
COLOR UR: NORMAL
CREAT SERPL-MCNC: 1.36 MG/DL (ref 0.7–1.3)
DIFFERENTIAL METHOD BLD: ABNORMAL
EBV NA IGG SER-ACNC: 239 U/ML (ref 0–17.9)
EBV VCA IGG SER-ACNC: 91.3 U/ML (ref 0–17.9)
EBV VCA IGM SER-ACNC: <36 U/ML (ref 0–35.9)
EOSINOPHIL # BLD: 0 K/UL (ref 0–0.4)
EOSINOPHIL NFR BLD: 0 % (ref 0–7)
EPITH CASTS URNS QL MICRO: NORMAL /LPF
ERYTHROCYTE [DISTWIDTH] IN BLOOD BY AUTOMATED COUNT: 14.7 % (ref 11.5–14.5)
GLOBULIN SER CALC-MCNC: 2.5 G/DL (ref 2–4)
GLUCOSE SERPL-MCNC: 160 MG/DL (ref 65–100)
GLUCOSE UR STRIP.AUTO-MCNC: NEGATIVE MG/DL
HCT VFR BLD AUTO: 40.7 % (ref 36.6–50.3)
HGB BLD-MCNC: 13.2 G/DL (ref 12.1–17)
HGB UR QL STRIP: NEGATIVE
HSV1 IGG SER IA-ACNC: 33.4 INDEX (ref 0–0.9)
HSV2 IGG SER IA-ACNC: <0.91 INDEX (ref 0–0.9)
HYALINE CASTS URNS QL MICRO: NORMAL /LPF (ref 0–5)
IMM GRANULOCYTES # BLD AUTO: 0.2 K/UL (ref 0–0.04)
IMM GRANULOCYTES NFR BLD AUTO: 1 % (ref 0–0.5)
INR PPP: 1.3 (ref 0.9–1.1)
INTERPRETATION, 169995: ABNORMAL
IRON SATN MFR SERPL: 13 % (ref 20–50)
IRON SERPL-MCNC: 37 UG/DL (ref 35–150)
KETONES UR QL STRIP.AUTO: NEGATIVE MG/DL
LACTATE SERPL-SCNC: 3.3 MMOL/L (ref 0.4–2)
LEUKOCYTE ESTERASE UR QL STRIP.AUTO: NEGATIVE
LYMPHOCYTES # BLD: 1.1 K/UL (ref 0.8–3.5)
LYMPHOCYTES NFR BLD: 7 % (ref 12–49)
MAGNESIUM SERPL-MCNC: 2.2 MG/DL (ref 1.6–2.4)
MCH RBC QN AUTO: 31.9 PG (ref 26–34)
MCHC RBC AUTO-ENTMCNC: 32.4 G/DL (ref 30–36.5)
MCV RBC AUTO: 98.3 FL (ref 80–99)
MITOCHONDRIA M2 IGG SER-ACNC: <20 UNITS (ref 0–20)
MONOCYTES # BLD: 1.2 K/UL (ref 0–1)
MONOCYTES NFR BLD: 7 % (ref 5–13)
NEUTS SEG # BLD: 13.4 K/UL (ref 1.8–8)
NEUTS SEG NFR BLD: 85 % (ref 32–75)
NITRITE UR QL STRIP.AUTO: NEGATIVE
NRBC # BLD: 0.05 K/UL (ref 0–0.01)
NRBC BLD-RTO: 0.3 PER 100 WBC
PH UR STRIP: 7 [PH] (ref 5–8)
PLATELET # BLD AUTO: 255 K/UL (ref 150–400)
PMV BLD AUTO: 11.2 FL (ref 8.9–12.9)
POTASSIUM SERPL-SCNC: 3 MMOL/L (ref 3.5–5.1)
POTASSIUM SERPL-SCNC: 3.5 MMOL/L (ref 3.5–5.1)
PROT SERPL-MCNC: 5.2 G/DL (ref 6.4–8.2)
PROT UR STRIP-MCNC: NEGATIVE MG/DL
PROTHROMBIN TIME: 13.1 SEC (ref 9–11.1)
RBC # BLD AUTO: 4.14 M/UL (ref 4.1–5.7)
RBC #/AREA URNS HPF: NORMAL /HPF (ref 0–5)
SODIUM SERPL-SCNC: 136 MMOL/L (ref 136–145)
SP GR UR REFRACTOMETRY: 1.01 (ref 1–1.03)
THERAPEUTIC RANGE,PTTT: ABNORMAL SECS (ref 58–77)
TIBC SERPL-MCNC: 295 UG/DL (ref 250–450)
UR CULT HOLD, URHOLD: NORMAL
UROBILINOGEN UR QL STRIP.AUTO: 1 EU/DL (ref 0.2–1)
WBC # BLD AUTO: 16 K/UL (ref 4.1–11.1)
WBC URNS QL MICRO: NORMAL /HPF (ref 0–4)

## 2020-03-31 PROCEDURE — 74011636637 HC RX REV CODE- 636/637: Performed by: INTERNAL MEDICINE

## 2020-03-31 PROCEDURE — 65660000000 HC RM CCU STEPDOWN

## 2020-03-31 PROCEDURE — 74011250636 HC RX REV CODE- 250/636: Performed by: FAMILY MEDICINE

## 2020-03-31 PROCEDURE — 87040 BLOOD CULTURE FOR BACTERIA: CPT

## 2020-03-31 PROCEDURE — 87529 HSV DNA AMP PROBE: CPT

## 2020-03-31 PROCEDURE — 74011000250 HC RX REV CODE- 250: Performed by: FAMILY MEDICINE

## 2020-03-31 PROCEDURE — 74011250637 HC RX REV CODE- 250/637: Performed by: NURSE PRACTITIONER

## 2020-03-31 PROCEDURE — 81256 HFE GENE: CPT

## 2020-03-31 PROCEDURE — 85730 THROMBOPLASTIN TIME PARTIAL: CPT

## 2020-03-31 PROCEDURE — 74011250636 HC RX REV CODE- 250/636: Performed by: INTERNAL MEDICINE

## 2020-03-31 PROCEDURE — 83735 ASSAY OF MAGNESIUM: CPT

## 2020-03-31 PROCEDURE — 83605 ASSAY OF LACTIC ACID: CPT

## 2020-03-31 PROCEDURE — 74011250637 HC RX REV CODE- 250/637: Performed by: INTERNAL MEDICINE

## 2020-03-31 PROCEDURE — 85025 COMPLETE CBC W/AUTO DIFF WBC: CPT

## 2020-03-31 PROCEDURE — 74011250636 HC RX REV CODE- 250/636: Performed by: NURSE PRACTITIONER

## 2020-03-31 PROCEDURE — 80076 HEPATIC FUNCTION PANEL: CPT

## 2020-03-31 PROCEDURE — 84132 ASSAY OF SERUM POTASSIUM: CPT

## 2020-03-31 PROCEDURE — 81001 URINALYSIS AUTO W/SCOPE: CPT

## 2020-03-31 PROCEDURE — 85610 PROTHROMBIN TIME: CPT

## 2020-03-31 PROCEDURE — C1751 CATH, INF, PER/CENT/MIDLINE: HCPCS

## 2020-03-31 PROCEDURE — 74011000258 HC RX REV CODE- 258: Performed by: INTERNAL MEDICINE

## 2020-03-31 PROCEDURE — 83540 ASSAY OF IRON: CPT

## 2020-03-31 PROCEDURE — 36415 COLL VENOUS BLD VENIPUNCTURE: CPT

## 2020-03-31 PROCEDURE — 80048 BASIC METABOLIC PNL TOTAL CA: CPT

## 2020-03-31 RX ORDER — DILTIAZEM HYDROCHLORIDE 60 MG/1
60 TABLET, FILM COATED ORAL EVERY 6 HOURS
Status: DISCONTINUED | OUTPATIENT
Start: 2020-03-31 | End: 2020-04-03

## 2020-03-31 RX ORDER — POTASSIUM CHLORIDE 750 MG/1
40 TABLET, FILM COATED, EXTENDED RELEASE ORAL 3 TIMES DAILY
Status: DISCONTINUED | OUTPATIENT
Start: 2020-03-31 | End: 2020-04-04 | Stop reason: HOSPADM

## 2020-03-31 RX ORDER — METOLAZONE 5 MG/1
5 TABLET ORAL DAILY
Status: COMPLETED | OUTPATIENT
Start: 2020-03-31 | End: 2020-03-31

## 2020-03-31 RX ORDER — POTASSIUM CHLORIDE 20 MEQ/1
40 TABLET, EXTENDED RELEASE ORAL EVERY 6 HOURS
Status: DISCONTINUED | OUTPATIENT
Start: 2020-03-31 | End: 2020-03-31

## 2020-03-31 RX ORDER — TRAMADOL HYDROCHLORIDE 50 MG/1
50 TABLET ORAL
Status: DISCONTINUED | OUTPATIENT
Start: 2020-03-31 | End: 2020-04-04 | Stop reason: HOSPADM

## 2020-03-31 RX ORDER — FUROSEMIDE 10 MG/ML
80 INJECTION INTRAMUSCULAR; INTRAVENOUS 2 TIMES DAILY
Status: DISCONTINUED | OUTPATIENT
Start: 2020-03-31 | End: 2020-04-04 | Stop reason: HOSPADM

## 2020-03-31 RX ORDER — POTASSIUM CHLORIDE 750 MG/1
40 TABLET, FILM COATED, EXTENDED RELEASE ORAL 3 TIMES DAILY
Status: DISCONTINUED | OUTPATIENT
Start: 2020-03-31 | End: 2020-03-31

## 2020-03-31 RX ORDER — PREDNISONE 20 MG/1
20 TABLET ORAL
Status: COMPLETED | OUTPATIENT
Start: 2020-03-31 | End: 2020-04-04

## 2020-03-31 RX ADMIN — Medication 10 ML: at 08:02

## 2020-03-31 RX ADMIN — DILTIAZEM HYDROCHLORIDE 60 MG: 60 TABLET, FILM COATED ORAL at 21:51

## 2020-03-31 RX ADMIN — METOPROLOL TARTRATE 12.5 MG: 25 TABLET ORAL at 05:59

## 2020-03-31 RX ADMIN — DILTIAZEM HYDROCHLORIDE 60 MG: 60 TABLET, FILM COATED ORAL at 09:18

## 2020-03-31 RX ADMIN — Medication 10 ML: at 13:26

## 2020-03-31 RX ADMIN — HEPARIN SODIUM 10 UNITS/KG/HR: 10000 INJECTION, SOLUTION INTRAVENOUS at 01:50

## 2020-03-31 RX ADMIN — FUROSEMIDE 80 MG: 10 INJECTION, SOLUTION INTRAMUSCULAR; INTRAVENOUS at 09:54

## 2020-03-31 RX ADMIN — Medication 30 ML: at 06:00

## 2020-03-31 RX ADMIN — PREDNISONE 20 MG: 20 TABLET ORAL at 15:52

## 2020-03-31 RX ADMIN — METOPROLOL TARTRATE 12.5 MG: 25 TABLET ORAL at 11:03

## 2020-03-31 RX ADMIN — POTASSIUM CHLORIDE 40 MEQ: 750 TABLET, FILM COATED, EXTENDED RELEASE ORAL at 21:52

## 2020-03-31 RX ADMIN — TRAMADOL HYDROCHLORIDE 50 MG: 50 TABLET, FILM COATED ORAL at 15:52

## 2020-03-31 RX ADMIN — Medication 30 ML: at 21:51

## 2020-03-31 RX ADMIN — APIXABAN 5 MG: 5 TABLET, FILM COATED ORAL at 21:52

## 2020-03-31 RX ADMIN — CEFTRIAXONE 1 G: 1 INJECTION, POWDER, FOR SOLUTION INTRAMUSCULAR; INTRAVENOUS at 15:53

## 2020-03-31 RX ADMIN — APIXABAN 5 MG: 5 TABLET, FILM COATED ORAL at 09:19

## 2020-03-31 RX ADMIN — DEXTROSE MONOHYDRATE 12.5 MG/HR: 50 INJECTION, SOLUTION INTRAVENOUS at 03:11

## 2020-03-31 RX ADMIN — METOPROLOL TARTRATE 12.5 MG: 25 TABLET ORAL at 17:53

## 2020-03-31 RX ADMIN — METOLAZONE 5 MG: 5 TABLET ORAL at 09:18

## 2020-03-31 RX ADMIN — FUROSEMIDE 80 MG: 10 INJECTION, SOLUTION INTRAMUSCULAR; INTRAVENOUS at 17:55

## 2020-03-31 RX ADMIN — POTASSIUM CHLORIDE 40 MEQ: 750 TABLET, FILM COATED, EXTENDED RELEASE ORAL at 09:18

## 2020-03-31 RX ADMIN — POTASSIUM CHLORIDE 40 MEQ: 1500 TABLET, EXTENDED RELEASE ORAL at 12:04

## 2020-03-31 RX ADMIN — DILTIAZEM HYDROCHLORIDE 60 MG: 60 TABLET, FILM COATED ORAL at 15:04

## 2020-03-31 RX ADMIN — METOPROLOL TARTRATE 12.5 MG: 25 TABLET ORAL at 00:05

## 2020-03-31 NOTE — PROGRESS NOTES
[address] [place] [address]  [phone]      Cardiology Progress Note      3/31/2020 9:00 AM    Admit Date: 3/29/2020    Admit Diagnosis:   Atrial fibrillation with RVR ([address] [I48.91]  Atrial fibrillation with RVR ([address] [I48.91]    Subjective:     Gracia Camarillo feels the same, SOB. Fair diuresis within 24 hours, does not appear to have any less edema. Remains in afib with rate controlled at  bpm.Continues on IV Dilt at 12.5mg and BB. Visit Vitals  BP (!) 140/94 (BP 1 Location: Left arm, BP Patient Position: Sitting)   Pulse 92   Temp 98.5 °F (36.9 °C)   Resp 18   Ht 6' 2\" (1.88 m)   Wt 150.5 kg (331 lb 12.7 oz)   SpO2 98%   BMI 42.60 kg/m²       Current Facility-Administered Medications   Medication Dose Route Frequency    furosemide (LASIX) injection 80 mg  80 mg IntraVENous BID    potassium chloride SR (KLOR-CON 10) tablet 40 mEq  40 mEq Oral TID    dilTIAZem (CARDIZEM) IR tablet 60 mg  60 mg Oral Q6H    apixaban (ELIQUIS) tablet 5 mg  5 mg Oral Q12H    potassium chloride (K-DUR, KLOR-CON) SR tablet 40 mEq  40 mEq Oral Q6H    ondansetron (ZOFRAN) injection 4 mg  4 mg IntraVENous Q6H PRN    morphine injection 1 mg  1 mg IntraVENous Q3H PRN    metoprolol tartrate (LOPRESSOR) tablet 12.5 mg  12.5 mg Oral Q6H    bacitracin 500 unit/gram packet 1 Packet  1 Packet Topical PRN    sodium chloride (NS) flush 5-40 mL  5-40 mL IntraVENous Q8H    sodium chloride (NS) flush 5-40 mL  5-40 mL IntraVENous PRN       Objective:      Physical Exam:  General Appearance:  super morbidly obese  male in no acute distress  Chest:   Clear and diminished  Cardiovascular:  irr, irr no murmur. Abdomen:   morbidly obese, anasarca, +BS, tense    Extremities: joe LE +4 pitting edema; errythmic, warm to touch, edema to waist - edema appears to have increased in his LE  Skin:  Warm and dry.      Data Review:   Recent Labs     03/29/20  1709 03/29/20  0834   WBC 20.8* 28.9*   HGB 13.3 15.5   HCT 40.1 46.7    357     Recent Labs     03/31/20  0415 03/30/20  0805 03/29/20  0842 03/29/20  0834    134*  --  132*   K 3.0* 2.7*  --  4.6   CL 96* 95*  --  96*   CO2 34* 32  --  22   * 103*  --  104*   BUN 23* 28*  --  35*   CREA 1.36* 1.49*  --  2.28*   CA 8.1* 8.0*  --  8.7   MG 2.2  --   --   --    ALB 2.7* 2.7*  --  3.6   TBILI 1.4* 2.5*  --  3.7*   SGOT 236* 498*  --  1,577*   * 1,085*  --  1,838*   INR 1.3*  --  1.6*  --        Recent Labs     03/29/20  0842 03/29/20  0834   TROIQ  --  0.44*     --          Intake/Output Summary (Last 24 hours) at 3/31/2020 1117  Last data filed at 3/31/2020 1104  Gross per 24 hour   Intake 2832. 45 ml   Output 2375 ml   Net 457.45 ml        Telemetry: afib with RVR  Cxray:    Echo:  11/2019  · Left Ventricle: Normal cavity size. Mild concentric hypertrophy. Mild systolic dysfunction. Estimated left ventricular ejection fraction is 46 - 50%. · Right Ventricle: Mildly dilated right ventricle. Mildly reduced systolic function. · Left Atrium: Mildly dilated left atrium. · Mitral Valve: Mild mitral valve regurgitation is present. · Tricuspid Valve: Mild tricuspid valve regurgitation is present. · Pulmonary Artery: Moderate pulmonary hypertension. · Right Atrium: Mildly dilated right atrium. Assessment:     Active Problems:    Acute on chronic diastolic HF (heart failure) ([facility] (11/4/2019)      Morbid obesity ([facility] (11/4/2019)      ALYSSA on CPAP (11/4/2019)      Atrial fibrillation with rapid ventricular response ([facility] (11/5/2019)      Essential hypertension (12/4/2019)      Peripheral vascular disease ([facility] (2/25/2020)        Plan:     Afib with RVR:  With rate controlled, will start Diltiazem 60mg QID, titrate IV Dilt off. Continue on metoprolol 12.5mg Q6H  Discontinue IV heparin, start Eliquis 5mg BID  Dr. Anish Diana rediscussed sotalol, but patient discontinued due to \"side effects\", made him feel worse.    Patient was offered pacemaker and AV pat by Dr. Nazario Gama, but patient is concerned with the potential for a reaction due to his sensitivities to metals. - Patient now wants to reconsider this option and would like to discuss with Dr. Nazario Gama    He is not a candidate for PVI afib ablation due to his noncompliance. RUFINO and cardioversion? ? Successful cardioversion 2/6/2020, had been on NOAC and did not require RUFINO. Unfortunately, he did not remain in SR, stopped meds, and is not considered to be candidate for repeat cardioversion. And, during this COVID19 time, it has been recommended not to perform RUFINO unless absolutely necessary due to increased risk of airborne particles. Digoxin not an option due to ARF (which has improved but now hypokalemic), and amiodarone not an option due to noncompliance. Acute on chronic diastolic HF/HFpEF (EF 95% 11/2019)  2.8L input yesterday, stopping IV fluids today to decrease input  Increase lasix to 80mg BID, start metolazone 5mg now, monitor I/Os, daily weights on stand up scales, and labs  Remains hypokalemic, supplementing K+ with PO today, recheck K+ at 1:00PM and if needed will supplement with IV. ALYSSA with CPAP:  States compliance with CPAP    Elevated LFTs:  Trending down  May be related to passive liver congestion due to exacerbation of DHF    Yoselin Tamayo [facility] Cardiology    3/31/2020         Patient seen, examined by me personally. Plan discussed as detailed. Agree with note as outlined by  NP. I confirm findings in history and physical exam. No additional findings noted. Agree with plan as outlined above. Aggressive diuresis, EP to see for possible AVN ablation/pacemaker.     Lachelle Spencer MD

## 2020-03-31 NOTE — PROGRESS NOTES
Hospitalist Progress Note    NAME: Yao Fam   :  1960   MRN:  [id number]       Assessment / Plan:  ? New onset left MTP gout  Severe pain, redness 1st MTP, no prior history of gout  See photo below  Cellulitis other concern, but not warm  PO steroids x 5 days    Hypokalemia POA K 3.0  PO KCL, recheck in Am    SIRS POA WBC 28.9 , HR 160s, RR 22, no fevers   Elevated lactate 4.0 POA  Blood cultures negative  Etiology unclear, no fevers  No clear intraabdominal source on CT  CXR with no ASD  UA pending, I ordered today  Repeat WBC in AM, was trending down  1 dose of zosyn at admit, not continued  Serial lactates    Acute hepatitis POA  etiology unclear, suspect drug related  Denies ETOH use  Hepatitis B and C negative  HSV-1 IgG positive, but not at risk for HSV hepatitis        I think HSV hepatitis unlikley, hold acyclovir        Will check serum PCR  ? Drug  GI following, serial labs  LFTs are improving    Persistent atrial fib with RVR POA   S/P cardizem drip, heparin drip. Now on PO cardizem and metoprolol, PO eliquis  Cardiology following, echocardiogram.    ? Procedure in Am, spoke with Dr Lisandro Gill hypertension POA  Metoprolol and cardizem    Anasarca POA  Acute on chronic diastolic congestive heart failure POA   Lasix, echocardiogram, daily weight, intake output, cardiac diet        Code Status: Full code  Surrogate Decision Maker:     DVT Prophylaxis: eliquis  GI Prophylaxis: not indicated     Baseline: Independent     Subjective:     Chief Complaint / Reason for Physician Visit  \"my left great toe hurts\". Discussed with RN events overnight.    No cough or SOB  Feels real swollen  Increase pain in left foot, no prior history of gout  HR controlled on oral meds, off all drips    Review of Systems:  Symptom Y/N Comments  Symptom Y/N Comments   Fever/Chills n   Chest Pain n    Poor Appetite    Edema y    Cough n   Abdominal Pain n    Sputum    Joint Pain y    SOB/ELIZONDO n Pruritis/Rash     Nausea/vomit n   Tolerating PT/OT     Diarrhea n   Tolerating Diet     Constipation    Other       Could NOT obtain due to:      Objective:     VITALS:   Last 24hrs VS reviewed since prior progress note. Most recent are:  Patient Vitals for the past 24 hrs:   Temp Pulse Resp BP SpO2   03/31/20 1034 98.5 °F (36.9 °C) 92 18 (!) 140/94 98 %   03/31/20 0922  94 18 (!) 138/91 96 %   03/31/20 0726 97.8 °F (36.6 °C) 76 18 130/79 97 %   03/31/20 0559  85  128/80    03/31/20 0343 98.5 °F (36.9 °C) 85 20 (!) 107/93 98 %   03/31/20 0314 98.7 °F (37.1 °C) 69 20 127/69 94 %   03/31/20 0135    151/83    03/31/20 0134  (!) 107   97 %   03/31/20 0005  (!) 104  127/72    03/30/20 2220 97.7 °F (36.5 °C) (!) 107 20 135/77 97 %   03/30/20 1938 98 °F (36.7 °C) 100 20 151/87 97 %   03/30/20 1709  (!) 112      03/30/20 1708  (!) 105      03/30/20 1624 97.7 °F (36.5 °C) (!) 114 16 172/74 99 %   03/30/20 1600  (!) 107      03/30/20 1240  96      03/30/20 1200  (!) 109      03/30/20 1141 97.8 °F (36.6 °C) 92 16 122/67 100 %       Intake/Output Summary (Last 24 hours) at 3/31/2020 1134  Last data filed at 3/31/2020 1104  Gross per 24 hour   Intake 2832. 45 ml   Output 2375 ml   Net 457.45 ml        PHYSICAL EXAM:  General: WD, WN. Alert, cooperative, no acute distress    EENT:   Anicteric sclerae. MMM  Resp:  CTA bilaterally, no wheezing or rales.   No accessory muscle use  CV:  IRRegular  rhythm,  3+ edema  GI:  Soft, Non distended, Non tender.  +Bowel sounds  Neurologic:  Alert and oriented X 3, normal speech,   Psych:   Good insight. Not anxious nor agitated  Skin:  Patchy erythema left great toe and dorsum of foot  No jaundice            Reviewed most current lab test results and cultures  YES  Reviewed most current radiology test results   YES  Review and summation of old records today    NO  Reviewed patient's current orders and MAR    YES  PMH/SH reviewed - no change compared to H&P  ________________________________________________________________________  Care Plan discussed with:    Comments   Patient x    Family      RN x    Care Manager     Consultant  x DR Art Vasquez                     Multidiciplinary team rounds were held today with , nursing, pharmacist and clinical coordinator. Patient's plan of care was discussed; medications were reviewed and discharge planning was addressed. ________________________________________________________________________        Comments   >50% of visit spent in counseling and coordination of care     ________________________________________________________________________  Sharmila Belle MD     Procedures: see electronic medical records for all procedures/Xrays and details which were not copied into this note but were reviewed prior to creation of Plan. LABS:  I reviewed today's most current labs and imaging studies.   Pertinent labs include:  Recent Labs     03/29/20  1709 03/29/20  0834   WBC 20.8* 28.9*   HGB 13.3 15.5   HCT 40.1 46.7    357     Recent Labs     03/31/20  0415 03/30/20  0805 03/29/20  0842 03/29/20  0834    134*  --  132*   K 3.0* 2.7*  --  4.6   CL 96* 95*  --  96*   CO2 34* 32  --  22   * 103*  --  104*   BUN 23* 28*  --  35*   CREA 1.36* 1.49*  --  2.28*   CA 8.1* 8.0*  --  8.7   MG 2.2  --   --   --    ALB 2.7* 2.7*  --  3.6   TBILI 1.4* 2.5*  --  3.7*   SGOT 236* 498*  --  1,577*   * 1,085*  --  1,838*   INR 1.3*  --  1.6*  --        Signed: Sharmila Belle MD

## 2020-03-31 NOTE — PROGRESS NOTES
Gastroenterology Progress Note  RUBI Ivey   for Dr. Jerson Skelton  3/31/2020    Admit Date: 3/29/2020    Subjective: Follow up for:  1)  Elevated LFTs    2) Sepsis with lactic acidosis    3) Acute hepatitis    Patient is on cardiac diet    Pain: Patient complains of abdominal pain no. He believes his BP meds are causing his liver issues. He is very swollen and states he would be SOB is he walked in the [place]. Current Facility-Administered Medications   Medication Dose Route Frequency    furosemide (LASIX) injection 80 mg  80 mg IntraVENous BID    potassium chloride SR (KLOR-CON 10) tablet 40 mEq  40 mEq Oral TID    dilTIAZem (CARDIZEM) IR tablet 60 mg  60 mg Oral Q6H    apixaban (ELIQUIS) tablet 5 mg  5 mg Oral Q12H    ondansetron (ZOFRAN) injection 4 mg  4 mg IntraVENous Q6H PRN    morphine injection 1 mg  1 mg IntraVENous Q3H PRN    metoprolol tartrate (LOPRESSOR) tablet 12.5 mg  12.5 mg Oral Q6H    bacitracin 500 unit/gram packet 1 Packet  1 Packet Topical PRN    sodium chloride (NS) flush 5-40 mL  5-40 mL IntraVENous Q8H    sodium chloride (NS) flush 5-40 mL  5-40 mL IntraVENous PRN        Objective:     Blood pressure (!) 140/94, pulse 92, temperature 98.5 °F (36.9 °C), resp. rate 18, height 6' 2\" (1.88 m), weight 150.5 kg (331 lb 12.7 oz), SpO2 98 %.    03/31 0701 - 03/31 1900  In: 524.9 [P.O.:240;  I.V.:284.9]  Out: -     03/29 1901 - 03/31 0700  In: 3607.6 [I.V.:3607.6]  Out: 3650 [Urine:3650]    EXAM:   GEN: obese, WM, NAD  HEENT: NCAT, slcera anicteric  Ext: pitting edema, BLE erythematous  Neuro: alert and oriented x4, no asterixis     Data Review    Recent Results (from the past 24 hour(s))   ECHO ADULT COMPLETE    Collection Time: 03/30/20  4:19 PM   Result Value Ref Range    Ao Root D 3.55 cm    LVIDd 5.80 4.2 - 5.9 cm    LVPWd 2.00 (A) 0.6 - 1.0 cm    LVIDs 4.47 cm    IVSd 1.97 (A) 0.6 - 1.0 cm    IVSs 2.22 cm    LVOT d 2.16 cm    MVA (PHT) 6.1 cm2    MV E Venkatesh 109.66 cm/s    Left Atrium to Aortic Root Ratio 1.22     LV Mass .1 (A) 88 - 224 g    LV Mass AL Index 277.3 (A) 49 - 115 g/m2    LVPWs 2.43 cm    Mitral Regurgitant Peak Velocity 326.97 cm/s    Mitral Valve E Wave Deceleration Time 120.2 ms    Mitral Valve Pressure Half-time 36.3 ms    Left Atrium Major Axis 4.32 cm    Pulmonic Valve Max Velocity 97.56 cm/s    MR Peak Gradient 42.8 mmHg    Left Ventricular Fractional Shortening by 2D 96.811315038 %    Mitral Valve Deceleration Towner [id number]     Left Ventricular End Diastolic Volume by Teichholz Method 56.83787378 mL    Left Ventricular End Systolic Volume by Teichholz Method 49.417710127 mL    Left Ventricular Stroke Volume by Teichholz Method 96.584144519 mL    PV peak gradient 3.8 mmHg   PROTHROMBIN TIME + INR    Collection Time: 03/31/20  4:15 AM   Result Value Ref Range    INR 1.3 (H) 0.9 - 1.1      Prothrombin time 13.1 (H) 9.0 - 11.1 sec   PTT    Collection Time: 03/31/20  4:15 AM   Result Value Ref Range    aPTT 51.0 (H) 22.1 - 32.0 sec    aPTT, therapeutic range     58.0 - 39.2 SECS   METABOLIC PANEL, BASIC    Collection Time: 03/31/20  4:15 AM   Result Value Ref Range    Sodium 136 136 - 145 mmol/L    Potassium 3.0 (L) 3.5 - 5.1 mmol/L    Chloride 96 (L) 97 - 108 mmol/L    CO2 34 (H) 21 - 32 mmol/L    Anion gap 6 5 - 15 mmol/L    Glucose 160 (H) 65 - 100 mg/dL    BUN 23 (H) 6 - 20 MG/DL    Creatinine 1.36 (H) 0.70 - 1.30 MG/DL    BUN/Creatinine ratio 17 12 - 20      GFR est AA >60 >60 ml/min/1.73m2    GFR est non-AA 53 (L) >60 ml/min/1.73m2    Calcium 8.1 (L) 8.5 - 10.1 MG/DL   MAGNESIUM    Collection Time: 03/31/20  4:15 AM   Result Value Ref Range    Magnesium 2.2 1.6 - 2.4 mg/dL     Recent Labs     03/29/20  1709 03/29/20  0834   WBC 20.8* 28.9*   HGB 13.3 15.5   HCT 40.1 46.7    357     Recent Labs     03/31/20  0415 03/30/20  0805 03/29/20  0834    134* 132*   K 3.0* 2.7* 4.6   CL 96* 95* 96*   CO2 34* 32 22   BUN 23* 28* 35*   CREA 1.36* 1.49* 2.28*   * 103* 104*   CA 8.1* 8.0* 8.7   MG 2.2  --   --      Recent Labs     03/30/20  0805 03/29/20  0842 03/29/20  0834   SGOT 498*  --  1,577*   ALT 1,085*  --  1,838*     --  166*   TBILI 2.5*  --  3.7*   TP 5.5*  --  7.0   ALB 2.7*  --  3.6   GLOB 2.8  --  3.4   LPSE  --  162  --      Recent Labs     03/31/20  0415 03/30/20  0759 03/30/20  0134  03/29/20  0842   INR 1.3*  --   --   --  1.6*   PTP 13.1*  --   --   --  15.8*   APTT 51.0* 60.9* 61.7*   < >  --     < > = values in this interval not displayed. Recent Labs     03/29/20 2048 03/29/20  1709   FERR 1,224* 1,323*      No results found for: FOL, RBCF   No results for input(s): PH, PCO2, PO2 in the last 72 hours.   Recent Labs     03/29/20  0842 03/29/20  0834     --    TROIQ  --  0.44*     Lab Results   Component Value Date/Time    Cholesterol, total 167 11/05/2019 04:41 AM    HDL Cholesterol 38 11/05/2019 04:41 AM    LDL, calculated 113.6 (H) 11/05/2019 04:41 AM    Triglyceride 77 11/05/2019 04:41 AM    CHOL/HDL Ratio 4.4 11/05/2019 04:41 AM     No results found for: [facility]  Lab Results   Component Value Date/Time    Color YELLOW/STRAW 11/04/2019 03:59 PM    Appearance CLEAR 11/04/2019 03:59 PM    Specific gravity 1.017 11/04/2019 03:59 PM    pH (UA) 6.5 11/04/2019 03:59 PM    Protein 30 (A) 11/04/2019 03:59 PM    Glucose NEGATIVE  11/04/2019 03:59 PM    Ketone NEGATIVE  11/04/2019 03:59 PM    Bilirubin NEGATIVE  11/04/2019 03:59 PM    Urobilinogen 1.0 11/04/2019 03:59 PM    Nitrites NEGATIVE  11/04/2019 03:59 PM    Leukocyte Esterase NEGATIVE  11/04/2019 03:59 PM    Epithelial cells FEW 11/04/2019 03:59 PM    Bacteria NEGATIVE  11/04/2019 03:59 PM    WBC 0-4 11/04/2019 03:59 PM    RBC 0-5 11/04/2019 03:59 PM           Assessment:     Active Problems:    Acute on chronic diastolic HF (heart failure) ([facility] (11/4/2019)      Morbid obesity ([facility] (11/4/2019)      ALYSSA on CPAP (11/4/2019)      Atrial fibrillation with rapid ventricular response ([facility] (11/5/2019)      Essential hypertension (12/4/2019)      Peripheral vascular disease ([facility] (2/25/2020)    Ferritin elevated (acute phase reactant)  Acute hepatitis A/B/C panel negative  HSV1 positive  CMV negative  EBV pending  MARCELO pending  AMA pendng  ASMA pending  Alpha 1 normal  Ceruloplasmin normal  INR improved from 1.6 to 1.3  LFTs pending      Plan: This appears to be a severe acute hepatitis. HSV Ab positive. Consider PCR or Ag testing. If positive, consider acyclovir. Other considerations include DILI from holistic remedies for elevated heavy metal levels, worsening heart failure contributing to congestion of the liver as well as hereditary/auto immune causes. I added on iron panel and hemochromatosis genetics. I added on LFT's today. We will continue to follow.      RUBI Ivey  03/31/20  10:51 AM    [address] [facility], [address] [place] [address]  [phone]  [address] [place] [address] [place]: [phone]

## 2020-03-31 NOTE — PROGRESS NOTES
Spiritual Care Assessment/Progress Note  [facility]      NAME: Rose Marie Collins      MRN: [id number]  AGE: 60 y.o.  SEX: male  Rastafarian Affiliation: No preference   Language: English     3/31/2020     Total Time (in minutes): 10     Spiritual Assessment begun in [facility] PROGRESSIVE CARE through conversation with:         [x]Patient        [] Family    [] Friend(s)        Reason for Consult: Initial/Spiritual assessment, patient floor     Spiritual beliefs: (Please include comment if needed)     [] Identifies with a debby tradition:         [] Supported by a debby community:            [] Claims no spiritual orientation:           [] Seeking spiritual identity:                [x] Adheres to an individual form of spirituality:           [] Not able to assess:                           Identified resources for coping:      [] Prayer                               [] Music                  [x] Guided Imagery     [] Family/friends                 [] Pet visits     [] Devotional reading                         [] Unknown     [] Other:                                               Interventions offered during this visit: (See comments for more details)    Patient Interventions: Affirmation of emotions/emotional suffering, Catharsis/review of pertinent events in supportive environment, Initial/Spiritual assessment, patient floor, Coping skills reviewed/reinforced, Rastafarian beliefs/image of God discussed           Plan of Care:     [] Support spiritual and/or cultural needs    [] Support AMD and/or advance care planning process      [] Support grieving process   [] Coordinate Rites and/or Rituals    [] Coordination with community clergy   [] No spiritual needs identified at this time   [] Detailed Plan of Care below (See Comments)  [] Make referral to Music Therapy  [] Make referral to Pet Therapy     [] Make referral to Addiction services  [] Make referral to [facility]  [] Make referral to Spiritual Care Partner  [] No future visits requested        [x] Follow up visits as needed     Comments: The purpose of the visit was to do a spiritual assessment on the patient. Patient was resting in bed and had no one visiting at the time. Patient shared that he has accepted that his ability to work is ending due to his illness, however he appears to be in a state of pre-grief and denial about it. He mentioned that though it is difficult not able to work anymore, he realizes that it was inevitable. He seems to dismiss the pain of his past and resolves that he has to just deal with not being able to work any longer. He mentioned being disconnected from his daughter. The patient appears to avoid things that make him feel uncomfortable i.e. health issues, difficult family dynamics, and his inability to continue working. The  facilitated story-telling, listened empathically and reflectively. The  provided the presence of spiritual care and comfort. [address] [facility] Gerald Skinner.    paging service [phone])

## 2020-03-31 NOTE — PROGRESS NOTES
7:30 AM Bedside report received from Nina Howard, [address] [place] [address], and Shawn martinez RN.    9:00 AM Per NP, Sosa, give metolazone 30 minutes prior to IV lasix. NP states to start oral dilt and titrate off IV dilt. NP states to start eliquis now and dc heparin gtt. Spoke with PharmJAYJAY, [facility], who confirms to dc IV dilt 30 mins after PO given and dc heparin gtt when oral eliquis given. See MAR.    1:30 PM Spoke with PharmD, [facility], regarding patient's repeat K+ 3.5 and patient's current potassium orders (patient received an extra dose this afternoon per Dr. Jaye Bragg but now has two orders?). [facility] to f/u with Dr. Jaye Bragg for clarification. 3:20 PM Notified Dr. Jaye Bragg of patient's c/o increased swelling and pain in his left foot and especially his L great toe. Patient also noted to have increased L periorbital swelling. MD aware of patient's request for something \"not as strong as morphine. \" MD to place orders. 3:55 PM PRN PO tramadol given for patient's c/o pain per MD order. See MAR.    7:00 PM Bedside report given to Nina Howard RN and Shawn martinez RN.

## 2020-03-31 NOTE — PROGRESS NOTES
Bedside and Verbal shift change report given to [facility] (oncoming nurse) by Renu العلي (offgoing nurse). Report included the following information SBAR, ED Summary, Intake/Output, MAR, Recent Results, Med Rec Status and Cardiac Rhythm AFIB. 1000 patient asked to set up home bipap. Water reservoir of machine was dirty with water build up, patient did not want it to be clean a per his request \"just add water to \".    0000 patient desat while on bipap machine to 77% but recovers to mid 90s within one to three minutes will continue to monitor    0125 artifact observed on tele rounded on patient. Patient was observed trying to get out of bed and had removed tele leads and pulled PICC lines tights. Patients home Bipap mask was pulled off and on the floor in two pieces. Patient stated that he did not know where he was when he awoke and was trying to get some ones attention since he could not find his call bell (laying on other side of the bed). Lines where untangled from bed rail and observed to be intact. patient was assisted to stand to use the urinal. Patient felt like he had something running down his arm, PICC dressing had old oozing around  the dressing but showed no signs of new bleeding. Once patient got back in bed tele leads where replaced and PICC dressing was wrapped with a gauze wrap and taped to prevent him from possibly pulling out if another episode occurs. Once settled back in bed obtained a set of vitals and patient was A+O X4 mik continue to monitor PICC and Patient. Bed alarm was turned on.    0311 titrated dilt to 12.5 mg/ hr 69 to 90s    0517 confirmed PTT with pharmacy up to 11units/kg/hr for heprin      Bedside and Verbal shift change report given to kimani (oncoming nurse) by lucy (offgoing nurse). Report included the following information SBAR, Kardex, ED Summary, Intake/Output, MAR, Recent Results, Med Rec Status and Cardiac Rhythm AFIB.

## 2020-03-31 NOTE — PROGRESS NOTES
Cardiology Progress Note            [address], [place], [address] [facility]  [phone]    3/31/2020 12:03 PM    Admit Date: 3/29/2020    Admit Diagnosis: Atrial fibrillation with RVR ([address] [I48.91]; Atrial fibrillation with RVR (HCC) [I48.91]    Subjective:     Gracia Rabia  Is c/o le edema.     Visit Vitals  BP (!) 140/94 (BP 1 Location: Left arm, BP Patient Position: Sitting)   Pulse 92   Temp 98.5 °F (36.9 °C)   Resp 18   Ht 6' 2\" (1.88 m)   Wt 331 lb 12.7 oz (150.5 kg)   SpO2 98%   BMI 42.60 kg/m²     Current Facility-Administered Medications   Medication Dose Route Frequency    furosemide (LASIX) injection 80 mg  80 mg IntraVENous BID    potassium chloride SR (KLOR-CON 10) tablet 40 mEq  40 mEq Oral TID    dilTIAZem (CARDIZEM) IR tablet 60 mg  60 mg Oral Q6H    apixaban (ELIQUIS) tablet 5 mg  5 mg Oral Q12H    potassium chloride (K-DUR, KLOR-CON) SR tablet 40 mEq  40 mEq Oral Q6H    ondansetron (ZOFRAN) injection 4 mg  4 mg IntraVENous Q6H PRN    morphine injection 1 mg  1 mg IntraVENous Q3H PRN    metoprolol tartrate (LOPRESSOR) tablet 12.5 mg  12.5 mg Oral Q6H    bacitracin 500 unit/gram packet 1 Packet  1 Packet Topical PRN    sodium chloride (NS) flush 5-40 mL  5-40 mL IntraVENous Q8H    sodium chloride (NS) flush 5-40 mL  5-40 mL IntraVENous PRN         Objective:      Visit Vitals  BP (!) 140/94 (BP 1 Location: Left arm, BP Patient Position: Sitting)   Pulse 92   Temp 98.5 °F (36.9 °C)   Resp 18   Ht 6' 2\" (1.88 m)   Wt 331 lb 12.7 oz (150.5 kg)   SpO2 98%   BMI 42.60 kg/m²       Physical Exam:  Abdomen: soft, non-tender  Extremities: extremities +2 edema  Heart: irr irr, tachy  Lungs: clear to auscultation bilaterally  Pulses: 2+ and symmetric    Data Review:   Labs:    Recent Labs     03/29/20  1709 03/29/20  0834   WBC 20.8* 28.9*   HGB 13.3 15.5   HCT 40.1 46.7    357     Recent Labs     03/31/20  0415 03/30/20  0805 03/29/20  0842 03/29/20  0834    134*  -- 132*   K 3.0* 2.7*  --  4.6   CL 96* 95*  --  96*   CO2 34* 32  --  22   * 103*  --  104*   BUN 23* 28*  --  35*   CREA 1.36* 1.49*  --  2.28*   CA 8.1* 8.0*  --  8.7   MG 2.2  --   --   --    ALB 2.7* 2.7*  --  3.6   TBILI 1.4* 2.5*  --  3.7*   SGOT 236* 498*  --  1,577*   * 1,085*  --  1,838*   INR 1.3*  --  1.6*  --        Recent Labs     03/29/20  0842 03/29/20  0834   TROIQ  --  0.44*     --          Intake/Output Summary (Last 24 hours) at 3/31/2020 1203  Last data filed at 3/31/2020 1104  Gross per 24 hour   Intake 2832. 45 ml   Output 2375 ml   Net 457.45 ml        Telemetry: afib with rvr    Assessment:     Active Problems:    Acute on chronic diastolic HF (heart failure) ([facility] (11/4/2019)      Morbid obesity ([facility] (11/4/2019)      ALYSSA on CPAP (11/4/2019)      Atrial fibrillation with rapid ventricular response ([facility] (11/5/2019)      Essential hypertension (12/4/2019)      Peripheral vascular disease ([facility] (2/25/2020)        Plan:     Flavia Krishnan is in afib with poor rate control. He has a hx of medical non-compliance. He is agreeable to scppm/av node abl. D/w Dr Sreedhar Padilla - elevated wbc. Will cont to follow and get blood cx today. Plan to proceed thurs or Friday. 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 He understands and is agreeable. I discussed the risks/benefits/alternatives of the procedure with the patient. Risks include (but are not limited to) bleeding, heart block, infection, cva/mi/tamponade/death. The patient understands and agrees to proceed. Thank you for this interesting consultation.       Randy Matthew MD, [facility]    3/31/2020 Implemented All Fall Risk Interventions:  [place] to call system. Call bell, personal items and telephone within reach. Instruct patient to call for assistance. Room bathroom lighting operational. Non-slip footwear when patient is off stretcher. Physically safe environment: no spills, clutter or unnecessary equipment. Stretcher in lowest position, wheels locked, appropriate side rails in place. Provide visual cue, wrist band, yellow gown, etc. Monitor gait and stability. Monitor for mental status changes and reorient to person, place, and time. Review medications for side effects contributing to fall risk. Reinforce activity limits and safety measures with patient and family.

## 2020-04-01 LAB
ALBUMIN SERPL-MCNC: 3 G/DL (ref 3.5–5)
ALBUMIN SERPL-MCNC: 3.1 G/DL (ref 3.5–5)
ALBUMIN/GLOB SERPL: 1 {RATIO} (ref 1.1–2.2)
ALBUMIN/GLOB SERPL: 1 {RATIO} (ref 1.1–2.2)
ALP SERPL-CCNC: 120 U/L (ref 45–117)
ALP SERPL-CCNC: 121 U/L (ref 45–117)
ALT SERPL-CCNC: 630 U/L (ref 12–78)
ALT SERPL-CCNC: 693 U/L (ref 12–78)
ANION GAP SERPL CALC-SCNC: 5 MMOL/L (ref 5–15)
APTT PPP: 26.8 SEC (ref 22.1–32)
APTT PPP: 27.1 SEC (ref 22.1–32)
AST SERPL-CCNC: 119 U/L (ref 15–37)
AST SERPL-CCNC: 137 U/L (ref 15–37)
BASOPHILS # BLD: 0 K/UL (ref 0–0.1)
BASOPHILS NFR BLD: 0 % (ref 0–1)
BILIRUB DIRECT SERPL-MCNC: 0.7 MG/DL (ref 0–0.2)
BILIRUB SERPL-MCNC: 1.5 MG/DL (ref 0.2–1)
BILIRUB SERPL-MCNC: 1.5 MG/DL (ref 0.2–1)
BUN SERPL-MCNC: 26 MG/DL (ref 6–20)
BUN/CREAT SERPL: 14 (ref 12–20)
CALCIUM SERPL-MCNC: 8.8 MG/DL (ref 8.5–10.1)
CHLORIDE SERPL-SCNC: 97 MMOL/L (ref 97–108)
CO2 SERPL-SCNC: 33 MMOL/L (ref 21–32)
COMMENT, HOLDF: NORMAL
CREAT SERPL-MCNC: 1.92 MG/DL (ref 0.7–1.3)
DIFFERENTIAL METHOD BLD: ABNORMAL
EOSINOPHIL # BLD: 0 K/UL (ref 0–0.4)
EOSINOPHIL NFR BLD: 0 % (ref 0–7)
ERYTHROCYTE [DISTWIDTH] IN BLOOD BY AUTOMATED COUNT: 14.7 % (ref 11.5–14.5)
GLOBULIN SER CALC-MCNC: 3 G/DL (ref 2–4)
GLOBULIN SER CALC-MCNC: 3 G/DL (ref 2–4)
GLUCOSE SERPL-MCNC: 127 MG/DL (ref 65–100)
HCT VFR BLD AUTO: 40.4 % (ref 36.6–50.3)
HGB BLD-MCNC: 13 G/DL (ref 12.1–17)
IMM GRANULOCYTES # BLD AUTO: 0.2 K/UL (ref 0–0.04)
IMM GRANULOCYTES NFR BLD AUTO: 1 % (ref 0–0.5)
INR PPP: 1.2 (ref 0.9–1.1)
LACTATE SERPL-SCNC: 2.3 MMOL/L (ref 0.4–2)
LYMPHOCYTES # BLD: 0.8 K/UL (ref 0.8–3.5)
LYMPHOCYTES NFR BLD: 5 % (ref 12–49)
MAGNESIUM SERPL-MCNC: 2 MG/DL (ref 1.6–2.4)
MCH RBC QN AUTO: 32.4 PG (ref 26–34)
MCHC RBC AUTO-ENTMCNC: 32.2 G/DL (ref 30–36.5)
MCV RBC AUTO: 100.7 FL (ref 80–99)
MONOCYTES # BLD: 1 K/UL (ref 0–1)
MONOCYTES NFR BLD: 6 % (ref 5–13)
NEUTS SEG # BLD: 14.6 K/UL (ref 1.8–8)
NEUTS SEG NFR BLD: 88 % (ref 32–75)
NRBC # BLD: 0.02 K/UL (ref 0–0.01)
NRBC BLD-RTO: 0.1 PER 100 WBC
PLATELET # BLD AUTO: 216 K/UL (ref 150–400)
PMV BLD AUTO: 11.5 FL (ref 8.9–12.9)
POTASSIUM SERPL-SCNC: 3.7 MMOL/L (ref 3.5–5.1)
PROT SERPL-MCNC: 6 G/DL (ref 6.4–8.2)
PROT SERPL-MCNC: 6.1 G/DL (ref 6.4–8.2)
PROTHROMBIN TIME: 12.2 SEC (ref 9–11.1)
RBC # BLD AUTO: 4.01 M/UL (ref 4.1–5.7)
SAMPLES BEING HELD,HOLD: NORMAL
SODIUM SERPL-SCNC: 135 MMOL/L (ref 136–145)
THERAPEUTIC RANGE,PTTT: NORMAL SECS (ref 58–77)
THERAPEUTIC RANGE,PTTT: NORMAL SECS (ref 58–77)
URATE SERPL-MCNC: 12.9 MG/DL (ref 3.5–7.2)
WBC # BLD AUTO: 16.6 K/UL (ref 4.1–11.1)

## 2020-04-01 PROCEDURE — 36415 COLL VENOUS BLD VENIPUNCTURE: CPT

## 2020-04-01 PROCEDURE — 85025 COMPLETE CBC W/AUTO DIFF WBC: CPT

## 2020-04-01 PROCEDURE — 85730 THROMBOPLASTIN TIME PARTIAL: CPT

## 2020-04-01 PROCEDURE — 74011000258 HC RX REV CODE- 258: Performed by: INTERNAL MEDICINE

## 2020-04-01 PROCEDURE — 80076 HEPATIC FUNCTION PANEL: CPT

## 2020-04-01 PROCEDURE — 80053 COMPREHEN METABOLIC PANEL: CPT

## 2020-04-01 PROCEDURE — 84550 ASSAY OF BLOOD/URIC ACID: CPT

## 2020-04-01 PROCEDURE — 65660000000 HC RM CCU STEPDOWN

## 2020-04-01 PROCEDURE — 74011250636 HC RX REV CODE- 250/636: Performed by: NURSE PRACTITIONER

## 2020-04-01 PROCEDURE — 83605 ASSAY OF LACTIC ACID: CPT

## 2020-04-01 PROCEDURE — 74011250637 HC RX REV CODE- 250/637: Performed by: NURSE PRACTITIONER

## 2020-04-01 PROCEDURE — 74011250637 HC RX REV CODE- 250/637: Performed by: INTERNAL MEDICINE

## 2020-04-01 PROCEDURE — 85610 PROTHROMBIN TIME: CPT

## 2020-04-01 PROCEDURE — 83735 ASSAY OF MAGNESIUM: CPT

## 2020-04-01 PROCEDURE — 74011636637 HC RX REV CODE- 636/637: Performed by: INTERNAL MEDICINE

## 2020-04-01 PROCEDURE — 74011250636 HC RX REV CODE- 250/636: Performed by: INTERNAL MEDICINE

## 2020-04-01 RX ORDER — METOLAZONE 5 MG/1
5 TABLET ORAL DAILY
Status: DISCONTINUED | OUTPATIENT
Start: 2020-04-01 | End: 2020-04-02 | Stop reason: SDUPTHER

## 2020-04-01 RX ADMIN — PREDNISONE 20 MG: 20 TABLET ORAL at 09:09

## 2020-04-01 RX ADMIN — POTASSIUM CHLORIDE 40 MEQ: 750 TABLET, FILM COATED, EXTENDED RELEASE ORAL at 22:25

## 2020-04-01 RX ADMIN — Medication 30 ML: at 06:00

## 2020-04-01 RX ADMIN — Medication 10 ML: at 18:38

## 2020-04-01 RX ADMIN — METOLAZONE 5 MG: 5 TABLET ORAL at 12:29

## 2020-04-01 RX ADMIN — METOPROLOL TARTRATE 12.5 MG: 25 TABLET ORAL at 12:29

## 2020-04-01 RX ADMIN — TRAMADOL HYDROCHLORIDE 50 MG: 50 TABLET, FILM COATED ORAL at 01:47

## 2020-04-01 RX ADMIN — METOPROLOL TARTRATE 12.5 MG: 25 TABLET ORAL at 00:45

## 2020-04-01 RX ADMIN — POTASSIUM CHLORIDE 40 MEQ: 750 TABLET, FILM COATED, EXTENDED RELEASE ORAL at 16:20

## 2020-04-01 RX ADMIN — CEFTRIAXONE 1 G: 1 INJECTION, POWDER, FOR SOLUTION INTRAMUSCULAR; INTRAVENOUS at 16:20

## 2020-04-01 RX ADMIN — POTASSIUM CHLORIDE 40 MEQ: 750 TABLET, FILM COATED, EXTENDED RELEASE ORAL at 09:09

## 2020-04-01 RX ADMIN — DILTIAZEM HYDROCHLORIDE 60 MG: 60 TABLET, FILM COATED ORAL at 09:09

## 2020-04-01 RX ADMIN — APIXABAN 5 MG: 5 TABLET, FILM COATED ORAL at 09:09

## 2020-04-01 RX ADMIN — METOPROLOL TARTRATE 12.5 MG: 25 TABLET ORAL at 18:36

## 2020-04-01 RX ADMIN — METOPROLOL TARTRATE 12.5 MG: 25 TABLET ORAL at 06:53

## 2020-04-01 RX ADMIN — DILTIAZEM HYDROCHLORIDE 60 MG: 60 TABLET, FILM COATED ORAL at 03:40

## 2020-04-01 RX ADMIN — APIXABAN 5 MG: 5 TABLET, FILM COATED ORAL at 20:36

## 2020-04-01 RX ADMIN — DILTIAZEM HYDROCHLORIDE 60 MG: 60 TABLET, FILM COATED ORAL at 20:37

## 2020-04-01 RX ADMIN — FUROSEMIDE 80 MG: 10 INJECTION, SOLUTION INTRAMUSCULAR; INTRAVENOUS at 09:10

## 2020-04-01 RX ADMIN — DILTIAZEM HYDROCHLORIDE 60 MG: 60 TABLET, FILM COATED ORAL at 16:20

## 2020-04-01 RX ADMIN — Medication 10 ML: at 22:25

## 2020-04-01 RX ADMIN — Medication 10 ML: at 16:15

## 2020-04-01 RX ADMIN — FUROSEMIDE 80 MG: 10 INJECTION, SOLUTION INTRAMUSCULAR; INTRAVENOUS at 18:37

## 2020-04-01 NOTE — PROGRESS NOTES
0725 - Bedside shift change report given to Ana Lynn (oncoming nurse) by Jenae Bird (offgoing nurse). Report included the following information SBAR, Kardex, Procedure Summary, Intake/Output, MAR and Recent Results. [address] pt's daughter, Burgess Rosalee bernabe to notify her of pt's whereabouts. Left message on her phone.

## 2020-04-01 NOTE — PROGRESS NOTES
Cardiology Progress Note            [address] [facility], [place], [address] [facility]  [phone]    4/1/2020 9:41 AM    Admit Date: 3/29/2020    Admit Diagnosis: Atrial fibrillation with RVR ([address] [I48.91]; Atrial fibrillation with RVR ([address] [I48.91]    Subjective:     Kelli Innocent   denies chest pain.     Visit Vitals  /77 (BP 1 Location: Left arm, BP Patient Position: At rest)   Pulse 82   Temp 97.5 °F (36.4 °C)   Resp 18   Ht 6' 2\" (1.88 m)   Wt 326 lb 11.6 oz (148.2 kg)   SpO2 100%   BMI 41.95 kg/m²     Current Facility-Administered Medications   Medication Dose Route Frequency    furosemide (LASIX) injection 80 mg  80 mg IntraVENous BID    dilTIAZem (CARDIZEM) IR tablet 60 mg  60 mg Oral Q6H    apixaban (ELIQUIS) tablet 5 mg  5 mg Oral Q12H    potassium chloride SR (KLOR-CON 10) tablet 40 mEq  40 mEq Oral TID    traMADoL (ULTRAM) tablet 50 mg  50 mg Oral Q6H PRN    predniSONE (DELTASONE) tablet 20 mg  20 mg Oral DAILY WITH BREAKFAST    cefTRIAXone (ROCEPHIN) 1 g in 0.9% sodium chloride (MBP/ADV) 50 mL  1 g IntraVENous Q24H    ondansetron (ZOFRAN) injection 4 mg  4 mg IntraVENous Q6H PRN    morphine injection 1 mg  1 mg IntraVENous Q3H PRN    metoprolol tartrate (LOPRESSOR) tablet 12.5 mg  12.5 mg Oral Q6H    bacitracin 500 unit/gram packet 1 Packet  1 Packet Topical PRN    sodium chloride (NS) flush 5-40 mL  5-40 mL IntraVENous Q8H    sodium chloride (NS) flush 5-40 mL  5-40 mL IntraVENous PRN         Objective:      Visit Vitals  /77 (BP 1 Location: Left arm, BP Patient Position: At rest)   Pulse 82   Temp 97.5 °F (36.4 °C)   Resp 18   Ht 6' 2\" (1.88 m)   Wt 326 lb 11.6 oz (148.2 kg)   SpO2 100%   BMI 41.95 kg/m²       Physical Exam:  Abdomen: soft, non-tender  Extremities: +2 edema  Heart: irr irr  Lungs: clear to auscultation bilaterally  Pulses: 2+ and symmetric    Data Review:   Labs:    Recent Labs     04/01/20  0338 03/31/20  1206 03/29/20  1709   WBC 16.6* 16.0* 20.8* HGB 13.0 13.2 13.3   HCT 40.4 40.7 40.1    255 256     Recent Labs     04/01/20  0338 03/31/20  1210 03/31/20  0415 03/30/20  0805   NA  --   --  136 134*   K  --  3.5 3.0* 2.7*   CL  --   --  96* 95*   CO2  --   --  34* 32   GLU  --   --  160* 103*   BUN  --   --  23* 28*   CREA  --   --  1.36* 1.49*   CA  --   --  8.1* 8.0*   MG  --   --  2.2  --    ALB 3.1*  --  2.7* 2.7*   TBILI 1.5*  --  1.4* 2.5*   SGOT 137*  --  236* 498*   *  --  794* 1,085*   INR 1.2*  --  1.3*  --        No results for input(s): TROIQ, CPK, CKMB in the last 72 hours. Intake/Output Summary (Last 24 hours) at 4/1/2020 0941  Last data filed at 4/1/2020 4409  Gross per 24 hour   Intake 1297.82 ml   Output 2950 ml   Net -1652.18 ml        Telemetry: afib    Assessment:     Active Problems:    Acute on chronic diastolic HF (heart failure) ([address] (11/4/2019)      Morbid obesity ([address] (11/4/2019)      ALYSSA on CPAP (11/4/2019)      Atrial fibrillation with rapid ventricular response ([address] (11/5/2019)      Essential hypertension (12/4/2019)      Peripheral vascular disease ([address] (2/25/2020)        Plan:     Sophie Black is doing better - diurseing, lfts and wbc improving. bld cx ntd.  Plan for Lucero Viramontes MD, [facility]    4/1/2020

## 2020-04-01 NOTE — PROGRESS NOTES
F/U for elevated liver tests  Bob Stands for Nola Engel    S: Mr. Gracia Camarillo was seen by me today during rounds. At this time, he is resting + comfortably. there is no new itching. 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 He has chronic itching. He has no mental status changes. He has completed his mucormyst rx. The patient has no new complaints today. Please see admission consult for details of ROS; there are no other changes today. Medications Prior to Admission   Medication Sig    METHYLPREDNISOLONE PO Take  by mouth two (2) times a day.  sotalol HCl (SOTALOL PO) Take  by mouth two (2) times a day.  naltrexone HCl (NALTREXONE PO) Take  by mouth daily. Dose unknown prescribed Dr. Jerome Castaneda    dilTIAZem CD (CARDIZEM CD) 120 mg ER capsule Take 1 Cap by mouth daily.  HCG 8000IU/4 ML 2,000 Units by SubCUTAneous route. HCG   Benzyl Alcohol   Sodium phosphate   Sterile water   (may use HCl or NaOH  to make adjustments to the [address])    testosterone cypionate (DEPO-TESTOSTERONE) 100 mg/mL injection 200 mg by IntraMUSCular route Once every 2 weeks.  furosemide (LASIX) 40 mg tablet take 1 tablet by mouth twice a day    anastrozole (ARIMIDEX) 1 mg tablet Take 1 mg by mouth every seven (7) days.  apixaban (ELIQUIS) 5 mg tablet Take 1 Tab by mouth two (2) times a day.  desloratadine (CLARINEX) 5 mg tablet Take 5 mg by mouth daily.  montelukast (SINGULAIR) 10 mg tablet Take 10 mg by mouth every evening.  dextroamphetamine-amphetamine (ADDERALL) 30 mg tablet Take 30 mg by mouth two (2) times a day. O: Blood pressure 133/77, pulse 82, temperature 97.5 °F (36.4 °C), resp. rate 18, height 6' 2\" (1.88 m), weight 148.2 kg (326 lb 11.6 oz), SpO2 100 %. Gen: Patient is in no acute distress. There is no jaundice. Redness on back. No rash.   No asterixus      Recent Labs     04/01/20  3786 03/31/20  1206   WBC 16.6* 16.0*   HGB 13.0 13.2   HCT 40.4 40.7    255     Recent Labs     04/01/20  0338 03/31/20  1210 03/31/20 0415 03/30/20  0805 03/29/20  0834   NA  --   --  136 134* 132*   K  --  3.5 3.0* 2.7* 4.6   CL  --   --  96* 95* 96*   CO2  --   --  34* 32 22   BUN  --   --  23* 28* 35*   CREA  --   --  1.36* 1.49* 2.28*   GLU  --   --  160* 103* 104*   CA  --   --  8.1* 8.0* 8.7   MG  --   --  2.2  --   --    URICA 12.9*  --   --   --   --      Recent Labs     04/01/20 0338 03/31/20 0415 03/30/20  0805 03/29/20  0842   SGOT 137* 236* 498*  --    * 794* 1,085*  --    * 108 109  --    TBILI 1.5* 1.4* 2.5*  --    TP 6.1* 5.2* 5.5*  --    ALB 3.1* 2.7* 2.7*  --    GLOB 3.0 2.5 2.8  --    LPSE  --   --   --  162     Recent Labs     04/01/20 0338 03/31/20 0415 03/30/20  0759  03/29/20  0842   INR 1.2* 1.3*  --   --  1.6*   PTP 12.2* 13.1*  --   --  15.8*   APTT 26.8 51.0* 60.9*   < >  --     < > = values in this interval not displayed. Recent Labs     03/31/20  1206 03/29/20 2048 03/29/20  1709   TIBC 295  --   --    PSAT 13*  --   --    FERR  --  1,224* 1,323*      No results found for: FOL, RBCF   No results for input(s): PH, PCO2, PO2 in the last 72 hours.   Recent Labs     03/29/20  0842 03/29/20  0834     --    TROIQ  --  0.44*     Lab Results   Component Value Date/Time    Cholesterol, total 167 11/05/2019 04:41 AM    HDL Cholesterol 38 11/05/2019 04:41 AM    LDL, calculated 113.6 (H) 11/05/2019 04:41 AM    Triglyceride 77 11/05/2019 04:41 AM    CHOL/HDL Ratio 4.4 11/05/2019 04:41 AM     No results found for: [facility]  Lab Results   Component Value Date/Time    Color YELLOW/STRAW 03/31/2020 12:06 PM    Appearance CLEAR 03/31/2020 12:06 PM    Specific gravity 1.009 03/31/2020 12:06 PM    pH (UA) 7.0 03/31/2020 12:06 PM    Protein NEGATIVE  03/31/2020 12:06 PM    Glucose NEGATIVE  03/31/2020 12:06 PM    Ketone NEGATIVE  03/31/2020 12:06 PM    Bilirubin NEGATIVE 03/31/2020 12:06 PM    Urobilinogen 1.0 03/31/2020 12:06 PM    Nitrites NEGATIVE  03/31/2020 12:06 PM    Leukocyte Esterase NEGATIVE  03/31/2020 12:06 PM    Epithelial cells FEW 03/31/2020 12:06 PM    Bacteria NEGATIVE  03/31/2020 12:06 PM    WBC 0-4 03/31/2020 12:06 PM    RBC 0-5 03/31/2020 12:06 PM         Cross sectional imaging none new   A: Active Problems:    Acute on chronic diastolic HF (heart failure) ([address] (11/4/2019)      Morbid obesity ([address] (11/4/2019)      ALYSSA on CPAP (11/4/2019)      Atrial fibrillation with rapid ventricular response ([address] (11/5/2019)      Essential hypertension (12/4/2019)      Peripheral vascular disease ([address] (2/25/2020)          Comment:   INR is normal, so this is not likely acute right heart failure/  chf  Possible drug induced liver injury. Regardless of cause, his numbers are improvingd    P. I can find limited data on chelation therapy and elevated liver tests    This is behaving like dili. We will follow from a distance. I spoke to Ms Ld Mendoza this am    Thanks    Valerie Lira MD  11:47 AM  4/1/2020   . Please call the office at [phone] if you need to reach me. I /my coverage can then be paged by the office or answering service. I do not particpate in [employer]. The use of [employer] could potentially put the patient at risk. Wei Cadena

## 2020-04-01 NOTE — PROGRESS NOTES
Hospitalist Progress Note    NAME: Yana Shoemaker   :  1960   MRN:  [id number]       Interim Hospital Summary: 60 y.o. male whom presented on 3/29/2020 with Nausea, vomiting, and shortness of breath     Assessment / Plan:  New onset left MTP gout  Cellulitis  Chronic Leukocytosis in the past without source of infection  - less pain than yesterday. Continue with steroid which was started on 3/31 to complete total 5 doses    Uric acid 12.9    See photo below    Continue with IV rocephin; WBC down to 16.6 today vs 28.9 on admission. Pt has had hx of leukocytosis in the past    PO steroids x 5 days     Hypokalemia POA  - resolved; will continue to monitor while on lasix     SIRS POA WBC 28.9 , HR 160s, RR 22, no fevers   Elevated lactate 4.0 POA  - Blood cultures (3/29 &) no growth so far     CT of abd/pel:   1. Features suggestive of chronic gallbladder disease-correlate clinically. 2. Nonobstructing right nephrolithiasis. 3. Generalized body wall edema. CXR with no ASD    UA (-)    Received IV zosyn on admission.  Will continue with rocephin for now to treat cellulitis    Lactic acid 4.0->2.3    No abd pain, no fever/chills, no n/v, tolerating cardiac diet without difficulty     Acute hepatitis POA; resolving  - appreciate GI input; etiology unclear, suspect drug related    Denies ETOH use    Hepatitis B and C negative    HSV-1 IgG positive, but not at risk for HSV hepatitis        I think HSV hepatitis unlikley, hold acyclovir        HSV 1 & 2 by PCR pending    LFTs are improving     Persistent atrial fib with RVR POA   Hypercoagulable state secondary to a-fib  Essential hypertension POA  - S/P cardizem drip, heparin drip.      Continue with PO cardizem and metoprolol, PO eliquis    Appreciate cardiology input; schedule for pacemaker and AV node ablation on Friday     Anasarca POA  Acute on chronic diastolic congestive heart failure POA  -  Echo: Normal cavity size and systolic function (ejection fraction normal). Mild concentric hypertrophy. Estimated left ventricular ejection fraction is 55 - 60%. Continue with daily Lasix,daily weight, intake output, cardiac diet  Wt Readings from Last 3 Encounters:   04/01/20 148.2 kg (326 lb 11.6 oz)   03/05/20 141.8 kg (312 lb 11.2 oz)   02/25/20 143.2 kg (315 lb 11.2 oz)     Morbidly obese  BMI 43.25 kg/m²       Code Status: Full code     DVT Prophylaxis: eliquis  GI Prophylaxis: not indicated     Baseline: Independent    Recommended Disposition:  PT, OT, RN     Subjective:     Chief Complaint / Reason for Physician Visit  \"I want my feet to small enough so I can wear the boots\". Discussed with RN events overnight. Review of Systems:  Symptom Y/N Comments  Symptom Y/N Comments   Fever/Chills n   Chest Pain n    Poor Appetite n   Edema y    Cough    Abdominal Pain n    Sputum    Joint Pain     SOB/ELIZONDO y   Pruritis/Rash y    Nausea/vomit n   Tolerating PT/OT     Diarrhea n   Tolerating Diet     Constipation n   Other       Could NOT obtain due to:      Objective:     VITALS:   Last 24hrs VS reviewed since prior progress note. Most recent are:  Patient Vitals for the past 24 hrs:   Temp Pulse Resp BP SpO2   04/01/20 0653  87  120/81    04/01/20 0343 97 °F (36.1 °C) 83 20 128/87 97 %   04/01/20 0340  89  128/87    04/01/20 0045  82  122/89    03/31/20 2316 97.2 °F (36.2 °C) 98 20 118/76 99 %   03/31/20 2151  82  129/80    03/31/20 1930 97.6 °F (36.4 °C) 85 20 125/84 97 %   03/31/20 1755  93  (!) 141/96    03/31/20 1502 97.4 °F (36.3 °C) 86 18 (!) 145/96 95 %   03/31/20 1034 98.5 °F (36.9 °C) 92 18 (!) 140/94 98 %   03/31/20 0922  94 18 (!) 138/91 96 %   03/31/20 0726 97.8 °F (36.6 °C) 76 18 130/79 97 %       Intake/Output Summary (Last 24 hours) at 4/1/2020 0657  Last data filed at 4/1/2020 0052  Gross per 24 hour   Intake 1582.71 ml   Output 2950 ml   Net -1367.29 ml        PHYSICAL EXAM:  General: Morbidly obese, ill appearing male. Alert, cooperative, no acute distress    EENT:  EOMI. Anicteric sclerae. MMM  Resp:  Clear in apex with decreased breath sounds at bases. no wheezing or rales. No accessory muscle use  CV:  irregular  rhythm,  3+ edema  GI:  Soft, obese, Non tender. +Bowel sounds  Neurologic:  Alert and oriented X 3, normal speech,   Psych:   Good insight. Not anxious nor agitated  Skin:    No jaundice          Reviewed most current lab test results and cultures  YES  Reviewed most current radiology test results   YES  Review and summation of old records today    NO  Reviewed patient's current orders and MAR    YES  PMH/SH reviewed - no change compared to H&P  ________________________________________________________________________  Care Plan discussed with:    Comments   Patient y    Family      RN y    Care Manager     Consultant                        Multidiciplinary team rounds were held today with , nursing, pharmacist and clinical coordinator. Patient's plan of care was discussed; medications were reviewed and discharge planning was addressed. ____________________________________________________________________  Delgadillo Pollen, NP     Procedures: see electronic medical records for all procedures/Xrays and details which were not copied into this note but were reviewed prior to creation of Plan. LABS:  I reviewed today's most current labs and imaging studies.   Pertinent labs include:  Recent Labs     04/01/20 0338 03/31/20  1206 03/29/20  1709   WBC 16.6* 16.0* 20.8*   HGB 13.0 13.2 13.3   HCT 40.4 40.7 40.1    255 256     Recent Labs     04/01/20  0338 03/31/20  1210 03/31/20  0415 03/30/20  0805 03/29/20  0842 03/29/20  0834   NA  --   --  136 134*  --  132*   K  --  3.5 3.0* 2.7*  --  4.6   CL  --   --  96* 95*  --  96*   CO2  --   --  34* 32  --  22   GLU  --   --  160* 103*  --  104*   BUN  --   --  23* 28*  --  35*   CREA  --   --  1.36* 1.49*  --  2.28*   CA  --   --  8.1* 8.0*  --  8.7   MG --   --  2.2  --   --   --    ALB 3.1*  --  2.7* 2.7*  --  3.6   TBILI 1.5*  --  1.4* 2.5*  --  3.7*   SGOT 137*  --  236* 498*  --  1,577*   *  --  794* 1,085*  --  1,838*   INR 1.2*  --  1.3*  --  1.6*  --        Signed: )Heidi Centeno, NP

## 2020-04-01 NOTE — PROGRESS NOTES
Spoke to Fernanda Hoang in lab asking about CMP order, she states order had been DCd. Entered order which will be add on to Uric Acid draw.

## 2020-04-01 NOTE — PROGRESS NOTES
Bedside and Verbal shift change report given to [address] [place] [address] Avenue (oncoming nurse) by kimani (offgoing nurse). Report included the following information SBAR, Intake/Output, MAR and Cardiac Rhythm AfIB. Bedside and Verbal shift change report given to tracy (oncoming nurse) by Karina Ace (offgoing nurse). Report included the following information SBAR, Kardex, ED Summary, Intake/Output, MAR, Recent Results, Med Rec Status and Cardiac Rhythm AfIB.

## 2020-04-01 NOTE — PROGRESS NOTES
[address] [place] [address]  [phone]      Cardiology Progress Note      4/1/2020 9:00 AM    Admit Date: 3/29/2020    Admit Diagnosis:   Atrial fibrillation with RVR ([address] [I48.91]  Atrial fibrillation with RVR ([address] [I48.91]    Subjective:     Sophie Black feels the same. Received metolazone and lasix yesterday with neg 2.5L/24 hours. But still extreme edema. Remains in afib, HR controlled at 80-100bpm, PO Dilt, BB. Plan for pacemaker and AV pat ablation on 4/3/2020. Visit Vitals  /77 (BP 1 Location: Left arm, BP Patient Position: At rest)   Pulse 82   Temp 97.5 °F (36.4 °C)   Resp 18   Ht 6' 2\" (1.88 m)   Wt 148.2 kg (326 lb 11.6 oz)   SpO2 100%   BMI 41.95 kg/m²       Current Facility-Administered Medications   Medication Dose Route Frequency    metOLazone (ZAROXOLYN) tablet 5 mg  5 mg Oral DAILY    furosemide (LASIX) injection 80 mg  80 mg IntraVENous BID    dilTIAZem (CARDIZEM) IR tablet 60 mg  60 mg Oral Q6H    apixaban (ELIQUIS) tablet 5 mg  5 mg Oral Q12H    potassium chloride SR (KLOR-CON 10) tablet 40 mEq  40 mEq Oral TID    traMADoL (ULTRAM) tablet 50 mg  50 mg Oral Q6H PRN    predniSONE (DELTASONE) tablet 20 mg  20 mg Oral DAILY WITH BREAKFAST    cefTRIAXone (ROCEPHIN) 1 g in 0.9% sodium chloride (MBP/ADV) 50 mL  1 g IntraVENous Q24H    ondansetron (ZOFRAN) injection 4 mg  4 mg IntraVENous Q6H PRN    morphine injection 1 mg  1 mg IntraVENous Q3H PRN    metoprolol tartrate (LOPRESSOR) tablet 12.5 mg  12.5 mg Oral Q6H    bacitracin 500 unit/gram packet 1 Packet  1 Packet Topical PRN    sodium chloride (NS) flush 5-40 mL  5-40 mL IntraVENous Q8H    sodium chloride (NS) flush 5-40 mL  5-40 mL IntraVENous PRN       Objective:      Physical Exam:  General Appearance:  super morbidly obese  male in no acute distress  Chest:   Clear and diminished  Cardiovascular:  irr, irr no murmur.    Abdomen:   morbidly obese, anasarca, +BS, tense    Extremities: joe LE +4 pitting edema; errythmic, warm to touch, edema to waist - edema appears to be unchanged  Skin:  Warm and dry. Data Review:   Recent Labs     04/01/20  0338 03/31/20  1206 03/29/20  1709   WBC 16.6* 16.0* 20.8*   HGB 13.0 13.2 13.3   HCT 40.4 40.7 40.1    255 256     Recent Labs     04/01/20  0908 04/01/20  0338 03/31/20  1210 03/31/20  0415 03/30/20  0805   *  --   --  136 134*   K 3.7  --  3.5 3.0* 2.7*   CL 97  --   --  96* 95*   CO2 33*  --   --  34* 32   *  --   --  160* 103*   BUN 26*  --   --  23* 28*   CREA 1.92*  --   --  1.36* 1.49*   CA 8.8  --   --  8.1* 8.0*   MG 2.0  --   --  2.2  --    ALB 3.0* 3.1*  --  2.7* 2.7*   TBILI 1.5* 1.5*  --  1.4* 2.5*   SGOT 119* 137*  --  236* 498*   * 693*  --  794* 1,085*   INR  --  1.2*  --  1.3*  --        No results for input(s): TROIQ, CPK, CKMB in the last 72 hours. Intake/Output Summary (Last 24 hours) at 4/1/2020 1134  Last data filed at 4/1/2020 1023  Gross per 24 hour   Intake 1297.82 ml   Output 2750 ml   Net -1452.18 ml        Telemetry: afib with RVR    Echo:  11/2019  · Left Ventricle: Normal cavity size. Mild concentric hypertrophy. Mild systolic dysfunction. Estimated left ventricular ejection fraction is 46 - 50%. · Right Ventricle: Mildly dilated right ventricle. Mildly reduced systolic function. · Left Atrium: Mildly dilated left atrium. · Mitral Valve: Mild mitral valve regurgitation is present. · Tricuspid Valve: Mild tricuspid valve regurgitation is present. · Pulmonary Artery: Moderate pulmonary hypertension. · Right Atrium: Mildly dilated right atrium.          Assessment:     Active Problems:    Acute on chronic diastolic HF (heart failure) ([address] (11/4/2019)      Morbid obesity ([address] (11/4/2019)      ALYSSA on CPAP (11/4/2019)      Atrial fibrillation with rapid ventricular response ([address] (11/5/2019)      Essential hypertension (12/4/2019)      Peripheral vascular disease ([address] (2/25/2020)        Plan:     Afib with RVR:  Rate controlled, continue on Dilt 60mg QID, metoprolol 12.5mg BID and Eliquis. Plan for pacemaker and AV pat ablation Friday. Acute on chronic diastolic HF/HFpEF (EF 70% 11/2019)  2.5L input yesterday, neg 1.5 total.  CR slightly elevated, K+ stable. Giving metolazone before lasix today to hopefully increase output. Monitor I/Os, daily weights on stand up scales, and labs. Continue with KCL 40 meq TID  Wt down from 332# to 326#. ALYSSA with CPAP:  States compliance with CPAP    Elevated LFTs:  Trending down  May be related to passive liver congestion due to exacerbation of DHF    Eladio. [address] Cardiology    4/1/2020         Patient seen, examined by me personally. Plan discussed as detailed. Agree with note as outlined by  NP. I confirm findings in history and physical exam. No additional findings noted. Agree with plan as outlined above. Still has significant volume overload. Continue aggressive diuresis. Renal function expected to worsen.     Andrew Torres MD

## 2020-04-02 LAB
ALBUMIN SERPL-MCNC: 3.1 G/DL (ref 3.5–5)
ALBUMIN/GLOB SERPL: 1 {RATIO} (ref 1.1–2.2)
ALP SERPL-CCNC: 120 U/L (ref 45–117)
ALT SERPL-CCNC: 528 U/L (ref 12–78)
ANION GAP SERPL CALC-SCNC: 4 MMOL/L (ref 5–15)
AST SERPL-CCNC: 90 U/L (ref 15–37)
BASOPHILS # BLD: 0 K/UL (ref 0–0.1)
BASOPHILS NFR BLD: 0 % (ref 0–1)
BILIRUB DIRECT SERPL-MCNC: 0.5 MG/DL (ref 0–0.2)
BILIRUB SERPL-MCNC: 1.3 MG/DL (ref 0.2–1)
BUN SERPL-MCNC: 30 MG/DL (ref 6–20)
BUN/CREAT SERPL: 17 (ref 12–20)
CALCIUM SERPL-MCNC: 9 MG/DL (ref 8.5–10.1)
CHLORIDE SERPL-SCNC: 95 MMOL/L (ref 97–108)
CO2 SERPL-SCNC: 35 MMOL/L (ref 21–32)
CREAT SERPL-MCNC: 1.78 MG/DL (ref 0.7–1.3)
DIFFERENTIAL METHOD BLD: ABNORMAL
EOSINOPHIL # BLD: 0 K/UL (ref 0–0.4)
EOSINOPHIL NFR BLD: 0 % (ref 0–7)
ERYTHROCYTE [DISTWIDTH] IN BLOOD BY AUTOMATED COUNT: 14.6 % (ref 11.5–14.5)
EST. AVERAGE GLUCOSE BLD GHB EST-MCNC: 128 MG/DL
GLOBULIN SER CALC-MCNC: 3.2 G/DL (ref 2–4)
GLUCOSE SERPL-MCNC: 107 MG/DL (ref 65–100)
HBA1C MFR BLD: 6.1 % (ref 4–5.6)
HCT VFR BLD AUTO: 41.1 % (ref 36.6–50.3)
HGB BLD-MCNC: 13.2 G/DL (ref 12.1–17)
HSV1 DNA SPEC QL NAA+PROBE: NEGATIVE
HSV2 DNA SPEC QL NAA+PROBE: NEGATIVE
IMM GRANULOCYTES # BLD AUTO: 0.2 K/UL (ref 0–0.04)
IMM GRANULOCYTES NFR BLD AUTO: 1 % (ref 0–0.5)
LYMPHOCYTES # BLD: 1.6 K/UL (ref 0.8–3.5)
LYMPHOCYTES NFR BLD: 10 % (ref 12–49)
MCH RBC QN AUTO: 32.1 PG (ref 26–34)
MCHC RBC AUTO-ENTMCNC: 32.1 G/DL (ref 30–36.5)
MCV RBC AUTO: 100 FL (ref 80–99)
MONOCYTES # BLD: 1.3 K/UL (ref 0–1)
MONOCYTES NFR BLD: 8 % (ref 5–13)
NEUTS SEG # BLD: 13.4 K/UL (ref 1.8–8)
NEUTS SEG NFR BLD: 81 % (ref 32–75)
NRBC # BLD: 0 K/UL (ref 0–0.01)
NRBC BLD-RTO: 0 PER 100 WBC
PLATELET # BLD AUTO: 235 K/UL (ref 150–400)
PMV BLD AUTO: 11.6 FL (ref 8.9–12.9)
POTASSIUM SERPL-SCNC: 4.2 MMOL/L (ref 3.5–5.1)
PROT SERPL-MCNC: 6.3 G/DL (ref 6.4–8.2)
RBC # BLD AUTO: 4.11 M/UL (ref 4.1–5.7)
SODIUM SERPL-SCNC: 134 MMOL/L (ref 136–145)
SPECIMEN SOURCE: NORMAL
WBC # BLD AUTO: 16.5 K/UL (ref 4.1–11.1)

## 2020-04-02 PROCEDURE — 74011250637 HC RX REV CODE- 250/637: Performed by: INTERNAL MEDICINE

## 2020-04-02 PROCEDURE — 74011000258 HC RX REV CODE- 258: Performed by: INTERNAL MEDICINE

## 2020-04-02 PROCEDURE — 74011250636 HC RX REV CODE- 250/636: Performed by: NURSE PRACTITIONER

## 2020-04-02 PROCEDURE — 80053 COMPREHEN METABOLIC PANEL: CPT

## 2020-04-02 PROCEDURE — 74011636637 HC RX REV CODE- 636/637: Performed by: INTERNAL MEDICINE

## 2020-04-02 PROCEDURE — 74011250636 HC RX REV CODE- 250/636: Performed by: INTERNAL MEDICINE

## 2020-04-02 PROCEDURE — 65660000000 HC RM CCU STEPDOWN

## 2020-04-02 PROCEDURE — 36415 COLL VENOUS BLD VENIPUNCTURE: CPT

## 2020-04-02 PROCEDURE — 83036 HEMOGLOBIN GLYCOSYLATED A1C: CPT

## 2020-04-02 PROCEDURE — 74011250637 HC RX REV CODE- 250/637: Performed by: NURSE PRACTITIONER

## 2020-04-02 PROCEDURE — 82248 BILIRUBIN DIRECT: CPT

## 2020-04-02 PROCEDURE — 85025 COMPLETE CBC W/AUTO DIFF WBC: CPT

## 2020-04-02 RX ORDER — METOLAZONE 5 MG/1
5 TABLET ORAL DAILY
Status: DISCONTINUED | OUTPATIENT
Start: 2020-04-02 | End: 2020-04-04 | Stop reason: HOSPADM

## 2020-04-02 RX ADMIN — FUROSEMIDE 80 MG: 10 INJECTION, SOLUTION INTRAMUSCULAR; INTRAVENOUS at 09:27

## 2020-04-02 RX ADMIN — POTASSIUM CHLORIDE 40 MEQ: 750 TABLET, FILM COATED, EXTENDED RELEASE ORAL at 08:43

## 2020-04-02 RX ADMIN — METOPROLOL TARTRATE 12.5 MG: 25 TABLET ORAL at 11:57

## 2020-04-02 RX ADMIN — DILTIAZEM HYDROCHLORIDE 60 MG: 60 TABLET, FILM COATED ORAL at 08:43

## 2020-04-02 RX ADMIN — CEFTRIAXONE 1 G: 1 INJECTION, POWDER, FOR SOLUTION INTRAMUSCULAR; INTRAVENOUS at 15:35

## 2020-04-02 RX ADMIN — TRAMADOL HYDROCHLORIDE 50 MG: 50 TABLET, FILM COATED ORAL at 15:37

## 2020-04-02 RX ADMIN — DILTIAZEM HYDROCHLORIDE 60 MG: 60 TABLET, FILM COATED ORAL at 15:35

## 2020-04-02 RX ADMIN — Medication 30 ML: at 21:05

## 2020-04-02 RX ADMIN — DILTIAZEM HYDROCHLORIDE 60 MG: 60 TABLET, FILM COATED ORAL at 03:25

## 2020-04-02 RX ADMIN — APIXABAN 5 MG: 5 TABLET, FILM COATED ORAL at 08:43

## 2020-04-02 RX ADMIN — DILTIAZEM HYDROCHLORIDE 60 MG: 60 TABLET, FILM COATED ORAL at 21:04

## 2020-04-02 RX ADMIN — METOPROLOL TARTRATE 12.5 MG: 25 TABLET ORAL at 17:24

## 2020-04-02 RX ADMIN — Medication 10 ML: at 05:53

## 2020-04-02 RX ADMIN — FUROSEMIDE 80 MG: 10 INJECTION, SOLUTION INTRAMUSCULAR; INTRAVENOUS at 17:24

## 2020-04-02 RX ADMIN — PREDNISONE 20 MG: 20 TABLET ORAL at 08:43

## 2020-04-02 RX ADMIN — POTASSIUM CHLORIDE 40 MEQ: 750 TABLET, FILM COATED, EXTENDED RELEASE ORAL at 21:03

## 2020-04-02 RX ADMIN — Medication 10 ML: at 13:30

## 2020-04-02 RX ADMIN — POTASSIUM CHLORIDE 40 MEQ: 750 TABLET, FILM COATED, EXTENDED RELEASE ORAL at 15:35

## 2020-04-02 RX ADMIN — METOPROLOL TARTRATE 12.5 MG: 25 TABLET ORAL at 00:10

## 2020-04-02 RX ADMIN — METOPROLOL TARTRATE 12.5 MG: 25 TABLET ORAL at 05:52

## 2020-04-02 RX ADMIN — METOLAZONE 5 MG: 5 TABLET ORAL at 08:43

## 2020-04-02 NOTE — PROGRESS NOTES
Hospitalist Progress Note    NAME: Kelli Hatch   :  1960   MRN:  [id number]     I reviewed with the resident the medical history and the resident's findings on the physical examination. I discussed with the resident the patient's diagnosis and concur with the plan. Interim Hospital Summary: 60 y.o. male whom presented on 3/29/2020 with Nausea, vomiting, and shortness of breath     Assessment / Plan:  NPO after midnight for pacemaker insertion tomorrow. Asked nursing staff to contact pacemaker device rep to speak with the pt so he can address all the questions. New onset left MTP gout  Cellulitis  Chronic Leukocytosis in the past without source of infection  - less pain than yesterday. Continue with steroid which was started on 3/31 to complete total 5 doses    Uric acid 12.9    See photo below    Continue with IV rocephin; WBC down to 16.5 today vs 28.9 on admission. Pt has had hx of leukocytosis in the past (between January to , WBC as 20K)    PO steroids x 5 days     Hypokalemia POA  - resolved; will continue to monitor while on lasix     SIRS POA WBC 28.9 , HR 160s, RR 22, no fevers   Elevated lactate 4.0 POA  - Blood cultures (3/29 &) no growth so far     CT of abd/pel:   1. Features suggestive of chronic gallbladder disease-correlate clinically. 2. Nonobstructing right nephrolithiasis. 3. Generalized body wall edema. CXR with no ASD    UA (-)    Received IV zosyn on admission.  continue with rocephin to treat cellulitis; will complete total of 10 days    Lactic acid 4.0->2.3    No abd pain, no fever/chills, no n/v, tolerating cardiac diet without difficulty     Acute hepatitis POA; resolving  - appreciate GI input; etiology unclear, suspect drug related    Denies ETOH use    Hepatitis B and C negative    HSV-1 IgG positive, but not at risk for HSV hepatitis        HSV hepatitis unlikley, hold acyclovir        HSV 1 & 2 by PCR pending    LFTs are improving     Persistent atrial fib with RVR POA   Hypercoagulable state secondary to a-fib  Essential hypertension POA  - S/P cardizem drip, heparin drip.      Continue with PO cardizem and metoprolol, PO eliquis    Appreciate cardiology input; schedule for pacemaker and AV node ablation on Friday     Anasarca POA  Acute on chronic diastolic congestive heart failure POA  -  Echo: Normal cavity size and systolic function (ejection fraction normal). Mild concentric hypertrophy. Estimated left ventricular ejection fraction is 55 - 60%. Continue with Lasix; daily weight 326lbs -> 318lbs     Strict I & O    JUANJO  - improving; creat 1.7 (was 2.2 on admission)    Avoid nephrotoxic agetns      Morbidly obese  BMI 43.25 kg/m²       Code Status: Full code     DVT Prophylaxis: eliquis  GI Prophylaxis: not indicated     Baseline: Independent    Recommended Disposition:  PT, OT, RN     Subjective:     Chief Complaint / Reason for Physician Visit  \"I have to talk with someone who makes pacemaker tomorrow\". Remind the pt that we will arrange the information session. Discussed with RN events overnight. Review of Systems:  Symptom Y/N Comments  Symptom Y/N Comments   Fever/Chills n   Chest Pain n    Poor Appetite n   Edema y    Cough    Abdominal Pain n    Sputum    Joint Pain     SOB/ELIZONDO y   Pruritis/Rash y    Nausea/vomit n   Tolerating PT/OT     Diarrhea n   Tolerating Diet     Constipation n   Other       Could NOT obtain due to:      Objective:     VITALS:   Last 24hrs VS reviewed since prior progress note.  Most recent are:  Patient Vitals for the past 24 hrs:   Temp Pulse Resp BP SpO2   04/02/20 1600  77      04/02/20 1508 98.4 °F (36.9 °C) 74 16 131/87 99 %   04/02/20 1453    131/87    04/02/20 1158  91      04/02/20 1103 97.9 °F (36.6 °C) 72 18 140/75 98 %   04/02/20 0800  80      04/02/20 0726 97.7 °F (36.5 °C) 80 16 132/81 98 %   04/02/20 0328 98 °F (36.7 °C) 71 18 137/88 96 %   04/01/20 2250 98.1 °F (36.7 °C) 83 20 (!) 131/103 97 %   04/01/20 1951 97.8 °F (36.6 °C) 69 18 (!) 150/93 98 %       Intake/Output Summary (Last 24 hours) at 4/2/2020 1630  Last data filed at 4/2/2020 1508  Gross per 24 hour   Intake 990 ml   Output 4325 ml   Net -3335 ml        PHYSICAL EXAM:  General: Morbidly obese, ill appearing male. Alert, cooperative, no acute distress    EENT:  EOMI. Anicteric sclerae. MMM  Resp:  Clear in apex with decreased breath sounds at bases. no wheezing or rales. No accessory muscle use  CV:  irregular  rhythm,  3+ edema  GI:  Soft, obese, Non tender. +Bowel sounds  Neurologic:  Alert and oriented X 3, normal speech,   Psych:   Good insight. Not anxious nor agitated  Skin:    No jaundice          Reviewed most current lab test results and cultures  YES  Reviewed most current radiology test results   YES  Review and summation of old records today    NO  Reviewed patient's current orders and MAR    YES  PMH/SH reviewed - no change compared to H&P  ________________________________________________________________________  Care Plan discussed with:    Comments   Patient y    Family      RN y    Care Manager     Consultant                        Multidiciplinary team rounds were held today with , nursing, pharmacist and clinical coordinator. Patient's plan of care was discussed; medications were reviewed and discharge planning was addressed. ____________________________________________________________________  Lexii Torres NP     Procedures: see electronic medical records for all procedures/Xrays and details which were not copied into this note but were reviewed prior to creation of Plan. LABS:  I reviewed today's most current labs and imaging studies.   Pertinent labs include:  Recent Labs     04/02/20  0336 04/01/20  0338 03/31/20  1206   WBC 16.5* 16.6* 16.0*   HGB 13.2 13.0 13.2   HCT 41.1 40.4 40.7    216 255     Recent Labs     04/02/20  0336 04/01/20  0908 04/01/20  0338 03/31/20  1210 03/31/20  0415   * 135*  --   --  136   K 4.2 3.7  --  3.5 3.0*   CL 95* 97  --   --  96*   CO2 35* 33*  --   --  34*   * 127*  --   --  160*   BUN 30* 26*  --   --  23*   CREA 1.78* 1.92*  --   --  1.36*   CA 9.0 8.8  --   --  8.1*   MG  --  2.0  --   --  2.2   ALB 3.1* 3.0* 3.1*  --  2.7*   TBILI 1.3* 1.5* 1.5*  --  1.4*   SGOT 90* 119* 137*  --  236*   * 630* 693*  --  794*   INR  --   --  1.2*  --  1.3*       Signed: )Heidi Stanford, NP

## 2020-04-02 NOTE — PROGRESS NOTES
Problem: Falls - Risk of  Goal: *Absence of Falls  Description: Document Clydene Bone Fall Risk and appropriate interventions in the flowsheet.   Outcome: Progressing Towards Goal  Note: Fall Risk Interventions:  Mobility Interventions: Communicate number of staff needed for ambulation/transfer         Medication Interventions: Evaluate medications/consider consulting pharmacy, Patient to call before getting OOB, Teach patient to arise slowly    Elimination Interventions: Bed/chair exit alarm    History of Falls Interventions: Bed/chair exit alarm         Problem: Afib Pathway: Day 3  Goal: Off Pathway (Use only if patient is Off Pathway)  Outcome: Progressing Towards Goal  Goal: Activity/Safety  Outcome: Progressing Towards Goal  Goal: Diagnostic Test/Procedures  Outcome: Progressing Towards Goal  Goal: Nutrition/Diet  Outcome: Progressing Towards Goal  Goal: Discharge Planning  Outcome: Progressing Towards Goal  Goal: Medications  Outcome: Progressing Towards Goal  Goal: Respiratory  Outcome: Progressing Towards Goal  Goal: Treatments/Interventions/Procedures  Outcome: Progressing Towards Goal  Goal: Psychosocial  Outcome: Progressing Towards Goal     Problem: Hypertension  Goal: *Blood pressure within specified parameters  Outcome: Progressing Towards Goal  Goal: *Fluid volume balance  Outcome: Progressing Towards Goal  Goal: *Labs within defined limits  Outcome: Progressing Towards Goal     Problem: Heart Failure: Day 3  Goal: Off Pathway (Use only if patient is Off Pathway)  Outcome: Progressing Towards Goal  Goal: Activity/Safety  Outcome: Progressing Towards Goal  Goal: Diagnostic Test/Procedures  Outcome: Progressing Towards Goal  Goal: Nutrition/Diet  Outcome: Progressing Towards Goal  Goal: Discharge Planning  Outcome: Progressing Towards Goal  Goal: Medications  Outcome: Progressing Towards Goal  Goal: Respiratory  Outcome: Progressing Towards Goal  Goal: Treatments/Interventions/Procedures  Outcome: Progressing Towards Goal  Goal: Psychosocial  Outcome: Progressing Towards Goal  Goal: *Oxygen saturation within defined limits  Outcome: Progressing Towards Goal  Goal: *Hemodynamically stable  Outcome: Progressing Towards Goal  Goal: *Optimal pain control at patient's stated goal  Outcome: Progressing Towards Goal  Goal: *Anxiety reduced or absent  Outcome: Progressing Towards Goal  Goal: *Demonstrates progressive activity  Outcome: Progressing Towards Goal     Problem: Heart Failure: Day 4  Goal: Off Pathway (Use only if patient is Off Pathway)  Outcome: Progressing Towards Goal  Goal: Activity/Safety  Outcome: Progressing Towards Goal  Goal: Diagnostic Test/Procedures  Outcome: Progressing Towards Goal  Goal: Nutrition/Diet  Outcome: Progressing Towards Goal  Goal: Discharge Planning  Outcome: Progressing Towards Goal  Goal: Medications  Outcome: Progressing Towards Goal  Goal: Respiratory  Outcome: Progressing Towards Goal  Goal: Treatments/Interventions/Procedures  Outcome: Progressing Towards Goal  Goal: Psychosocial  Outcome: Progressing Towards Goal  Goal: *Oxygen saturation within defined limits  Outcome: Progressing Towards Goal  Goal: *Hemodynamically stable  Outcome: Progressing Towards Goal  Goal: *Optimal pain control at patient's stated goal  Outcome: Progressing Towards Goal  Goal: *Anxiety reduced or absent  Outcome: Progressing Towards Goal  Goal: *Demonstrates progressive activity  Outcome: Progressing Towards Goal     Problem: Heart Failure: Day 5  Goal: Off Pathway (Use only if patient is Off Pathway)  Outcome: Progressing Towards Goal  Goal: Activity/Safety  Outcome: Progressing Towards Goal  Goal: Diagnostic Test/Procedures  Outcome: Progressing Towards Goal  Goal: Nutrition/Diet  Outcome: Progressing Towards Goal  Goal: Discharge Planning  Outcome: Progressing Towards Goal  Goal: Medications  Outcome: Progressing Towards Goal  Goal: Respiratory  Outcome: Progressing Towards Goal  Goal: Treatments/Interventions/Procedures  Outcome: Progressing Towards Goal  Goal: Psychosocial  Outcome: Progressing Towards Goal     Problem: Heart Failure: Discharge Outcomes  Goal: *Demonstrates ability to perform prescribed activity without shortness of breath or discomfort  Outcome: Progressing Towards Goal  Goal: *Left ventricular function assessment completed prior to or during stay, or planned for post-discharge  Outcome: Progressing Towards Goal  Goal: *ACEI prescribed if LVEF less than 40% and no contraindications or ARB prescribed  Outcome: Progressing Towards Goal  Goal: *Verbalizes understanding and describes prescribed diet  Outcome: Progressing Towards Goal  Goal: *Verbalizes understanding/describes prescribed medications  Outcome: Progressing Towards Goal  Goal: *Describes available resources and support systems  Description: (eg: Home Health, Palliative Care, Advanced Medical Directive)  Outcome: Progressing Towards Goal  Goal: *Describes smoking cessation resources  Outcome: Progressing Towards Goal  Goal: *Understands and describes signs and symptoms to report to providers(Stroke Metric)  Outcome: Progressing Towards Goal  Goal: *Describes/verbalizes understanding of follow-up/return appt  Description: (eg: to physicians, diabetes treatment coordinator, and other resources  Outcome: Progressing Towards Goal  Goal: *Describes importance of continuing daily weights and changes to report to physician  Outcome: Progressing Towards Goal

## 2020-04-02 NOTE — PROGRESS NOTES
TRANSITION OF CARE PLAN:     Plan A: Home with family     Pt requested assistance with disability and social security. He states that he will need disability because he is a  and will not be able to work. CM will provide pt with Social Security contact information tomorrow morning. CM will continue to follow patient for discharge planning needs and arrange for services as deemed necessary.     [facility] -- [facility]- [phone]     Tobias Smith, Care Manager  [phone]

## 2020-04-02 NOTE — PROGRESS NOTES
Bedside shift change report given to Clementina Ramon (oncoming nurse) by Sixto Hutton (offgoing nurse). Report included the following information SBAR, Kardex, Intake/Output, MAR, Recent Results and Cardiac Rhythm Afib. 2230  CHG bath given as patient is pre op for possible pacer placement. 0130  Nurse checked w/ charge nurse. Patient is not on OR schedule for tomorrow and does not have NPO order. Patient was also told by cardiologist that pacer will be placed Friday. Will defer am CHG bath unless contacted by OR. Bedside shift change report given to Philly Amanda (oncoming nurse) by Clementina Ramon (offgoing nurse). Report included the following information SBAR, Kardex, Intake/Output, MAR, Recent Results and Cardiac Rhythm Afib.

## 2020-04-02 NOTE — PROGRESS NOTES
0700 Bedside shift change report given to DEIDRE Martin (oncoming nurse) by Clementina Ramon RN (offgoing nurse). Report included the following information SBAR, Kardex, Intake/Output, MAR, Recent Results and Cardiac Rhythm A Fib.     0730 Pt resting quietly in chair this AM. VSS. Pt talkative and in good spirits this morning. Plan for pacemaker placement tomorrow. Transfer order remains in place from yesterday. Awaiting bed placement. 6437 Order placed by cardiology to give metolazone 30 minutes prior to lasix. Administered metolazone now. 3847 Administered lasix. Cardiology at bedside speaking with pt. Plan to speak with case management today regarding discharge needs for pt. Consents for procedures signed and placed on pt's chart. 1100 Pt has some concerns regarding discharge needs. Will speak with CM.     1338 Spoke with MARY Blancas from [employer] calling to assist with pt's discharge needs. [facility]. For future needs, [facility] can be reached at [phone].     1508 Pt resting quietly in bed at this time. VSS. No complaints of pain at this time. 1537 Pt complaining of pain 5/10 in left leg. Administered PRN tramadol. Will reassess shortly. 1625 Pt stating pain is now 0/10 after pain medicine. Will continue to monitor. 1900 Bedside shift change report given to Roya Parikh RN (oncoming nurse) by DEIDRE Martin (offgoing nurse). Report included the following information SBAR, Kardex, Intake/Output, MAR, Recent Results and Cardiac Rhythm A Fib.

## 2020-04-02 NOTE — PROGRESS NOTES
[address]  [phone]      Cardiology Progress Note      4/2/2020 9:00 AM    Admit Date: 3/29/2020    Admit Diagnosis:   Atrial fibrillation with RVR ([address] [I48.91]  Atrial fibrillation with RVR ([address] [I48.91]    Subjective:     Asmita Pena is very anxious due his job stability. 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 Case Management will meet with the patient to discuss options. He diuresed 5L/24 hours with improvement in CR and LFTs, K+ stable. Remains in afib, HR controlled at 80-100bpm, PO Dilt, BB. Plan for pacemaker and AV pat ablation on 4/3/2020.        Visit Vitals  /81 (BP 1 Location: Left arm, BP Patient Position: At rest)   Pulse 80   Temp 97.7 °F (36.5 °C)   Resp 16   Ht 6' 2\" (1.88 m)   Wt 144.6 kg (318 lb 12.6 oz)   SpO2 98%   BMI 40.93 kg/m²       Current Facility-Administered Medications   Medication Dose Route Frequency    metOLazone (ZAROXOLYN) tablet 5 mg  5 mg Oral DAILY    metOLazone (ZAROXOLYN) tablet 5 mg  5 mg Oral DAILY    furosemide (LASIX) injection 80 mg  80 mg IntraVENous BID    dilTIAZem (CARDIZEM) IR tablet 60 mg  60 mg Oral Q6H    apixaban (ELIQUIS) tablet 5 mg  5 mg Oral Q12H    potassium chloride SR (KLOR-CON 10) tablet 40 mEq  40 mEq Oral TID    traMADoL (ULTRAM) tablet 50 mg  50 mg Oral Q6H PRN    predniSONE (DELTASONE) tablet 20 mg  20 mg Oral DAILY WITH BREAKFAST    cefTRIAXone (ROCEPHIN) 1 g in 0.9% sodium chloride (MBP/ADV) 50 mL  1 g IntraVENous Q24H    ondansetron (ZOFRAN) injection 4 mg  4 mg IntraVENous Q6H PRN    morphine injection 1 mg  1 mg IntraVENous Q3H PRN    metoprolol tartrate (LOPRESSOR) tablet 12.5 mg  12.5 mg Oral Q6H    bacitracin 500 unit/gram packet 1 Packet  1 Packet Topical PRN    sodium chloride (NS) flush 5-40 mL  5-40 mL IntraVENous Q8H    sodium chloride (NS) flush 5-40 mL  5-40 mL IntraVENous PRN       Objective:      Physical Exam:  General Appearance:  super morbidly obese  male in no acute distress  Chest:   Clear and diminished  Cardiovascular:  irr, irr no murmur. Abdomen:   morbidly obese, anasarca has improved,  +BS  Extremities: joe UE edema improving; LE +4 pitting edema; errythmic, warm to touch, edema to waist improving with diuresing   SKin:  Warm and dry. Data Review:   Recent Labs     04/02/20 0336 04/01/20 0338 03/31/20  1206   WBC 16.5* 16.6* 16.0*   HGB 13.2 13.0 13.2   HCT 41.1 40.4 40.7    216 255     Recent Labs     04/02/20 0336 04/01/20  0908 04/01/20 0338 03/31/20  1210 03/31/20  0415   * 135*  --   --  136   K 4.2 3.7  --  3.5 3.0*   CL 95* 97  --   --  96*   CO2 35* 33*  --   --  34*   * 127*  --   --  160*   BUN 30* 26*  --   --  23*   CREA 1.78* 1.92*  --   --  1.36*   CA 9.0 8.8  --   --  8.1*   MG  --  2.0  --   --  2.2   ALB 3.1* 3.0* 3.1*  --  2.7*   TBILI 1.3* 1.5* 1.5*  --  1.4*   SGOT 90* 119* 137*  --  236*   * 630* 693*  --  794*   INR  --   --  1.2*  --  1.3*       No results for input(s): TROIQ, CPK, CKMB in the last 72 hours. Intake/Output Summary (Last 24 hours) at 4/2/2020 0841  Last data filed at 4/2/2020 0540  Gross per 24 hour   Intake 1320 ml   Output 4975 ml   Net -3655 ml        Telemetry: afib rate controlled    Echo:  11/2019  · Left Ventricle: Normal cavity size. Mild concentric hypertrophy. Mild systolic dysfunction. Estimated left ventricular ejection fraction is 46 - 50%. · Right Ventricle: Mildly dilated right ventricle. Mildly reduced systolic function. · Left Atrium: Mildly dilated left atrium. · Mitral Valve: Mild mitral valve regurgitation is present. · Tricuspid Valve: Mild tricuspid valve regurgitation is present. · Pulmonary Artery: Moderate pulmonary hypertension. · Right Atrium: Mildly dilated right atrium.          Assessment:     Active Problems: Acute on chronic diastolic HF (heart failure) ([facility] (11/4/2019)      Morbid obesity ([facility] (11/4/2019)      ALYSSA on CPAP (11/4/2019)      Atrial fibrillation with rapid ventricular response ([facility] (11/5/2019)      Essential hypertension (12/4/2019)      Peripheral vascular disease ([facility] (2/25/2020)        Plan:     Afib with RVR:  Rate controlled, continue on Dilt 60mg QID, metoprolol 12.5mg BID and Eliquis. Plan for pacemaker and AV pat ablation Friday. Acute on chronic diastolic HF/HFpEF (EF 53% 11/2019)  Diuresed 5L/24hrs, continue with metolazone daily. CR, LFTs and K+ improving. Monitor I/Os, daily weights on stand up scales, and labs. Continue with KCL 40 meq TID  Wt down from 326# to 318# - total of approx 20#s. ALYSSA with CPAP:  States compliance with CPAP    Elevated LFTs:  Trending down  May be related to passive liver congestion due to exacerbation of DHF    Eladio. Yesenia Tamay[address] Cardiology    4/2/2020         Patient seen, examined by me personally. Plan discussed as detailed. Agree with note as outlined by  NP. I confirm findings in history and physical exam. No additional findings noted. Agree with plan as outlined above. Continue aggressive diuresis, watch renal function.     Anay Watson MD

## 2020-04-02 NOTE — PROGRESS NOTES
Cardiology Progress Note            [address] [facility]  [phone]    4/2/2020 8:49 AM    Admit Date: 3/29/2020    Admit Diagnosis: Atrial fibrillation with RVR ([address] [I48.91]; Atrial fibrillation with RVR ([address] [I48.91]    Subjective:     Joshua Dys   denies chest pain.     Visit Vitals  /81 (BP 1 Location: Left arm, BP Patient Position: At rest)   Pulse 80   Temp 97.7 °F (36.5 °C)   Resp 16   Ht 6' 2\" (1.88 m)   Wt 318 lb 12.6 oz (144.6 kg)   SpO2 98%   BMI 40.93 kg/m²     Current Facility-Administered Medications   Medication Dose Route Frequency    metOLazone (ZAROXOLYN) tablet 5 mg  5 mg Oral DAILY    metOLazone (ZAROXOLYN) tablet 5 mg  5 mg Oral DAILY    furosemide (LASIX) injection 80 mg  80 mg IntraVENous BID    dilTIAZem (CARDIZEM) IR tablet 60 mg  60 mg Oral Q6H    potassium chloride SR (KLOR-CON 10) tablet 40 mEq  40 mEq Oral TID    traMADoL (ULTRAM) tablet 50 mg  50 mg Oral Q6H PRN    predniSONE (DELTASONE) tablet 20 mg  20 mg Oral DAILY WITH BREAKFAST    cefTRIAXone (ROCEPHIN) 1 g in 0.9% sodium chloride (MBP/ADV) 50 mL  1 g IntraVENous Q24H    ondansetron (ZOFRAN) injection 4 mg  4 mg IntraVENous Q6H PRN    morphine injection 1 mg  1 mg IntraVENous Q3H PRN    metoprolol tartrate (LOPRESSOR) tablet 12.5 mg  12.5 mg Oral Q6H    bacitracin 500 unit/gram packet 1 Packet  1 Packet Topical PRN    sodium chloride (NS) flush 5-40 mL  5-40 mL IntraVENous Q8H    sodium chloride (NS) flush 5-40 mL  5-40 mL IntraVENous PRN         Objective:      Visit Vitals  /81 (BP 1 Location: Left arm, BP Patient Position: At rest)   Pulse 80   Temp 97.7 °F (36.5 °C)   Resp 16   Ht 6' 2\" (1.88 m)   Wt 318 lb 12.6 oz (144.6 kg)   SpO2 98%   BMI 40.93 kg/m²       Physical Exam:  Abdomen: soft, non-tender  Extremities: extremities normal  Heart: irr irr  Lungs: clear to auscultation bilaterally  Pulses: 2+ and symmetric    Data Review:   Labs:    Recent Labs 04/02/20  0336 04/01/20  0338 03/31/20  1206   WBC 16.5* 16.6* 16.0*   HGB 13.2 13.0 13.2   HCT 41.1 40.4 40.7    216 255     Recent Labs     04/02/20  0336 04/01/20  0908 04/01/20  0338 03/31/20  1210 03/31/20  0415   * 135*  --   --  136   K 4.2 3.7  --  3.5 3.0*   CL 95* 97  --   --  96*   CO2 35* 33*  --   --  34*   * 127*  --   --  160*   BUN 30* 26*  --   --  23*   CREA 1.78* 1.92*  --   --  1.36*   CA 9.0 8.8  --   --  8.1*   MG  --  2.0  --   --  2.2   ALB 3.1* 3.0* 3.1*  --  2.7*   TBILI 1.3* 1.5* 1.5*  --  1.4*   SGOT 90* 119* 137*  --  236*   * 630* 693*  --  794*   INR  --   --  1.2*  --  1.3*       No results for input(s): TROIQ, CPK, CKMB in the last 72 hours. Intake/Output Summary (Last 24 hours) at 4/2/2020 0849  Last data filed at 4/2/2020 0540  Gross per 24 hour   Intake 1320 ml   Output 4975 ml   Net -3655 ml        Telemetry: afib    Assessment:     Active Problems:    Acute on chronic diastolic HF (heart failure) ([facility] (11/4/2019)      Morbid obesity ([address] (11/4/2019)      ALYSSA on CPAP (11/4/2019)      Atrial fibrillation with rapid ventricular response ([address] (11/5/2019)      Essential hypertension (12/4/2019)      Peripheral vascular disease ([address] (2/25/2020)        Plan:     Brodie Negron is in afib. For av node abl/ppm tmrw. Npo p mn. Will hold oac. Reviewed procedure with patient.     Dinorah Johnson MD, [facility]    4/2/2020

## 2020-04-02 NOTE — PROGRESS NOTES
Problem: Falls - Risk of  Goal: *Absence of Falls  Description: Document Jagjit Long Fall Risk and appropriate interventions in the flowsheet.   Outcome: Progressing Towards Goal  Note: Fall Risk Interventions:  Mobility Interventions: Strengthening exercises (ROM-active/passive)         Medication Interventions: Assess postural VS orthostatic hypotension, Evaluate medications/consider consulting pharmacy, Patient to call before getting OOB, Teach patient to arise slowly    Elimination Interventions: Call light in reach, Patient to call for help with toileting needs, Urinal in reach    History of Falls Interventions: Bed/chair exit alarm         Problem: Patient Education: Go to Patient Education Activity  Goal: Patient/Family Education  Outcome: Progressing Towards Goal     Problem: Patient Education: Go to Patient Education Activity  Goal: Patient/Family Education  Outcome: Progressing Towards Goal     Problem: Afib Pathway: Day 1  Goal: Off Pathway (Use only if patient is Off Pathway)  Outcome: Progressing Towards Goal  Goal: Activity/Safety  Outcome: Progressing Towards Goal  Goal: Consults, if ordered  Outcome: Progressing Towards Goal  Goal: Diagnostic Test/Procedures  Outcome: Progressing Towards Goal  Goal: Nutrition/Diet  Outcome: Progressing Towards Goal  Goal: Discharge Planning  Outcome: Progressing Towards Goal  Goal: Medications  Outcome: Progressing Towards Goal  Goal: Respiratory  Outcome: Progressing Towards Goal  Goal: Treatments/Interventions/Procedures  Outcome: Progressing Towards Goal  Goal: Psychosocial  Outcome: Progressing Towards Goal  Goal: *Optimal pain control at patient's stated goal  Outcome: Progressing Towards Goal  Goal: *Hemodynamically stable  Outcome: Progressing Towards Goal  Goal: *Stable cardiac rhythm  Outcome: Progressing Towards Goal  Goal: *Lungs clear or at baseline  Outcome: Progressing Towards Goal  Goal: *Labs within defined limits  Outcome: Progressing Towards Goal  Goal: *Describes available resources and support systems  Outcome: Progressing Towards Goal     Problem: Afib Pathway: Day 2  Goal: Off Pathway (Use only if patient is Off Pathway)  Outcome: Progressing Towards Goal  Goal: Activity/Safety  Outcome: Progressing Towards Goal  Goal: Consults, if ordered  Outcome: Progressing Towards Goal  Goal: Diagnostic Test/Procedures  Outcome: Progressing Towards Goal  Goal: Nutrition/Diet  Outcome: Progressing Towards Goal  Goal: Discharge Planning  Outcome: Progressing Towards Goal  Goal: Medications  Outcome: Progressing Towards Goal  Goal: Respiratory  Outcome: Progressing Towards Goal  Goal: Treatments/Interventions/Procedures  Outcome: Progressing Towards Goal  Goal: Psychosocial  Outcome: Progressing Towards Goal  Goal: *Hemodynamically stable  Outcome: Progressing Towards Goal  Goal: *Optimal pain control at patient's stated goal  Outcome: Progressing Towards Goal  Goal: *Stable cardiac rhythm  Outcome: Progressing Towards Goal  Goal: *Lungs clear or at baseline  Outcome: Progressing Towards Goal  Goal: *Describes available resources and support systems  Outcome: Progressing Towards Goal     Problem: Afib Pathway: Day 3  Goal: Off Pathway (Use only if patient is Off Pathway)  Outcome: Progressing Towards Goal  Goal: Activity/Safety  Outcome: Progressing Towards Goal  Goal: Diagnostic Test/Procedures  Outcome: Progressing Towards Goal  Goal: Nutrition/Diet  Outcome: Progressing Towards Goal  Goal: Discharge Planning  Outcome: Progressing Towards Goal  Goal: Medications  Outcome: Progressing Towards Goal  Goal: Respiratory  Outcome: Progressing Towards Goal  Goal: Treatments/Interventions/Procedures  Outcome: Progressing Towards Goal  Goal: Psychosocial  Outcome: Progressing Towards Goal     Problem: Afib: Discharge Outcomes (not in CAT)  Goal: *Hemodynamically stable  Outcome: Progressing Towards Goal  Goal: *Stable cardiac rhythm  Outcome: Progressing Towards Goal  Goal: *Lungs clear or at baseline  Outcome: Progressing Towards Goal  Goal: *Optimal pain control at patient's stated goal  Outcome: Progressing Towards Goal  Goal: *Identifies cardiac risk factors  Outcome: Progressing Towards Goal  Goal: *Verbalizes home exercise program, activity guidelines, cardiac precautions  Outcome: Progressing Towards Goal  Goal: *Verbalizes understanding and describes prescribed diet  Outcome: Progressing Towards Goal  Goal: *Verbalizes understanding and describes medication purposes and frequencies  Outcome: Progressing Towards Goal  Goal: *Anxiety reduced or absent  Outcome: Progressing Towards Goal  Goal: *Understands and describes signs and symptoms to report to providers(Stroke Metric)  Outcome: Progressing Towards Goal  Goal: *Describes follow-up/return visits to physicians  Outcome: Progressing Towards Goal  Goal: *Describes available resources and support systems  Outcome: Progressing Towards Goal  Goal: *Influenza immunization  Outcome: Progressing Towards Goal  Goal: *Pneumococcal immunization  Outcome: Progressing Towards Goal  Goal: *Describes smoking cessation resources  Outcome: Progressing Towards Goal     Problem: Hypertension  Goal: *Blood pressure within specified parameters  Outcome: Progressing Towards Goal  Goal: *Fluid volume balance  Outcome: Progressing Towards Goal  Goal: *Labs within defined limits  Outcome: Progressing Towards Goal     Problem: Patient Education: Go to Patient Education Activity  Goal: Patient/Family Education  Outcome: Progressing Towards Goal     Problem: Patient Education: Go to Patient Education Activity  Goal: Patient/Family Education  Outcome: Progressing Towards Goal     Problem: Heart Failure: Day 1  Goal: Off Pathway (Use only if patient is Off Pathway)  Outcome: Progressing Towards Goal  Goal: Activity/Safety  Outcome: Progressing Towards Goal  Goal: Consults, if ordered  Outcome: Progressing Towards Goal  Goal: Diagnostic Test/Procedures  Outcome: Progressing Towards Goal  Goal: Nutrition/Diet  Outcome: Progressing Towards Goal  Goal: Discharge Planning  Outcome: Progressing Towards Goal  Goal: Medications  Outcome: Progressing Towards Goal  Goal: Respiratory  Outcome: Progressing Towards Goal  Goal: Treatments/Interventions/Procedures  Outcome: Progressing Towards Goal  Goal: Psychosocial  Outcome: Progressing Towards Goal  Goal: *Oxygen saturation within defined limits  Outcome: Progressing Towards Goal  Goal: *Hemodynamically stable  Outcome: Progressing Towards Goal  Goal: *Optimal pain control at patient's stated goal  Outcome: Progressing Towards Goal  Goal: *Anxiety reduced or absent  Outcome: Progressing Towards Goal     Problem: Heart Failure: Day 2  Goal: Off Pathway (Use only if patient is Off Pathway)  Outcome: Progressing Towards Goal  Goal: Activity/Safety  Outcome: Progressing Towards Goal  Goal: Consults, if ordered  Outcome: Progressing Towards Goal  Goal: Diagnostic Test/Procedures  Outcome: Progressing Towards Goal  Goal: Nutrition/Diet  Outcome: Progressing Towards Goal  Goal: Discharge Planning  Outcome: Progressing Towards Goal  Goal: Medications  Outcome: Progressing Towards Goal  Goal: Respiratory  Outcome: Progressing Towards Goal  Goal: Treatments/Interventions/Procedures  Outcome: Progressing Towards Goal  Goal: Psychosocial  Outcome: Progressing Towards Goal  Goal: *Oxygen saturation within defined limits  Outcome: Progressing Towards Goal  Goal: *Hemodynamically stable  Outcome: Progressing Towards Goal  Goal: *Optimal pain control at patient's stated goal  Outcome: Progressing Towards Goal  Goal: *Anxiety reduced or absent  Outcome: Progressing Towards Goal  Goal: *Demonstrates progressive activity  Outcome: Progressing Towards Goal     Problem: Heart Failure: Day 3  Goal: Off Pathway (Use only if patient is Off Pathway)  Outcome: Progressing Towards Goal  Goal: Activity/Safety  Outcome: Progressing Towards Goal  Goal: Diagnostic Test/Procedures  Outcome: Progressing Towards Goal  Goal: Nutrition/Diet  Outcome: Progressing Towards Goal  Goal: Discharge Planning  Outcome: Progressing Towards Goal  Goal: Medications  Outcome: Progressing Towards Goal  Goal: Respiratory  Outcome: Progressing Towards Goal  Goal: Treatments/Interventions/Procedures  Outcome: Progressing Towards Goal  Goal: Psychosocial  Outcome: Progressing Towards Goal  Goal: *Oxygen saturation within defined limits  Outcome: Progressing Towards Goal  Goal: *Hemodynamically stable  Outcome: Progressing Towards Goal  Goal: *Optimal pain control at patient's stated goal  Outcome: Progressing Towards Goal  Goal: *Anxiety reduced or absent  Outcome: Progressing Towards Goal  Goal: *Demonstrates progressive activity  Outcome: Progressing Towards Goal     Problem: Heart Failure: Day 4  Goal: Off Pathway (Use only if patient is Off Pathway)  Outcome: Progressing Towards Goal  Goal: Activity/Safety  Outcome: Progressing Towards Goal  Goal: Diagnostic Test/Procedures  Outcome: Progressing Towards Goal  Goal: Nutrition/Diet  Outcome: Progressing Towards Goal  Goal: Discharge Planning  Outcome: Progressing Towards Goal  Goal: Medications  Outcome: Progressing Towards Goal  Goal: Respiratory  Outcome: Progressing Towards Goal  Goal: Treatments/Interventions/Procedures  Outcome: Progressing Towards Goal  Goal: Psychosocial  Outcome: Progressing Towards Goal  Goal: *Oxygen saturation within defined limits  Outcome: Progressing Towards Goal  Goal: *Hemodynamically stable  Outcome: Progressing Towards Goal  Goal: *Optimal pain control at patient's stated goal  Outcome: Progressing Towards Goal  Goal: *Anxiety reduced or absent  Outcome: Progressing Towards Goal  Goal: *Demonstrates progressive activity  Outcome: Progressing Towards Goal     Problem: Heart Failure: Day 5  Goal: Off Pathway (Use only if patient is Off Pathway)  Outcome: Progressing Towards Goal  Goal: Activity/Safety  Outcome: Progressing Towards Goal  Goal: Diagnostic Test/Procedures  Outcome: Progressing Towards Goal  Goal: Nutrition/Diet  Outcome: Progressing Towards Goal  Goal: Discharge Planning  Outcome: Progressing Towards Goal  Goal: Medications  Outcome: Progressing Towards Goal  Goal: Respiratory  Outcome: Progressing Towards Goal  Goal: Treatments/Interventions/Procedures  Outcome: Progressing Towards Goal  Goal: Psychosocial  Outcome: Progressing Towards Goal     Problem: Heart Failure: Discharge Outcomes  Goal: *Demonstrates ability to perform prescribed activity without shortness of breath or discomfort  Outcome: Progressing Towards Goal  Goal: *Left ventricular function assessment completed prior to or during stay, or planned for post-discharge  Outcome: Progressing Towards Goal  Goal: *ACEI prescribed if LVEF less than 40% and no contraindications or ARB prescribed  Outcome: Progressing Towards Goal  Goal: *Verbalizes understanding and describes prescribed diet  Outcome: Progressing Towards Goal  Goal: *Verbalizes understanding/describes prescribed medications  Outcome: Progressing Towards Goal  Goal: *Describes available resources and support systems  Description: (eg: Home Health, Palliative Care, [employer] Directive)  Outcome: Progressing Towards Goal  Goal: *Describes smoking cessation resources  Outcome: Progressing Towards Goal  Goal: *Understands and describes signs and symptoms to report to providers(Stroke Metric)  Outcome: Progressing Towards Goal  Goal: *Describes/verbalizes understanding of follow-up/return appt  Description: (eg: to physicians, diabetes treatment coordinator, and other resources  Outcome: Progressing Towards Goal  Goal: *Describes importance of continuing daily weights and changes to report to physician  Outcome: Progressing Towards Goal

## 2020-04-03 ENCOUNTER — APPOINTMENT (OUTPATIENT)
Dept: VASCULAR SURGERY | Age: 60
DRG: 242 | End: 2020-04-03
Attending: NURSE PRACTITIONER
Payer: COMMERCIAL

## 2020-04-03 ENCOUNTER — ANESTHESIA (OUTPATIENT)
Dept: CARDIAC CATH/INVASIVE PROCEDURES | Age: 60
DRG: 242 | End: 2020-04-03
Payer: COMMERCIAL

## 2020-04-03 ENCOUNTER — ANESTHESIA EVENT (OUTPATIENT)
Dept: CARDIAC CATH/INVASIVE PROCEDURES | Age: 60
DRG: 242 | End: 2020-04-03
Payer: COMMERCIAL

## 2020-04-03 ENCOUNTER — APPOINTMENT (OUTPATIENT)
Dept: GENERAL RADIOLOGY | Age: 60
DRG: 242 | End: 2020-04-03
Attending: INTERNAL MEDICINE
Payer: COMMERCIAL

## 2020-04-03 PROBLEM — Z98.890 S/P AV (ATRIOVENTRICULAR) NODAL ABLATION: Status: ACTIVE | Noted: 2020-04-03

## 2020-04-03 PROBLEM — Z95.0 PACEMAKER: Status: ACTIVE | Noted: 2020-04-03

## 2020-04-03 LAB
ALBUMIN SERPL-MCNC: 3.1 G/DL (ref 3.5–5)
ALBUMIN/GLOB SERPL: 1.1 {RATIO} (ref 1.1–2.2)
ALP SERPL-CCNC: 112 U/L (ref 45–117)
ALT SERPL-CCNC: 393 U/L (ref 12–78)
ANION GAP SERPL CALC-SCNC: 3 MMOL/L (ref 5–15)
AST SERPL-CCNC: 56 U/L (ref 15–37)
BACTERIA SPEC CULT: NORMAL
BASOPHILS # BLD: 0 K/UL (ref 0–0.1)
BASOPHILS NFR BLD: 0 % (ref 0–1)
BILIRUB SERPL-MCNC: 1 MG/DL (ref 0.2–1)
BUN SERPL-MCNC: 32 MG/DL (ref 6–20)
BUN/CREAT SERPL: 19 (ref 12–20)
CALCIUM SERPL-MCNC: 9.1 MG/DL (ref 8.5–10.1)
CHLORIDE SERPL-SCNC: 94 MMOL/L (ref 97–108)
CO2 SERPL-SCNC: 37 MMOL/L (ref 21–32)
CREAT SERPL-MCNC: 1.71 MG/DL (ref 0.7–1.3)
DIFFERENTIAL METHOD BLD: ABNORMAL
EOSINOPHIL # BLD: 0 K/UL (ref 0–0.4)
EOSINOPHIL NFR BLD: 0 % (ref 0–7)
ERYTHROCYTE [DISTWIDTH] IN BLOOD BY AUTOMATED COUNT: 14.3 % (ref 11.5–14.5)
GLOBULIN SER CALC-MCNC: 2.9 G/DL (ref 2–4)
GLUCOSE SERPL-MCNC: 91 MG/DL (ref 65–100)
HCT VFR BLD AUTO: 39 % (ref 36.6–50.3)
HFE GENE MUT ANL BLD/T: NORMAL
HGB BLD-MCNC: 12.6 G/DL (ref 12.1–17)
IMM GRANULOCYTES # BLD AUTO: 0.1 K/UL (ref 0–0.04)
IMM GRANULOCYTES NFR BLD AUTO: 1 % (ref 0–0.5)
LEFT ABI: 1.36
LEFT ANTERIOR TIBIAL: 201 MMHG
LEFT ARM BP: 154 MMHG
LEFT ATA BP LEVEL: NORMAL
LEFT POSTERIOR TIBIAL: 210 MMHG
LEFT TBI: 0.66
LEFT TOE PRESSURE: 101 MMHG
LYMPHOCYTES # BLD: 1.5 K/UL (ref 0.8–3.5)
LYMPHOCYTES NFR BLD: 12 % (ref 12–49)
MCH RBC QN AUTO: 32.4 PG (ref 26–34)
MCHC RBC AUTO-ENTMCNC: 32.3 G/DL (ref 30–36.5)
MCV RBC AUTO: 100.3 FL (ref 80–99)
MONOCYTES # BLD: 1.2 K/UL (ref 0–1)
MONOCYTES NFR BLD: 9 % (ref 5–13)
NEUTS SEG # BLD: 10.6 K/UL (ref 1.8–8)
NEUTS SEG NFR BLD: 78 % (ref 32–75)
NRBC # BLD: 0 K/UL (ref 0–0.01)
NRBC BLD-RTO: 0 PER 100 WBC
PLATELET # BLD AUTO: 198 K/UL (ref 150–400)
PMV BLD AUTO: 11 FL (ref 8.9–12.9)
POTASSIUM SERPL-SCNC: 4 MMOL/L (ref 3.5–5.1)
PROT SERPL-MCNC: 6 G/DL (ref 6.4–8.2)
RBC # BLD AUTO: 3.89 M/UL (ref 4.1–5.7)
RIGHT ABI: 1.35
RIGHT ANTERIOR TIBIAL: 199 MMHG
RIGHT ATA BP LEVEL: NORMAL
RIGHT DIST ATA VELOCITY: 47.9 CM/S
RIGHT DIST PTA PSV: 60.7 CM/S
RIGHT MID ATA VELOCITY: 55.2 CM/S
RIGHT PERONEAL DIST VELOCITY: 40.6 CM/S
RIGHT POP A PROX VEL RATIO: 0.92
RIGHT POPLITEAL DIST SYS PSV: 58.9 CM/S
RIGHT POPLITEAL PROX SYS PSV: 64.4 CM/S
RIGHT POSTERIOR TIBIAL: 208 MMHG
RIGHT PROX ATA VELOCITY: 47.9 CM/S
RIGHT PROX PFA A PSV: 50.8 CM/S
RIGHT PROX PTA PSV: 57.1 CM/S
RIGHT PTA MID SYS PSV: 89.9 CM/S
RIGHT SFA DIST VEL RATIO: 0.67
RIGHT SFA MID VEL RATIO: 1.2
RIGHT SUPER FEMORAL DIST SYS PSV: 69.8 CM/S
RIGHT SUPER FEMORAL MID SYS PSV: 104.5 CM/S
RIGHT SUPER FEMORAL PROX SYS PSV: 88.1 CM/S
RIGHT TBI: 0
RIGHT TOE PRESSURE: 0 MMHG
SERVICE CMNT-IMP: NORMAL
SODIUM SERPL-SCNC: 134 MMOL/L (ref 136–145)
WBC # BLD AUTO: 13.4 K/UL (ref 4.1–11.1)

## 2020-04-03 PROCEDURE — 74011250636 HC RX REV CODE- 250/636: Performed by: NURSE ANESTHETIST, CERTIFIED REGISTERED

## 2020-04-03 PROCEDURE — 74011000258 HC RX REV CODE- 258: Performed by: INTERNAL MEDICINE

## 2020-04-03 PROCEDURE — 77030016894 HC CBL EP DX CATH3 STJU -B: Performed by: INTERNAL MEDICINE

## 2020-04-03 PROCEDURE — C1893 INTRO/SHEATH, FIXED,NON-PEEL: HCPCS | Performed by: INTERNAL MEDICINE

## 2020-04-03 PROCEDURE — 0JH604Z INSERTION OF PACEMAKER, SINGLE CHAMBER INTO CHEST SUBCUTANEOUS TISSUE AND FASCIA, OPEN APPROACH: ICD-10-PCS | Performed by: INTERNAL MEDICINE

## 2020-04-03 PROCEDURE — 77030029065 HC DRSG HEMO QCLOT ZMED -B: Performed by: INTERNAL MEDICINE

## 2020-04-03 PROCEDURE — 02HK3JZ INSERTION OF PACEMAKER LEAD INTO RIGHT VENTRICLE, PERCUTANEOUS APPROACH: ICD-10-PCS | Performed by: INTERNAL MEDICINE

## 2020-04-03 PROCEDURE — 76060000033 HC ANESTHESIA 1 TO 1.5 HR: Performed by: INTERNAL MEDICINE

## 2020-04-03 PROCEDURE — C1894 INTRO/SHEATH, NON-LASER: HCPCS | Performed by: INTERNAL MEDICINE

## 2020-04-03 PROCEDURE — 74011636637 HC RX REV CODE- 636/637: Performed by: INTERNAL MEDICINE

## 2020-04-03 PROCEDURE — 77030002996 HC SUT SLK J&J -A: Performed by: INTERNAL MEDICINE

## 2020-04-03 PROCEDURE — 02K83ZZ MAP CONDUCTION MECHANISM, PERCUTANEOUS APPROACH: ICD-10-PCS | Performed by: INTERNAL MEDICINE

## 2020-04-03 PROCEDURE — 65660000000 HC RM CCU STEPDOWN

## 2020-04-03 PROCEDURE — 77030027107 HC PTCH EXT REF CARTO3 J&J -F: Performed by: INTERNAL MEDICINE

## 2020-04-03 PROCEDURE — C1730 CATH, EP, 19 OR FEW ELECT: HCPCS | Performed by: INTERNAL MEDICINE

## 2020-04-03 PROCEDURE — 77030031139 HC SUT VCRL2 J&J -A: Performed by: INTERNAL MEDICINE

## 2020-04-03 PROCEDURE — 77030011992 HC AIRWY NASOPHGL TELE -A: Performed by: ANESTHESIOLOGY

## 2020-04-03 PROCEDURE — 85025 COMPLETE CBC W/AUTO DIFF WBC: CPT

## 2020-04-03 PROCEDURE — 02583ZZ DESTRUCTION OF CONDUCTION MECHANISM, PERCUTANEOUS APPROACH: ICD-10-PCS | Performed by: INTERNAL MEDICINE

## 2020-04-03 PROCEDURE — 74011250636 HC RX REV CODE- 250/636: Performed by: INTERNAL MEDICINE

## 2020-04-03 PROCEDURE — 74011250636 HC RX REV CODE- 250/636: Performed by: NURSE PRACTITIONER

## 2020-04-03 PROCEDURE — 77030018673: Performed by: INTERNAL MEDICINE

## 2020-04-03 PROCEDURE — 4A0234Z MEASUREMENT OF CARDIAC ELECTRICAL ACTIVITY, PERCUTANEOUS APPROACH: ICD-10-PCS | Performed by: INTERNAL MEDICINE

## 2020-04-03 PROCEDURE — 80053 COMPREHEN METABOLIC PANEL: CPT

## 2020-04-03 PROCEDURE — 4A023FZ MEASUREMENT OF CARDIAC RHYTHM, PERCUTANEOUS APPROACH: ICD-10-PCS | Performed by: INTERNAL MEDICINE

## 2020-04-03 PROCEDURE — 74011250637 HC RX REV CODE- 250/637: Performed by: NURSE PRACTITIONER

## 2020-04-03 PROCEDURE — 93650 ICAR CATH ABLTJ AV NODE FUNC: CPT | Performed by: INTERNAL MEDICINE

## 2020-04-03 PROCEDURE — 93922 UPR/L XTREMITY ART 2 LEVELS: CPT

## 2020-04-03 PROCEDURE — C1898 LEAD, PMKR, OTHER THAN TRANS: HCPCS | Performed by: INTERNAL MEDICINE

## 2020-04-03 PROCEDURE — 93926 LOWER EXTREMITY STUDY: CPT

## 2020-04-03 PROCEDURE — 33207 INSERT HEART PM VENTRICULAR: CPT | Performed by: INTERNAL MEDICINE

## 2020-04-03 PROCEDURE — 74011250637 HC RX REV CODE- 250/637: Performed by: INTERNAL MEDICINE

## 2020-04-03 PROCEDURE — 74011000250 HC RX REV CODE- 250: Performed by: INTERNAL MEDICINE

## 2020-04-03 PROCEDURE — 36415 COLL VENOUS BLD VENIPUNCTURE: CPT

## 2020-04-03 PROCEDURE — 71045 X-RAY EXAM CHEST 1 VIEW: CPT

## 2020-04-03 PROCEDURE — C1786 PMKR, SINGLE, RATE-RESP: HCPCS | Performed by: INTERNAL MEDICINE

## 2020-04-03 PROCEDURE — 77010033678 HC OXYGEN DAILY

## 2020-04-03 PROCEDURE — 77030018729 HC ELECTRD DEFIB PAD CARD -B: Performed by: INTERNAL MEDICINE

## 2020-04-03 PROCEDURE — C1732 CATH, EP, DIAG/ABL, 3D/VECT: HCPCS | Performed by: INTERNAL MEDICINE

## 2020-04-03 DEVICE — PACEMAKER CRD UPLR/BPLR 50.8X42.6X7.4 MM 12.25 CC 22.5 GM: Type: IMPLANTABLE DEVICE | Status: FUNCTIONAL

## 2020-04-03 DEVICE — LEAD PCMKR CAPSUR FIX NOVUS 58 --: Type: IMPLANTABLE DEVICE | Status: FUNCTIONAL

## 2020-04-03 RX ORDER — DIPHENHYDRAMINE HYDROCHLORIDE 50 MG/ML
INJECTION, SOLUTION INTRAMUSCULAR; INTRAVENOUS
Status: DISPENSED
Start: 2020-04-03 | End: 2020-04-04

## 2020-04-03 RX ORDER — PROPOFOL 10 MG/ML
INJECTION, EMULSION INTRAVENOUS AS NEEDED
Status: DISCONTINUED | OUTPATIENT
Start: 2020-04-03 | End: 2020-04-03 | Stop reason: HOSPADM

## 2020-04-03 RX ORDER — NALOXONE HYDROCHLORIDE 0.4 MG/ML
0.4 INJECTION, SOLUTION INTRAMUSCULAR; INTRAVENOUS; SUBCUTANEOUS AS NEEDED
Status: DISCONTINUED | OUTPATIENT
Start: 2020-04-03 | End: 2020-04-04 | Stop reason: HOSPADM

## 2020-04-03 RX ORDER — DIPHENHYDRAMINE HYDROCHLORIDE 50 MG/ML
50 INJECTION, SOLUTION INTRAMUSCULAR; INTRAVENOUS ONCE
Status: COMPLETED | OUTPATIENT
Start: 2020-04-03 | End: 2020-04-03

## 2020-04-03 RX ORDER — SODIUM CHLORIDE 0.9 % (FLUSH) 0.9 %
5-40 SYRINGE (ML) INJECTION EVERY 8 HOURS
Status: DISCONTINUED | OUTPATIENT
Start: 2020-04-03 | End: 2020-04-04 | Stop reason: HOSPADM

## 2020-04-03 RX ORDER — LIDOCAINE HYDROCHLORIDE 10 MG/ML
INJECTION INFILTRATION; PERINEURAL AS NEEDED
Status: DISCONTINUED | OUTPATIENT
Start: 2020-04-03 | End: 2020-04-03 | Stop reason: HOSPADM

## 2020-04-03 RX ORDER — METOPROLOL TARTRATE 25 MG/1
25 TABLET, FILM COATED ORAL EVERY 12 HOURS
Status: DISCONTINUED | OUTPATIENT
Start: 2020-04-03 | End: 2020-04-04 | Stop reason: HOSPADM

## 2020-04-03 RX ORDER — BACITRACIN 50000 [IU]/1
INJECTION, POWDER, FOR SOLUTION INTRAMUSCULAR AS NEEDED
Status: DISCONTINUED | OUTPATIENT
Start: 2020-04-03 | End: 2020-04-03 | Stop reason: HOSPADM

## 2020-04-03 RX ORDER — SODIUM CHLORIDE 0.9 % (FLUSH) 0.9 %
5-40 SYRINGE (ML) INJECTION AS NEEDED
Status: DISCONTINUED | OUTPATIENT
Start: 2020-04-03 | End: 2020-04-04 | Stop reason: HOSPADM

## 2020-04-03 RX ORDER — HEPARIN SODIUM 200 [USP'U]/100ML
INJECTION, SOLUTION INTRAVENOUS
Status: COMPLETED | OUTPATIENT
Start: 2020-04-03 | End: 2020-04-03

## 2020-04-03 RX ORDER — FENTANYL CITRATE 50 UG/ML
INJECTION, SOLUTION INTRAMUSCULAR; INTRAVENOUS AS NEEDED
Status: DISCONTINUED | OUTPATIENT
Start: 2020-04-03 | End: 2020-04-03 | Stop reason: HOSPADM

## 2020-04-03 RX ORDER — SODIUM CHLORIDE 9 MG/ML
INJECTION, SOLUTION INTRAVENOUS
Status: DISCONTINUED | OUTPATIENT
Start: 2020-04-03 | End: 2020-04-03 | Stop reason: HOSPADM

## 2020-04-03 RX ORDER — HYDROCORTISONE SODIUM SUCCINATE 100 MG/2ML
100 INJECTION, POWDER, FOR SOLUTION INTRAMUSCULAR; INTRAVENOUS ONCE
Status: COMPLETED | OUTPATIENT
Start: 2020-04-03 | End: 2020-04-03

## 2020-04-03 RX ORDER — PROPOFOL 10 MG/ML
INJECTION, EMULSION INTRAVENOUS
Status: DISCONTINUED | OUTPATIENT
Start: 2020-04-03 | End: 2020-04-03 | Stop reason: HOSPADM

## 2020-04-03 RX ORDER — MIDAZOLAM HYDROCHLORIDE 1 MG/ML
INJECTION, SOLUTION INTRAMUSCULAR; INTRAVENOUS AS NEEDED
Status: DISCONTINUED | OUTPATIENT
Start: 2020-04-03 | End: 2020-04-03 | Stop reason: HOSPADM

## 2020-04-03 RX ADMIN — POTASSIUM CHLORIDE 40 MEQ: 750 TABLET, FILM COATED, EXTENDED RELEASE ORAL at 13:37

## 2020-04-03 RX ADMIN — FENTANYL CITRATE 100 MCG: 50 INJECTION INTRAMUSCULAR; INTRAVENOUS at 08:24

## 2020-04-03 RX ADMIN — METOPROLOL TARTRATE 25 MG: 25 TABLET, FILM COATED ORAL at 21:43

## 2020-04-03 RX ADMIN — PROPOFOL 75 MCG/KG/MIN: 10 INJECTION, EMULSION INTRAVENOUS at 07:56

## 2020-04-03 RX ADMIN — FUROSEMIDE 80 MG: 10 INJECTION, SOLUTION INTRAMUSCULAR; INTRAVENOUS at 13:37

## 2020-04-03 RX ADMIN — POTASSIUM CHLORIDE 40 MEQ: 750 TABLET, FILM COATED, EXTENDED RELEASE ORAL at 21:43

## 2020-04-03 RX ADMIN — Medication 10 ML: at 06:27

## 2020-04-03 RX ADMIN — SODIUM CHLORIDE 1 G: 900 INJECTION, SOLUTION INTRAVENOUS at 07:57

## 2020-04-03 RX ADMIN — METOPROLOL TARTRATE 12.5 MG: 25 TABLET ORAL at 00:10

## 2020-04-03 RX ADMIN — MIDAZOLAM 1 MG: 1 INJECTION INTRAMUSCULAR; INTRAVENOUS at 07:58

## 2020-04-03 RX ADMIN — FENTANYL CITRATE 50 MCG: 50 INJECTION INTRAMUSCULAR; INTRAVENOUS at 08:03

## 2020-04-03 RX ADMIN — MIDAZOLAM 1 MG: 1 INJECTION INTRAMUSCULAR; INTRAVENOUS at 07:53

## 2020-04-03 RX ADMIN — Medication 10 ML: at 21:44

## 2020-04-03 RX ADMIN — FENTANYL CITRATE 50 MCG: 50 INJECTION INTRAMUSCULAR; INTRAVENOUS at 08:25

## 2020-04-03 RX ADMIN — FENTANYL CITRATE 50 MCG: 50 INJECTION INTRAMUSCULAR; INTRAVENOUS at 07:58

## 2020-04-03 RX ADMIN — DILTIAZEM HYDROCHLORIDE 60 MG: 60 TABLET, FILM COATED ORAL at 03:56

## 2020-04-03 RX ADMIN — HYDROCORTISONE SODIUM SUCCINATE 100 MG: 100 INJECTION, POWDER, FOR SOLUTION INTRAMUSCULAR; INTRAVENOUS at 14:24

## 2020-04-03 RX ADMIN — PREDNISONE 20 MG: 20 TABLET ORAL at 13:37

## 2020-04-03 RX ADMIN — APIXABAN 5 MG: 5 TABLET, FILM COATED ORAL at 16:20

## 2020-04-03 RX ADMIN — PROPOFOL 50 MG: 10 INJECTION, EMULSION INTRAVENOUS at 07:58

## 2020-04-03 RX ADMIN — CEFTRIAXONE 1 G: 1 INJECTION, POWDER, FOR SOLUTION INTRAMUSCULAR; INTRAVENOUS at 16:22

## 2020-04-03 RX ADMIN — METOLAZONE 5 MG: 5 TABLET ORAL at 13:37

## 2020-04-03 RX ADMIN — METOPROLOL TARTRATE 25 MG: 25 TABLET, FILM COATED ORAL at 13:37

## 2020-04-03 RX ADMIN — SODIUM CHLORIDE: 900 INJECTION, SOLUTION INTRAVENOUS at 07:50

## 2020-04-03 RX ADMIN — METOPROLOL TARTRATE 12.5 MG: 25 TABLET ORAL at 06:26

## 2020-04-03 RX ADMIN — DIPHENHYDRAMINE HYDROCHLORIDE 50 MG: 50 INJECTION, SOLUTION INTRAMUSCULAR; INTRAVENOUS at 14:23

## 2020-04-03 NOTE — ROUTINE PROCESS
Attempted to schedule PCP BRANDEE apt with Dr. Santiago Batista, left message and currently awaiting return call.

## 2020-04-03 NOTE — PROGRESS NOTES
Cardiology Progress Note            [address], [place], [address] [facility]  [phone]    4/3/2020 9:00 AM    Admit Date: 3/29/2020    Admit Diagnosis: Atrial fibrillation with RVR ([address] [I48.91]; Atrial fibrillation with RVR ([address] [I48.91]    Subjective:     Chepe Meals   denies chest pain.     Visit Vitals  BP (!) 137/96   Pulse 79   Temp 98.3 °F (36.8 °C)   Resp 18   Ht 6' 2\" (1.88 m)   Wt 309 lb 15.5 oz (140.6 kg)   SpO2 97%   BMI 39.80 kg/m²     Current Facility-Administered Medications   Medication Dose Route Frequency    heparinized saline 2 units/mL infusion    CONTINUOUS    lidocaine (XYLOCAINE) 10 mg/mL (1 %) injection    PRN    bacitracin injection    PRN    heparinized saline 2 units/mL infusion    CONTINUOUS    isoproterenol (ISUPREL) 500 mcg in 0.9% sodium chloride 250 mL infusion    CONTINUOUS    metOLazone (ZAROXOLYN) tablet 5 mg  5 mg Oral DAILY    furosemide (LASIX) injection 80 mg  80 mg IntraVENous BID    dilTIAZem (CARDIZEM) IR tablet 60 mg  60 mg Oral Q6H    potassium chloride SR (KLOR-CON 10) tablet 40 mEq  40 mEq Oral TID    traMADoL (ULTRAM) tablet 50 mg  50 mg Oral Q6H PRN    predniSONE (DELTASONE) tablet 20 mg  20 mg Oral DAILY WITH BREAKFAST    cefTRIAXone (ROCEPHIN) 1 g in 0.9% sodium chloride (MBP/ADV) 50 mL  1 g IntraVENous Q24H    ondansetron (ZOFRAN) injection 4 mg  4 mg IntraVENous Q6H PRN    morphine injection 1 mg  1 mg IntraVENous Q3H PRN    metoprolol tartrate (LOPRESSOR) tablet 12.5 mg  12.5 mg Oral Q6H    bacitracin 500 unit/gram packet 1 Packet  1 Packet Topical PRN    sodium chloride (NS) flush 5-40 mL  5-40 mL IntraVENous Q8H    sodium chloride (NS) flush 5-40 mL  5-40 mL IntraVENous PRN     Facility-Administered Medications Ordered in Other Encounters   Medication Dose Route Frequency    vancomycin (VANCOCIN) 1 g in 0.9% sodium chloride (MBP/ADV) 250 mL adv   IntraVENous CONTINUOUS    0.9% sodium chloride infusion   IntraVENous CONTINUOUS    midazolam (VERSED) injection   IntraVENous PRN    fentaNYL citrate (PF) injection    PRN    propofoL (DIPRIVAN) 10 mg/mL injection   IntraVENous CONTINUOUS    propofoL (DIPRIVAN) 10 mg/mL injection   IntraVENous PRN         Objective:      Visit Vitals  BP (!) 137/96   Pulse 79   Temp 98.3 °F (36.8 °C)   Resp 18   Ht 6' 2\" (1.88 m)   Wt 309 lb 15.5 oz (140.6 kg)   SpO2 97%   BMI 39.80 kg/m²       Physical Exam:  Abdomen: soft, non-tender  Extremities: extremities normal  Heart: regular rate and rhythm  Lungs: clear to auscultation bilaterally  Pulses: 2+ and symmetric    Data Review:   Labs:    Recent Labs     04/03/20  0605 04/02/20  0336 04/01/20  0338   WBC 13.4* 16.5* 16.6*   HGB 12.6 13.2 13.0   HCT 39.0 41.1 40.4    235 216     Recent Labs     04/03/20  0605 04/02/20  0336 04/01/20  0908 04/01/20  0338   * 134* 135*  --    K 4.0 4.2 3.7  --    CL 94* 95* 97  --    CO2 37* 35* 33*  --    GLU 91 107* 127*  --    BUN 32* 30* 26*  --    CREA 1.71* 1.78* 1.92*  --    CA 9.1 9.0 8.8  --    MG  --   --  2.0  --    ALB 3.1* 3.1* 3.0* 3.1*   TBILI 1.0 1.3* 1.5* 1.5*   SGOT 56* 90* 119* 137*   * 528* 630* 693*   INR  --   --   --  1.2*       No results for input(s): TROIQ, CPK, CKMB in the last 72 hours. Intake/Output Summary (Last 24 hours) at 4/3/2020 0900  Last data filed at 4/3/2020 8933  Gross per 24 hour   Intake 700 ml   Output 5800 ml   Net -5100 ml        Telemetry: v paced    Assessment:     Active Problems:    Acute on chronic diastolic HF (heart failure) ([facility] (11/4/2019)      Morbid obesity ([facility] (11/4/2019)      ALYSSA on CPAP (11/4/2019)      [facility] fibrillation with [facility] response ([facility] (11/5/2019)      Essential hypertension (12/4/2019)      Peripheral vascular disease ([facility] (2/25/2020)        Plan:     Sumanth Zee is sp av node abl/ppm. Can start oac tonite. Will dc dilt and see how his bp does.  gen cards per Dr Enoc Urbina, MD, [facility]    4/3/2020

## 2020-04-03 NOTE — PROGRESS NOTES
Hospitalist Progress Note    NAME: Flavia Krishnan   :  1960   MRN:  [id number]     I reviewed with the resident the medical history and the resident's findings on the physical examination. I discussed with the resident the patient's diagnosis and concur with the plan. Interim Hospital Summary: 60 y.o. male whom presented on 3/29/2020 with Nausea, vomiting, and shortness of breath     Assessment / Plan:  New onset left MTP gout  Cellulitis (improving)  Leukocytosis (improving); pt had leukocytosis in the past without source of infection  - less pain than yesterday. Continue with steroid which was started on 3/31 to complete total 5 doses    Uric acid 12.9    See photo below    Continue with IV rocephin; WBC down to 13.4 today vs 28.9 on admission. Pt has had hx of leukocytosis in the past (between January to , WBC as 20K)    PO steroids x 5 days     Hypokalemia POA  - resolved; will continue to monitor while on lasix     SIRS POA WBC 28.9 , HR 160s, RR 22, no fevers   Elevated lactate 4.0 POA  - Blood cultures (3/29 &) no growth so far     CT of abd/pel:   1. Features suggestive of chronic gallbladder disease-correlate clinically. 2. Nonobstructing right nephrolithiasis. 3. Generalized body wall edema. CXR with no ASD    UA (-)    Received IV zosyn on admission.  continue with rocephin to treat cellulitis; will complete total of 10 days    Lactic acid 4.0->2.3    No abd pain, no fever/chills, no n/v, tolerating cardiac diet without difficulty     Acute hepatitis POA; resolving  - appreciate GI input; etiology unclear, suspect drug related    Denies ETOH use    Hepatitis B and C negative    HSV-1 IgG positive, but not at risk for HSV hepatitis        HSV hepatitis unlikley, hold acyclovir        HSV 1 & 2 by PCR pending    LFTs are improving     Persistent atrial fib with RVR POA   S/p Pacemaker by Dr. Nazario Gama and AVN ablation by Dr. Lita Reyes (4/3)  Hypercoagulable state secondary to a-fib  Essential hypertension POA  - S/P cardizem drip, heparin drip.      Continue with PO cardizem and metoprolol, PO eliquis    Appreciate cardiology input;     Right foot cold to touch, palpable pulse, able to move toes, and decreased sensation after the procedure; LINDSEY and duplex study in low extremity study reviewed by Dr. Lora Shen which revealed no significant stenosis. No further vascular intervention at this time. Will continue to follow.     Anasarca POA  Acute on chronic diastolic congestive heart failure POA  -  Echo: Normal cavity size and systolic function (ejection fraction normal). Mild concentric hypertrophy. Estimated left ventricular ejection fraction is 55 - 60%. Continue with Lasix & metolazone; daily weight 326lbs -> 318lbs -> 309lbs     Strict I & O    JUANJO  - improving; creat 1.7 (was 2.2 on admission)    Avoid nephrotoxic agetns      Morbidly obese  BMI 43.25 kg/m²       Code Status: Full code     DVT Prophylaxis: eliquis  GI Prophylaxis: not indicated     Baseline: Independent    Recommended Disposition:  PT, OT, RN     Subjective:     Chief Complaint / Reason for Physician Visit  \"I am feeling just fine for now. \"  Discussed with RN events overnight. Review of Systems:  Symptom Y/N Comments  Symptom Y/N Comments   Fever/Chills n   Chest Pain n    Poor Appetite n   Edema y    Cough    Abdominal Pain n    Sputum    Joint Pain     SOB/ELIZONDO n   Pruritis/Rash y    Nausea/vomit n   Tolerating PT/OT     Diarrhea n   Tolerating Diet     Constipation n   Other       Could NOT obtain due to:      Objective:     VITALS:   Last 24hrs VS reviewed since prior progress note.  Most recent are:  Patient Vitals for the past 24 hrs:   Temp Pulse Resp BP SpO2   04/03/20 0626  79      04/03/20 0356 98.3 °F (36.8 °C) 71 18 (!) 137/96 97 %   04/02/20 2307 98.5 °F (36.9 °C) 73 18 136/89 95 %   04/02/20 2247 98.2 °F (36.8 °C)       04/02/20 2040 98.6 °F (37 °C) 75 20 (!) 118/104 96 % 04/02/20 1600  77      04/02/20 1508 98.4 °F (36.9 °C) 74 16 131/87 99 %   04/02/20 1453    131/87    04/02/20 1158  91      04/02/20 1103 97.9 °F (36.6 °C) 72 18 140/75 98 %   04/02/20 0800  80      04/02/20 0726 97.7 °F (36.5 °C) 80 16 132/81 98 %       Intake/Output Summary (Last 24 hours) at 4/3/2020 0630  Last data filed at 4/3/2020 1726  Gross per 24 hour   Intake 940 ml   Output 6000 ml   Net -5060 ml        PHYSICAL EXAM:  General: Morbidly obese, ill appearing male. Alert, cooperative, no acute distress    EENT:  EOMI. Anicteric sclerae. MMM  Resp:  Clear in apex with decreased breath sounds at bases. no wheezing or rales. No accessory muscle use  CV:  regular  rhythm,  3+ edema  GI:  Soft, obese, Non tender. +Bowel sounds  Neurologic:  Alert and oriented X 3, normal speech,   Psych:   Good insight. Not anxious nor agitated  Skin:    No jaundice      4/1/2020                       4/3 (left)                        4/3(right); full ROM, decrease sensation,                                                                              palpable pulse            Reviewed most current lab test results and cultures  YES  Reviewed most current radiology test results   YES  Review and summation of old records today    NO  Reviewed patient's current orders and MAR    YES  PMH/ reviewed - no change compared to H&P  ________________________________________________________________________  Care Plan discussed with:    Comments   Patient y    Family      RN y    Care Manager     Consultant                        Multidiciplinary team rounds were held today with , nursing, pharmacist and clinical coordinator. Patient's plan of care was discussed; medications were reviewed and discharge planning was addressed.      ____________________________________________________________________  Paola Tavarez NP     Procedures: see electronic medical records for all procedures/Xrays and details which were not copied into this note but were reviewed prior to creation of Plan. LABS:  I reviewed today's most current labs and imaging studies.   Pertinent labs include:  Recent Labs     04/03/20  0605 04/02/20 0336 04/01/20 0338   WBC 13.4* 16.5* 16.6*   HGB 12.6 13.2 13.0   HCT 39.0 41.1 40.4    235 216     Recent Labs     04/02/20 0336 04/01/20  0908 04/01/20 0338 03/31/20  1210   * 135*  --   --    K 4.2 3.7  --  3.5   CL 95* 97  --   --    CO2 35* 33*  --   --    * 127*  --   --    BUN 30* 26*  --   --    CREA 1.78* 1.92*  --   --    CA 9.0 8.8  --   --    MG  --  2.0  --   --    ALB 3.1* 3.0* 3.1*  --    TBILI 1.3* 1.5* 1.5*  --    SGOT 90* 119* 137*  --    * 630* 693*  --    INR  --   --  1.2*  --        Signed: )Heidi Stanford, NP

## 2020-04-03 NOTE — PROGRESS NOTES
Problem: Falls - Risk of  Goal: *Absence of Falls  Description: Document Derrick Romano Fall Risk and appropriate interventions in the flowsheet.   Outcome: Progressing Towards Goal  Note: Fall Risk Interventions:  Mobility Interventions: Patient to call before getting OOB, Strengthening exercises (ROM-active/passive)         Medication Interventions: Assess postural VS orthostatic hypotension, Evaluate medications/consider consulting pharmacy, Patient to call before getting OOB    Elimination Interventions: Call light in reach, Patient to call for help with toileting needs, Urinal in reach    History of Falls Interventions: Evaluate medications/consider consulting pharmacy, Room close to nurse's station         Problem: Hypertension  Goal: *Blood pressure within specified parameters  Outcome: Progressing Towards Goal  Goal: *Fluid volume balance  Outcome: Progressing Towards Goal  Goal: *Labs within defined limits  Outcome: Progressing Towards Goal     Problem: Afib Pathway: Day 3  Goal: Medications  Outcome: Progressing Towards Goal

## 2020-04-03 NOTE — PROGRESS NOTES
Received pt back from the procedure area post PPi and AVN. Pt very sleepy   o2 on at 6 l. Report from Enrique Barnes CRNA and Faisal Hill RN. Left chest dressing dry and intact. Ice pack on site  No bleeding or hematoma. Right groin dressing dry and intact. Pt arouseable.

## 2020-04-03 NOTE — PROGRESS NOTES
Bedside and Verbal shift change report given to Heather Stephen RN (oncoming nurse) by Melvi Kelley RN (offgoing nurse). Report included the following information SBAR, Kardex, MAR, Recent Results and Cardiac Rhythm A-fib controlled. 2178- Report given to [facility] on [address] No assessment or meds given to the patient. Patient was off of the floor before RN was able to assess the patient.

## 2020-04-03 NOTE — ANESTHESIA POSTPROCEDURE EVALUATION
Procedure(s):  INSERT PPM SINGLE VENTRICULAR  ABLATION AV NODE.    total IV anesthesia, general    Anesthesia Post Evaluation        Patient location during evaluation: PACU  Note status: Adequate. Level of consciousness: responsive to verbal stimuli and sleepy but conscious  Pain management: satisfactory to patient  Airway patency: patent  Anesthetic complications: no  Cardiovascular status: acceptable  Respiratory status: acceptable  Hydration status: acceptable  Comments: +Post-Anesthesia Evaluation and Assessment    Patient: Yana Shoemaker MRN: [id number]  SSN: [id number]   YOB: 1960  Age: 60 y.o. Sex: male      Cardiovascular Function/Vital Signs    BP (!) 151/109   Pulse 75   Temp 36.7 °C (98 °F)   Resp 20   Ht 6' 2\" (1.88 m)   Wt 140.6 kg (309 lb 15.5 oz)   SpO2 98%   BMI 39.80 kg/m²     Patient is status post Procedure(s):  INSERT PPM SINGLE VENTRICULAR  ABLATION AV NODE. Nausea/Vomiting: Controlled. Postoperative hydration reviewed and adequate. Pain:  Pain Scale 1: Numeric (0 - 10) (04/03/20 0925)  Pain Intensity 1: 0 (04/03/20 0925)   Managed. Neurological Status: At baseline. Mental Status and Level of Consciousness: Arousable. Pulmonary Status:   O2 Device: Nasal cannula (04/03/20 0925)   Adequate oxygenation and airway patent. Complications related to anesthesia: None    Post-anesthesia assessment completed. No concerns. Signed By: Smiley Garnica MD    4/3/2020  Post anesthesia nausea and vomiting:  controlled      Vitals Value Taken Time   /101 4/3/2020  9:40 AM   Temp     Pulse 77 4/3/2020  9:42 AM   Resp 25 4/3/2020  9:42 AM   SpO2 91 % 4/3/2020  9:42 AM   Vitals shown include unvalidated device data.

## 2020-04-03 NOTE — CONSULTS
Consulted by hospitalist for a cool dusky right foot following a right AV node ablation. Patient currently being treated for left foot cellulitis. Electrophysiology had previously consulted Dr. Zack Sanchez who has agreed to see the patient. We will defer vascular care to him at this time. Dr. Zack Sanchez can re-consult Dr. Krysta Hernandez as needed.

## 2020-04-03 NOTE — PROGRESS NOTES
[address]  [phone]      Cardiology Progress Note      4/3/2020 1255 PM    Admit Date: 3/29/2020    Admit Diagnosis:   Atrial fibrillation with RVR ([address] [I48.91]  Atrial fibrillation with RVR ([address] [I48.91]    Interval History/Subjective:     Mervat Lock is a 60 y.o. male admitted for Atrial fibrillation with RVR ([address] [I48.91]  [facility] [I48.91]    -VSS- BP slightly elevated  -creat 1.71; LFTs decreasing   -weight down 9#; I/O neg 5L  -s/p pacemaker and AVN ablation  -Mr. Tomi Camara is feeling well. He does not have any site pain. He does have some pain in his left great toe that he wonders if it's gout. Left great toe very tender to touch. Duskiness to R foot noted when comparing feet. Patient denies pain to his right foot. Able to move his toes. Has slight tingling sensation at the dorsal surface, but no numbness.      Visit Vitals  /74   Pulse 75   Temp 98 °F (36.7 °C)   Resp 21   Ht 6' 2\" (1.88 m)   Wt 140.6 kg (309 lb 15.5 oz)   SpO2 96%   BMI 39.80 kg/m²       Current Facility-Administered Medications   Medication Dose Route Frequency    diphenhydrAMINE (BENADRYL) 50 mg/mL injection        [Held by provider] apixaban (ELIQUIS) tablet 5 mg  5 mg Oral BID    metoprolol tartrate (LOPRESSOR) tablet 25 mg  25 mg Oral Q12H    hydrocortisone Sod Succ (PF) (SOLU-CORTEF) injection 100 mg  100 mg IntraVENous ONCE    diphenhydrAMINE (BENADRYL) injection 50 mg  50 mg IntraVENous ONCE    metOLazone (ZAROXOLYN) tablet 5 mg  5 mg Oral DAILY    furosemide (LASIX) injection 80 mg  80 mg IntraVENous BID    potassium chloride SR (KLOR-CON 10) tablet 40 mEq  40 mEq Oral TID    traMADoL (ULTRAM) tablet 50 mg  50 mg Oral Q6H PRN    predniSONE (DELTASONE) tablet 20 mg  20 mg Oral DAILY WITH BREAKFAST    cefTRIAXone (ROCEPHIN) 1 g in 0.9% sodium chloride (MBP/ADV) 50 mL  1 g IntraVENous Q24H    ondansetron (ZOFRAN) injection 4 mg  4 mg IntraVENous Q6H PRN    morphine injection 1 mg  1 mg IntraVENous Q3H PRN    bacitracin 500 unit/gram packet 1 Packet  1 Packet Topical PRN    sodium chloride (NS) flush 5-40 mL  5-40 mL IntraVENous Q8H    sodium chloride (NS) flush 5-40 mL  5-40 mL IntraVENous PRN       Objective:      Physical Exam:  General Appearance:  pleasant, obese,  male resting in bed in NAD  Chest:   Clear- dim L  Cardiovascular:  Regular rate and rhythm, no murmur. Abdomen:   Soft, non-tender, bowel sounds are active. Extremities: 2-3+ pitting edema BLE. Palpable distal pulses, but weak RLE. Left foot warm and red. R foot dusky/blue with delayed cap refill and cool toes. Slight duskiness noted just proximal to right knee. All pulses palpable, but weak. Skin:  Warm and dry. left infraclavicular site CDI without swelling or bleeding. R groin site CDI with scant blood. No swelling or bruit. Data Review:   Recent Labs     04/03/20  0605 04/02/20 0336 04/01/20 0338   WBC 13.4* 16.5* 16.6*   HGB 12.6 13.2 13.0   HCT 39.0 41.1 40.4    235 216     Recent Labs     04/03/20  0605 04/02/20  0336 04/01/20  0908 04/01/20  0338   * 134* 135*  --    K 4.0 4.2 3.7  --    CL 94* 95* 97  --    CO2 37* 35* 33*  --    GLU 91 107* 127*  --    BUN 32* 30* 26*  --    CREA 1.71* 1.78* 1.92*  --    CA 9.1 9.0 8.8  --    MG  --   --  2.0  --    ALB 3.1* 3.1* 3.0* 3.1*   TBILI 1.0 1.3* 1.5* 1.5*   SGOT 56* 90* 119* 137*   * 528* 630* 693*   INR  --   --   --  1.2*       No results for input(s): TROIQ, CPK, CKMB in the last 72 hours. Intake/Output Summary (Last 24 hours) at 4/3/2020 1418  Last data filed at 4/3/2020 4117  Gross per 24 hour   Intake 400 ml   Output 4800 ml   Net -4400 ml        Telemetry: v pacing. Prior a fib  EKG: a fib with RVR  ECHO:EF 55-60%; mild concentric hypertrophy;  Trace MR  Cxray:  Pacemaker in place.   No acute changes    Assessment:     Active Problems:    Acute on chronic diastolic HF (heart failure) ([facility] (11/4/2019)      Morbid obesity ([facility] (11/4/2019)      ALYSSA on CPAP (11/4/2019)      Atrial fibrillation with rapid ventricular response ([facility] (11/5/2019)      Essential hypertension (12/4/2019)      Peripheral vascular disease ([facility] (2/25/2020)      S/P AV (atrioventricular) pat ablation (4/3/2020)      Overview: 4/3/20      Pacemaker (4/3/2020)      Overview: 4/3/20: [employer]         Plan:     A fib with RVR:  Now paced s/p pacemaker and AVN ablation procedures (4/3/20)  · dilt stopped  · Restart BB  · Standard post-pacemaker care   · Was going to restart eliquis tonight, but hold until RLE further evaluated  · STAT LINDSEY's for duskiness and decreased temperature in RLE s/p groin access for AVN ablation   · RLE discussed with Dr. Regulo Ojeda (and facetime evaluation) and Do Álvarez NP    Acute on chronic diastolic heart failure:  Improving. · Down another 9# and 5L overnight  · Getting daily metolazone and BID lasix  - creatinine steady  · BB to also address HTN    ALYSSA:  CPAP at bedside  · Reinforce CPAP use    Elevated LFTs:  Improving    Dotty Alonso NP  DNP, RN, [facility]      Patient seen and examined by me with nurse practitioner. I personally performed all components of the history, physical, and medical decision making and agree with the assessment and plan as noted. Rt. Foot exam. Sensation and motor function intact. Reviewed LINDSEY and right LE arterial doppler. No significant stenosis noted. Per pt right foot is discolored like this for long time and actually is improved since edema has improved over last few days. Per pt right foot is always slightly colder than right. No clinical evidence of ALI or CLI. Monitor for now. No further vascular intervention is needed at this time. Will follow up in AM. D/w nursing staff.      Jerry Baptiste MD

## 2020-04-03 NOTE — ANESTHESIA PREPROCEDURE EVALUATION
Relevant Problems   No relevant active problems       Anesthetic History   No history of anesthetic complications            Review of Systems / Medical History  Patient summary reviewed, nursing notes reviewed and pertinent labs reviewed    Pulmonary        Sleep apnea: CPAP           Neuro/Psych              Cardiovascular    Hypertension      CHF  Dysrhythmias : atrial fibrillation  PAD    Exercise tolerance: <4 METS  Comments: Echo 3/30/20: Estimated left ventricular ejection fraction is 55 - 60%.    GI/Hepatic/Renal         Renal disease: CRI       Endo/Other        Morbid obesity     Other Findings   Comments: Hx Mercury poisoning            Physical Exam    Airway  Mallampati: IV    Neck ROM: normal range of motion   Mouth opening: Normal     Cardiovascular  Regular rate and rhythm,  S1 and S2 normal,  no murmur, click, rub, or gallop             Dental  No notable dental hx       Pulmonary  Breath sounds clear to auscultation               Abdominal  GI exam deferred       Other Findings            Anesthetic Plan    ASA: 3  Anesthesia type: total IV anesthesia          Induction: Intravenous  Anesthetic plan and risks discussed with: Patient

## 2020-04-04 VITALS
HEART RATE: 77 BPM | BODY MASS INDEX: 39.27 KG/M2 | HEIGHT: 74 IN | SYSTOLIC BLOOD PRESSURE: 139 MMHG | DIASTOLIC BLOOD PRESSURE: 98 MMHG | RESPIRATION RATE: 17 BRPM | TEMPERATURE: 98 F | WEIGHT: 306 LBS | OXYGEN SATURATION: 100 %

## 2020-04-04 PROBLEM — M10.9 GOUT INVOLVING TOE: Status: ACTIVE | Noted: 2020-04-04

## 2020-04-04 LAB
ALBUMIN SERPL-MCNC: 3 G/DL (ref 3.5–5)
ALBUMIN/GLOB SERPL: 1 {RATIO} (ref 1.1–2.2)
ALP SERPL-CCNC: 99 U/L (ref 45–117)
ALT SERPL-CCNC: 301 U/L (ref 12–78)
ANION GAP SERPL CALC-SCNC: 7 MMOL/L (ref 5–15)
AST SERPL-CCNC: 45 U/L (ref 15–37)
ATRIAL RATE: 127 BPM
BASOPHILS # BLD: 0 K/UL (ref 0–0.1)
BASOPHILS NFR BLD: 0 % (ref 0–1)
BILIRUB SERPL-MCNC: 1.3 MG/DL (ref 0.2–1)
BUN SERPL-MCNC: 35 MG/DL (ref 6–20)
BUN/CREAT SERPL: 21 (ref 12–20)
CALCIUM SERPL-MCNC: 9 MG/DL (ref 8.5–10.1)
CALCULATED R AXIS, ECG10: -77 DEGREES
CALCULATED T AXIS, ECG11: 91 DEGREES
CHLORIDE SERPL-SCNC: 95 MMOL/L (ref 97–108)
CO2 SERPL-SCNC: 34 MMOL/L (ref 21–32)
CREAT SERPL-MCNC: 1.68 MG/DL (ref 0.7–1.3)
DIAGNOSIS, 93000: NORMAL
DIFFERENTIAL METHOD BLD: ABNORMAL
EOSINOPHIL # BLD: 0 K/UL (ref 0–0.4)
EOSINOPHIL NFR BLD: 0 % (ref 0–7)
ERYTHROCYTE [DISTWIDTH] IN BLOOD BY AUTOMATED COUNT: 14.2 % (ref 11.5–14.5)
GLOBULIN SER CALC-MCNC: 2.9 G/DL (ref 2–4)
GLUCOSE SERPL-MCNC: 113 MG/DL (ref 65–100)
HCT VFR BLD AUTO: 39.1 % (ref 36.6–50.3)
HGB BLD-MCNC: 12.6 G/DL (ref 12.1–17)
IMM GRANULOCYTES # BLD AUTO: 0.2 K/UL (ref 0–0.04)
IMM GRANULOCYTES NFR BLD AUTO: 1 % (ref 0–0.5)
LYMPHOCYTES # BLD: 0.7 K/UL (ref 0.8–3.5)
LYMPHOCYTES NFR BLD: 5 % (ref 12–49)
MCH RBC QN AUTO: 32.2 PG (ref 26–34)
MCHC RBC AUTO-ENTMCNC: 32.2 G/DL (ref 30–36.5)
MCV RBC AUTO: 100 FL (ref 80–99)
MONOCYTES # BLD: 0.9 K/UL (ref 0–1)
MONOCYTES NFR BLD: 6 % (ref 5–13)
NEUTS SEG # BLD: 12.8 K/UL (ref 1.8–8)
NEUTS SEG NFR BLD: 88 % (ref 32–75)
NRBC # BLD: 0.02 K/UL (ref 0–0.01)
NRBC BLD-RTO: 0.1 PER 100 WBC
PLATELET # BLD AUTO: 207 K/UL (ref 150–400)
PMV BLD AUTO: 11.2 FL (ref 8.9–12.9)
POTASSIUM SERPL-SCNC: 4.2 MMOL/L (ref 3.5–5.1)
PROT SERPL-MCNC: 5.9 G/DL (ref 6.4–8.2)
Q-T INTERVAL, ECG07: 540 MS
QRS DURATION, ECG06: 222 MS
QTC CALCULATION (BEZET), ECG08: 603 MS
RBC # BLD AUTO: 3.91 M/UL (ref 4.1–5.7)
SODIUM SERPL-SCNC: 136 MMOL/L (ref 136–145)
VENTRICULAR RATE, ECG03: 75 BPM
WBC # BLD AUTO: 14.6 K/UL (ref 4.1–11.1)

## 2020-04-04 PROCEDURE — 80053 COMPREHEN METABOLIC PANEL: CPT

## 2020-04-04 PROCEDURE — 77010033678 HC OXYGEN DAILY

## 2020-04-04 PROCEDURE — 74011250636 HC RX REV CODE- 250/636: Performed by: NURSE PRACTITIONER

## 2020-04-04 PROCEDURE — 85025 COMPLETE CBC W/AUTO DIFF WBC: CPT

## 2020-04-04 PROCEDURE — 77030011746 HC SEAL HEMSTAT TELE -B

## 2020-04-04 PROCEDURE — 74011250637 HC RX REV CODE- 250/637: Performed by: NURSE PRACTITIONER

## 2020-04-04 PROCEDURE — 93005 ELECTROCARDIOGRAM TRACING: CPT

## 2020-04-04 PROCEDURE — 74011250637 HC RX REV CODE- 250/637: Performed by: INTERNAL MEDICINE

## 2020-04-04 PROCEDURE — 74011636637 HC RX REV CODE- 636/637: Performed by: INTERNAL MEDICINE

## 2020-04-04 PROCEDURE — 36415 COLL VENOUS BLD VENIPUNCTURE: CPT

## 2020-04-04 RX ORDER — METOLAZONE 5 MG/1
5 TABLET ORAL DAILY
Qty: 30 TAB | Refills: 0 | Status: SHIPPED | OUTPATIENT
Start: 2020-04-05 | End: 2020-05-06 | Stop reason: ALTCHOICE

## 2020-04-04 RX ORDER — METOPROLOL TARTRATE 25 MG/1
25 TABLET, FILM COATED ORAL EVERY 12 HOURS
Qty: 60 TAB | Refills: 0 | Status: SHIPPED | OUTPATIENT
Start: 2020-04-04 | End: 2020-05-15 | Stop reason: SDUPTHER

## 2020-04-04 RX ORDER — DOXYCYCLINE 100 MG/1
100 CAPSULE ORAL 2 TIMES DAILY
Qty: 20 CAP | Refills: 0 | Status: SHIPPED | OUTPATIENT
Start: 2020-04-04 | End: 2020-04-17

## 2020-04-04 RX ADMIN — PREDNISONE 20 MG: 20 TABLET ORAL at 09:35

## 2020-04-04 RX ADMIN — METOPROLOL TARTRATE 25 MG: 25 TABLET, FILM COATED ORAL at 09:35

## 2020-04-04 RX ADMIN — APIXABAN 5 MG: 5 TABLET, FILM COATED ORAL at 09:35

## 2020-04-04 RX ADMIN — Medication 10 ML: at 06:43

## 2020-04-04 RX ADMIN — POTASSIUM CHLORIDE 40 MEQ: 750 TABLET, FILM COATED, EXTENDED RELEASE ORAL at 17:52

## 2020-04-04 RX ADMIN — FUROSEMIDE 80 MG: 10 INJECTION, SOLUTION INTRAMUSCULAR; INTRAVENOUS at 09:36

## 2020-04-04 RX ADMIN — METOLAZONE 5 MG: 5 TABLET ORAL at 06:42

## 2020-04-04 RX ADMIN — APIXABAN 5 MG: 5 TABLET, FILM COATED ORAL at 17:52

## 2020-04-04 RX ADMIN — POTASSIUM CHLORIDE 40 MEQ: 750 TABLET, FILM COATED, EXTENDED RELEASE ORAL at 09:35

## 2020-04-04 NOTE — PROGRESS NOTES
Bedside shift change report given to Jona Lares RN (oncoming nurse) by John Tony RN (offgoing nurse). Report included the following information SBAR, Kardex, Procedure Summary, Intake/Output, MAR, Accordion, Recent Results, Med Rec Status, Cardiac Rhythm Paced, Alarm Parameters , Pre Procedure Checklist, Procedure Verification, Quality Measures and Dual Neuro Assessment. Left upper chest pacemaker site and right groin cath site dressing intact, no bleeding and hematoma. Pt. Informed RN that his feet usually cool to touch, but now left foot feel warmer than usual, , now  feel much better

## 2020-04-04 NOTE — DISCHARGE SUMMARY
Hospitalist Discharge Summary     Patient ID:  Shy Blanchard  [id number]  60 y.o.  1960    PCP on record: Leilani Davila MD    Admit date: 3/29/2020  Discharge date and time: 4/4/2020      DISCHARGE DIAGNOSIS:  New onset left MTP gout  Cellulitis (improving)  Leukocytosis (improving); pt had leukocytosis in the past without source of infection  Hypokalemia POA  SIRS POA WBC 28.9 , HR 160s, RR 22, no fevers   Elevated lactate 4.0 POA  Acute hepatitis POA; resolving  Persistent atrial fib with RVR POA   S/p Pacemaker by Dr. Danna Lafleur and AVN ablation by Dr. Wilfredo Lofton (4/3)  Hypercoagulable state secondary to a-fib  Essential hypertension POA  Anasarca POA  Acute on chronic diastolic congestive heart failure POA  JUANJO  Morbidly obese  BMI 43.25 kg/m²           CONSULTATIONS:  IP CONSULT TO CARDIOLOGY  IP CONSULT TO GASTROENTEROLOGY  IP CONSULT TO VASCULAR SURGERY    Excerpted HPI from H&P of Graciela Felix MD:  Geri Patel is a 60 y.o.  male who presents with worsening shortness of breath, nausea vomiting. Patient reports symptoms started for 5 days ago progressively worse now is severe. 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 He states that he had medically poisoning after dental procedure. 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 He was seen by multiple specialties for heavy metal poisoning. He has completed cheerleading agent treatment. He reports that his multiple rash on upper arms which is a his body reaction to heavy metal poisoning. His body tries to expel heavy metals. He states that he has not been able to eat even a small amount of food for last several days. MsZaira states his dose of sotalol was increased recently. He states that his elevated liver enzyme is due to increased dose of sotalol. No EtOH for last 40 years. Not been taking any Tylenol. No sick contact.   He also been suffering from chest pain shortness of breath inability to sleep.     Upon arrival in the emergency room he has A. fib with RVR. He is found to have acute renal injury and acute hepatitis. Hospitalist consulted to admit the patient. Cardiology seeing him for A. fib RVR, GI has seen for elevated liver enzyme. Acute hepatitis panel sent. Surgery advised medical management by GI and hospitalist.    ______________________________________________________________________  DISCHARGE SUMMARY/HOSPITAL COURSE:  for full details see H&P, daily progress notes, labs, consult notes. 60year old male admitted to the hospital with sob, weight gain, and worsening left foot pain with swelling. Pt ended up having AV node ablation with pace maker placement due to persistent A-fib. Pt developed in changes of circulation of right foot immediately after procedure, but recovered less than 24 hours. Pt has been diuresed with Lasix, weight down to 306 lbs upon discharge vs 326 lbs on admission. Pt was treated for left foot cellulitis with IV ABX with significant improvement. At this time, we will recommend him to complete additional 10 days of doxycyline; pt claims that he heals slow due to previous metal toxicity. Pt tends to fixated on metal allergy and chelation therapy during every visit and with all the providers. Patient has been advised to follow up with his provider at [place] for chelation therapy if that was helping in the past. Pt has been remindeded many times that unfortunately, we will not be able to make any contribution to his hx of metal allergy at this time. Pt also has not been compliant with medical therapy due to various his symptoms that r/t metal allergy. Pt is in high risk of readmission due to poor compliance to medical therapy. Pt tends to point out how much he knows so much more than medical & speciality care providers.     Below are the detail medical illnesses that he was treated during this admission:    New onset left MTP gout  - completed 5 days course of steroid. Uric acid 12.9    Advised pt to follow up with his pcp    Cellulitis (improving)  Leukocytosis (improving)  Hx of leukocytosis without source of infection in the past  -  Received 6 dose of IV rocephin during hospitalization. Pt has been advised to complete total 10 days of doxycycline. WBC 14k upon discharge vs 28.9 on admission.      Hypokalemia POA  - resolved     SIRS POA WBC 28.9 , HR 160s, RR 22, no fevers   Elevated lactate 4.0 POA  - Blood cultures (3/29 &31) no growth so far     CT of abd/pel:   1. Features suggestive of chronic gallbladder disease-correlate clinically. 2. Nonobstructing right nephrolithiasis. 3. Generalized body wall edema. CXR with no ASD    UA (-)    Received IV zosyn on admission which was changed to IV rocephin to treat cellulitis. Pt was advised to complete additional ABX as directed. Lactic acid 4.0->2.3    No abd pain, no fever/chills, no n/v, tolerating cardiac diet without difficulty     Acute hepatitis POA; resolving  - appreciate GI input; etiology unclear, suspect drug related    Denies ETOH use    Hepatitis B and C negative    HSV-1 IgG positive, but not at risk for HSV hepatitis        HSV hepatitis unlikley, hold acyclovir        HSV 1 & 2 by PCR pending    LFTs are improving     Persistent atrial fib with RVR POA   S/p Pacemaker by Dr. Frank Arzate and AVN ablation by Dr. Lora Shen (4/3)  Hypercoagulable state secondary to a-fib  Essential hypertension POA  -  Continue with eliquis and BB       4/3 Right foot cold to touch, palpable pulse, able to move toes, and decreased sensation after the procedure; LINDSEY and duplex study in low extremity study reviewed by Dr. Lora Shen. Pt may have right pedal atherosclerosis, but no urgent intervention needed at this time    4/4 no longer dusky color appearance.  Pt feels like it is back to his baseline.     Anasarca POA  Acute on chronic diastolic congestive heart failure POA  -  Echo: Normal cavity size and systolic function (ejection fraction normal). Mild concentric hypertrophy. Estimated left ventricular ejection fraction is 55 - 60%. Continue with Lasix & metolazone; daily weight 326lbs -> 306lbs     Daily Weights: Notify MD for a weight gain of 3 pounds or greater in one day or 5 pounds in one week. Weight upon discharge 306 lbs       JUANJO  - improving; creat 1.7 (was 2.2 on admission)          Morbidly obese  BMI 43.25 kg/m²   -> discussed about importance of life style modification, but pt informed me that he feels like he has been doing great.           _______________________________________________________________________  Patient seen and examined by me on discharge day. Pertinent Findings:  Gen:    Not in distress  Chest: Clear lungs  CVS:   Regular rhythm. Trace of pitting edema (which is improved since the admission)  Abd:  Soft, not distended, not tender  Neuro:  Alert, orient x 4  _______________________________________________________________________  DISCHARGE MEDICATIONS:   Current Discharge Medication List      START taking these medications    Details   metOLazone (ZAROXOLYN) 5 mg tablet Take 1 Tab by mouth daily. Qty: 30 Tab, Refills: 0      metoprolol tartrate (LOPRESSOR) 25 mg tablet Take 1 Tab by mouth every twelve (12) hours. Qty: 60 Tab, Refills: 0      doxycycline (MONODOX) 100 mg capsule Take 1 Cap by mouth two (2) times a day for 10 days. Qty: 20 Cap, Refills: 0         CONTINUE these medications which have NOT CHANGED    Details   naltrexone HCl (NALTREXONE PO) Take  by mouth daily. Dose unknown prescribed Dr. Eduardo Lopez      HCG 8000IU/4 ML 2,000 Units by SubCUTAneous route. HCG   Benzyl Alcohol   Sodium phosphate   Sterile water   (may use HCl or NaOH  to make adjustments to the [facility])      testosterone cypionate (DEPO-TESTOSTERONE) 100 mg/mL injection 200 mg by IntraMUSCular route Once every 2 weeks.       furosemide (LASIX) 40 mg tablet take 1 tablet by mouth twice a day  Qty: 60 Tab, Refills: 1      anastrozole (ARIMIDEX) 1 mg tablet Take 1 mg by mouth every seven (7) days. Refills: 0      apixaban (ELIQUIS) 5 mg tablet Take 1 Tab by mouth two (2) times a day. Qty: 60 Tab, Refills: 1      desloratadine (CLARINEX) 5 mg tablet Take 5 mg by mouth daily. montelukast (SINGULAIR) 10 mg tablet Take 10 mg by mouth every evening. dextroamphetamine-amphetamine (ADDERALL) 30 mg tablet Take 30 mg by mouth two (2) times a day. STOP taking these medications       METHYLPREDNISOLONE PO Comments:   Reason for Stopping:         sotalol HCl (SOTALOL PO) Comments:   Reason for Stopping:         dilTIAZem CD (CARDIZEM CD) 120 mg ER capsule Comments:   Reason for Stopping:               My Recommended Diet, Activity, Wound Care, and follow-up labs are listed in the patient's Discharge Insturctions which I have personally completed and reviewed. _______________________________________________________________________  DISPOSITION:    Home with Family: y   Home with HH/PT/OT/RN:    SNF/LTC:    HUANG:    OTHER:        Condition at Discharge:  Stable  _______________________________________________________________________  Follow up with:   PCP : Lloyd Vallejo MD  Follow-up Information     Follow up With Specialties Details Why Contact Info    Social Security Administration    Use this website to apply for social securuty disability.  Offices to apply in person are likely closed due to COVID-19 https://ssa.gov/applyfordisability    Lloyd Vallejo MD General Practice  2 weeks [address] [facility] [address]      Karuna Romero MD Cardiology, Cardio Vascular Surgery, Internal Medicine  2 weeks [address] [phone]                Total time in minutes spent coordinating this discharge (includes going over instructions, follow-up, prescriptions, and preparing report for sign off to her PCP) : 48 minutes    Signed:  Heidi Patel NP

## 2020-04-04 NOTE — DISCHARGE INSTRUCTIONS
[address] [place] [address]  [phone]        NEW PACEMAKER IMPLANT DISCHARGE INSTRUCTIONS    Patient ID:  Kelli Hatch  [id number]  76 y.o.  1960    Admit Date: 3/29/2020    Discharge Date: 4/3/2020     Admitting Physician: Pratibha Velázquez MD     Discharge Physician: Pratibha Velázquez MD    Admission Diagnoses:   Atrial fibrillation with [facility]) [I48.91]  Atrial fibrillation with [facility]) [I48.91]    Discharge Diagnoses: Active Problems:    Acute on chronic diastolic HF (heart failure) ([facility] (11/4/2019)      Morbid obesity ([facility] (11/4/2019)      ALYSSA on CPAP (11/4/2019)      Atrial fibrillation with rapid ventricular response ([facility] (11/5/2019)      Essential hypertension (12/4/2019)      Peripheral vascular disease ([facility] [address] (2/25/2020)        Discharge Condition: Good    Cardiology Procedures this Admission:  Pacemaker insertion. Disposition: home    Reference discharge instructions provided by nursing for diet and activity. Follow-up with device clinic in three weeks. Call [phone] to make an appointment. Signed:  MATEO Busby  4/3/2020  9:04 AM      DISCHARGE INSTRUCTIONS FOR PATIENTS WITH PACEMAKERS    1. Remember to call for an appointment for 3 weeks [phone] to check healing and implant programming. 2. Medic Alert Bracelets are available from your pharmacist to wear at all times if you choose to wear one. 3. Carry your ID card for pacemaker with you at all times. This card will be given to you in the hospital or mailed to you. 4. The pacemaker will bulge slightly under your skin. The bulge will decrease in size over the next few weeks. Please notify the doctor's office if you notice any of the following around your site:   A.  A bruise that does not go away. B.  Soreness or yellow, green, or brown drainage from the site. C. Any swelling from the site. D. If you have a fever of 100 degrees or higher that lasts for a few days.     INCISION CARE 1.  Leave the dressing over your site until your follow up visit. 2.  You may not shower until after follow up visit. 3.  For comfort, wear loose fitting clothing. 1. 4.  Ice pack to affected shoulder for first 24 hours, wear your sling for 2 days. 2. 5.  Report any signs of infection, fever, pain, swelling, redness, oozing, or heat at site especially if these symptoms increase after the first 3 to 4 days. ACTIVITY PRECAUTIONS     1. Avoid rough contact with the implant site. 2. No driving for 14 days. 3. Avoid lifting your arm over your head, carrying anything on the affected side, or lifting over 10 pounds for 90 days. For the first 2 days only bend your arm at the elbow. 4. Any extreme activity such as golf, weight lifting or exercise biking should be restricted for 60 days. 5. Do not carry objects by holding them against your implant site. 6.  No shooting rifles or any type of gun with the affected shoulder permanently. SPECIAL PRECAUTIONS     1. You should avoid all strong magnetic fields, such as arc welding, large transformers, large motors. 2.  You may or may not (depending on your device) have an MRI which uses a strong magnet to take pictures. 3.  Treatments or surgery that requires diathermy or electrocautery should be discussed with your doctor before scheduled. 4. Avoid radio frequency transmitters, including radar. 5. Advise dentist or other medical personnel you see that you have a pacemaker. 6.  Cell phones and microwave oven use is okay. 7.  If you plan to move or take a trip to a new area, the doctor's office will give you a name of a doctor to contact for any problems. ANTIBIOTIC THERAPY    During the first 8 weeks after your pacemaker insertion, you may need antibiotics before any dental work or certain tests or operations. Let the dentist or doctor who is caring for you know that you have had an implanted device.       S/P AV NODE ABLATION DISCHARGE INSTRUCTIONS    It is normal to feel tired the first couple days. Take it easy and follow the physicians instructions. CHECK THE CATHETER INSERTION SITE DAILY:  You may shower 24 hours after the procedure, remove the bandage during showering. Wash with soap and water and pat dry. Gentle cleaning of the site with soap and water is sufficient, cover with a dry clean dressing or bandage. Do not apply creams or powders to the area. Do not sit in a bathtub or pool of water for 7 days or until wound has completely healed. Temporary bruising and discomfort is normal and may last a few weeks. You may have a  formation of a small lump at the site which may last up to 6 weeks. CALL THE PHYSICIAN:  If the site becomes red, swollen or feels warm to the touch  If there is bleeding or drainage or if there is unusual pain at the groin or down the leg. If there is any bleeding, lie down, apply pressure or have someone apply pressure with a clean cloth until the bleeding stops. If the bleeding continues, call 911 to be transported to the hospital.  DO NOT DRIVE YOURSELF, [place] [id number]. Activity:      For the first 24-48 hours or as instructed by the physician:  No lifting, pushing or pulling over 5 pounds and no straining the insertion site. Do not life grocery bags or the garbage can, do not run the vacuum  or  for 7 days. Start with short walks as in the hospital and gradually increase as tolerated each day. It is recommended to walk 30 minutes 5-7 days per week. Follow your physicians instructions on activity. Avoid walking outside in extremes of heat or cold. Walk inside when it is cold and windy or hot and humid. Things to keep in mind:  No driving for at least 5 days, or as designated by your physician. Limit the number of times you go up and down the stairs  Take rests and pace yourself with activity.   Be careful and do not strain with bowel movements. Medications: Take all medications as prescribed  Call your physician if you have any questions  Keep an updated list of your medications with you at all times and give a list to your physician and pharmacist    Signs and Symptoms:  Be cautious of symptoms of angina or recurrent symptoms such as chest discomfort, unusual shortness of breath or fatigue, palpitations. After Care: Follow up with your physician as instructed. Follow a heart healthy diet with proper portion control, daily stress management, daily exercise, blood pressure and cholesterol control , and smoking cessation. Patient Discharge Instructions     Pt Name  Brodie Negron   Date of Birth 1960   Age  60 y.o. Medical Record Number  [id number]   PCP Deshaun Sewell MD    Admit date:  3/29/2020 @    [address] [place] [address]    Room Number  2161/01   Date of Discharge 4/4/2020     Admission Diagnoses:     Atrial fibrillation with rapid ventricular response (HCC)          Allergies   Allergen Reactions    Ace Inhibitors Cough    Gadolinium-Containing Contrast Media Rash        You were admitted to [address] [place] [address] for  Atrial fibrillation with rapid ventricular response (HCC)    YOUR OTHER MEDICAL DIAGNOSES INCLUDE (BUT NOT LIMITED TO ):  Present on Admission:   Atrial fibrillation with rapid ventricular response (HCC)   ALYSSA on CPAP   Essential hypertension   Morbid obesity ([address]   Peripheral vascular disease ([address]   Acute on chronic diastolic HF (heart failure) (HCC)   S/P AV (atrioventricular) pat ablation   Pacemaker   Gout involving toe      DIET:  Cardiac Diet, 2gm sodium diet      Recommended activity: Activity as tolerated  Follow up : Follow-up Information     Follow up With Specialties Details Why Contact Info    [employer]    Use this website to apply for social securuty disability.  Offices to apply in person are likely closed due to COVID-19 https://ssa.gov/applyfordisability    Ivan Hinton MD General Practice  2 weeks [facility]  [address]  [phone]      Moon Tavera MD Cardiology, [facility] Vascular Surgery, Internal Medicine  2 weeks [address]  [phone]            Daily weights: Notify MD for a weight gain of 3 pounds or greater in one day or 5 pounds in one week. Weight up discharge: 306lbs  Please elevate your leg when resting. Patient Education   Doxycycline (By mouth)   Doxycycline ([id number])  Treats and prevents infections. Also used to prevent malaria and treat rosacea or severe acne. This medicine is a tetracycline antibiotic. Brand Name(s): Acticlate, Adoxa, Adoxa Jorge 1/150, Avidoxy, Avidoxy DK, BenzoDox 30 Kit, BenzoDox 60 Kit, Doryx, Doryx MPC, Monodox, Morgidox 5P079HE, Morgidox 6i857TG Kit, Morgidox 1x50MG, Morgidox 1x50MG Kit, Morgidox [id number]   There may be other brand names for this medicine. When This Medicine Should Not Be Used: This medicine is not right for everyone. Do not use it if you had an allergic reaction to doxycycline or another tetracycline antibiotic, or if you are pregnant or breastfeeding. How to Use This Medicine:   Capsule, Delayed Release Capsule, Long Acting Capsule, Liquid, Tablet, Delayed Release Tablet  · Your doctor will tell you how much medicine to use. Do not use more than directed. · Ask your pharmacist or doctor if you need to take this medicine with or without food. Some forms can be taken with food or milk, but others must be taken on an empty stomach. · Oracea® capsules: This medicine must be taken on an empty stomach, at least 1 hour before or 2 hours after a meal.  · Capsule: Swallow whole. Do not break, crush, chew, or open it. · Delayed-release tablets: You may also take this medicine by sprinkling the broken tablets onto room-temperature applesauce. Swallow this mixture right away.  Do not chew it. Do not store the mixture for later use. You may take this medicine with food or milk to avoid stomach upset. · Oral liquid: Shake the bottle well just before each use. Measure the oral liquid medicine with a marked measuring spoon, oral syringe, or medicine cup. · Tablets: You may take this medicine with food or milk to avoid stomach irritation. To break a tablet, hold the tablet between your thumb and index fingers close to the appropriate scored line. Then, apply enough pressure to snap the tablet segments apart. Do not use the tablet if it does not break on the scored lines. · Take all of the medicine in your prescription to clear up your infection, even if you feel better after the first few doses. · Drink plenty of fluids to avoid throat problems, if you take the capsule or tablet form. · Malaria prevention: Start taking the medicine 1 or 2 days before you travel. Take the medicine every day during your trip. Keep taking it for 4 weeks after you return. However, do not use the medicine for longer than 4 months. · Do not use this medicine for more than 9 months if you are using it for rosacea. · Use only the brand of medicine your doctor prescribed. Other brands may not work the same way. · Read and follow the patient instructions that come with this medicine. Talk to your doctor or pharmacist if you have any questions. · Missed dose: Take a dose as soon as you remember. If it is almost time for your next dose, wait until then and take a regular dose. Do not take extra medicine to make up for a missed dose. · Store the medicine in a closed container at room temperature, away from heat, moisture, and direct light. Do not freeze the oral liquid. Drugs and Foods to Avoid:   Ask your doctor or pharmacist before using any other medicine, including over-the-counter medicines, vitamins, and herbal products. · Some medicines can affect how doxycycline works.  Tell your doctor if you are using any of the following:  ¨ Bismuth subsalicylate  ¨ Acne medicines (including isotretinoin)  ¨ Birth control pills  ¨ Blood thinner (including warfarin)  ¨ Medicine for seizures (including carbamazepine, phenobarbital, phenytoin)  ¨ Medicine that contains aluminum, calcium, or iron (including an antacid or vitamin supplement)  ¨ Medicine to treat psoriasis (including acitretin)  ¨ Penicillin antibiotic  ¨ Stomach medicine  Warnings While Using This Medicine:   · This medicine may cause birth defects if either partner is using it during conception or pregnancy. Tell your doctor right away if you or your partner becomes pregnant. Birth control pills may not work as well when used with this medicine. Use a second form of birth control to keep from getting pregnant. · Tell your doctor if you have kidney disease, liver disease, asthma, or an allergy to sulfites. Tell your doctor if you had surgery on your stomach, or if you have a history of yeast infections. · This medicine may cause the following problems:  ¨ Permanent change in tooth color (in children younger than 6years old)  ¨ Increased pressure inside the head  ¨ Yeast infection  ¨ Immune system problems  · This medicine can cause diarrhea. Call your doctor if the diarrhea becomes severe, does not stop, or is bloody. Do not take any medicine to stop diarrhea until you have talked to your doctor. Diarrhea can occur 2 months or more after you stop taking this medicine. · This medicine may make your skin more sensitive to sunlight. Wear sunscreen. Do not use sunlamps or tanning beds. · Tell any doctor or dentist who treats you that you are using this medicine. This medicine may affect certain medical test results. · Call your doctor if your symptoms do not improve or if they get worse. · Your doctor will do lab tests at regular visits to check on the effects of this medicine. Keep all appointments. · Keep all medicine out of the reach of children.  Never share your medicine with anyone. Possible Side Effects While Using This Medicine:   Call your doctor right away if you notice any of these side effects:  · Allergic reaction: Itching or hives, swelling in your face or hands, swelling or tingling in your mouth or throat, chest tightness, trouble breathing  · Blistering, peeling, red skin rash  · Burning, pain, or irritation in your upper stomach or throat  · Diarrhea that may contain blood  · Fever, chills, cough, runny or stuffy nose, sore throat, body aches  · Joint pain, fever, rash, and unusual tiredness or weakness  · Severe headache, dizziness, vision changes  · Sudden and severe stomach pain, nausea, vomiting, lightheadedness  If you notice these less serious side effects, talk with your doctor:   · Darkening of your skin, scars, teeth, or gums  · Sores or white patches on your lips, mouth, or throat  If you notice other side effects that you think are caused by this medicine, tell your doctor. Call your doctor for medical advice about side effects. You may report side effects to FDA at 3-927-FDA-2293  © 2017 [address] Information is for End User's use only and may not be sold, redistributed or otherwise used for commercial purposes. The above information is an  only. It is not intended as medical advice for individual conditions or treatments. Talk to your doctor, nurse or pharmacist before following any medical regimen to see if it is safe and effective for you. · It is important that you take the medication exactly as they are prescribed. · Keep your medication in the bottles provided by the pharmacist and keep a list of the medication names, dosages, and times to be taken in your wallet. · Do not take other medications without consulting your doctor.        ADDITIONAL INFORMATION: If you experience any of the following symptoms or have any health problem not listed below, then please call your primary care physician or return to the emergency room if you cannot get hold of your doctor: Fever, chills, nausea, vomiting, diarrhea, change in mentation, falling, bleeding, shortness of breath. I understand that if any problems occur once I am discharged, I am supposed to call my Primary care physician for further care or seek help in the Emergency Department at the nearest Healthcare facility. I have had an opportunity to discuss my clinical issues with my doctor and nursing staff. I understand and acknowledge receipt of the above instructions.                                                                                                                                            Physician's or R.N.'s Signature                                                            Date/Time                                                                                                                                              Patient or Representative Signature                                                 Date/Time

## 2020-04-04 NOTE — PROGRESS NOTES
Bedside shift change report given to Cristel Irwin RN (oncoming nurse) by Su Parker RN (offgoing nurse). Report included the following information SBAR, Kardex, Procedure Summary, Intake/Output, MAR, Accordion, Recent Results, Med Rec Status, Cardiac Rhythm Paced, Alarm Parameters , Pre Procedure Checklist, Procedure Verification, Quality Measures and Dual Neuro Assessment.

## 2020-04-04 NOTE — PROGRESS NOTES
Bedside shift change report given to Romeo Murillo RN (oncoming nurse) by Marino Wynn RN (offgoing nurse). Report included the following information SBAR, Kardex, Procedure Summary, Intake/Output, MAR, Accordion, Recent Results, Med Rec Status, Cardiac Rhythm Paced, Alarm Parameters , Pre Procedure Checklist, Procedure Verification, Quality Measures and Dual Neuro Assessment.

## 2020-04-04 NOTE — PROGRESS NOTES
Hospitalist Progress Note    NAME: Fernando Huertas   :  1960   MRN:  [id number]     I reviewed with the resident the medical history and the resident's findings on the physical examination. I discussed with the resident the patient's diagnosis and concur with the plan. Interim Hospital Summary: 60 y.o. male whom presented on 3/29/2020 with Nausea, vomiting, and shortness of breath     Assessment / Plan: Will be discharged to home once clear by cardiologist.  [place] onset left MTP gout  Cellulitis (improving)  Leukocytosis (improving); pt had leukocytosis in the past without source of infection  - less pain than yesterday. Continue with steroid which was started on 3/31 to complete total 5 doses    Uric acid 12.9    See photo below    Continue with IV rocephin; WBC down to 14 today vs 28.9 on admission. Pt has had hx of leukocytosis in the past (between January to , WBC as 20K)    PO steroids x 5 days; completed on 4/3     Hypokalemia POA  - resolved; will continue to monitor while on lasix     SIRS POA WBC 28.9 , HR 160s, RR 22, no fevers   Elevated lactate 4.0 POA  - Blood cultures (3/29 &) no growth so far     CT of abd/pel:   1. Features suggestive of chronic gallbladder disease-correlate clinically. 2. Nonobstructing right nephrolithiasis. 3. Generalized body wall edema. CXR with no ASD    UA (-)    Received IV zosyn on admission.  continue with rocephin to treat cellulitis; will complete total of 10 days    Lactic acid 4.0->2.3    No abd pain, no fever/chills, no n/v, tolerating cardiac diet without difficulty     Acute hepatitis POA; resolving  - appreciate GI input; etiology unclear, suspect drug related    Denies ETOH use    Hepatitis B and C negative    HSV-1 IgG positive, but not at risk for HSV hepatitis        HSV hepatitis unlikley, hold acyclovir        HSV 1 & 2 by PCR pending    LFTs are improving     Persistent atrial fib with RVR POA   S/p Pacemaker by  Manuel and AVN ablation by Dr. Noa Wells (4/3)  Hypercoagulable state secondary to a-fib  Essential hypertension POA  - S/P cardizem drip, heparin drip.      Continue with PO cardizem and metoprolol, PO eliquis    Appreciate cardiology input;     4/3 Right foot cold to touch, palpable pulse, able to move toes, and decreased sensation after the procedure; LINDSEY and duplex study in low extremity study reviewed by Dr. Noa Wells which revealed no significant stenosis. No further vascular intervention at this time    4/4 no longer dusky color appearance. Pt feels like it is back to his baseline. (see picture below)     Anasarca POA  Acute on chronic diastolic congestive heart failure POA  -  Echo: Normal cavity size and systolic function (ejection fraction normal). Mild concentric hypertrophy. Estimated left ventricular ejection fraction is 55 - 60%. Continue with Lasix & metolazone; daily weight 326lbs -> 306lbs     Strict I & O    JUANJO  - improving; creat 1.7 (was 2.2 on admission)    Avoid nephrotoxic agetns      Morbidly obese  BMI 43.25 kg/m²       Code Status: Full code     DVT Prophylaxis: eliquis  GI Prophylaxis: not indicated     Baseline: Independent    Recommended Disposition:  PT, OT, RN     Subjective:     Chief Complaint / Reason for Physician Visit  \"I am feeling just fine for now. \"  Discussed with RN events overnight. Review of Systems:  Symptom Y/N Comments  Symptom Y/N Comments   Fever/Chills n   Chest Pain n    Poor Appetite n   Edema y    Cough    Abdominal Pain n    Sputum    Joint Pain     SOB/ELIZONDO n   Pruritis/Rash y    Nausea/vomit n   Tolerating PT/OT     Diarrhea n   Tolerating Diet     Constipation n   Other       Could NOT obtain due to:      Objective:     VITALS:   Last 24hrs VS reviewed since prior progress note.  Most recent are:  Patient Vitals for the past 24 hrs:   Temp Pulse Resp BP SpO2   04/04/20 0404 97.8 °F (36.6 °C)       04/04/20 0403 97.8 °F (36.6 °C) 75 18 (!) 143/92 95 % 04/03/20 2304  77   98 %   04/03/20 2303 98.3 °F (36.8 °C) 76 18 (!) 150/99 98 %   04/03/20 2150  75   93 %   04/03/20 2149    (!) 149/94    04/03/20 2002 98.2 °F (36.8 °C) 77 20 131/89 96 %   04/03/20 2001  77   96 %   04/03/20 1200    140/74    04/03/20 1134    160/89    04/03/20 1100  75 21  96 %   04/03/20 1046  75 13  99 %   04/03/20 1019  75 17 136/86 98 %   04/03/20 0950   16 (!) 146/96 96 %   04/03/20 0945   12 (!) 145/95 98 %   04/03/20 0940   12 (!) 142/100 98 %   04/03/20 0925  75 20 (!) 151/109 98 %   04/03/20 0920 98 °F (36.7 °C) 75 20 (!) 151/110 98 %   04/03/20 0909 96 °F (35.6 °C) 75 19 (!) 148/110 96 %       Intake/Output Summary (Last 24 hours) at 4/4/2020 5892  Last data filed at 4/4/2020 9380  Gross per 24 hour   Intake 500 ml   Output 2725 ml   Net -2225 ml        PHYSICAL EXAM:  General: Morbidly obese, ill appearing male. Alert, cooperative, no acute distress    EENT:  EOMI. Anicteric sclerae. MMM  Resp:  Clear in apex with decreased breath sounds at bases. no wheezing or rales. No accessory muscle use  CV:  regular  rhythm,  3+ edema  GI:  Soft, obese, Non tender. +Bowel sounds  Neurologic:  Alert and oriented X 3, normal speech,   Psych:   Good insight.  Not anxious nor agitated  Skin:    No jaundice                            4/3 (left)                        4/3(right); full ROM, decrease sensation,                                                                              palpable pulse        4/4 left                              4/4 right            Reviewed most current lab test results and cultures  YES  Reviewed most current radiology test results   YES  Review and summation of old records today    NO  Reviewed patient's current orders and MAR    YES  PMH/SH reviewed - no change compared to H&P  ________________________________________________________________________  Care Plan discussed with:    Comments   Patient y    Family      RN y    Care Manager     Consultant                        Multidiciplinary team rounds were held today with , nursing, pharmacist and clinical coordinator. Patient's plan of care was discussed; medications were reviewed and discharge planning was addressed. ____________________________________________________________________  Maya Vasquez NP     Procedures: see electronic medical records for all procedures/Xrays and details which were not copied into this note but were reviewed prior to creation of Plan. LABS:  I reviewed today's most current labs and imaging studies.   Pertinent labs include:  Recent Labs     04/04/20  0406 04/03/20  0605 04/02/20  0336   WBC 14.6* 13.4* 16.5*   HGB 12.6 12.6 13.2   HCT 39.1 39.0 41.1    198 235     Recent Labs     04/04/20  0406 04/03/20  0605 04/02/20  0336 04/01/20  0908    134* 134* 135*   K 4.2 4.0 4.2 3.7   CL 95* 94* 95* 97   CO2 34* 37* 35* 33*   * 91 107* 127*   BUN 35* 32* 30* 26*   CREA 1.68* 1.71* 1.78* 1.92*   CA 9.0 9.1 9.0 8.8   MG  --   --   --  2.0   ALB 3.0* 3.1* 3.1* 3.0*   TBILI 1.3* 1.0 1.3* 1.5*   SGOT 45* 56* 90* 119*   * 393* 528* 630*       Signed: )Heidi Rawls NP

## 2020-04-04 NOTE — PROGRESS NOTES
Problem: Falls - Risk of  Goal: *Absence of Falls  Description: Document Sakina Aranda Fall Risk and appropriate interventions in the flowsheet.   Outcome: Progressing Towards Goal  Note: Fall Risk Interventions:  Mobility Interventions: Patient to call before getting OOB, Strengthening exercises (ROM-active/passive)         Medication Interventions: Assess postural VS orthostatic hypotension, Evaluate medications/consider consulting pharmacy, Patient to call before getting OOB, Teach patient to arise slowly    Elimination Interventions: Call light in reach, Patient to call for help with toileting needs, Urinal in reach    History of Falls Interventions: Evaluate medications/consider consulting pharmacy, Room close to nurse's station         Problem: Patient Education: Go to Patient Education Activity  Goal: Patient/Family Education  Outcome: Progressing Towards Goal     Problem: Patient Education: Go to Patient Education Activity  Goal: Patient/Family Education  Outcome: Progressing Towards Goal     Problem: Afib Pathway: Day 1  Goal: Off Pathway (Use only if patient is Off Pathway)  Outcome: Progressing Towards Goal  Goal: Activity/Safety  Outcome: Progressing Towards Goal  Goal: Consults, if ordered  Outcome: Progressing Towards Goal  Goal: Diagnostic Test/Procedures  Outcome: Progressing Towards Goal  Goal: Nutrition/Diet  Outcome: Progressing Towards Goal  Goal: Discharge Planning  Outcome: Progressing Towards Goal  Goal: Medications  Outcome: Progressing Towards Goal  Goal: Respiratory  Outcome: Progressing Towards Goal  Goal: Treatments/Interventions/Procedures  Outcome: Progressing Towards Goal  Goal: Psychosocial  Outcome: Progressing Towards Goal  Goal: *Optimal pain control at patient's stated goal  Outcome: Progressing Towards Goal  Goal: *Hemodynamically stable  Outcome: Progressing Towards Goal  Goal: *Stable cardiac rhythm  Outcome: Progressing Towards Goal  Goal: *Lungs clear or at baseline  Outcome: Progressing Towards Goal  Goal: *Labs within defined limits  Outcome: Progressing Towards Goal  Goal: *Describes available resources and support systems  Outcome: Progressing Towards Goal     Problem: Afib Pathway: Day 2  Goal: Off Pathway (Use only if patient is Off Pathway)  Outcome: Progressing Towards Goal  Goal: Activity/Safety  Outcome: Progressing Towards Goal  Goal: Consults, if ordered  Outcome: Progressing Towards Goal  Goal: Diagnostic Test/Procedures  Outcome: Progressing Towards Goal  Goal: Nutrition/Diet  Outcome: Progressing Towards Goal  Goal: Discharge Planning  Outcome: Progressing Towards Goal  Goal: Medications  Outcome: Progressing Towards Goal  Goal: Respiratory  Outcome: Progressing Towards Goal  Goal: Treatments/Interventions/Procedures  Outcome: Progressing Towards Goal  Goal: Psychosocial  Outcome: Progressing Towards Goal  Goal: *Hemodynamically stable  Outcome: Progressing Towards Goal  Goal: *Optimal pain control at patient's stated goal  Outcome: Progressing Towards Goal  Goal: *Stable cardiac rhythm  Outcome: Progressing Towards Goal  Goal: *Lungs clear or at baseline  Outcome: Progressing Towards Goal  Goal: *Describes available resources and support systems  Outcome: Progressing Towards Goal     Problem: Afib Pathway: Day 3  Goal: Off Pathway (Use only if patient is Off Pathway)  Outcome: Progressing Towards Goal  Goal: Activity/Safety  Outcome: Progressing Towards Goal  Goal: Diagnostic Test/Procedures  Outcome: Progressing Towards Goal  Goal: Nutrition/Diet  Outcome: Progressing Towards Goal  Goal: Discharge Planning  Outcome: Progressing Towards Goal  Goal: Medications  Outcome: Progressing Towards Goal  Goal: Respiratory  Outcome: Progressing Towards Goal  Goal: Treatments/Interventions/Procedures  Outcome: Progressing Towards Goal  Goal: Psychosocial  Outcome: Progressing Towards Goal     Problem: Afib: Discharge Outcomes (not in CAT)  Goal: *Hemodynamically stable  Outcome: Progressing Towards Goal  Goal: *Stable cardiac rhythm  Outcome: Progressing Towards Goal  Goal: *Lungs clear or at baseline  Outcome: Progressing Towards Goal  Goal: *Optimal pain control at patient's stated goal  Outcome: Progressing Towards Goal  Goal: *Identifies cardiac risk factors  Outcome: Progressing Towards Goal  Goal: *Verbalizes home exercise program, activity guidelines, cardiac precautions  Outcome: Progressing Towards Goal  Goal: *Verbalizes understanding and describes prescribed diet  Outcome: Progressing Towards Goal  Goal: *Verbalizes understanding and describes medication purposes and frequencies  Outcome: Progressing Towards Goal  Goal: *Anxiety reduced or absent  Outcome: Progressing Towards Goal  Goal: *Understands and describes signs and symptoms to report to providers(Stroke Metric)  Outcome: Progressing Towards Goal  Goal: *Describes follow-up/return visits to physicians  Outcome: Progressing Towards Goal  Goal: *Describes available resources and support systems  Outcome: Progressing Towards Goal  Goal: *Influenza immunization  Outcome: Progressing Towards Goal  Goal: *Pneumococcal immunization  Outcome: Progressing Towards Goal  Goal: *Describes smoking cessation resources  Outcome: Progressing Towards Goal     Problem: Hypertension  Goal: *Blood pressure within specified parameters  Outcome: Progressing Towards Goal  Goal: *Fluid volume balance  Outcome: Progressing Towards Goal  Goal: *Labs within defined limits  Outcome: Progressing Towards Goal     Problem: Patient Education: Go to Patient Education Activity  Goal: Patient/Family Education  Outcome: Progressing Towards Goal     Problem: Patient Education: Go to Patient Education Activity  Goal: Patient/Family Education  Outcome: Progressing Towards Goal     Problem: Heart Failure: Day 1  Goal: Off Pathway (Use only if patient is Off Pathway)  Outcome: Progressing Towards Goal  Goal: Activity/Safety  Outcome: Progressing Towards Goal  Goal: Consults, if ordered  Outcome: Progressing Towards Goal  Goal: Diagnostic Test/Procedures  Outcome: Progressing Towards Goal  Goal: Nutrition/Diet  Outcome: Progressing Towards Goal  Goal: Discharge Planning  Outcome: Progressing Towards Goal  Goal: Medications  Outcome: Progressing Towards Goal  Goal: Respiratory  Outcome: Progressing Towards Goal  Goal: Treatments/Interventions/Procedures  Outcome: Progressing Towards Goal  Goal: Psychosocial  Outcome: Progressing Towards Goal  Goal: *Oxygen saturation within defined limits  Outcome: Progressing Towards Goal  Goal: *Hemodynamically stable  Outcome: Progressing Towards Goal  Goal: *Optimal pain control at patient's stated goal  Outcome: Progressing Towards Goal  Goal: *Anxiety reduced or absent  Outcome: Progressing Towards Goal     Problem: Heart Failure: Day 2  Goal: Off Pathway (Use only if patient is Off Pathway)  Outcome: Progressing Towards Goal  Goal: Activity/Safety  Outcome: Progressing Towards Goal  Goal: Consults, if ordered  Outcome: Progressing Towards Goal  Goal: Diagnostic Test/Procedures  Outcome: Progressing Towards Goal  Goal: Nutrition/Diet  Outcome: Progressing Towards Goal  Goal: Discharge Planning  Outcome: Progressing Towards Goal  Goal: Medications  Outcome: Progressing Towards Goal  Goal: Respiratory  Outcome: Progressing Towards Goal  Goal: Treatments/Interventions/Procedures  Outcome: Progressing Towards Goal  Goal: Psychosocial  Outcome: Progressing Towards Goal  Goal: *Oxygen saturation within defined limits  Outcome: Progressing Towards Goal  Goal: *Hemodynamically stable  Outcome: Progressing Towards Goal  Goal: *Optimal pain control at patient's stated goal  Outcome: Progressing Towards Goal  Goal: *Anxiety reduced or absent  Outcome: Progressing Towards Goal  Goal: *Demonstrates progressive activity  Outcome: Progressing Towards Goal     Problem: Heart Failure: Day 3  Goal: Off Pathway (Use only if patient is Off Pathway)  Outcome: Progressing Towards Goal  Goal: Activity/Safety  Outcome: Progressing Towards Goal  Goal: Diagnostic Test/Procedures  Outcome: Progressing Towards Goal  Goal: Nutrition/Diet  Outcome: Progressing Towards Goal  Goal: Discharge Planning  Outcome: Progressing Towards Goal  Goal: Medications  Outcome: Progressing Towards Goal  Goal: Respiratory  Outcome: Progressing Towards Goal  Goal: Treatments/Interventions/Procedures  Outcome: Progressing Towards Goal  Goal: Psychosocial  Outcome: Progressing Towards Goal  Goal: *Oxygen saturation within defined limits  Outcome: Progressing Towards Goal  Goal: *Hemodynamically stable  Outcome: Progressing Towards Goal  Goal: *Optimal pain control at patient's stated goal  Outcome: Progressing Towards Goal  Goal: *Anxiety reduced or absent  Outcome: Progressing Towards Goal  Goal: *Demonstrates progressive activity  Outcome: Progressing Towards Goal     Problem: Heart Failure: Day 4  Goal: Off Pathway (Use only if patient is Off Pathway)  Outcome: Progressing Towards Goal  Goal: Activity/Safety  Outcome: Progressing Towards Goal  Goal: Diagnostic Test/Procedures  Outcome: Progressing Towards Goal  Goal: Nutrition/Diet  Outcome: Progressing Towards Goal  Goal: Discharge Planning  Outcome: Progressing Towards Goal  Goal: Medications  Outcome: Progressing Towards Goal  Goal: Respiratory  Outcome: Progressing Towards Goal  Goal: Treatments/Interventions/Procedures  Outcome: Progressing Towards Goal  Goal: Psychosocial  Outcome: Progressing Towards Goal  Goal: *Oxygen saturation within defined limits  Outcome: Progressing Towards Goal  Goal: *Hemodynamically stable  Outcome: Progressing Towards Goal  Goal: *Optimal pain control at patient's stated goal  Outcome: Progressing Towards Goal  Goal: *Anxiety reduced or absent  Outcome: Progressing Towards Goal  Goal: *Demonstrates progressive activity  Outcome: Progressing Towards Goal     Problem: Heart Failure: Day 5  Goal: Off Pathway (Use only if patient is Off Pathway)  Outcome: Progressing Towards Goal  Goal: Activity/Safety  Outcome: Progressing Towards Goal  Goal: Diagnostic Test/Procedures  Outcome: Progressing Towards Goal  Goal: Nutrition/Diet  Outcome: Progressing Towards Goal  Goal: Discharge Planning  Outcome: Progressing Towards Goal  Goal: Medications  Outcome: Progressing Towards Goal  Goal: Respiratory  Outcome: Progressing Towards Goal  Goal: Treatments/Interventions/Procedures  Outcome: Progressing Towards Goal  Goal: Psychosocial  Outcome: Progressing Towards Goal     Problem: Heart Failure: Discharge Outcomes  Goal: *Demonstrates ability to perform prescribed activity without shortness of breath or discomfort  Outcome: Progressing Towards Goal  Goal: *Left ventricular function assessment completed prior to or during stay, or planned for post-discharge  Outcome: Progressing Towards Goal  Goal: *ACEI prescribed if LVEF less than 40% and no contraindications or ARB prescribed  Outcome: Progressing Towards Goal  Goal: *Verbalizes understanding and describes prescribed diet  Outcome: Progressing Towards Goal  Goal: *Verbalizes understanding/describes prescribed medications  Outcome: Progressing Towards Goal  Goal: *Describes available resources and support systems  Description: (eg: Home Health, Palliative Care, [employer] Directive)  Outcome: Progressing Towards Goal  Goal: *Describes smoking cessation resources  Outcome: Progressing Towards Goal  Goal: *Understands and describes signs and symptoms to report to providers(Stroke Metric)  Outcome: Progressing Towards Goal  Goal: *Describes/verbalizes understanding of follow-up/return appt  Description: (eg: to physicians, diabetes treatment coordinator, and other resources  Outcome: Progressing Towards Goal  Goal: *Describes importance of continuing daily weights and changes to report to physician  Outcome: Progressing Towards Goal     Problem: Pain  Goal: *Control of Pain  Outcome: Progressing Towards Goal  Goal: *PALLIATIVE CARE:  Alleviation of Pain  Outcome: Progressing Towards Goal     Problem: Patient Education: Go to Patient Education Activity  Goal: Patient/Family Education  Outcome: Progressing Towards Goal     Problem: Pressure Injury - Risk of  Goal: *Prevention of pressure injury  Description: Document Michel Scale and appropriate interventions in the flowsheet. Outcome: Progressing Towards Goal  Note: Pressure Injury Interventions:       Moisture Interventions: Absorbent underpads    Activity Interventions: Increase time out of bed, Pressure redistribution bed/mattress(bed type), Assess need for specialty bed    Mobility Interventions: Float heels, Turn and reposition approx.  every two hours(pillow and wedges)    Nutrition Interventions: Document food/fluid/supplement intake                     Problem: Patient Education: Go to Patient Education Activity  Goal: Patient/Family Education  Outcome: Progressing Towards Goal     Problem: Arrhythmia Pathway (Adult)  Goal: *Absence of arrhythmia  Outcome: Progressing Towards Goal     Problem: Patient Education: Go to Patient Education Activity  Goal: Patient/Family Education  Outcome: Progressing Towards Goal     Problem: Patient Education: Go to Patient Education Activity  Goal: Patient/Family Education  Outcome: Progressing Towards Goal     Problem: Pacer/ICD: Post-Procedure  Goal: Off Pathway (Use only if patient is Off Pathway)  Outcome: Progressing Towards Goal  Goal: Activity/Safety  Outcome: Progressing Towards Goal  Goal: Consults, if ordered  Outcome: Progressing Towards Goal  Goal: Diagnostic Test/Procedures  Outcome: Progressing Towards Goal  Goal: Nutrition/Diet  Outcome: Progressing Towards Goal  Goal: Discharge Planning  Outcome: Progressing Towards Goal  Goal: Medications  Outcome: Progressing Towards Goal  Goal: Respiratory  Outcome: Progressing Towards Goal  Goal: Treatments/Interventions/Procedures  Outcome: Progressing Towards Goal  Goal: Psychosocial  Outcome: Progressing Towards Goal  Goal: *Hemodynamically stable  Outcome: Progressing Towards Goal  Goal: *Optimal pain control at patient's stated goal  Outcome: Progressing Towards Goal  Goal: *Absence of signs and symptoms of infection or wound complication  Outcome: Progressing Towards Goal

## 2020-04-04 NOTE — PROGRESS NOTES
4/4/2020 10:30 AM    Admit Date: 3/29/2020    Admit Diagnosis: Atrial fibrillation with RVR ([facility] [address] [I48.91]; Atrial fibrillation with RVR ([facility]) [I48.91]    Subjective:     Pilar Real   denies chest pain, chest pressure/discomfort, dyspnea, palpitations, irregular heart beats, near-syncope, syncope, fatigue, orthopnea, paroxysmal nocturnal dyspnea, exertional chest pressure/discomfort, claudication. Visit Vitals  BP (!) 139/98   Pulse 77   Temp 98 °F (36.7 °C)   Resp 17   Ht 6' 2\" (1.88 m)   Wt 306 lb (138.8 kg)   SpO2 100%   BMI 39.29 kg/m²     Current Facility-Administered Medications   Medication Dose Route Frequency    sodium chloride (NS) flush 5-40 mL  5-40 mL IntraVENous Q8H    sodium chloride (NS) flush 5-40 mL  5-40 mL IntraVENous PRN    naloxone (NARCAN) injection 0.4 mg  0.4 mg IntraVENous PRN    apixaban (ELIQUIS) tablet 5 mg  5 mg Oral BID    metoprolol tartrate (LOPRESSOR) tablet 25 mg  25 mg Oral Q12H    metOLazone (ZAROXOLYN) tablet 5 mg  5 mg Oral DAILY    furosemide (LASIX) injection 80 mg  80 mg IntraVENous BID    potassium chloride SR (KLOR-CON 10) tablet 40 mEq  40 mEq Oral TID    traMADoL (ULTRAM) tablet 50 mg  50 mg Oral Q6H PRN    cefTRIAXone (ROCEPHIN) 1 g in 0.9% sodium chloride (MBP/ADV) 50 mL  1 g IntraVENous Q24H    ondansetron (ZOFRAN) injection 4 mg  4 mg IntraVENous Q6H PRN    morphine injection 1 mg  1 mg IntraVENous Q3H PRN    bacitracin 500 unit/gram packet 1 Packet  1 Packet Topical PRN    sodium chloride (NS) flush 5-40 mL  5-40 mL IntraVENous Q8H    sodium chloride (NS) flush 5-40 mL  5-40 mL IntraVENous PRN         Objective:      Visit Vitals  BP (!) 139/98   Pulse 77   Temp 98 °F (36.7 °C)   Resp 17   Ht 6' 2\" (1.88 m)   Wt 306 lb (138.8 kg)   SpO2 100%   BMI 39.29 kg/m²       Physical Exam:  Limited exam due to COVID 19 pandemic  Abdomen: soft, non-tender. Bowel sounds normal.   Extremities: rt. Foot duskiness is much better. 1+ edema. Neurologic: Grossly normal    Data Review:   Labs:    Recent Results (from the past 24 hour(s))   ANKLE BRACHIAL INDEX    Collection Time: 04/03/20  2:01 PM   Result Value Ref Range    Left arm  mmHg    Left posterior tibial 210 mmHg    Left anterior tibial 201 mmHg    Left toe pressure 101 mmHg    Right posterior tibial 208 mmHg    Right anterior tibial 199 mmHg    Right toe pressure 0 mmHg    Left LINDSEY 1.36     Right LINDSEY 1.35     Left TBI 0.66     Right TBI 0.00     Right LARON level DPA     Left LARON level DPA    DUPLEX LOWER EXT ARTERY RIGHT    Collection Time: 04/03/20  2:25 PM   Result Value Ref Range    Right Prox PFA A PSV 50.8 cm/s    Right super femoral dist sys PSV 69.8 cm/s    Right super femoral mid sys .5 cm/s    Right super femoral prox sys PSV 88.1 cm/s    Right popliteal dist sys PSV 58.9 cm/s    Right popliteal prox sys PSV 64.4 cm/s    Right Dist PTA PSV 60.7 cm/s    Right mid PTA sys PSV 89.9 cm/s    Right Prox PTA PSV 57.1 cm/s    Right Dist Peroneal Velocity 40.6 cm/s    Right Dist LARON Velocity 47.9 cm/s    Right Mid LARON Velocity 55.2 cm/s    Right Prox LARON Velocity 47.9 cm/s    Right SFA Mid Venkatesh Ratio 1.2     Right SFA Dist Venkatesh Ratio 0.67     Right Pop A Prox Venkatesh Ratio 0.92    CBC WITH AUTOMATED DIFF    Collection Time: 04/04/20  4:06 AM   Result Value Ref Range    WBC 14.6 (H) 4.1 - 11.1 K/uL    RBC 3.91 (L) 4.10 - 5.70 M/uL    HGB 12.6 12.1 - 17.0 g/dL    HCT 39.1 36.6 - 50.3 %    .0 (H) 80.0 - 99.0 FL    MCH 32.2 26.0 - 34.0 PG    MCHC 32.2 30.0 - 36.5 g/dL    RDW 14.2 11.5 - 14.5 %    PLATELET 095 775 - 677 K/uL    MPV 11.2 8.9 - 12.9 FL    NRBC 0.1 (H) 0  WBC    ABSOLUTE NRBC 0.02 (H) 0.00 - 0.01 K/uL    NEUTROPHILS 88 (H) 32 - 75 %    LYMPHOCYTES 5 (L) 12 - 49 %    MONOCYTES 6 5 - 13 %    EOSINOPHILS 0 0 - 7 %    BASOPHILS 0 0 - 1 %    IMMATURE GRANULOCYTES 1 (H) 0.0 - 0.5 %    ABS. NEUTROPHILS 12.8 (H) 1.8 - 8.0 K/UL    ABS.  LYMPHOCYTES 0.7 (L) 0.8 - 3.5 K/UL ABS. MONOCYTES 0.9 0.0 - 1.0 K/UL    ABS. EOSINOPHILS 0.0 0.0 - 0.4 K/UL    ABS. BASOPHILS 0.0 0.0 - 0.1 K/UL    ABS. IMM. GRANS. 0.2 (H) 0.00 - 0.04 K/UL    DF AUTOMATED     METABOLIC PANEL, COMPREHENSIVE    Collection Time: 04/04/20  4:06 AM   Result Value Ref Range    Sodium 136 136 - 145 mmol/L    Potassium 4.2 3.5 - 5.1 mmol/L    Chloride 95 (L) 97 - 108 mmol/L    CO2 34 (H) 21 - 32 mmol/L    Anion gap 7 5 - 15 mmol/L    Glucose 113 (H) 65 - 100 mg/dL    BUN 35 (H) 6 - 20 MG/DL    Creatinine 1.68 (H) 0.70 - 1.30 MG/DL    BUN/Creatinine ratio 21 (H) 12 - 20      GFR est AA 51 (L) >60 ml/min/1.73m2    GFR est non-AA 42 (L) >60 ml/min/1.73m2    Calcium 9.0 8.5 - 10.1 MG/DL    Bilirubin, total 1.3 (H) 0.2 - 1.0 MG/DL    ALT (SGPT) 301 (H) 12 - 78 U/L    AST (SGOT) 45 (H) 15 - 37 U/L    Alk. phosphatase 99 45 - 117 U/L    Protein, total 5.9 (L) 6.4 - 8.2 g/dL    Albumin 3.0 (L) 3.5 - 5.0 g/dL    Globulin 2.9 2.0 - 4.0 g/dL    A-G Ratio 1.0 (L) 1.1 - 2.2     EKG, 12 LEAD, INITIAL    Collection Time: 04/04/20  4:20 AM   Result Value Ref Range    Ventricular Rate 75 BPM    Atrial Rate 127 BPM    QRS Duration 222 ms    Q-T Interval 540 ms    QTC Calculation (Bezet) 603 ms    Calculated R Axis -77 degrees    Calculated T Axis 91 degrees    Diagnosis       Ventricular-paced rhythm  Abnormal ECG  When compared with ECG of 29-MAR-2020 08:21,  Electronic ventricular pacemaker has replaced Atrial fibrillation  Vent.  rate has decreased BY  85 BPM         Telemetry: paced      Assessment:     Active Problems:    Acute on chronic diastolic HF (heart failure) ([facility] (11/4/2019)      Morbid obesity ([address] (11/4/2019)      ALYSSA on CPAP (11/4/2019)      Atrial fibrillation with rapid ventricular response ([address] (11/5/2019)      Essential hypertension (12/4/2019)      Peripheral vascular disease ([facility] [address] (2/25/2020)      S/P AV (atrioventricular) pat ablation (4/3/2020)      Overview: 4/3/20      Pacemaker (4/3/2020)      Overview: 4/3/20: [employer]         Plan:     S/p AV pat ablation and PPM. Stable. Right foot also better. Based upon LINDSEY may have right sided pedal atherosclerosis. No need for any urgent intervention. Can f/u as out pt. Ok to dc from cardiac standpoint. F/u with Dr. Severa Blue.

## 2020-04-06 ENCOUNTER — PATIENT OUTREACH (OUTPATIENT)
Dept: INTERNAL MEDICINE CLINIC | Age: 60
End: 2020-04-06

## 2020-04-06 LAB
BACTERIA SPEC CULT: NORMAL
BACTERIA SPEC CULT: NORMAL
SERVICE CMNT-IMP: NORMAL
SERVICE CMNT-IMP: NORMAL

## 2020-04-07 ENCOUNTER — TELEPHONE (OUTPATIENT)
Dept: CARDIOLOGY CLINIC | Age: 60
End: 2020-04-07

## 2020-04-07 DIAGNOSIS — I10 ESSENTIAL HYPERTENSION: Chronic | ICD-10-CM

## 2020-04-07 DIAGNOSIS — I73.9 PERIPHERAL VASCULAR DISEASE (HCC): Primary | ICD-10-CM

## 2020-04-07 DIAGNOSIS — I50.30 DIASTOLIC CONGESTIVE HEART FAILURE, UNSPECIFIED HF CHRONICITY (HCC): ICD-10-CM

## 2020-04-07 RX ORDER — POTASSIUM CHLORIDE 20 MEQ/1
40 TABLET, EXTENDED RELEASE ORAL DAILY
Qty: 60 TAB | Refills: 1 | Status: SHIPPED | OUTPATIENT
Start: 2020-04-07 | End: 2020-05-19 | Stop reason: SDUPTHER

## 2020-04-07 NOTE — TELEPHONE ENCOUNTER
Patient just got a pacemaker put in on last Friday April 3rd and would like to speak with the nurse in reference to some medication that he was on while in the hospital. Potassium is what he has a question about.             Thanks

## 2020-04-07 NOTE — TELEPHONE ENCOUNTER
Verified patient with 2 identifiers   Patient has 10 MEQ tablets on hand and states he will take 2 tabs twice daily to = 40 MEQ daily. Is this acceptable? He has enough for six days  Please send new script to [facility] on file.

## 2020-04-07 NOTE — TELEPHONE ENCOUNTER
Verified patient with 2 identifiers. Patient was in the hospital from 3/29-4/4/20  Had PPM inserted. States he was on K+ the entire time he was hospital  Discharge papers did not indicate to continue. States he is not getting leg cramps now but does not want them to start. Patient currently taking metolazone 5 mg daily and Furosemide 40 mg twice daily. Please advise on K+. Patient also states his elbow is red swollen and painful  States it started yesterday and believes it is allergic reaction related to his doxycycline he is taking. He called pcp about but they referred him back here since we started him on it  He is taking singulair in am and benadryl in PM. Patient has seven days left of Doxy.

## 2020-04-08 NOTE — TELEPHONE ENCOUNTER
Verified patient with 2 identifiers   Advised patient a script was sent to pharmacy on file. Advised it is 20 MEQ tab and that he should take two tabs daily to = 40 Meq   Patient verified understanding. Advised I would be sending him a lab slip to check his mag and cmp. Patient had PPM insert on 4/3/20  His follow up for site check is scheduled for 4/17/20. Patient states he had really bad sweats during the night and bandage over PM site fell off. Patient states he cleansed around area with alcohol and applied triple antiboiotic ointment. He then applied gauze and some tape leaving a portion loose to let air get in. Advised patient that the bandage needs to be air tight until his follow up. Patients verified understanding and will apply more tape to make it air tight. Patient also states he received a call from [facility] stating he may have been exposed to the Covid 19 virus while in the hospital. States he was advised to go in and get tested. Patient believes this was a \"spam\" call. Advised patient it was probably legit, as there are at least 40 cases that I heard of there right now. Patient states he feels fine and is not going at this time. Advised patient sx could take up to 14 days to show. Also, people could be carriers. Patient states he will go if he develops sx, but for now he is staying home. ...

## 2020-04-11 ENCOUNTER — APPOINTMENT (OUTPATIENT)
Dept: GENERAL RADIOLOGY | Age: 60
DRG: 281 | End: 2020-04-11
Attending: EMERGENCY MEDICINE
Payer: COMMERCIAL

## 2020-04-11 ENCOUNTER — HOSPITAL ENCOUNTER (INPATIENT)
Age: 60
LOS: 5 days | Discharge: HOME | DRG: 281 | End: 2020-04-17
Attending: EMERGENCY MEDICINE | Admitting: INTERNAL MEDICINE
Payer: COMMERCIAL

## 2020-04-11 DIAGNOSIS — E87.6 HYPOKALEMIA: ICD-10-CM

## 2020-04-11 DIAGNOSIS — I21.4 NSTEMI (NON-ST ELEVATED MYOCARDIAL INFARCTION) (HCC): Primary | ICD-10-CM

## 2020-04-11 DIAGNOSIS — R11.2 NON-INTRACTABLE VOMITING WITH NAUSEA, UNSPECIFIED VOMITING TYPE: ICD-10-CM

## 2020-04-11 DIAGNOSIS — E86.0 DEHYDRATION: ICD-10-CM

## 2020-04-11 LAB
ALBUMIN SERPL-MCNC: 2.9 G/DL (ref 3.5–5)
ALBUMIN/GLOB SERPL: 0.7 {RATIO} (ref 1.1–2.2)
ALP SERPL-CCNC: 82 U/L (ref 45–117)
ALT SERPL-CCNC: 70 U/L (ref 12–78)
ANION GAP SERPL CALC-SCNC: 10 MMOL/L (ref 5–15)
APPEARANCE UR: CLEAR
AST SERPL-CCNC: 47 U/L (ref 15–37)
BACTERIA URNS QL MICRO: NEGATIVE /HPF
BILIRUB SERPL-MCNC: 0.8 MG/DL (ref 0.2–1)
BILIRUB UR QL: NEGATIVE
BUN SERPL-MCNC: 25 MG/DL (ref 6–20)
BUN/CREAT SERPL: 15 (ref 12–20)
CALCIUM SERPL-MCNC: 9.2 MG/DL (ref 8.5–10.1)
CHLORIDE SERPL-SCNC: 89 MMOL/L (ref 97–108)
CO2 SERPL-SCNC: 29 MMOL/L (ref 21–32)
COLOR UR: ABNORMAL
CREAT SERPL-MCNC: 1.7 MG/DL (ref 0.7–1.3)
EPITH CASTS URNS QL MICRO: ABNORMAL /LPF
GLOBULIN SER CALC-MCNC: 4 G/DL (ref 2–4)
GLUCOSE SERPL-MCNC: 119 MG/DL (ref 65–100)
GLUCOSE UR STRIP.AUTO-MCNC: NEGATIVE MG/DL
HGB UR QL STRIP: NEGATIVE
HYALINE CASTS URNS QL MICRO: ABNORMAL /LPF (ref 0–5)
KETONES UR QL STRIP.AUTO: NEGATIVE MG/DL
LACTATE SERPL-SCNC: 1.7 MMOL/L (ref 0.4–2)
LEUKOCYTE ESTERASE UR QL STRIP.AUTO: NEGATIVE
LIPASE SERPL-CCNC: 95 U/L (ref 73–393)
NITRITE UR QL STRIP.AUTO: NEGATIVE
PH UR STRIP: 6.5 [PH] (ref 5–8)
POTASSIUM SERPL-SCNC: 2.8 MMOL/L (ref 3.5–5.1)
PROT SERPL-MCNC: 6.9 G/DL (ref 6.4–8.2)
PROT UR STRIP-MCNC: ABNORMAL MG/DL
RBC #/AREA URNS HPF: ABNORMAL /HPF (ref 0–5)
SODIUM SERPL-SCNC: 128 MMOL/L (ref 136–145)
SP GR UR REFRACTOMETRY: 1.01 (ref 1–1.03)
UA: UC IF INDICATED,UAUC: ABNORMAL
UROBILINOGEN UR QL STRIP.AUTO: 0.2 EU/DL (ref 0.2–1)
WBC URNS QL MICRO: ABNORMAL /HPF (ref 0–4)

## 2020-04-11 PROCEDURE — 87040 BLOOD CULTURE FOR BACTERIA: CPT

## 2020-04-11 PROCEDURE — 96375 TX/PRO/DX INJ NEW DRUG ADDON: CPT

## 2020-04-11 PROCEDURE — 83605 ASSAY OF LACTIC ACID: CPT

## 2020-04-11 PROCEDURE — 74011250636 HC RX REV CODE- 250/636: Performed by: EMERGENCY MEDICINE

## 2020-04-11 PROCEDURE — 93005 ELECTROCARDIOGRAM TRACING: CPT

## 2020-04-11 PROCEDURE — 84484 ASSAY OF TROPONIN QUANT: CPT

## 2020-04-11 PROCEDURE — 96365 THER/PROPH/DIAG IV INF INIT: CPT

## 2020-04-11 PROCEDURE — 83690 ASSAY OF LIPASE: CPT

## 2020-04-11 PROCEDURE — 85025 COMPLETE CBC W/AUTO DIFF WBC: CPT

## 2020-04-11 PROCEDURE — 71045 X-RAY EXAM CHEST 1 VIEW: CPT

## 2020-04-11 PROCEDURE — 99285 EMERGENCY DEPT VISIT HI MDM: CPT

## 2020-04-11 PROCEDURE — 80053 COMPREHEN METABOLIC PANEL: CPT

## 2020-04-11 PROCEDURE — 81001 URINALYSIS AUTO W/SCOPE: CPT

## 2020-04-11 PROCEDURE — 36415 COLL VENOUS BLD VENIPUNCTURE: CPT

## 2020-04-11 RX ORDER — ONDANSETRON 2 MG/ML
4 INJECTION INTRAMUSCULAR; INTRAVENOUS
Status: COMPLETED | OUTPATIENT
Start: 2020-04-11 | End: 2020-04-12

## 2020-04-11 RX ADMIN — ONDANSETRON HYDROCHLORIDE 4 MG: 2 INJECTION, SOLUTION INTRAMUSCULAR; INTRAVENOUS at 23:30

## 2020-04-12 ENCOUNTER — APPOINTMENT (OUTPATIENT)
Dept: GENERAL RADIOLOGY | Age: 60
DRG: 281 | End: 2020-04-12
Attending: INTERNAL MEDICINE
Payer: COMMERCIAL

## 2020-04-12 PROBLEM — I48.21 PERMANENT ATRIAL FIBRILLATION (HCC): Status: ACTIVE | Noted: 2020-04-12

## 2020-04-12 PROBLEM — R77.8 ELEVATED TROPONIN: Status: ACTIVE | Noted: 2020-04-12

## 2020-04-12 PROBLEM — I21.4 NSTEMI (NON-ST ELEVATED MYOCARDIAL INFARCTION) (HCC): Status: ACTIVE | Noted: 2020-04-12

## 2020-04-12 LAB
ANION GAP SERPL CALC-SCNC: 7 MMOL/L (ref 5–15)
APTT PPP: 32.5 SEC (ref 22.1–32)
APTT PPP: 48.7 SEC (ref 22.1–32)
APTT PPP: 56.9 SEC (ref 22.1–32)
ATRIAL RATE: 73 BPM
ATRIAL RATE: 75 BPM
BASOPHILS # BLD: 0 K/UL (ref 0–0.1)
BASOPHILS # BLD: 0 K/UL (ref 0–0.1)
BASOPHILS NFR BLD: 0 % (ref 0–1)
BASOPHILS NFR BLD: 0 % (ref 0–1)
BUN SERPL-MCNC: 28 MG/DL (ref 6–20)
BUN/CREAT SERPL: 16 (ref 12–20)
CALCIUM SERPL-MCNC: 8.3 MG/DL (ref 8.5–10.1)
CALCULATED R AXIS, ECG10: -64 DEGREES
CALCULATED R AXIS, ECG10: -75 DEGREES
CALCULATED T AXIS, ECG11: 102 DEGREES
CALCULATED T AXIS, ECG11: 111 DEGREES
CHLORIDE SERPL-SCNC: 93 MMOL/L (ref 97–108)
CO2 SERPL-SCNC: 33 MMOL/L (ref 21–32)
CREAT SERPL-MCNC: 1.76 MG/DL (ref 0.7–1.3)
DIAGNOSIS, 93000: NORMAL
DIAGNOSIS, 93000: NORMAL
DIFFERENTIAL METHOD BLD: ABNORMAL
DIFFERENTIAL METHOD BLD: ABNORMAL
EOSINOPHIL # BLD: 0 K/UL (ref 0–0.4)
EOSINOPHIL # BLD: 0 K/UL (ref 0–0.4)
EOSINOPHIL NFR BLD: 0 % (ref 0–7)
EOSINOPHIL NFR BLD: 0 % (ref 0–7)
ERYTHROCYTE [DISTWIDTH] IN BLOOD BY AUTOMATED COUNT: 13.4 % (ref 11.5–14.5)
ERYTHROCYTE [DISTWIDTH] IN BLOOD BY AUTOMATED COUNT: 13.5 % (ref 11.5–14.5)
ERYTHROCYTE [SEDIMENTATION RATE] IN BLOOD: 52 MM/HR (ref 0–20)
GLUCOSE SERPL-MCNC: 87 MG/DL (ref 65–100)
HCT VFR BLD AUTO: 41 % (ref 36.6–50.3)
HCT VFR BLD AUTO: 41.4 % (ref 36.6–50.3)
HGB BLD-MCNC: 14.3 G/DL (ref 12.1–17)
HGB BLD-MCNC: 14.3 G/DL (ref 12.1–17)
IMM GRANULOCYTES # BLD AUTO: 0.1 K/UL (ref 0–0.04)
IMM GRANULOCYTES # BLD AUTO: 0.1 K/UL (ref 0–0.04)
IMM GRANULOCYTES NFR BLD AUTO: 1 % (ref 0–0.5)
IMM GRANULOCYTES NFR BLD AUTO: 1 % (ref 0–0.5)
LYMPHOCYTES # BLD: 0.6 K/UL (ref 0.8–3.5)
LYMPHOCYTES # BLD: 0.6 K/UL (ref 0.8–3.5)
LYMPHOCYTES NFR BLD: 7 % (ref 12–49)
LYMPHOCYTES NFR BLD: 7 % (ref 12–49)
MAGNESIUM SERPL-MCNC: 1.6 MG/DL (ref 1.6–2.4)
MCH RBC QN AUTO: 31.4 PG (ref 26–34)
MCH RBC QN AUTO: 31.8 PG (ref 26–34)
MCHC RBC AUTO-ENTMCNC: 34.5 G/DL (ref 30–36.5)
MCHC RBC AUTO-ENTMCNC: 34.9 G/DL (ref 30–36.5)
MCV RBC AUTO: 91 FL (ref 80–99)
MCV RBC AUTO: 91.1 FL (ref 80–99)
MONOCYTES # BLD: 0.6 K/UL (ref 0–1)
MONOCYTES # BLD: 0.6 K/UL (ref 0–1)
MONOCYTES NFR BLD: 7 % (ref 5–13)
MONOCYTES NFR BLD: 7 % (ref 5–13)
NEUTS SEG # BLD: 7.4 K/UL (ref 1.8–8)
NEUTS SEG # BLD: 7.5 K/UL (ref 1.8–8)
NEUTS SEG NFR BLD: 85 % (ref 32–75)
NEUTS SEG NFR BLD: 86 % (ref 32–75)
NRBC # BLD: 0 K/UL (ref 0–0.01)
NRBC # BLD: 0 K/UL (ref 0–0.01)
NRBC BLD-RTO: 0 PER 100 WBC
NRBC BLD-RTO: 0 PER 100 WBC
PLATELET # BLD AUTO: 220 K/UL (ref 150–400)
PLATELET # BLD AUTO: 224 K/UL (ref 150–400)
PMV BLD AUTO: 10.8 FL (ref 8.9–12.9)
PMV BLD AUTO: 11 FL (ref 8.9–12.9)
POTASSIUM SERPL-SCNC: 3 MMOL/L (ref 3.5–5.1)
PROCALCITONIN SERPL-MCNC: 0.1 NG/ML
Q-T INTERVAL, ECG07: 508 MS
Q-T INTERVAL, ECG07: 552 MS
QRS DURATION, ECG06: 228 MS
QRS DURATION, ECG06: 238 MS
QTC CALCULATION (BEZET), ECG08: 574 MS
QTC CALCULATION (BEZET), ECG08: 616 MS
RBC # BLD AUTO: 4.5 M/UL (ref 4.1–5.7)
RBC # BLD AUTO: 4.55 M/UL (ref 4.1–5.7)
RBC MORPH BLD: ABNORMAL
SODIUM SERPL-SCNC: 133 MMOL/L (ref 136–145)
THERAPEUTIC RANGE,PTTT: ABNORMAL SECS (ref 58–77)
TROPONIN I SERPL-MCNC: 0.36 NG/ML
TROPONIN I SERPL-MCNC: 0.68 NG/ML
VENTRICULAR RATE, ECG03: 75 BPM
VENTRICULAR RATE, ECG03: 77 BPM
WBC # BLD AUTO: 8.7 K/UL (ref 4.1–11.1)
WBC # BLD AUTO: 8.8 K/UL (ref 4.1–11.1)

## 2020-04-12 PROCEDURE — 74011250637 HC RX REV CODE- 250/637: Performed by: EMERGENCY MEDICINE

## 2020-04-12 PROCEDURE — 80048 BASIC METABOLIC PNL TOTAL CA: CPT

## 2020-04-12 PROCEDURE — 74011250637 HC RX REV CODE- 250/637: Performed by: INTERNAL MEDICINE

## 2020-04-12 PROCEDURE — 74011250636 HC RX REV CODE- 250/636: Performed by: EMERGENCY MEDICINE

## 2020-04-12 PROCEDURE — 85730 THROMBOPLASTIN TIME PARTIAL: CPT

## 2020-04-12 PROCEDURE — 84484 ASSAY OF TROPONIN QUANT: CPT

## 2020-04-12 PROCEDURE — 65660000000 HC RM CCU STEPDOWN

## 2020-04-12 PROCEDURE — 74011250637 HC RX REV CODE- 250/637: Performed by: NURSE PRACTITIONER

## 2020-04-12 PROCEDURE — 36415 COLL VENOUS BLD VENIPUNCTURE: CPT

## 2020-04-12 PROCEDURE — 83735 ASSAY OF MAGNESIUM: CPT

## 2020-04-12 PROCEDURE — 93005 ELECTROCARDIOGRAM TRACING: CPT

## 2020-04-12 PROCEDURE — 84145 PROCALCITONIN (PCT): CPT

## 2020-04-12 PROCEDURE — 74011250636 HC RX REV CODE- 250/636: Performed by: INTERNAL MEDICINE

## 2020-04-12 PROCEDURE — 74011000258 HC RX REV CODE- 258: Performed by: INTERNAL MEDICINE

## 2020-04-12 PROCEDURE — 73070 X-RAY EXAM OF ELBOW: CPT

## 2020-04-12 PROCEDURE — 85652 RBC SED RATE AUTOMATED: CPT

## 2020-04-12 PROCEDURE — 74011250636 HC RX REV CODE- 250/636: Performed by: NURSE PRACTITIONER

## 2020-04-12 PROCEDURE — 85025 COMPLETE CBC W/AUTO DIFF WBC: CPT

## 2020-04-12 RX ORDER — ANASTROZOLE 1 MG/1
1 TABLET ORAL
Status: DISCONTINUED | OUTPATIENT
Start: 2020-04-12 | End: 2020-04-12

## 2020-04-12 RX ORDER — HEPARIN SODIUM 5000 [USP'U]/ML
2000 INJECTION, SOLUTION INTRAVENOUS; SUBCUTANEOUS AS NEEDED
Status: DISCONTINUED | OUTPATIENT
Start: 2020-04-12 | End: 2020-04-13

## 2020-04-12 RX ORDER — MAGNESIUM SULFATE 1 G/100ML
1 INJECTION INTRAVENOUS ONCE
Status: COMPLETED | OUTPATIENT
Start: 2020-04-12 | End: 2020-04-12

## 2020-04-12 RX ORDER — METOLAZONE 2.5 MG/1
5 TABLET ORAL DAILY
Status: DISCONTINUED | OUTPATIENT
Start: 2020-04-12 | End: 2020-04-17 | Stop reason: HOSPADM

## 2020-04-12 RX ORDER — HEPARIN SODIUM 5000 [USP'U]/ML
4000 INJECTION, SOLUTION INTRAVENOUS; SUBCUTANEOUS ONCE
Status: COMPLETED | OUTPATIENT
Start: 2020-04-12 | End: 2020-04-12

## 2020-04-12 RX ORDER — ONDANSETRON 2 MG/ML
4 INJECTION INTRAMUSCULAR; INTRAVENOUS
Status: DISCONTINUED | OUTPATIENT
Start: 2020-04-12 | End: 2020-04-17 | Stop reason: HOSPADM

## 2020-04-12 RX ORDER — METOPROLOL TARTRATE 25 MG/1
25 TABLET, FILM COATED ORAL EVERY 12 HOURS
Status: DISCONTINUED | OUTPATIENT
Start: 2020-04-12 | End: 2020-04-17 | Stop reason: HOSPADM

## 2020-04-12 RX ORDER — ASPIRIN 325 MG
325 TABLET ORAL
Status: COMPLETED | OUTPATIENT
Start: 2020-04-12 | End: 2020-04-12

## 2020-04-12 RX ORDER — POTASSIUM CHLORIDE 7.45 MG/ML
10 INJECTION INTRAVENOUS
Status: COMPLETED | OUTPATIENT
Start: 2020-04-12 | End: 2020-04-12

## 2020-04-12 RX ORDER — ACETAMINOPHEN 325 MG/1
650 TABLET ORAL
Status: DISCONTINUED | OUTPATIENT
Start: 2020-04-12 | End: 2020-04-17 | Stop reason: HOSPADM

## 2020-04-12 RX ORDER — POTASSIUM CHLORIDE 20 MEQ/1
40 TABLET, EXTENDED RELEASE ORAL
Status: COMPLETED | OUTPATIENT
Start: 2020-04-12 | End: 2020-04-12

## 2020-04-12 RX ORDER — SODIUM CHLORIDE 0.9 % (FLUSH) 0.9 %
5-40 SYRINGE (ML) INJECTION AS NEEDED
Status: DISCONTINUED | OUTPATIENT
Start: 2020-04-12 | End: 2020-04-17 | Stop reason: HOSPADM

## 2020-04-12 RX ORDER — HEPARIN SODIUM 10000 [USP'U]/100ML
7.8-25 INJECTION, SOLUTION INTRAVENOUS
Status: DISCONTINUED | OUTPATIENT
Start: 2020-04-12 | End: 2020-04-13

## 2020-04-12 RX ORDER — MONTELUKAST SODIUM 10 MG/1
10 TABLET ORAL EVERY EVENING
Status: DISCONTINUED | OUTPATIENT
Start: 2020-04-12 | End: 2020-04-17 | Stop reason: HOSPADM

## 2020-04-12 RX ORDER — POTASSIUM CHLORIDE 750 MG/1
20 TABLET, FILM COATED, EXTENDED RELEASE ORAL
Status: COMPLETED | OUTPATIENT
Start: 2020-04-12 | End: 2020-04-12

## 2020-04-12 RX ORDER — SODIUM CHLORIDE 0.9 % (FLUSH) 0.9 %
5-40 SYRINGE (ML) INJECTION EVERY 8 HOURS
Status: DISCONTINUED | OUTPATIENT
Start: 2020-04-12 | End: 2020-04-17 | Stop reason: HOSPADM

## 2020-04-12 RX ORDER — HEPARIN SODIUM 10000 [USP'U]/100ML
7.8-25 INJECTION, SOLUTION INTRAVENOUS
Status: DISCONTINUED | OUTPATIENT
Start: 2020-04-12 | End: 2020-04-12

## 2020-04-12 RX ORDER — HEPARIN SODIUM 5000 [USP'U]/ML
4000 INJECTION, SOLUTION INTRAVENOUS; SUBCUTANEOUS AS NEEDED
Status: DISCONTINUED | OUTPATIENT
Start: 2020-04-12 | End: 2020-04-13

## 2020-04-12 RX ORDER — MONTELUKAST SODIUM 10 MG/1
10 TABLET ORAL EVERY EVENING
Status: DISCONTINUED | OUTPATIENT
Start: 2020-04-12 | End: 2020-04-12

## 2020-04-12 RX ADMIN — Medication 10 ML: at 07:01

## 2020-04-12 RX ADMIN — POTASSIUM CHLORIDE 10 MEQ: 10 INJECTION, SOLUTION INTRAVENOUS at 14:00

## 2020-04-12 RX ADMIN — Medication 10 ML: at 04:07

## 2020-04-12 RX ADMIN — HEPARIN SODIUM 7.8 UNITS/KG/HR: 10000 INJECTION, SOLUTION INTRAVENOUS at 06:49

## 2020-04-12 RX ADMIN — METOPROLOL TARTRATE 25 MG: 25 TABLET, FILM COATED ORAL at 09:07

## 2020-04-12 RX ADMIN — SODIUM CHLORIDE 500 ML: 900 INJECTION, SOLUTION INTRAVENOUS at 00:55

## 2020-04-12 RX ADMIN — METOPROLOL TARTRATE 25 MG: 25 TABLET, FILM COATED ORAL at 20:45

## 2020-04-12 RX ADMIN — POTASSIUM CHLORIDE 20 MEQ: 750 TABLET, FILM COATED, EXTENDED RELEASE ORAL at 15:16

## 2020-04-12 RX ADMIN — ASPIRIN 325 MG ORAL TABLET 325 MG: 325 PILL ORAL at 00:55

## 2020-04-12 RX ADMIN — ACETAMINOPHEN 650 MG: 325 TABLET ORAL at 17:51

## 2020-04-12 RX ADMIN — ONDANSETRON HYDROCHLORIDE 4 MG: 2 INJECTION, SOLUTION INTRAMUSCULAR; INTRAVENOUS at 04:05

## 2020-04-12 RX ADMIN — MONTELUKAST 10 MG: 10 TABLET, FILM COATED ORAL at 17:51

## 2020-04-12 RX ADMIN — Medication 10 ML: at 15:16

## 2020-04-12 RX ADMIN — POTASSIUM CHLORIDE 10 MEQ: 10 INJECTION, SOLUTION INTRAVENOUS at 00:55

## 2020-04-12 RX ADMIN — HEPARIN SODIUM 4000 UNITS: 5000 INJECTION INTRAVENOUS; SUBCUTANEOUS at 06:55

## 2020-04-12 RX ADMIN — POTASSIUM CHLORIDE 40 MEQ: 20 TABLET, EXTENDED RELEASE ORAL at 00:55

## 2020-04-12 RX ADMIN — ONDANSETRON HYDROCHLORIDE 4 MG: 2 INJECTION, SOLUTION INTRAMUSCULAR; INTRAVENOUS at 21:14

## 2020-04-12 RX ADMIN — ONDANSETRON HYDROCHLORIDE 4 MG: 2 INJECTION, SOLUTION INTRAMUSCULAR; INTRAVENOUS at 14:23

## 2020-04-12 RX ADMIN — Medication 10 ML: at 20:48

## 2020-04-12 RX ADMIN — CEFTRIAXONE 1 G: 1 INJECTION, POWDER, FOR SOLUTION INTRAMUSCULAR; INTRAVENOUS at 06:00

## 2020-04-12 RX ADMIN — HEPARIN SODIUM 2000 UNITS: 5000 INJECTION INTRAVENOUS; SUBCUTANEOUS at 14:35

## 2020-04-12 RX ADMIN — POTASSIUM CHLORIDE 10 MEQ: 10 INJECTION, SOLUTION INTRAVENOUS at 15:34

## 2020-04-12 RX ADMIN — MAGNESIUM SULFATE HEPTAHYDRATE 1 G: 1 INJECTION, SOLUTION INTRAVENOUS at 18:21

## 2020-04-12 NOTE — PROGRESS NOTES
Initial Nutrition Assessment:    INTERVENTIONS/RECOMMENDATIONS:   · Advance diet as tolerated     ASSESSMENT:   Patient medically noted for NSTEMI, nausea/vomiting, and dehydration. PMH recent pacemaker, AFIB, CHF, HTN, and PVD. Noted for recent discharge; weight on d/c 306# for discharge summary. If current weight accurate, patient with a 24# weight loss x 1 week. Likely combination of poor intake from nausea/vomiting and diuresis. Patient is currently NPO. Cardiology consult pending. Advance diet as tolerated. Will monitor PO and assess need for ONS at follow up. Diet Order: NPO   % Eaten:  No data found. Pertinent Medications: [x]Reviewed []Other: Lopressor  Pertinent Labs: [x]Reviewed []Other: Na 128, K+ 2.8  Food Allergies: [x]None []Yes:    Last BM: 4/10  [x]Active     []Hyperactive  []Hypoactive       [] Absent BS  Skin:    [x] Intact   [] Incision  [] Breakdown: [] Edema []Other:    Anthropometrics:   Height: 6' 2\" (188 cm) Weight: 127.9 kg (281 lb 15.5 oz)   IBW (%IBW):   ( ) UBW (%UBW):   (  %)   Last Weight Metrics:  Weight Loss Metrics 4/11/2020 4/4/2020 3/5/2020 2/25/2020 2/18/2020 2/6/2020 1/29/2020   Today's Wt 281 lb 15.5 oz 306 lb 312 lb 11.2 oz 315 lb 11.2 oz 314 lb 8 oz 308 lb 309 lb   BMI 36.2 kg/m2 39.29 kg/m2 40.15 kg/m2 40.53 kg/m2 40.38 kg/m2 39.54 kg/m2 39.67 kg/m2       BMI: Body mass index is 36.2 kg/m². This BMI is indicative of:   []Underweight    []Normal    []Overweight    [x] Obesity   [] Extreme Obesity (BMI>40)     Estimated Nutrition Needs (Based on):   2308 Kcals/day(BMR (2160) x 1. 3AF -500kcal) , 102 g(0.8 g/kg bw) Protein  Carbohydrate: At Least 130 g/day  Fluids: 2300 mL/day (1ml/kcal)    Pt expected to meet estimated nutrient needs: [x]Yes []No    NUTRITION DIAGNOSES:   Problem:  Unintended weight loss      Etiology: related to nausea/vomiting, diuresis?      Signs/Symptoms: as evidenced by 24# weight loss x 1 week      NUTRITION INTERVENTIONS:  Meals/Snacks: General/healthful diet                  GOAL:   Diet advanced and PO at least 50% of meals next 2-4 days    LEARNING NEEDS (Diet, Food/Nutrient-Drug Interaction):    [x] None Identified   [] Identified and Education Provided/Documented   [] Identified and Pt declined/was not appropriate     Cultural, Mormon, OR Ethnic Dietary Needs:    [x] None Identified   [] Identified and Addressed     [x] Interdisciplinary Care Plan Reviewed/Documented    [x] Discharge Planning:  Heart healthy diet      MONITORING /EVALUATION:   Food/Nutrient Intake Outcomes:  Total energy intake  Physical Signs/Symptoms Outcomes: Weight/weight change, GI profile, Electrolyte and renal profile    NUTRITION RISK:    [x] Patient At Nutritional Risk              [] Patient Not at Nutritional Risk    PT SEEN FOR:    []  MD Consult: []Calorie Count      []Diabetic Diet Education        []Diet Education     []Electrolyte Management     []General Nutrition Management and Supplements     []Management of Tube Feeding     []TPN Recommendations    [x]  RN Referral:  [x]MST score >=2     []Enteral/Parenteral Nutrition PTA     []Pregnant: Gestational DM or Multigestation     []Pressure Ulcer/Wound Care needs        []  Low BMI  []  JOEL William [address]  Pager [phone]    Weekend Pager [phone]

## 2020-04-12 NOTE — PROGRESS NOTES
Problem: Falls - Risk of  Goal: *Absence of Falls  Description: Document Dilcia Canas Fall Risk and appropriate interventions in the flowsheet. Outcome: Progressing Towards Goal  Note: Fall Risk Interventions:            Medication Interventions: Evaluate medications/consider consulting pharmacy, Patient to call before getting OOB, Teach patient to arise slowly                   Problem: Patient Education: Go to Patient Education Activity  Goal: Patient/Family Education  Outcome: Progressing Towards Goal     Problem: Pressure Injury - Risk of  Goal: *Prevention of pressure injury  Description: Document Michel Scale and appropriate interventions in the flowsheet.   Outcome: Progressing Towards Goal  Note: Pressure Injury Interventions:       Moisture Interventions: Absorbent underpads, Minimize layers    Activity Interventions: Pressure redistribution bed/mattress(bed type), Increase time out of bed         Nutrition Interventions: Discuss nutritional consult with provider                     Problem: Patient Education: Go to Patient Education Activity  Goal: Patient/Family Education  Outcome: Progressing Towards Goal

## 2020-04-12 NOTE — PROGRESS NOTES
Resting comfortably at this time. - Awaiting cardiology input. - EKG V-paced.  - Troponin trending down. - Recheck bmp and Mg.

## 2020-04-12 NOTE — H&P
Hospitalist Admission Note    NAME: Kylee Patton   :  1960   MRN:  [id number]     Date/Time:  2020 4:12 AM    Patient PCP: Nancy Spence MD  ______________________________________________________________________  Given the patient's current clinical presentation, I have a high level of concern for decompensation if discharged from the emergency department. Complex decision making was performed, which includes reviewing the patient's available past medical records, laboratory results, and x-ray films. My assessment of this patient's clinical condition and my plan of care is as follows. Assessment / Plan:  Elevated troponin rule out non-STEMI   Admit patient to IVCU   Keep patient n.p.o.  1961 [address] Cardiology consultation   Start patient on heparin   Patient received 325 mg aspirin    Intractable nausea and vomiting cause unknown   Start patient on Zofran   CT abdomen  show chronic gallbladder disease  GI consultation    CHF diastolic chronic   Hold Lasix patient n.p.o. Atrial fibrillation  Hold Eliquis   Patient was started on heparin    Hypertensioncontinue home antihypertensive medication      History of cellulitis   Start patient on IV Rocephin        Code Status: Full   Surrogate Decision Maker:    DVT Prophylaxis: Heparin   GI Prophylaxis: not indicated          Subjective:   CHIEF COMPLAINT: intractable nausea and vomiting     HISTORY OF PRESENT ILLNESS:     60years old male with past medical history significant for CHF, atrial fibrillation, hypertension, cellulitis, elevated LFT presented to the hospital for evaluation of intractable nausea and vomiting started 2 days ago, patient stated he been complaining from nausea and vomiting before but is worsening for the last 2 days patient denies any fever any chills, patient was recently admitted to the hospital and had pacemaker placed last , troponin was noticed to be elevated 0.68.     We were asked to admit for work up and evaluation of the above problems. Past Medical History:   Diagnosis Date    A-fib [facility])     Allergic reaction to contrast material     galodinium    Hypertension     Irregular heart rhythm     Mercury poisoning     Morbid obesity ([address] 11/4/2019    ALYSSA on CPAP     ALYSSA on CPAP 11/4/2019    Post concussion syndrome         Past Surgical History:   Procedure Laterality Date    HX SHOULDER ARTHROSCOPY      HX UROLOGICAL Left     hydrocoele resection    AK ICAR CATHETER ABLATION ATRIOVENTR NODE FUNCTION N/A 4/3/2020    ABLATION AV NODE performed by Carolyn Boyle MD at [facility] CARDIAC CATH LAB    AK INS NEW/RPLC PRM PACEMAKER W/TRANSV ELTRD VENTR N/A 4/3/2020    INSERT PPM SINGLE VENTRICULAR performed by Carolyn Boyle MD at [facility] CARDIAC CATH LAB       Social History     Tobacco Use    Smoking status: Never Smoker    Smokeless tobacco: Never Used   Substance Use Topics    Alcohol use: Not Currently        Family History   Problem Relation Age of Onset    Colon Cancer Mother     Hypertension Father     Other Father         multiple birth defects    Hypertension Paternal Grandfather      Allergies   Allergen Reactions    Ace Inhibitors Cough    Gadolinium-Containing Contrast Media Rash    Iodinated Contrast Media Rash     No contrast injection dye        Prior to Admission medications    Medication Sig Start Date End Date Taking? Authorizing Provider   potassium chloride (K-DUR, KLOR-CON) 20 mEq tablet Take 2 Tabs by mouth daily. 4/7/20  Yes Ayesha Dominique, ANP   metOLazone (ZAROXOLYN) 5 mg tablet Take 1 Tab by mouth daily. 4/5/20  Yes Heidi Stanford NP   metoprolol tartrate (LOPRESSOR) 25 mg tablet Take 1 Tab by mouth every twelve (12) hours. 4/4/20  Yes Heidi Stanford NP   doxycycline (MONODOX) 100 mg capsule Take 1 Cap by mouth two (2) times a day for 10 days. 4/4/20 4/14/20 Yes Heidi Stanford NP   naltrexone HCl (NALTREXONE PO) Take  by mouth daily.  Dose unknown prescribed Dr. Lacy Lazo   Yes Provider, Historical   HCG 8000IU/4 ML 2,000 Units by SubCUTAneous route. HCG   Benzyl Alcohol   Sodium phosphate   Sterile water   (may use HCl or NaOH  to make adjustments to the [facility])   Yes Provider, Historical   testosterone cypionate (Depo-Testosterone) 100 mg/mL injection 200 mg by IntraMUSCular route Once every 2 weeks. Yes Provider, Historical   furosemide (LASIX) 40 mg tablet take 1 tablet by mouth twice a day 1/3/20  Yes Charlene Allen MD   anastrozole (ARIMIDEX) 1 mg tablet Take 1 mg by mouth every seven (7) days. 10/9/19  Yes Provider, Historical   apixaban (ELIQUIS) 5 mg tablet Take 1 Tab by mouth two (2) times a day. 11/7/19  Yes Guerda Main MD   desloratadine (CLARINEX) 5 mg tablet Take 5 mg by mouth daily. Yes Provider, Historical   montelukast (SINGULAIR) 10 mg tablet Take 10 mg by mouth every evening. Yes Provider, Historical   dextroamphetamine-amphetamine (ADDERALL) 30 mg tablet Take 30 mg by mouth two (2) times a day. Provider, Historical       REVIEW OF SYSTEMS:     I am not able to complete the review of systems because:    The patient is intubated and sedated    The patient has altered mental status due to his acute medical problems    The patient has baseline aphasia from prior stroke(s)    The patient has baseline dementia and is not reliable historian    The patient is in acute medical distress and unable to provide information           Total of 12 systems reviewed as follows:       POSITIVE= underlined text  Negative = text not underlined  General:  fever, chills, sweats, generalized weakness, weight loss/gain,      loss of appetite   Eyes:    blurred vision, eye pain, loss of vision, double vision  ENT:    rhinorrhea, pharyngitis   Respiratory:   cough, sputum production, SOB, ELIZONDO, wheezing, pleuritic pain   Cardiology:   chest pain, palpitations, orthopnea, PND, edema, syncope   Gastrointestinal:  abdominal pain , N/V, diarrhea, dysphagia, constipation, bleeding   Genitourinary:  frequency, urgency, dysuria, hematuria, incontinence   Muskuloskeletal :  arthralgia, myalgia, back pain  Hematology:  easy bruising, nose or gum bleeding, lymphadenopathy   Dermatological: rash, ulceration, pruritis, color change / jaundice  Endocrine:   hot flashes or polydipsia   Neurological:  headache, dizziness, confusion, focal weakness, paresthesia,     Speech difficulties, memory loss, gait difficulty  Psychological: Feelings of anxiety, depression, agitation    Objective:   VITALS:    Visit Vitals  /69   Pulse 75   Temp 98.2 °F (36.8 °C)   Resp 19   Ht 6' 2\" (1.88 m)   Wt 127.9 kg (281 lb 15.5 oz)   SpO2 94%   BMI 36.20 kg/m²       PHYSICAL EXAM:    General:    Alert, cooperative, no distress, appears stated age. HEENT: Atraumatic, anicteric sclerae, pink conjunctivae     No oral ulcers, mucosa moist, throat clear, dentition fair  Neck:  Supple, symmetrical,  thyroid: non tender  Lungs:   Clear to auscultation bilaterally. No Wheezing or Rhonchi. No rales. Chest wall:  No tenderness  No Accessory muscle use. Heart:   Regular  rhythm,  No  murmur   No edema  Abdomen:   Soft, non-tender. Not distended. Bowel sounds normal  Extremities: No cyanosis. No clubbing,      Skin turgor normal, Capillary refill normal, Radial dial pulse 2+  Skin:     Not pale. Not Jaundiced  No rashes   Psych:  Good insight. Not depressed. Not anxious or agitated. Neurologic: EOMs intact. No facial asymmetry. No aphasia or slurred speech. Symmetrical strength, Sensation grossly intact.  Alert and oriented X 4.     _______________________________________________________________________  Care Plan discussed with:    Comments   Patient y    Family      RN y    Care Manager                    Consultant:      _______________________________________________________________________  Expected  Disposition:   Home with Family y   HH/PT/OT/RN    SNF/LTC    HUANG ________________________________________________________________________  TOTAL TIME:  60  Minutes    Critical Care Provided     Minutes non procedure based      Comments    y Reviewed previous records   >50% of visit spent in counseling and coordination of care y Discussion with patient and/or family and questions answered       ________________________________________________________________________  Signed: Elvin Hopper MD    Procedures: see electronic medical records for all procedures/Xrays and details which were not copied into this note but were reviewed prior to creation of Plan. LAB DATA REVIEWED:    Recent Results (from the past 24 hour(s))   EKG, 12 LEAD, INITIAL    Collection Time: 04/11/20 10:20 PM   Result Value Ref Range    Ventricular Rate 77 BPM    Atrial Rate 73 BPM    QRS Duration 228 ms    Q-T Interval 508 ms    QTC Calculation (Bezet) 574 ms    Calculated R Axis -64 degrees    Calculated T Axis 111 degrees    Diagnosis       Ventricular-paced rhythm  When compared with ECG of 04-APR-2020 04:20,  Vent. rate has increased BY   2 BPM     CBC WITH AUTOMATED DIFF    Collection Time: 04/11/20 11:08 PM   Result Value Ref Range    WBC 8.7 4.1 - 11.1 K/uL    RBC 4.55 4.10 - 5.70 M/uL    HGB 14.3 12.1 - 17.0 g/dL    HCT 41.4 36.6 - 50.3 %    MCV 91.0 80.0 - 99.0 FL    MCH 31.4 26.0 - 34.0 PG    MCHC 34.5 30.0 - 36.5 g/dL    RDW 13.4 11.5 - 14.5 %    PLATELET 264 256 - 927 K/uL    MPV 11.0 8.9 - 12.9 FL    NRBC 0.0 0  WBC    ABSOLUTE NRBC 0.00 0.00 - 0.01 K/uL    NEUTROPHILS 85 (H) 32 - 75 %    LYMPHOCYTES 7 (L) 12 - 49 %    MONOCYTES 7 5 - 13 %    EOSINOPHILS 0 0 - 7 %    BASOPHILS 0 0 - 1 %    IMMATURE GRANULOCYTES 1 (H) 0.0 - 0.5 %    ABS. NEUTROPHILS 7.4 1.8 - 8.0 K/UL    ABS. LYMPHOCYTES 0.6 (L) 0.8 - 3.5 K/UL    ABS. MONOCYTES 0.6 0.0 - 1.0 K/UL    ABS. EOSINOPHILS 0.0 0.0 - 0.4 K/UL    ABS. BASOPHILS 0.0 0.0 - 0.1 K/UL    ABS. IMM.  GRANS. 0.1 (H) 0.00 - 0.04 K/UL    DF AUTOMATED RBC COMMENTS NORMOCYTIC, NORMOCHROMIC     TROPONIN I    Collection Time: 04/11/20 11:08 PM   Result Value Ref Range    Troponin-I, Qt. 0.68 (H) <0.05 ng/mL   LIPASE    Collection Time: 04/11/20 11:08 PM   Result Value Ref Range    Lipase 95 73 - 473 U/L   METABOLIC PANEL, COMPREHENSIVE    Collection Time: 04/11/20 11:08 PM   Result Value Ref Range    Sodium 128 (L) 136 - 145 mmol/L    Potassium 2.8 (L) 3.5 - 5.1 mmol/L    Chloride 89 (L) 97 - 108 mmol/L    CO2 29 21 - 32 mmol/L    Anion gap 10 5 - 15 mmol/L    Glucose 119 (H) 65 - 100 mg/dL    BUN 25 (H) 6 - 20 MG/DL    Creatinine 1.70 (H) 0.70 - 1.30 MG/DL    BUN/Creatinine ratio 15 12 - 20      GFR est AA 50 (L) >60 ml/min/1.73m2    GFR est non-AA 41 (L) >60 ml/min/1.73m2    Calcium 9.2 8.5 - 10.1 MG/DL    Bilirubin, total 0.8 0.2 - 1.0 MG/DL    ALT (SGPT) 70 12 - 78 U/L    AST (SGOT) 47 (H) 15 - 37 U/L    Alk.  phosphatase 82 45 - 117 U/L    Protein, total 6.9 6.4 - 8.2 g/dL    Albumin 2.9 (L) 3.5 - 5.0 g/dL    Globulin 4.0 2.0 - 4.0 g/dL    A-G Ratio 0.7 (L) 1.1 - 2.2     LACTIC ACID    Collection Time: 04/11/20 11:08 PM   Result Value Ref Range    Lactic acid 1.7 0.4 - 2.0 MMOL/L   URINALYSIS W/ REFLEX CULTURE    Collection Time: 04/11/20 11:28 PM   Result Value Ref Range    Color YELLOW/STRAW      Appearance CLEAR CLEAR      Specific gravity 1.012 1.003 - 1.030      pH (UA) 6.5 5.0 - 8.0      Protein TRACE (A) NEG mg/dL    Glucose Negative NEG mg/dL    Ketone Negative NEG mg/dL    Bilirubin Negative NEG      Blood Negative NEG      Urobilinogen 0.2 0.2 - 1.0 EU/dL    Nitrites Negative NEG      Leukocyte Esterase Negative NEG      WBC 0-4 0 - 4 /hpf    RBC 0-5 0 - 5 /hpf    Epithelial cells FEW FEW /lpf    Bacteria Negative NEG /hpf    UA:UC IF INDICATED CULTURE NOT INDICATED BY UA RESULT CNI      Hyaline cast 2-5 0 - 5 /lpf   PTT    Collection Time: 04/12/20 12:09 AM   Result Value Ref Range    aPTT 32.5 (H) 22.1 - 32.0 sec    aPTT, therapeutic range     58.0 - 77.0 SECS   CBC WITH AUTOMATED DIFF    Collection Time: 04/12/20 12:12 AM   Result Value Ref Range    WBC 8.8 4.1 - 11.1 K/uL    RBC 4.50 4. 10 - 5.70 M/uL    HGB 14.3 12.1 - 17.0 g/dL    HCT 41.0 36.6 - 50.3 %    MCV 91.1 80.0 - 99.0 FL    MCH 31.8 26.0 - 34.0 PG    MCHC 34.9 30.0 - 36.5 g/dL    RDW 13.5 11.5 - 14.5 %    PLATELET 355 805 - 570 K/uL    MPV 10.8 8.9 - 12.9 FL    NRBC 0.0 0  WBC    ABSOLUTE NRBC 0.00 0.00 - 0.01 K/uL    NEUTROPHILS 86 (H) 32 - 75 %    LYMPHOCYTES 7 (L) 12 - 49 %    MONOCYTES 7 5 - 13 %    EOSINOPHILS 0 0 - 7 %    BASOPHILS 0 0 - 1 %    IMMATURE GRANULOCYTES 1 (H) 0.0 - 0.5 %    ABS. NEUTROPHILS 7.5 1.8 - 8.0 K/UL    ABS. LYMPHOCYTES 0.6 (L) 0.8 - 3.5 K/UL    ABS. MONOCYTES 0.6 0.0 - 1.0 K/UL    ABS. EOSINOPHILS 0.0 0.0 - 0.4 K/UL    ABS. BASOPHILS 0.0 0.0 - 0.1 K/UL    ABS. IMM.  GRANS. 0.1 (H) 0.00 - 0.04 K/UL    DF AUTOMATED     PROCALCITONIN    Collection Time: 04/12/20 12:20 AM   Result Value Ref Range    Procalcitonin 0.10 ng/mL

## 2020-04-12 NOTE — ROUTINE PROCESS
Patient had some abdominal pain at lunch after eating . It resolved. Medicated x1 for nausea. Appetite good overall. No bowel movements. Voiding well. Skin continues to be pink all over, no itching or irritation. Heparin infusing without difficulty. Due for another ptt at 8:30pm received potassium and magnesium suppliments

## 2020-04-12 NOTE — CONSULTS
Cardiology Consult Note       Date of  Admission: 4/11/2020 10:14 PM     Admission type:Emergency     Subjective:     Mr. Aleatha Closs is admitted with intractable nausea, vomiting for last 2-3 days. He was recently hospitalized with rapid afib, abnormal LFT's, volume overload. LFT's improved with correction of volume status. Had AVN ablation with pacemaker implant. He has h/o diastolic HF. Lead exposure with chelation treatments in [place]. His troponin was mildly elevated. LFT's, renal function normal. Denies any chest pain, SOB, edema, PND, orthopnea. EKG shows paced rhythm. Pacer site looks good.     Patient Active Problem List    Diagnosis Date Noted    NSTEMI (non-ST elevated myocardial infarction) ([address] 04/12/2020    Elevated troponin 04/12/2020    Permanent atrial fibrillation 04/12/2020    Gout involving toe 04/04/2020    S/P AV (atrioventricular) pat ablation 04/03/2020    Pacemaker 04/03/2020    Peripheral vascular disease ([address] 02/25/2020    Essential hypertension 12/04/2019    CHF (congestive heart failure) ([address] 11/05/2019    Atrial fibrillation with rapid ventricular response ([address] 11/05/2019    Acute kidney injury ([address] 11/05/2019    JUANJO (acute kidney injury) ([address] 11/04/2019    Atrial fibrillation with RVR ([address] 11/04/2019    Acute on chronic diastolic HF (heart failure) ([address] 11/04/2019    Morbid obesity ([address] 11/04/2019    ALYSSA on CPAP 11/04/2019      Vishnu Mccormick MD  Past Medical History:   Diagnosis Date    A-fib [facility])     Allergic reaction to contrast material     galodinium    Hypertension     Irregular heart rhythm     Mercury poisoning     Morbid obesity ([address] 11/4/2019    ALYSSA on CPAP     ALYSSA on CPAP 11/4/2019    Post concussion syndrome       Past Surgical History:   Procedure Laterality Date    HX SHOULDER ARTHROSCOPY      HX UROLOGICAL Left     hydrocoele resection    [place] ICAR CATHETER ABLATION ATRIOVENTR NODE FUNCTION N/A 4/3/2020    ABLATION AV NODE performed by Chris Dunn MD at [facility] CARD[facility] CATH LAB    NE INS NEW/RPLC PRM PACEMAKER W/TRANSV ELTRD VENTR N/A 4/3/2020    INSERT PPM SINGLE VENTRICULAR performed by Chris Dunn MD at [facility] CARDIAC CATH LAB     Allergies   Allergen Reactions    Ace Inhibitors Cough    Gadolinium-Containing Contrast Media Rash    Iodinated Contrast Media Rash     No contrast injection dye      Family History   Problem Relation Age of Onset    Colon Cancer Mother     Hypertension Father     Other Father         multiple birth defects    Hypertension Paternal Grandfather       Current Facility-Administered Medications   Medication Dose Route Frequency    heparin (porcine) injection 2,000 Units  2,000 Units IntraVENous PRN    Or    heparin (porcine) injection 4,000 Units  4,000 Units IntraVENous PRN    heparin 25,000 units in D5W 250 ml infusion  7.8-25 Units/kg/hr IntraVENous TITRATE    sodium chloride (NS) flush 5-40 mL  5-40 mL IntraVENous Q8H    sodium chloride (NS) flush 5-40 mL  5-40 mL IntraVENous PRN    acetaminophen (TYLENOL) tablet 650 mg  650 mg Oral Q6H PRN    cefTRIAXone (ROCEPHIN) 1 g in 0.9% sodium chloride (MBP/ADV) 50 mL  1 g IntraVENous Q24H    metoprolol tartrate (LOPRESSOR) tablet 25 mg  25 mg Oral Q12H    metOLazone (ZAROXOLYN) tablet 5 mg  5 mg Oral DAILY    montelukast (SINGULAIR) tablet 10 mg  10 mg Oral QPM    potassium chloride 10 mEq in 100 ml IVPB  10 mEq IntraVENous Q1H    potassium chloride SR (KLOR-CON 10) tablet 20 mEq  20 mEq Oral NOW    magnesium sulfate 1 g/100 ml IVPB (premix or compounded)  1 g IntraVENous ONCE    ondansetron (ZOFRAN) injection 4 mg  4 mg IntraVENous Q30MIN PRN         Review of Symptoms:  A comprehensive review of systems was negative except for that written in the HPI.               Physical Exam    Visit Vitals  /78   Pulse 75   Temp 97.7 °F (36.5 °C)   Resp 20   Ht 6' 2\" (1.88 m)   Wt 281 lb 15.5 oz (127.9 kg)   SpO2 96%   BMI 36.20 kg/m²     General Appearance:  Well developed, well nourished,alert and oriented x 3, and individual in no acute distress. Ears/Nose/Mouth/Throat:   Hearing grossly normal.         Neck: Supple. Chest:   Lungs clear to auscultation bilaterally. Cardiovascular:  Regular rate and rhythm, S1, S2 normal, no murmur. Abdomen:   Soft, non-tender, bowel sounds are active. Extremities: No edema bilaterally. Skin: Warm and dry. Cardiographics    Telemetry: paced  ECG: paced  Echocardiogram: Not done    Labs:   Recent Results (from the past 24 hour(s))   EKG, 12 LEAD, INITIAL    Collection Time: 04/11/20 10:20 PM   Result Value Ref Range    Ventricular Rate 77 BPM    Atrial Rate 73 BPM    QRS Duration 228 ms    Q-T Interval 508 ms    QTC Calculation (Bezet) 574 ms    Calculated R Axis -64 degrees    Calculated T Axis 111 degrees    Diagnosis       Ventricular-paced rhythm  When compared with ECG of 04-APR-2020 04:20,  Vent. rate has increased BY   2 BPM     CBC WITH AUTOMATED DIFF    Collection Time: 04/11/20 11:08 PM   Result Value Ref Range    WBC 8.7 4.1 - 11.1 K/uL    RBC 4.55 4.10 - 5.70 M/uL    HGB 14.3 12.1 - 17.0 g/dL    HCT 41.4 36.6 - 50.3 %    MCV 91.0 80.0 - 99.0 FL    MCH 31.4 26.0 - 34.0 PG    MCHC 34.5 30.0 - 36.5 g/dL    RDW 13.4 11.5 - 14.5 %    PLATELET 367 660 - 658 K/uL    MPV 11.0 8.9 - 12.9 FL    NRBC 0.0 0  WBC    ABSOLUTE NRBC 0.00 0.00 - 0.01 K/uL    NEUTROPHILS 85 (H) 32 - 75 %    LYMPHOCYTES 7 (L) 12 - 49 %    MONOCYTES 7 5 - 13 %    EOSINOPHILS 0 0 - 7 %    BASOPHILS 0 0 - 1 %    IMMATURE GRANULOCYTES 1 (H) 0.0 - 0.5 %    ABS. NEUTROPHILS 7.4 1.8 - 8.0 K/UL    ABS. LYMPHOCYTES 0.6 (L) 0.8 - 3.5 K/UL    ABS. MONOCYTES 0.6 0.0 - 1.0 K/UL    ABS. EOSINOPHILS 0.0 0.0 - 0.4 K/UL    ABS. BASOPHILS 0.0 0.0 - 0.1 K/UL    ABS. IMM.  GRANS. 0.1 (H) 0.00 - 0.04 K/UL    DF AUTOMATED      RBC COMMENTS NORMOCYTIC, NORMOCHROMIC     TROPONIN I    Collection Time: 04/11/20 11:08 PM   Result Value Ref Range    Troponin-I, Qt. 0.68 (H) <0.05 ng/mL   LIPASE    Collection Time: 04/11/20 11:08 PM   Result Value Ref Range    Lipase 95 73 - 748 U/L   METABOLIC PANEL, COMPREHENSIVE    Collection Time: 04/11/20 11:08 PM   Result Value Ref Range    Sodium 128 (L) 136 - 145 mmol/L    Potassium 2.8 (L) 3.5 - 5.1 mmol/L    Chloride 89 (L) 97 - 108 mmol/L    CO2 29 21 - 32 mmol/L    Anion gap 10 5 - 15 mmol/L    Glucose 119 (H) 65 - 100 mg/dL    BUN 25 (H) 6 - 20 MG/DL    Creatinine 1.70 (H) 0.70 - 1.30 MG/DL    BUN/Creatinine ratio 15 12 - 20      GFR est AA 50 (L) >60 ml/min/1.73m2    GFR est non-AA 41 (L) >60 ml/min/1.73m2    Calcium 9.2 8.5 - 10.1 MG/DL    Bilirubin, total 0.8 0.2 - 1.0 MG/DL    ALT (SGPT) 70 12 - 78 U/L    AST (SGOT) 47 (H) 15 - 37 U/L    Alk.  phosphatase 82 45 - 117 U/L    Protein, total 6.9 6.4 - 8.2 g/dL    Albumin 2.9 (L) 3.5 - 5.0 g/dL    Globulin 4.0 2.0 - 4.0 g/dL    A-G Ratio 0.7 (L) 1.1 - 2.2     LACTIC ACID    Collection Time: 04/11/20 11:08 PM   Result Value Ref Range    Lactic acid 1.7 0.4 - 2.0 MMOL/L   URINALYSIS W/ REFLEX CULTURE    Collection Time: 04/11/20 11:28 PM   Result Value Ref Range    Color YELLOW/STRAW      Appearance CLEAR CLEAR      Specific gravity 1.012 1.003 - 1.030      pH (UA) 6.5 5.0 - 8.0      Protein TRACE (A) NEG mg/dL    Glucose Negative NEG mg/dL    Ketone Negative NEG mg/dL    Bilirubin Negative NEG      Blood Negative NEG      Urobilinogen 0.2 0.2 - 1.0 EU/dL    Nitrites Negative NEG      Leukocyte Esterase Negative NEG      WBC 0-4 0 - 4 /hpf    RBC 0-5 0 - 5 /hpf    Epithelial cells FEW FEW /lpf    Bacteria Negative NEG /hpf    UA:UC IF INDICATED CULTURE NOT INDICATED BY UA RESULT CNI      Hyaline cast 2-5 0 - 5 /lpf   CULTURE, BLOOD, PAIRED    Collection Time: 04/11/20 11:40 PM   Result Value Ref Range    Special Requests: NO SPECIAL REQUESTS      Culture result: NO GROWTH AFTER 8 HOURS     PTT    Collection Time: 04/12/20 12:09 AM   Result Value Ref Range aPTT 32.5 (H) 22.1 - 32.0 sec    aPTT, therapeutic range     58.0 - 77.0 SECS   CBC WITH AUTOMATED DIFF    Collection Time: 04/12/20 12:12 AM   Result Value Ref Range    WBC 8.8 4.1 - 11.1 K/uL    RBC 4.50 4. 10 - 5.70 M/uL    HGB 14.3 12.1 - 17.0 g/dL    HCT 41.0 36.6 - 50.3 %    MCV 91.1 80.0 - 99.0 FL    MCH 31.8 26.0 - 34.0 PG    MCHC 34.9 30.0 - 36.5 g/dL    RDW 13.5 11.5 - 14.5 %    PLATELET 468 352 - 131 K/uL    MPV 10.8 8.9 - 12.9 FL    NRBC 0.0 0  WBC    ABSOLUTE NRBC 0.00 0.00 - 0.01 K/uL    NEUTROPHILS 86 (H) 32 - 75 %    LYMPHOCYTES 7 (L) 12 - 49 %    MONOCYTES 7 5 - 13 %    EOSINOPHILS 0 0 - 7 %    BASOPHILS 0 0 - 1 %    IMMATURE GRANULOCYTES 1 (H) 0.0 - 0.5 %    ABS. NEUTROPHILS 7.5 1.8 - 8.0 K/UL    ABS. LYMPHOCYTES 0.6 (L) 0.8 - 3.5 K/UL    ABS. MONOCYTES 0.6 0.0 - 1.0 K/UL    ABS. EOSINOPHILS 0.0 0.0 - 0.4 K/UL    ABS. BASOPHILS 0.0 0.0 - 0.1 K/UL    ABS. IMM.  GRANS. 0.1 (H) 0.00 - 0.04 K/UL    DF AUTOMATED     PROCALCITONIN    Collection Time: 04/12/20 12:20 AM   Result Value Ref Range    Procalcitonin 0.10 ng/mL   SED RATE (ESR)    Collection Time: 04/12/20  6:50 AM   Result Value Ref Range    Sed rate, automated 52 (H) 0 - 20 mm/hr   TROPONIN I    Collection Time: 04/12/20  9:25 AM   Result Value Ref Range    Troponin-I, Qt. 0.36 (H) <1.45 ng/mL   METABOLIC PANEL, BASIC    Collection Time: 04/12/20  9:25 AM   Result Value Ref Range    Sodium 133 (L) 136 - 145 mmol/L    Potassium 3.0 (L) 3.5 - 5.1 mmol/L    Chloride 93 (L) 97 - 108 mmol/L    CO2 33 (H) 21 - 32 mmol/L    Anion gap 7 5 - 15 mmol/L    Glucose 87 65 - 100 mg/dL    BUN 28 (H) 6 - 20 MG/DL    Creatinine 1.76 (H) 0.70 - 1.30 MG/DL    BUN/Creatinine ratio 16 12 - 20      GFR est AA 48 (L) >60 ml/min/1.73m2    GFR est non-AA 40 (L) >60 ml/min/1.73m2    Calcium 8.3 (L) 8.5 - 10.1 MG/DL   MAGNESIUM    Collection Time: 04/12/20  9:25 AM   Result Value Ref Range    Magnesium 1.6 1.6 - 2.4 mg/dL   EKG, 12 LEAD, SUBSEQUENT    Collection Time: 04/12/20  9:43 AM   Result Value Ref Range    Ventricular Rate 75 BPM    Atrial Rate 75 BPM    QRS Duration 238 ms    Q-T Interval 552 ms    QTC Calculation (Bezet) 616 ms    Calculated R Axis -75 degrees    Calculated T Axis 102 degrees    Diagnosis       ** Poor data quality, interpretation may be adversely affected  Ventricular-paced rhythm  Abnormal ECG  When compared with ECG of 11-APR-2020 22:20,  MANUAL COMPARISON REQUIRED, DATA IS UNCONFIRMED          Assessment:     Assessment:         Hospital Problems  Date Reviewed: 4/4/2020          Codes Class Noted POA    NSTEMI (non-ST elevated myocardial infarction) ([facility] ICD-10-CM: I21.4  ICD-9-CM: 410.70  4/12/2020 Unknown        Elevated troponin ICD-10-CM: R79.89  ICD-9-CM: 790.6  4/12/2020 Unknown        Permanent atrial fibrillation ICD-10-CM: I48.21  ICD-9-CM: 427.31  4/12/2020 Unknown        ALYSSA on CPAP (Chronic) ICD-10-CM: G47.33, Z99.89  ICD-9-CM: 327.23, V46.8  11/4/2019 Yes               Plan:     He has mildly elevated troponin. Probably related to recent cardiac procedure. He is asymptomatic and troponin is trending down. Doubt NSTEMI. No indication for invasive management. Can resume diet. On heparin. Can resume oral anti coagulation from cardiac stand point. CHF well compensated. Continue home diuretic. Thank you for the consult. Will follow.     Liv Dan MD

## 2020-04-12 NOTE — ED NOTES
Pharmacy stated to start heparin gtt at 7.8 units/kg/hr for the initial rate at 7 am.    Pt took his dose of eliquis at 7pm 4/11/2020 so heparin gtt can not be started until 7 am on 4/12/2020. MD Elva Tovar made aware.

## 2020-04-12 NOTE — PROGRESS NOTES
Christie Michelle TRANSFER - IN REPORT:    Verbal report received from ED Nurse(name) on Fernando Huertas  being received from [place] RN(unit) for routine progression of care      Report consisted of patients Situation, Background, Assessment and   Recommendations(SBAR). Information from the following report(s) SBAR was reviewed with the receiving nurse. Opportunity for questions and clarification was provided. Assessment completed upon patients arrival to unit and care assumed.

## 2020-04-12 NOTE — ED NOTES
Assumed care of pt via triage. Pt comes to ED with nausea/vomiting for the past few days. Pt states he hasnt been able to keep any food/drink down for the last few days. Pt states he recently had a medtronic pacemaker placed about a week ago. Pt denies having any chest pain or SOB. Pt resting comfortably on stretcher in position of comfort. Pt in no apparent distress at this time. Call bell within reach. Side rails x2. Connected to monitor x3. Pt a/o x4. Stretcher locked in lowest position. Pt aware of plan to await for MD/PA-C/NP assessment, and pt/family verbalizes understanding. Will continue to monitor pt condition.

## 2020-04-12 NOTE — PROGRESS NOTES
1920 .Bedside shift change report given to  Aleyda(oncoming nurse) by [place] (offgoing nurse).  Report included the following information SBAR.     1950 off floor to Xray with Gerard Beltran, returned to floor 2015

## 2020-04-12 NOTE — ED PROVIDER NOTES
EMERGENCY DEPARTMENT HISTORY AND PHYSICAL EXAM      Date: 4/11/2020  Patient Name: Brodie Negron    History of Presenting Illness     Chief Complaint   Patient presents with    Vomiting     got put of hospital last sunday & vomiting since went home & has lost weight. thinks meds are making him sick & had pacemaker last week       History Provided By: Patient    HPI: Brodie Negron, 60 y.o. male  With past medical history of atrial fibrillation, Mercury poisoning, hypertension presenting today with vomiting. The patient reports that he out of the hospital on Sunday after having a pacemaker placed and being admitted for idiopathic hepatitis, Sirs. He notes that he started having vomiting 2 days ago. He has been unable to keep down any medications or food. He denies any fevers, chills. He notes that he is sweaty at baseline and is sweating today. He has no chest pain or shortness of breath, and states that he only has abdominal pain whenever he is vomiting. No new rash. No dysuria. Reports that he has been compliant with his new medications. There are no other complaints, changes, or physical findings at this time. PCP: Deshaun Sewell MD    No current facility-administered medications on file prior to encounter. Current Outpatient Medications on File Prior to Encounter   Medication Sig Dispense Refill    potassium chloride (K-DUR, KLOR-CON) 20 mEq tablet Take 2 Tabs by mouth daily. 60 Tab 1    metOLazone (ZAROXOLYN) 5 mg tablet Take 1 Tab by mouth daily. 30 Tab 0    metoprolol tartrate (LOPRESSOR) 25 mg tablet Take 1 Tab by mouth every twelve (12) hours. 60 Tab 0    doxycycline (MONODOX) 100 mg capsule Take 1 Cap by mouth two (2) times a day for 10 days. 20 Cap 0    naltrexone HCl (NALTREXONE PO) Take  by mouth daily. Dose unknown prescribed Dr. Octavio Barksdale      HCG 8000IU/4 ML 2,000 Units by SubCUTAneous route.   HCG   Benzyl Alcohol   Sodium phosphate   Sterile water   (may use HCl or NaOH  to make adjustments to the [facility])      testosterone cypionate (DEPO-TESTOSTERONE) 100 mg/mL injection 200 mg by IntraMUSCular route Once every 2 weeks.  furosemide (LASIX) 40 mg tablet take 1 tablet by mouth twice a day 60 Tab 1    anastrozole (ARIMIDEX) 1 mg tablet Take 1 mg by mouth every seven (7) days. 0    apixaban (ELIQUIS) 5 mg tablet Take 1 Tab by mouth two (2) times a day. 60 Tab 1    desloratadine (CLARINEX) 5 mg tablet Take 5 mg by mouth daily.  montelukast (SINGULAIR) 10 mg tablet Take 10 mg by mouth every evening.  dextroamphetamine-amphetamine (ADDERALL) 30 mg tablet Take 30 mg by mouth two (2) times a day. Past History     Past Medical History:  Past Medical History:   Diagnosis Date    A-fib ([facility] [address]     Allergic reaction to contrast material     galodinium    Hypertension     Irregular heart rhythm     Mercury poisoning     Morbid obesity ([facility] [address] 11/4/2019    ALYSSA on CPAP     ALYSSA on CPAP 11/4/2019    Post concussion syndrome        Past Surgical History:  Past Surgical History:   Procedure Laterality Date    HX SHOULDER ARTHROSCOPY      HX UROLOGICAL Left     hydrocoele resection    TX ICAR CATHETER ABLATION ATRIOVENTR NODE FUNCTION N/A 4/3/2020    ABLATION AV NODE performed by India Dwyer MD at [facility] CARDIAC CATH [facility]    TX INS NEW/RPLC PRM PACEMAKER W/TRANSV ELTRD VENTR N/A 4/3/2020    INSERT PPM SINGLE VENTRICULAR performed by India Dwyer MD at [facility] CARDIAC CATH LAB       Family History:  Family History   Problem Relation Age of Onset    Colon Cancer Mother     Hypertension Father     Other Father         multiple birth defects    Hypertension Paternal Grandfather        Social History:  Social History     Tobacco Use    Smoking status: Never Smoker    Smokeless tobacco: Never Used   Substance Use Topics    Alcohol use: Not Currently    Drug use: Never       Allergies:   Allergies   Allergen Reactions    Ace Inhibitors Cough    Gadolinium-Containing Contrast Media Rash         Review of Systems   Constitutional: No  fever  Skin: No  rash  HEENT: No  nasal congestion  Resp: No cough  CV: No chest pain  GI: + vomiting  : No dysuria  MSK: No joint pain  Neuro: No numbness  Psych: No suicidal      Physical Exam     Patient Vitals for the past 12 hrs:   Temp Pulse Resp BP SpO2   04/12/20 0100  75 13 122/72 95 %   04/12/20 0058  78 13 125/76 97 %   04/11/20 2211 98.2 °F (36.8 °C) 77 18 160/84 92 %     General: alert, No acute distress, diaphoretic  Eyes: EOMI, normal conjunctiva  ENT: moist mucous membranes. Neck: Active, full ROM of neck. Skin: No rashes. no jaundice              Lungs: Equal chest expansion. no respiratory distress. clear to auscultation bilaterally No accessory muscle usage  Heart: regular rate     no peripheral edema   2+ radial pulses and DPs bilaterally  Abd:  obese soft, nontender. No rebound tenderness. No guarding  Back: Full ROM  MSK: Right elbow with swelling, no tenderness to palpation, no warmth, minimal erythema  Neuro: Person, Place, Time and Situation; normal speech;   Psych: Cooperative with exam; Appropriate mood and affect             Diagnostic Study Results     Labs -     Recent Results (from the past 12 hour(s))   EKG, 12 LEAD, INITIAL    Collection Time: 04/11/20 10:20 PM   Result Value Ref Range    Ventricular Rate 77 BPM    Atrial Rate 73 BPM    QRS Duration 228 ms    Q-T Interval 508 ms    QTC Calculation (Bezet) 574 ms    Calculated R Axis -64 degrees    Calculated T Axis 111 degrees    Diagnosis       Ventricular-paced rhythm  When compared with ECG of 04-APR-2020 04:20,  Vent.  rate has increased BY   2 BPM     CBC WITH AUTOMATED DIFF    Collection Time: 04/11/20 11:08 PM   Result Value Ref Range    WBC 8.7 4.1 - 11.1 K/uL    RBC 4.55 4.10 - 5.70 M/uL    HGB 14.3 12.1 - 17.0 g/dL    HCT 41.4 36.6 - 50.3 %    MCV 91.0 80.0 - 99.0 FL    MCH 31.4 26.0 - 34.0 PG    MCHC 34.5 30.0 - 36.5 g/dL    RDW 13.4 11.5 - 14.5 %    PLATELET 237 276 - 646 K/uL    MPV 11.0 8.9 - 12.9 FL    NRBC 0.0 0  WBC    ABSOLUTE NRBC 0.00 0.00 - 0.01 K/uL    NEUTROPHILS 85 (H) 32 - 75 %    LYMPHOCYTES 7 (L) 12 - 49 %    MONOCYTES 7 5 - 13 %    EOSINOPHILS 0 0 - 7 %    BASOPHILS 0 0 - 1 %    IMMATURE GRANULOCYTES 1 (H) 0.0 - 0.5 %    ABS. NEUTROPHILS 7.4 1.8 - 8.0 K/UL    ABS. LYMPHOCYTES 0.6 (L) 0.8 - 3.5 K/UL    ABS. MONOCYTES 0.6 0.0 - 1.0 K/UL    ABS. EOSINOPHILS 0.0 0.0 - 0.4 K/UL    ABS. BASOPHILS 0.0 0.0 - 0.1 K/UL    ABS. IMM. GRANS. 0.1 (H) 0.00 - 0.04 K/UL    DF AUTOMATED      RBC COMMENTS NORMOCYTIC, NORMOCHROMIC     TROPONIN I    Collection Time: 04/11/20 11:08 PM   Result Value Ref Range    Troponin-I, Qt. 0.68 (H) <0.05 ng/mL   LIPASE    Collection Time: 04/11/20 11:08 PM   Result Value Ref Range    Lipase 95 73 - 057 U/L   METABOLIC PANEL, COMPREHENSIVE    Collection Time: 04/11/20 11:08 PM   Result Value Ref Range    Sodium 128 (L) 136 - 145 mmol/L    Potassium 2.8 (L) 3.5 - 5.1 mmol/L    Chloride 89 (L) 97 - 108 mmol/L    CO2 29 21 - 32 mmol/L    Anion gap 10 5 - 15 mmol/L    Glucose 119 (H) 65 - 100 mg/dL    BUN 25 (H) 6 - 20 MG/DL    Creatinine 1.70 (H) 0.70 - 1.30 MG/DL    BUN/Creatinine ratio 15 12 - 20      GFR est AA 50 (L) >60 ml/min/1.73m2    GFR est non-AA 41 (L) >60 ml/min/1.73m2    Calcium 9.2 8.5 - 10.1 MG/DL    Bilirubin, total 0.8 0.2 - 1.0 MG/DL    ALT (SGPT) 70 12 - 78 U/L    AST (SGOT) 47 (H) 15 - 37 U/L    Alk.  phosphatase 82 45 - 117 U/L    Protein, total 6.9 6.4 - 8.2 g/dL    Albumin 2.9 (L) 3.5 - 5.0 g/dL    Globulin 4.0 2.0 - 4.0 g/dL    A-G Ratio 0.7 (L) 1.1 - 2.2     LACTIC ACID    Collection Time: 04/11/20 11:08 PM   Result Value Ref Range    Lactic acid 1.7 0.4 - 2.0 MMOL/L   URINALYSIS W/ REFLEX CULTURE    Collection Time: 04/11/20 11:28 PM   Result Value Ref Range    Color YELLOW/STRAW      Appearance CLEAR CLEAR      Specific gravity 1.012 1.003 - 1.030      pH (UA) 6.5 5.0 - 8.0 Protein TRACE (A) NEG mg/dL    Glucose Negative NEG mg/dL    Ketone Negative NEG mg/dL    Bilirubin Negative NEG      Blood Negative NEG      Urobilinogen 0.2 0.2 - 1.0 EU/dL    Nitrites Negative NEG      Leukocyte Esterase Negative NEG      WBC 0-4 0 - 4 /hpf    RBC 0-5 0 - 5 /hpf    Epithelial cells FEW FEW /lpf    Bacteria Negative NEG /hpf    UA:UC IF INDICATED CULTURE NOT INDICATED BY UA RESULT CNI      Hyaline cast 2-5 0 - 5 /lpf   PTT    Collection Time: 04/12/20 12:09 AM   Result Value Ref Range    aPTT 32.5 (H) 22.1 - 32.0 sec    aPTT, therapeutic range     58.0 - 77.0 SECS   CBC WITH AUTOMATED DIFF    Collection Time: 04/12/20 12:12 AM   Result Value Ref Range    WBC 8.8 4.1 - 11.1 K/uL    RBC 4.50 4. 10 - 5.70 M/uL    HGB 14.3 12.1 - 17.0 g/dL    HCT 41.0 36.6 - 50.3 %    MCV 91.1 80.0 - 99.0 FL    MCH 31.8 26.0 - 34.0 PG    MCHC 34.9 30.0 - 36.5 g/dL    RDW 13.5 11.5 - 14.5 %    PLATELET 294 465 - 886 K/uL    MPV 10.8 8.9 - 12.9 FL    NRBC 0.0 0  WBC    ABSOLUTE NRBC 0.00 0.00 - 0.01 K/uL    NEUTROPHILS 86 (H) 32 - 75 %    LYMPHOCYTES 7 (L) 12 - 49 %    MONOCYTES 7 5 - 13 %    EOSINOPHILS 0 0 - 7 %    BASOPHILS 0 0 - 1 %    IMMATURE GRANULOCYTES 1 (H) 0.0 - 0.5 %    ABS. NEUTROPHILS 7.5 1.8 - 8.0 K/UL    ABS. LYMPHOCYTES 0.6 (L) 0.8 - 3.5 K/UL    ABS. MONOCYTES 0.6 0.0 - 1.0 K/UL    ABS. EOSINOPHILS 0.0 0.0 - 0.4 K/UL    ABS. BASOPHILS 0.0 0.0 - 0.1 K/UL    ABS. IMM. GRANS. 0.1 (H) 0.00 - 0.04 K/UL    DF AUTOMATED         Radiologic Studies -   XR CHEST PORT   Final Result   IMPRESSION:    1. There is a small developing area of airspace disease/atelectasis in the left   perihilar region may represent pneumonia. 2. Moderately severe cardiomegaly is stable. CT Results  (Last 48 hours)    None        CXR Results  (Last 48 hours)               04/11/20 1292  XR CHEST PORT Final result    Impression:  IMPRESSION:    1.  There is a small developing area of airspace disease/atelectasis in the left   perihilar region may represent pneumonia. 2. Moderately severe cardiomegaly is stable. Narrative:  EXAM:  XR CHEST PORT       INDICATION:  cp       COMPARISON:  4/3/2020       FINDINGS: A portable AP radiograph of the chest was obtained at 2244 hours. Pacer lead is intact. .  There is a small focus of airspace disease left   perihilar region. Right lung appears clear. There is moderately severe   cardiomegaly. .  The bones and soft tissues are grossly within normal limits. Medical Decision Making   I am the first provider for this patient. I reviewed the vital signs, available nursing notes, past medical history, past surgical history, family history and social history. Records Reviewed: Patient recently admitted to the hospital with elevated LFTs, atrial fibrillation, received a pacemaker while he was admitted. Provider Notes (Medical Decision Making):     Differential Diagnosis: Dehydration, UTI, hepatitis, pancreatitis, viral gastroenteritis, ACS, pneumonia, pneumothorax    Initial Plan: Labs, antiemetics, EKG, chest x-ray, and reassess. The patient is diaphoretic, but does not complain of any chest pain or shortness of breath. No clear etiology at this point for the patient's symptoms, but concern for possible hepatitis. ED Course:   Initial assessment performed. The patients presenting problems have been discussed, and they are in agreement with the care plan formulated and outlined with them. I have encouraged them to ask questions as they arise throughout their visit. ED Course as of Apr 12 0135   Sat Apr 11, 2020   2341 On my interpretation of the EKG V paced rhythm at a rate of 77, QTc is 434, QRS is 228, no ischemic changes, no changes from previous EKG.     [NW]   Sun Apr 12, 2020   0029 On my interpretation of the patient's laboratory studies unremarkable white blood cell count and normal hemoglobin, urinalysis without evidence of infection, metabolic panel reveals hypokalemia, hyponatremia, and slightly elevated creatinine at 1.7 which is consistent with the patient's baseline. Troponin is 0.68. Lactate negative and lipase negative. [NW]   0030 On my interpretation the patient's chest x-ray no evidence of lobar pneumonia    [NW]   0031 The patient presents today with mostly intractable vomiting for 2 days. His laboratory work-up reveals evidence of elevated troponin at 0.68. Patient denies any chest pain or shortness of breath. His EKG shows a paced rhythm. He has taken his Xarelto at 7:00 tonight, so cannot start heparin at this point. LFTs have improved since his most recent admission, but he is hypokalemic consistent with his recent episodes of vomiting. Will replete the patient's potassium and ultimately admit given his elevated troponin. [NW]      ED Course User Index  [NW] Kevin Armenta MD       I, Fany Bravo MD, am the attending of record for this patient encounter. Disposition: Admitted    Admission Note:  Patient is being admitted to the hospital by Service: Hospitalist.  The results of their tests and reasons for their admission have been discussed with them and available family. They convey agreement and understanding for the need to be admitted and for their admission diagnosis. Diagnosis     Clinical Impression:   1. NSTEMI (non-ST elevated myocardial infarction) (HCC)    2. Dehydration    3. Non-intractable vomiting with nausea, unspecified vomiting type    4. Hypokalemia        Attestations:    Fany Bravo MD    Please note that this dictation was completed with ethority, the computer voice recognition software. Quite often unanticipated grammatical, syntax, homophones, and other interpretive errors are inadvertently transcribed by the computer software. Please disregard these errors. Please excuse any errors that have escaped final proofreading. Thank you.

## 2020-04-12 NOTE — ED NOTES
TRANSFER - OUT REPORT:    Verbal report given to [facility], RN(name) on Betty Joy  being transferred to [facility] IV[facility](unit) for routine progression of care       Report consisted of patients Situation, Background, Assessment and   Recommendations(SBAR). Information from the following report(s) SBAR, Kardex, ED Summary, MAR, Recent Results and Cardiac Rhythm V Paced was reviewed with the receiving nurse. Lines:   Peripheral IV 04/11/20 Right Forearm (Active)   Site Assessment Clean, dry, & intact 4/11/2020 11:18 PM   Phlebitis Assessment 0 4/11/2020 11:18 PM   Infiltration Assessment 0 4/11/2020 11:18 PM   Dressing Status Clean, dry, & intact 4/11/2020 11:18 PM   Dressing Type Tape;Transparent 4/11/2020 11:18 PM   Hub Color/Line Status Patent; Flushed 4/11/2020 11:18 PM       Peripheral IV 04/12/20 Left Forearm (Active)   Site Assessment Clean, dry, & intact 4/12/2020 12:32 AM   Phlebitis Assessment 0 4/12/2020 12:32 AM   Infiltration Assessment 0 4/12/2020 12:32 AM   Dressing Status Clean, dry, & intact 4/12/2020 12:32 AM   Dressing Type Tape;Transparent 4/12/2020 12:32 AM   Hub Color/Line Status Pink;Flushed;Patent 4/12/2020 12:32 AM        Opportunity for questions and clarification was provided.       Patient transported with:   Monitor  Registered Nurse

## 2020-04-13 LAB
ANION GAP SERPL CALC-SCNC: 6 MMOL/L (ref 5–15)
APTT PPP: 64.6 SEC (ref 22.1–32)
BASOPHILS # BLD: 0 K/UL (ref 0–0.1)
BASOPHILS NFR BLD: 0 % (ref 0–1)
BUN SERPL-MCNC: 26 MG/DL (ref 6–20)
BUN/CREAT SERPL: 14 (ref 12–20)
CALCIUM SERPL-MCNC: 8.1 MG/DL (ref 8.5–10.1)
CHLORIDE SERPL-SCNC: 93 MMOL/L (ref 97–108)
CO2 SERPL-SCNC: 33 MMOL/L (ref 21–32)
CREAT SERPL-MCNC: 1.84 MG/DL (ref 0.7–1.3)
DIFFERENTIAL METHOD BLD: ABNORMAL
EOSINOPHIL # BLD: 0 K/UL (ref 0–0.4)
EOSINOPHIL NFR BLD: 0 % (ref 0–7)
ERYTHROCYTE [DISTWIDTH] IN BLOOD BY AUTOMATED COUNT: 13.4 % (ref 11.5–14.5)
GLUCOSE SERPL-MCNC: 94 MG/DL (ref 65–100)
HCT VFR BLD AUTO: 40.5 % (ref 36.6–50.3)
HGB BLD-MCNC: 13.7 G/DL (ref 12.1–17)
IMM GRANULOCYTES # BLD AUTO: 0 K/UL (ref 0–0.04)
IMM GRANULOCYTES NFR BLD AUTO: 1 % (ref 0–0.5)
LYMPHOCYTES # BLD: 1 K/UL (ref 0.8–3.5)
LYMPHOCYTES NFR BLD: 15 % (ref 12–49)
MCH RBC QN AUTO: 31.4 PG (ref 26–34)
MCHC RBC AUTO-ENTMCNC: 33.8 G/DL (ref 30–36.5)
MCV RBC AUTO: 92.7 FL (ref 80–99)
MONOCYTES # BLD: 0.5 K/UL (ref 0–1)
MONOCYTES NFR BLD: 8 % (ref 5–13)
NEUTS SEG # BLD: 4.9 K/UL (ref 1.8–8)
NEUTS SEG NFR BLD: 76 % (ref 32–75)
NRBC # BLD: 0 K/UL (ref 0–0.01)
NRBC BLD-RTO: 0 PER 100 WBC
PLATELET # BLD AUTO: 224 K/UL (ref 150–400)
PMV BLD AUTO: 11 FL (ref 8.9–12.9)
POTASSIUM SERPL-SCNC: 3.2 MMOL/L (ref 3.5–5.1)
RBC # BLD AUTO: 4.37 M/UL (ref 4.1–5.7)
SODIUM SERPL-SCNC: 132 MMOL/L (ref 136–145)
THERAPEUTIC RANGE,PTTT: ABNORMAL SECS (ref 58–77)
WBC # BLD AUTO: 6.4 K/UL (ref 4.1–11.1)

## 2020-04-13 PROCEDURE — 65660000000 HC RM CCU STEPDOWN

## 2020-04-13 PROCEDURE — 80048 BASIC METABOLIC PNL TOTAL CA: CPT

## 2020-04-13 PROCEDURE — 85730 THROMBOPLASTIN TIME PARTIAL: CPT

## 2020-04-13 PROCEDURE — 74011250636 HC RX REV CODE- 250/636: Performed by: INTERNAL MEDICINE

## 2020-04-13 PROCEDURE — 74011000258 HC RX REV CODE- 258: Performed by: INTERNAL MEDICINE

## 2020-04-13 PROCEDURE — 36415 COLL VENOUS BLD VENIPUNCTURE: CPT

## 2020-04-13 PROCEDURE — 74011250637 HC RX REV CODE- 250/637: Performed by: INTERNAL MEDICINE

## 2020-04-13 PROCEDURE — 85025 COMPLETE CBC W/AUTO DIFF WBC: CPT

## 2020-04-13 PROCEDURE — 74011250637 HC RX REV CODE- 250/637: Performed by: FAMILY MEDICINE

## 2020-04-13 PROCEDURE — 74011250636 HC RX REV CODE- 250/636: Performed by: EMERGENCY MEDICINE

## 2020-04-13 RX ORDER — LANOLIN ALCOHOL/MO/W.PET/CERES
3 CREAM (GRAM) TOPICAL
Status: DISCONTINUED | OUTPATIENT
Start: 2020-04-13 | End: 2020-04-17 | Stop reason: HOSPADM

## 2020-04-13 RX ADMIN — POTASSIUM BICARBONATE 40 MEQ: 782 TABLET, EFFERVESCENT ORAL at 10:33

## 2020-04-13 RX ADMIN — HEPARIN SODIUM 10.8 UNITS/KG/HR: 10000 INJECTION, SOLUTION INTRAVENOUS at 03:05

## 2020-04-13 RX ADMIN — MELATONIN 3 MG: at 22:56

## 2020-04-13 RX ADMIN — Medication 10 ML: at 04:46

## 2020-04-13 RX ADMIN — Medication 10 ML: at 22:57

## 2020-04-13 RX ADMIN — METOLAZONE 5 MG: 5 TABLET ORAL at 10:33

## 2020-04-13 RX ADMIN — Medication 10 ML: at 09:44

## 2020-04-13 RX ADMIN — MONTELUKAST 10 MG: 10 TABLET, FILM COATED ORAL at 17:54

## 2020-04-13 RX ADMIN — METOPROLOL TARTRATE 25 MG: 25 TABLET, FILM COATED ORAL at 10:33

## 2020-04-13 RX ADMIN — Medication 10 ML: at 05:32

## 2020-04-13 RX ADMIN — ONDANSETRON HYDROCHLORIDE 4 MG: 2 INJECTION, SOLUTION INTRAMUSCULAR; INTRAVENOUS at 04:45

## 2020-04-13 RX ADMIN — METOPROLOL TARTRATE 25 MG: 25 TABLET, FILM COATED ORAL at 22:56

## 2020-04-13 RX ADMIN — CEFTRIAXONE 1 G: 1 INJECTION, POWDER, FOR SOLUTION INTRAMUSCULAR; INTRAVENOUS at 05:32

## 2020-04-13 RX ADMIN — ONDANSETRON HYDROCHLORIDE 4 MG: 2 INJECTION, SOLUTION INTRAMUSCULAR; INTRAVENOUS at 15:55

## 2020-04-13 RX ADMIN — APIXABAN 5 MG: 5 TABLET, FILM COATED ORAL at 10:33

## 2020-04-13 RX ADMIN — ONDANSETRON HYDROCHLORIDE 4 MG: 2 INJECTION, SOLUTION INTRAMUSCULAR; INTRAVENOUS at 09:43

## 2020-04-13 NOTE — PROGRESS NOTES
Problem: Falls - Risk of  Goal: *Absence of Falls  Description: Document Martha Barkley Fall Risk and appropriate interventions in the flowsheet. Outcome: Progressing Towards Goal  Note: Fall Risk Interventions:  Mobility Interventions: Patient to call before getting OOB         Medication Interventions: Teach patient to arise slowly, Patient to call before getting OOB                   Problem: Patient Education: Go to Patient Education Activity  Goal: Patient/Family Education  Outcome: Progressing Towards Goal     Problem: Pressure Injury - Risk of  Goal: *Prevention of pressure injury  Description: Document Michel Scale and appropriate interventions in the flowsheet.   Outcome: Progressing Towards Goal  Note: Pressure Injury Interventions:       Moisture Interventions: Absorbent underpads    Activity Interventions: Pressure redistribution bed/mattress(bed type), Increase time out of bed    Mobility Interventions: HOB 30 degrees or less, Pressure redistribution bed/mattress (bed type)    Nutrition Interventions: Document food/fluid/supplement intake                     Problem: Patient Education: Go to Patient Education Activity  Goal: Patient/Family Education  Outcome: Progressing Towards Goal

## 2020-04-13 NOTE — PROGRESS NOTES
Hospitalist Progress Note    NAME: Sylvia Bonilla   :  1960   MRN:  [id number]       Assessment / Plan:    Elevated troponin/non-STEMI   Admited to [facility] consultation, resume PO AC  -cont statin   Started patient on heparin drip on admission    Patient received 325 mg aspirin on admission     Intractable nausea and vomiting cause unknown   Start patient on Zofran   CT abdomen  show chronic gallbladder disease  GI consultation     CHF diastolic chronic   Hold Lasix patient n.p.o.     Atrial fibrillation  resume  Eliquis   s/p pacemaker last week      Hypertension  continue home antihypertensive medication        History of cellulitis   Started on IV Rocephin, change to keflex     30.0 - 39.9 Obese / Body mass index is 35.81 kg/m². Code status: Full  Prophylaxis: eliquis  Recommended Disposition: Home w/Family     Subjective:     Chief Complaint / Reason for Physician Visit  Will be troponin with NSTEMI/intractable nausea and vomiting  Discussed with RN events overnight. Patient reported that he does not have any chest pain right now but he still feeling nauseous. He also reported that he had multiple vomiting episodes every time he tries to eat. Review of Systems:  Symptom Y/N Comments  Symptom Y/N Comments   Fever/Chills n   Chest Pain n    Poor Appetite n   Edema n    Cough    Abdominal Pain n    Sputum    Joint Pain     SOB/ELIZONDO    Pruritis/Rash     Nausea/vomit y   Tolerating PT/OT     Diarrhea n   Tolerating Diet     Constipation    Other       Could NOT obtain due to:      Objective:     VITALS:   Last 24hrs VS reviewed since prior progress note.  Most recent are:  Patient Vitals for the past 24 hrs:   Temp Pulse Resp BP SpO2   20 0728 99.2 °F (37.3 °C) 78 18 135/88 99 %   20 0611    132/75    20 0610  77   97 %   20 0228 99 °F (37.2 °C) 83 20 132/84 92 %   20 0005 99.9 °F (37.7 °C) 78 18 127/84 98 %   20 1939 99 °F (37.2 °C) 75 18 123/74 97 %       Intake/Output Summary (Last 24 hours) at 4/13/2020 1442  Last data filed at 4/13/2020 0305  Gross per 24 hour   Intake 415 ml   Output 1175 ml   Net -760 ml        PHYSICAL EXAM:  General: WD, WN. Alert, cooperative, no acute distress    EENT:  EOMI. Anicteric sclerae. MMM  Resp:  CTA bilaterally, no wheezing or rales. No accessory muscle use  CV:  Regular  rhythm,  No edema  GI:  Soft, Non distended, Non tender.  +Bowel sounds  Neurologic:  Alert and oriented X 3, normal speech,   Psych:   Good insight. Not anxious nor agitated  Skin:  No rashes. No jaundice    Reviewed most current lab test results and cultures  YES  Reviewed most current radiology test results   YES  Review and summation of old records today    NO  Reviewed patient's current orders and MAR    YES  PMH/SH reviewed - no change compared to H&P  ________________________________________________________________________  Care Plan discussed with:    Comments   Patient x    Family      RN x    Care Manager     Consultant                        Multidiciplinary team rounds were held today with , nursing, pharmacist and clinical coordinator. Patient's plan of care was discussed; medications were reviewed and discharge planning was addressed. ________________________________________________________________________  Total NON critical care TIME:  35 Minutes    Total CRITICAL CARE TIME Spent:   Minutes non procedure based      Comments   >50% of visit spent in counseling and coordination of care     ________________________________________________________________________  Nai Chapa MD     Procedures: see electronic medical records for all procedures/Xrays and details which were not copied into this note but were reviewed prior to creation of Plan. LABS:  I reviewed today's most current labs and imaging studies.   Pertinent labs include:  Recent Labs     04/13/20  0435 04/12/20  0012 04/11/20  8552 WBC 6.4 8.8 8.7   HGB 13.7 14.3 14.3   HCT 40.5 41.0 41.4    224 220     Recent Labs     04/13/20  0435 04/12/20  0925 04/11/20  2308   * 133* 128*   K 3.2* 3.0* 2.8*   CL 93* 93* 89*   CO2 33* 33* 29   GLU 94 87 119*   BUN 26* 28* 25*   CREA 1.84* 1.76* 1.70*   CA 8.1* 8.3* 9.2   MG  --  1.6  --    ALB  --   --  2.9*   TBILI  --   --  0.8   SGOT  --   --  47*   ALT  --   --  70       Signed: Angella Rueda MD

## 2020-04-13 NOTE — PROGRESS NOTES
Pt continues to complainof nausea. Has dry heaves. Medicated with Zofran 4 mg iv. New 4 x 4 with paper tape applied over steristrips on left chest.  No drainage. Has a small scrape underneath dressing. No bleeding   States its from an old blister. Sitting up in bed. WAiting on GI MD to see in consult. No complaints of any CP.

## 2020-04-13 NOTE — PROGRESS NOTES
Care Manager Initial Assessment    BRANDEE: Home with [facility] services      Reason for Readmission:     NSTEMI         RUR Score/Risk Level:   20% moderate readmission risk      PCP: First and Last name:  Cristopher Starks   Name of Practice:    Are you a current patient: Yes/No:  Yes   Approximate date of last visit:   Pt unable to state when was last seen    Is a Care Conference indicated:   Not at this time. CM will speak with MD and nursing about discharge planning. Did you attend your follow up appointment (s): If not, why not:  Pt states that he started throwing up about 1 or 2 days after last discharge and was unable to go anywhere. Resources/supports as identified by patient/family:   Daughter, Vickey Jose ([phone]). Has neighbor Sylvia Childs ([phone]) that helps him as needed       Top Challenges facing patient (as identified by patient/family and CM): Finances/Medication cost?    Will be an concern due to patient not being about to work as a  with new pacemaker. He is trying to file for unemployment, disability and work on early group home. He is unsure how long he will have health insurance   Transportation      Pt drives but neighbor, Marcella Monteiro, assists as needed  Support system or lack thereof? Pt has daughter and neighbor that helps him if needed  Living arrangements? Lives alone        Self-care/ADLs/Cognition? Independent with ADLs        Current Advanced Directive/Advance Care Plan:   No ACM, has no interest to complete at this time. \"He reports having more important paperwork to complete, such as unemployment and disability. Plan for utilizing home health:   No current plans, pt may benefit from [facility] nurse to check on him at home. Transition of Care Plan:    Based on readmission, the patient's previous Plan of Care   has been evaluated and/or modified.  The current Transition of Care Plan is:      Home with [facility] services would be beneficial with nursing check in and medication monitoring         Pt is a 60year old male, readmitted after having a pacemaker placed about a week ago. Pt states that he went home and after about a day, started with uncontrollable vomiting and nausea. Pt returned back into the hospital after not being able to keep anything down, including medications and feeling so horribly. Pt able to verify demographics and emergency contacts, Daughter, Arslan Todd ([phone]). Has neighbor Rangel Mendez ([phone]) that helps him as needed. Yung Crow, drove patient to the hospital.  Pt lives alone in a one level rancher with 2 steps to enter. Pt was independent with ADLs prior to admission. He reports having to quit his job due to not being able to work as a  any longer with his newly placed pacemaker. Pt's confirms that his PCP is Dr. Yanni Black MD but unable to state when he last visited PCP. Pt also has cardiologists, Dr. Ava Roque and Dr. Aline Thornton.  When asked about where he opens his medications, patient stated that he was feeling extremely nauseas and had to end the assessment with CM. Pt chart shows [facility] in [place] as his current pharmacy. Care Management Interventions  PCP Verified by CM: Yes  Mode of Transport at Discharge: Other (see comment)  Current Support Network: Lives Alone  Confirm Follow Up Transport: Self  Discharge Location  Discharge Placement: Home with outpatient services      Caryl [facility]ows, [address] - [address] [address]  Advanced Steps ACP Facilitator  Zone Phone: [phone]      Mobile: [phone]

## 2020-04-13 NOTE — PROGRESS NOTES
Cardiology Progress Note            [address]  [phone]    4/13/2020 9:00 AM    Admit Date: 4/11/2020    Admit Diagnosis: NSTEMI (non-ST elevated myocardial infarction) [facility]) [I21.4]    Subjective:     Kelli Innocent   denies chest pain.     Visit Vitals  /88   Pulse 78   Temp 99.2 °F (37.3 °C)   Resp 18   Ht 6' 2\" (1.88 m)   Wt 278 lb 14.1 oz (126.5 kg)   SpO2 99%   BMI 35.81 kg/m²     Current Facility-Administered Medications   Medication Dose Route Frequency    potassium bicarb-citric acid (EFFER-K) tablet 40 mEq  40 mEq Oral ONCE    heparin (porcine) injection 2,000 Units  2,000 Units IntraVENous PRN    Or    heparin (porcine) injection 4,000 Units  4,000 Units IntraVENous PRN    heparin 25,000 units in D5W 250 ml infusion  7.8-25 Units/kg/hr IntraVENous TITRATE    sodium chloride (NS) flush 5-40 mL  5-40 mL IntraVENous Q8H    sodium chloride (NS) flush 5-40 mL  5-40 mL IntraVENous PRN    acetaminophen (TYLENOL) tablet 650 mg  650 mg Oral Q6H PRN    cefTRIAXone (ROCEPHIN) 1 g in 0.9% sodium chloride (MBP/ADV) 50 mL  1 g IntraVENous Q24H    metoprolol tartrate (LOPRESSOR) tablet 25 mg  25 mg Oral Q12H    metOLazone (ZAROXOLYN) tablet 5 mg  5 mg Oral DAILY    montelukast (SINGULAIR) tablet 10 mg  10 mg Oral QPM    ondansetron (ZOFRAN) injection 4 mg  4 mg IntraVENous Q6H PRN         Objective:      Visit Vitals  /88   Pulse 78   Temp 99.2 °F (37.3 °C)   Resp 18   Ht 6' 2\" (1.88 m)   Wt 278 lb 14.1 oz (126.5 kg)   SpO2 99%   BMI 35.81 kg/m²       Physical Exam:  Abdomen: soft, non-tender  Extremities: extremities normal  Heart: regular rate and rhythm  Lungs: clear to auscultation bilaterally  Pulses: 2+ and symmetric    Data Review:   Labs:    Recent Labs     04/13/20  0435 04/12/20  0012 04/11/20  2308   WBC 6.4 8.8 8.7   HGB 13.7 14.3 14.3   HCT 40.5 41.0 41.4    224 220     Recent Labs     04/13/20  0435 04/12/20  0978 04/11/20  5333 * 133* 128*   K 3.2* 3.0* 2.8*   CL 93* 93* 89*   CO2 33* 33* 29   GLU 94 87 119*   BUN 26* 28* 25*   CREA 1.84* 1.76* 1.70*   CA 8.1* 8.3* 9.2   MG  --  1.6  --    ALB  --   --  2.9*   TBILI  --   --  0.8   SGOT  --   --  47*   ALT  --   --  70       Recent Labs     04/12/20  0925 04/11/20  2308   TROIQ 0.36* 0.68*         Intake/Output Summary (Last 24 hours) at 4/13/2020 0900  Last data filed at 4/13/2020 0305  Gross per 24 hour   Intake 655 ml   Output 1175 ml   Net -520 ml        Telemetry: v paced    Assessment:     Active Problems:    ALYSSA on CPAP (11/4/2019)      NSTEMI (non-ST elevated myocardial infarction) ([facility] [address] (4/12/2020)      Elevated troponin (4/12/2020)      Permanent atrial fibrillation (4/12/2020)        Plan:     Natalia Dudley is v paced 75 bpm. Cont bb for htn. Gi consult pending for n/v. Trop has peaked and coming down. Will remove dressing from site prior to dc.     Del Monaco MD, [facility]    4/13/2020

## 2020-04-14 ENCOUNTER — APPOINTMENT (OUTPATIENT)
Dept: GENERAL RADIOLOGY | Age: 60
DRG: 281 | End: 2020-04-14
Attending: PHYSICIAN ASSISTANT
Payer: COMMERCIAL

## 2020-04-14 PROBLEM — R11.2 NAUSEA & VOMITING: Status: ACTIVE | Noted: 2020-04-14

## 2020-04-14 LAB
ANION GAP SERPL CALC-SCNC: 4 MMOL/L (ref 5–15)
BASOPHILS # BLD: 0 K/UL (ref 0–0.1)
BASOPHILS NFR BLD: 0 % (ref 0–1)
BUN SERPL-MCNC: 28 MG/DL (ref 6–20)
BUN/CREAT SERPL: 15 (ref 12–20)
CALCIUM SERPL-MCNC: 8.2 MG/DL (ref 8.5–10.1)
CHLORIDE SERPL-SCNC: 93 MMOL/L (ref 97–108)
CO2 SERPL-SCNC: 35 MMOL/L (ref 21–32)
CREAT SERPL-MCNC: 1.9 MG/DL (ref 0.7–1.3)
DIFFERENTIAL METHOD BLD: ABNORMAL
EOSINOPHIL # BLD: 0 K/UL (ref 0–0.4)
EOSINOPHIL NFR BLD: 0 % (ref 0–7)
ERYTHROCYTE [DISTWIDTH] IN BLOOD BY AUTOMATED COUNT: 13.4 % (ref 11.5–14.5)
GLUCOSE SERPL-MCNC: 92 MG/DL (ref 65–100)
HCT VFR BLD AUTO: 37.9 % (ref 36.6–50.3)
HGB BLD-MCNC: 12.8 G/DL (ref 12.1–17)
IMM GRANULOCYTES # BLD AUTO: 0.1 K/UL (ref 0–0.04)
IMM GRANULOCYTES NFR BLD AUTO: 1 % (ref 0–0.5)
LYMPHOCYTES # BLD: 1.1 K/UL (ref 0.8–3.5)
LYMPHOCYTES NFR BLD: 17 % (ref 12–49)
MCH RBC QN AUTO: 31.3 PG (ref 26–34)
MCHC RBC AUTO-ENTMCNC: 33.8 G/DL (ref 30–36.5)
MCV RBC AUTO: 92.7 FL (ref 80–99)
MONOCYTES # BLD: 0.4 K/UL (ref 0–1)
MONOCYTES NFR BLD: 7 % (ref 5–13)
NEUTS SEG # BLD: 4.8 K/UL (ref 1.8–8)
NEUTS SEG NFR BLD: 75 % (ref 32–75)
NRBC # BLD: 0 K/UL (ref 0–0.01)
NRBC BLD-RTO: 0 PER 100 WBC
PLATELET # BLD AUTO: 262 K/UL (ref 150–400)
PMV BLD AUTO: 11.3 FL (ref 8.9–12.9)
POTASSIUM SERPL-SCNC: 3.1 MMOL/L (ref 3.5–5.1)
RBC # BLD AUTO: 4.09 M/UL (ref 4.1–5.7)
SODIUM SERPL-SCNC: 132 MMOL/L (ref 136–145)
WBC # BLD AUTO: 6.3 K/UL (ref 4.1–11.1)

## 2020-04-14 PROCEDURE — 74011250636 HC RX REV CODE- 250/636: Performed by: INTERNAL MEDICINE

## 2020-04-14 PROCEDURE — 74011250637 HC RX REV CODE- 250/637: Performed by: INTERNAL MEDICINE

## 2020-04-14 PROCEDURE — 74011250637 HC RX REV CODE- 250/637: Performed by: FAMILY MEDICINE

## 2020-04-14 PROCEDURE — 36415 COLL VENOUS BLD VENIPUNCTURE: CPT

## 2020-04-14 PROCEDURE — 74011000258 HC RX REV CODE- 258: Performed by: INTERNAL MEDICINE

## 2020-04-14 PROCEDURE — 74018 RADEX ABDOMEN 1 VIEW: CPT

## 2020-04-14 PROCEDURE — 85025 COMPLETE CBC W/AUTO DIFF WBC: CPT

## 2020-04-14 PROCEDURE — 94760 N-INVAS EAR/PLS OXIMETRY 1: CPT

## 2020-04-14 PROCEDURE — 65660000000 HC RM CCU STEPDOWN

## 2020-04-14 PROCEDURE — 80048 BASIC METABOLIC PNL TOTAL CA: CPT

## 2020-04-14 RX ORDER — PANTOPRAZOLE SODIUM 40 MG/1
40 TABLET, DELAYED RELEASE ORAL
Status: DISCONTINUED | OUTPATIENT
Start: 2020-04-14 | End: 2020-04-17 | Stop reason: HOSPADM

## 2020-04-14 RX ORDER — POLYETHYLENE GLYCOL 3350 17 G/17G
17 POWDER, FOR SOLUTION ORAL 2 TIMES DAILY
Status: DISCONTINUED | OUTPATIENT
Start: 2020-04-14 | End: 2020-04-17 | Stop reason: HOSPADM

## 2020-04-14 RX ORDER — DOCUSATE SODIUM 100 MG/1
100 CAPSULE, LIQUID FILLED ORAL 2 TIMES DAILY
Status: DISCONTINUED | OUTPATIENT
Start: 2020-04-14 | End: 2020-04-17 | Stop reason: HOSPADM

## 2020-04-14 RX ADMIN — ONDANSETRON HYDROCHLORIDE 4 MG: 2 INJECTION, SOLUTION INTRAMUSCULAR; INTRAVENOUS at 18:56

## 2020-04-14 RX ADMIN — Medication 10 ML: at 15:29

## 2020-04-14 RX ADMIN — PANTOPRAZOLE SODIUM 40 MG: 40 TABLET, DELAYED RELEASE ORAL at 11:37

## 2020-04-14 RX ADMIN — MELATONIN 3 MG: at 23:10

## 2020-04-14 RX ADMIN — Medication 10 ML: at 21:37

## 2020-04-14 RX ADMIN — MONTELUKAST 10 MG: 10 TABLET, FILM COATED ORAL at 17:39

## 2020-04-14 RX ADMIN — METOLAZONE 5 MG: 5 TABLET ORAL at 08:37

## 2020-04-14 RX ADMIN — METOPROLOL TARTRATE 25 MG: 25 TABLET, FILM COATED ORAL at 08:37

## 2020-04-14 RX ADMIN — ACETAMINOPHEN 650 MG: 325 TABLET ORAL at 08:37

## 2020-04-14 RX ADMIN — APIXABAN 5 MG: 5 TABLET, FILM COATED ORAL at 11:37

## 2020-04-14 RX ADMIN — Medication 10 ML: at 04:25

## 2020-04-14 RX ADMIN — CEFTRIAXONE 1 G: 1 INJECTION, POWDER, FOR SOLUTION INTRAMUSCULAR; INTRAVENOUS at 06:32

## 2020-04-14 RX ADMIN — ACETAMINOPHEN 650 MG: 325 TABLET ORAL at 04:24

## 2020-04-14 NOTE — PROGRESS NOTES
Bedside shift change report given to Agatha  (oncoming nurse) by Tod Balderas (offgoing nurse). Report included the following information SBAR, Kardex, Procedure Summary, Intake/Output, MAR, Med Rec Status and Cardiac Rhythm NSR.

## 2020-04-14 NOTE — PROGRESS NOTES
Problem: Falls - Risk of  Goal: *Absence of Falls  Description: Document Dilcia Canas Fall Risk and appropriate interventions in the flowsheet. Outcome: Progressing Towards Goal  Note: Fall Risk Interventions:  Mobility Interventions: Patient to call before getting OOB         Medication Interventions: Teach patient to arise slowly                   Problem: Pressure Injury - Risk of  Goal: *Prevention of pressure injury  Description: Document Michel Scale and appropriate interventions in the flowsheet.   Outcome: Progressing Towards Goal  Note: Pressure Injury Interventions:       Moisture Interventions: Apply protective barrier, creams and emollients    Activity Interventions: Increase time out of bed, Pressure redistribution bed/mattress(bed type)    Mobility Interventions: HOB 30 degrees or less    Nutrition Interventions: Document food/fluid/supplement intake

## 2020-04-14 NOTE — PROGRESS NOTES
Hospitalist Progress Note    NAME: Brodie Negron   :  1960   MRN:  [id number]       Assessment / Plan:    Elevated troponin/non-STEMI   Admited to [facility] Cardiology consultation, resume PO AC  -cont statin   Started patient on heparin drip on admission    Patient received 325 mg aspirin on admission     Intractable nausea and vomiting cause unknown  Likely dyspepsia versus PUD versus side effects of Eliquis   Start patient on Zofran   CT abdomen  show chronic gallbladder disease  GI consultation, input is appreciated. Recommended KUB and changing Eliquis to alternative agent if possible.     CHF diastolic chronic   On metolazone 5 mg daily     Atrial fibrillation  Change Eliquis to renal dose Xarelto 15 mg daily starting tomorrow given the Eliquis is causing the patient to have severe nausea and vomiting. Add Protonix 40 mg daily   s/p pacemaker last week      Hypertension  continue home antihypertensive medication        History of cellulitis   Started on IV Rocephin, change to keflex     30.0 - 39.9 Obese / Body mass index is 35.95 kg/m². Code status: Full  Prophylaxis: eliquis  Recommended Disposition: Home w/Family     Subjective:     Chief Complaint / Reason for Physician Visit  Will be troponin with NSTEMI/intractable nausea and vomiting  Discussed with RN events overnight. Patient is complaining of nausea only when he takes Eliquis as he reported. He also reported every time he takes Eliquis he feels discomfort in his stomach. Review of Systems:  Symptom Y/N Comments  Symptom Y/N Comments   Fever/Chills n   Chest Pain n    Poor Appetite n   Edema n    Cough    Abdominal Pain n    Sputum    Joint Pain     SOB/ELIZONDO    Pruritis/Rash     Nausea/vomit y   Tolerating PT/OT     Diarrhea n   Tolerating Diet     Constipation    Other       Could NOT obtain due to:      Objective:     VITALS:   Last 24hrs VS reviewed since prior progress note.  Most recent are:  Patient Vitals for the past 24 hrs:   Temp Pulse Resp BP SpO2   04/14/20 1130  80 16     04/14/20 1121 98.9 °F (37.2 °C) 77 16 133/80 95 %   04/14/20 0800 98 °F (36.7 °C) 79 18 125/78 94 %   04/14/20 0428 98.4 °F (36.9 °C) 78 17 131/81 95 %   04/13/20 2301 98.8 °F (37.1 °C) 76 18 132/71 94 %   04/13/20 1931 99.3 °F (37.4 °C) 80 17 142/80 92 %   04/13/20 1614 99.3 °F (37.4 °C) 78 16 156/80 93 %   04/13/20 1544 99.5 °F (37.5 °C) 78 18 157/90 92 %       Intake/Output Summary (Last 24 hours) at 4/14/2020 1325  Last data filed at 4/14/2020 0428  Gross per 24 hour   Intake 510 ml   Output 2050 ml   Net -1540 ml        PHYSICAL EXAM:  General: WD, WN. Alert, cooperative, no acute distress    EENT:  EOMI. Anicteric sclerae. MMM  Resp:  CTA bilaterally, no wheezing or rales. No accessory muscle use  CV:  Regular  rhythm,  No edema  GI:  Soft, Non distended, Non tender.  +Bowel sounds  Neurologic:  Alert and oriented X 3, normal speech,   Psych:   Good insight. Not anxious nor agitated  Skin:  No rashes. No jaundice    Reviewed most current lab test results and cultures  YES  Reviewed most current radiology test results   YES  Review and summation of old records today    NO  Reviewed patient's current orders and MAR    YES  PMH/SH reviewed - no change compared to H&P  ________________________________________________________________________  Care Plan discussed with:    Comments   Patient x    Family      RN x    Care Manager     Consultant                        Multidiciplinary team rounds were held today with , nursing, pharmacist and clinical coordinator. Patient's plan of care was discussed; medications were reviewed and discharge planning was addressed.      ________________________________________________________________________  Total NON critical care TIME:  35 Minutes    Total CRITICAL CARE TIME Spent:   Minutes non procedure based      Comments   >50% of visit spent in counseling and coordination of care ________________________________________________________________________  Sandy Sandifer, MD     Procedures: see electronic medical records for all procedures/Xrays and details which were not copied into this note but were reviewed prior to creation of Plan. LABS:  I reviewed today's most current labs and imaging studies.   Pertinent labs include:  Recent Labs     04/14/20  0431 04/13/20  0435 04/12/20  0012   WBC 6.3 6.4 8.8   HGB 12.8 13.7 14.3   HCT 37.9 40.5 41.0    224 224     Recent Labs     04/14/20  0431 04/13/20  0435 04/12/20  0925 04/11/20  2308   * 132* 133* 128*   K 3.1* 3.2* 3.0* 2.8*   CL 93* 93* 93* 89*   CO2 35* 33* 33* 29   GLU 92 94 87 119*   BUN 28* 26* 28* 25*   CREA 1.90* 1.84* 1.76* 1.70*   CA 8.2* 8.1* 8.3* 9.2   MG  --   --  1.6  --    ALB  --   --   --  2.9*   TBILI  --   --   --  0.8   SGOT  --   --   --  47*   ALT  --   --   --  70       Signed: Sandy Sandifer, MD

## 2020-04-14 NOTE — PROGRESS NOTES
Pt is requesting meds for gout.  Reached out to Dr Selin Taylor and he advised pt does not have gout and is not havig a flare

## 2020-04-14 NOTE — PROGRESS NOTES
Pt states stomach feels better. . Medicated with Tylenol 2 tabs po for ankle discomfort. Both ankles red but the left one is more painful. Says stomach is better. Still concerned about taking the elaquis due to stomach issues. MD aware.

## 2020-04-15 LAB
ANION GAP SERPL CALC-SCNC: 5 MMOL/L (ref 5–15)
APTT PPP: 28.3 SEC (ref 22.1–32)
BASOPHILS # BLD: 0 K/UL (ref 0–0.1)
BASOPHILS NFR BLD: 0 % (ref 0–1)
BUN SERPL-MCNC: 25 MG/DL (ref 6–20)
BUN/CREAT SERPL: 15 (ref 12–20)
CALCIUM SERPL-MCNC: 8.5 MG/DL (ref 8.5–10.1)
CHLORIDE SERPL-SCNC: 93 MMOL/L (ref 97–108)
CO2 SERPL-SCNC: 33 MMOL/L (ref 21–32)
CREAT SERPL-MCNC: 1.66 MG/DL (ref 0.7–1.3)
DIFFERENTIAL METHOD BLD: ABNORMAL
EOSINOPHIL # BLD: 0 K/UL (ref 0–0.4)
EOSINOPHIL NFR BLD: 0 % (ref 0–7)
ERYTHROCYTE [DISTWIDTH] IN BLOOD BY AUTOMATED COUNT: 13.2 % (ref 11.5–14.5)
GLUCOSE SERPL-MCNC: 93 MG/DL (ref 65–100)
HCT VFR BLD AUTO: 37.7 % (ref 36.6–50.3)
HGB BLD-MCNC: 12.5 G/DL (ref 12.1–17)
IMM GRANULOCYTES # BLD AUTO: 0.1 K/UL (ref 0–0.04)
IMM GRANULOCYTES NFR BLD AUTO: 1 % (ref 0–0.5)
LYMPHOCYTES # BLD: 0.8 K/UL (ref 0.8–3.5)
LYMPHOCYTES NFR BLD: 15 % (ref 12–49)
MCH RBC QN AUTO: 30.9 PG (ref 26–34)
MCHC RBC AUTO-ENTMCNC: 33.2 G/DL (ref 30–36.5)
MCV RBC AUTO: 93.3 FL (ref 80–99)
MONOCYTES # BLD: 0.5 K/UL (ref 0–1)
MONOCYTES NFR BLD: 9 % (ref 5–13)
NEUTS SEG # BLD: 3.9 K/UL (ref 1.8–8)
NEUTS SEG NFR BLD: 75 % (ref 32–75)
NRBC # BLD: 0 K/UL (ref 0–0.01)
NRBC BLD-RTO: 0 PER 100 WBC
PLATELET # BLD AUTO: 236 K/UL (ref 150–400)
PMV BLD AUTO: 11.1 FL (ref 8.9–12.9)
POTASSIUM SERPL-SCNC: 3.2 MMOL/L (ref 3.5–5.1)
RBC # BLD AUTO: 4.04 M/UL (ref 4.1–5.7)
RBC MORPH BLD: ABNORMAL
SODIUM SERPL-SCNC: 131 MMOL/L (ref 136–145)
THERAPEUTIC RANGE,PTTT: NORMAL SECS (ref 58–77)
WBC # BLD AUTO: 5.3 K/UL (ref 4.1–11.1)

## 2020-04-15 PROCEDURE — 94760 N-INVAS EAR/PLS OXIMETRY 1: CPT

## 2020-04-15 PROCEDURE — 85730 THROMBOPLASTIN TIME PARTIAL: CPT

## 2020-04-15 PROCEDURE — 36415 COLL VENOUS BLD VENIPUNCTURE: CPT

## 2020-04-15 PROCEDURE — 74011250637 HC RX REV CODE- 250/637: Performed by: INTERNAL MEDICINE

## 2020-04-15 PROCEDURE — 74011250637 HC RX REV CODE- 250/637: Performed by: FAMILY MEDICINE

## 2020-04-15 PROCEDURE — 65660000000 HC RM CCU STEPDOWN

## 2020-04-15 PROCEDURE — 85025 COMPLETE CBC W/AUTO DIFF WBC: CPT

## 2020-04-15 PROCEDURE — 80048 BASIC METABOLIC PNL TOTAL CA: CPT

## 2020-04-15 RX ADMIN — PANTOPRAZOLE SODIUM 40 MG: 40 TABLET, DELAYED RELEASE ORAL at 11:09

## 2020-04-15 RX ADMIN — Medication 10 ML: at 14:00

## 2020-04-15 RX ADMIN — Medication 10 ML: at 05:19

## 2020-04-15 RX ADMIN — Medication 10 ML: at 22:28

## 2020-04-15 RX ADMIN — MONTELUKAST 10 MG: 10 TABLET, FILM COATED ORAL at 19:09

## 2020-04-15 RX ADMIN — RIVAROXABAN 20 MG: 20 TABLET, FILM COATED ORAL at 11:09

## 2020-04-15 RX ADMIN — MELATONIN 3 MG: at 22:28

## 2020-04-15 NOTE — PROGRESS NOTES
Nutrition Assessment:    INTERVENTIONS/RECOMMENDATIONS:   Continue cardiac diet     ASSESSMENT:   Chart reviewed; medically noted for NSTEMI, AFIB, and nausea/vomiting (now resolved). PMH CHF, HTN, and PVD. Nausea/vomiting felt to be a side effect of eliquis which has now been discontinued. Patient reports tolerating meals well and keeping food down. He confirms ~26# weight loss in about a week. Given poor PO intake PTA and significant 8% weight loss x 1 week, patient does meet criteria for acute severe malnutrition. PO intake slowly improving; encouraged small, frequent meals. PO currently % of meals per flowsheets. Please document  Acute Severe Protein Calorie Malnutrition in patient diagnoses. Patient meets criteria for as evidenced by:   ASPEN Malnutrition Criteria  Acute Illness, Chronic Illness, or Social/Enviornmental: Acute illness  Energy Intake: Less than/equal to 50% est energy req for greater than/equal to 5 days  Weight Loss: Greater than 1-2% x 1 wk  ASPEN Malnutrition Score - Acute Illness: 12  Acute Illness - Malnutrition Diagnosis: Severe malnutrition. Diet Order: Cardiac  % Eaten:    Patient Vitals for the past 72 hrs:   % Diet Eaten   04/13/20 1824 50 %   04/12/20 1732 100 %   04/12/20 1435 100 %     Pertinent Medications: [x] Reviewed []Other: Colace, Lopressor, Protonix, Miralax, Effer-K, PRN Zofran  Pertinent Labs: [x]Reviewed  []Other: Na 131, K+ 3.2  Food Allergies: [x]None []Yes:     Last BM: 4/14  [x]Active     []Hyperactive  []Hypoactive       [] Absent  BS  Skin:    [x] Intact   [] Incision  [] Breakdown   [x]Edema   []Other:    Anthropometrics: Height: 6' 2\" (188 cm) Weight: 127 kg (279 lb 15.8 oz)    IBW (%IBW):   ( ) UBW (%UBW):   (  %)    BMI: Body mass index is 35.95 kg/m².     This BMI is indicative of:  []Underweight   []Normal   []Overweight   [x] Obesity   [] Extreme Obesity (BMI>40)  Last Weight Metrics:  Weight Loss Metrics 4/14/2020 4/4/2020 3/5/2020 2/25/2020 2/18/2020 2/6/2020 1/29/2020   Today's Wt 279 lb 15.8 oz 306 lb 312 lb 11.2 oz 315 lb 11.2 oz 314 lb 8 oz 308 lb 309 lb   BMI 35.95 kg/m2 39.29 kg/m2 40.15 kg/m2 40.53 kg/m2 40.38 kg/m2 39.54 kg/m2 39.67 kg/m2       Estimated Nutrition Needs (Based on): 5685 Kcals/day(BMR (2150) x 1. 3AF -500kcal) , 102 g(0.8 g/kg bw) Protein  Carbohydrate: At Least 130 g/day  Fluids: 1800 mL/day (or per MD)     Pt expected to meet estimated nutrient needs: [x]Yes []No    NUTRITION DIAGNOSES:   Problem:  Unintended weight loss      Etiology: related to nausea/vomiting, diuresis? Signs/Symptoms: as evidenced by 24# weight loss x 1 week      NUTRITION INTERVENTIONS:  Meals/Snacks: General/healthful diet                  GOAL:   PO intake >70% of meals next 5-7 days    NUTRITION MONITORING AND EVALUATION   Food/Nutrient Intake Outcomes:  Total energy intake  Physical Signs/Symptoms Outcomes: Weight/weight change, Electrolyte and renal profile, GI profile    Previous Goal Met:   [x] Met              [] Progressing Towards Goal              [] Not Progressing Towards Goal   Previous Recommendations:   [x] Implemented          [] Not Implemented          [] Not Applicable    LEARNING NEEDS (Diet, Food/Nutrient-Drug Interaction):    [x] None Identified   [] Identified and Education Provided/Documented   [] Identified and Pt declined/was not appropriate     Cultural, Faith, OR Ethnic Dietary Needs:    [x] None Identified   [] Identified and Addressed     [x] Interdisciplinary Care Plan Reviewed/Documented    [x] Discharge Planning: heart healthy diet    [] Participated in Interdisciplinary Rounds    NUTRITION RISK:    [] Patient At Nutritional Risk             [x] Patient Not At Nutritional Risk      Bee Hernandez [phone]  Pager [phone]    Weekend Pager [phone]

## 2020-04-15 NOTE — PROGRESS NOTES
Bedside and Verbal shift change report given to [address] [place] [address] (oncoming nurse) by Mara Pollock (offgoing nurse). Report included the following information SBAR, Kardex, ED Summary, OR Summary, Procedure Summary, Intake/Output, MAR and Accordion.

## 2020-04-15 NOTE — PROGRESS NOTES
Physical Therapy Screening:    An [facility] screening referral was triggered for physical therapy based on results obtained during the nursing admission assessment. The patients chart was reviewed and the patient is appropriate for a skilled therapy evaluation if there is a decline in functional mobility from baseline. Please order a consult for physical therapy if you are in agreement and would like an evaluation to be completed. Thank you.     Maritza Klein, PT

## 2020-04-15 NOTE — PROGRESS NOTES
F/U for nausea, vomiting    S: Mr. Della Jaime was seen by me today during rounds. At this time, he is resting comfortably. The patient has no new complaints today. Please see admission consult for details of ROS; there are no changes today. He continues to tolerate a cardiac diet with no recurrence of N/V. He again is adamant that his N/V is related to his Eliquis and does not want to take it anymore. From chart review, it looks like this has been changed to Xarelto. He states he doesn't want to take anything by mouth that he wants to do \"heparin\" injections at home. He denies abd pain. Still has not had a BM. KUB showed moderate constipation but no dilated bowel loops. I ordered colace and miralax but he doesn't want to take these as he feels fine and doesn't want to take any unnecessary medication. O: Blood pressure (!) 155/92, pulse 73, temperature 98.2 °F (36.8 °C), resp. rate 18, height 6' 2\" (1.88 m), weight 127 kg (279 lb 15.8 oz), SpO2 99 %. Gen: obese white male. Patient is in no acute distress. There is no jaundice. XR Results (most recent):  Results from Hospital Encounter encounter on 04/11/20   XR ABD (KUB)    Narrative PROCEDURE: XR ABD (KUB)    REASON FOR STUDY: constipation, nausea     COMPARISON: No comparison    FINDINGS: 2 AP supine images of the abdomen demonstrate moderate stool burden. No evidence of mechanical bowel obstruction. Impression IMPRESSION:  Moderate stool burden. No evidence of mechanical bowel obstruction                 A: Active Problems:    ALYSSA on CPAP (11/4/2019)      NSTEMI (non-ST elevated myocardial infarction) ([facility] [address] (4/12/2020)      Elevated troponin (4/12/2020)      Permanent atrial fibrillation (4/12/2020)      Nausea & vomiting (4/14/2020)        Comment:  N/V may well have been related to eliquis as it seems to have resolved now that he has stopped the medication. He is tolerating a cardiac diet without any GI c/o.     P: No indication for further biliary workup. Will sign off. Call if any further questions/concerns.       RUBI Delgado  04/15/20  11:53 AM

## 2020-04-15 NOTE — PROGRESS NOTES
Hospitalist Progress Note    NAME: Marcy Swenson   :  1960   MRN:  [id number]       Assessment / Plan:    Elevated troponin/non-STEMI  Asymptomatic  His elevated troponin likely secondary to the recent pacemaker placement. [address] [place] [address] Cardiology consultation, resume PO AC  -cont statin   Patient received 325 mg aspirin on admission, and he was on heparin drip initially.     Intractable nausea and vomiting cause unknown  Likely dyspepsia versus PUD versus side effects of Eliquis   Start patient on Zofran   CT abdomen  show chronic gallbladder disease  GI consultation, input is appreciated. Recommended KUB and changing Eliquis to alternative agent if possible.  KUB revealed Moderate stool burden. No evidence of mechanical bowel obstruction   Symptoms are better today     CHF diastolic chronic   On metolazone 5 mg daily     Atrial fibrillation  Changeed Eliquis to Xarelto   Add Protonix 40 mg daily  Patient refused to take Xarelto today. Long discussion with the patient and the nurse. Patient reported\" okay I will give it a try today\"   s/p pacemaker last week      Hypertension  continue home antihypertensive medication        History of cellulitis   Started on IV Rocephin, change to keflex     30.0 - 39.9 Obese / Body mass index is 35.95 kg/m². Code status: Full  Prophylaxis: eliquis  Recommended Disposition: Home w/Family     Subjective:     Chief Complaint / Reason for Physician Visit  Will be troponin with NSTEMI/intractable nausea and vomiting  Discussed with RN events overnight. Patient was sitting in chair watching TV. He refused to take his Xarelto today and when he was asked he said I do not have nausea and vomiting again. Long discussion about his high risk for stroke and the necessity of blood thinner. Patient reported\" I will give it a try today. \"    Review of Systems:  Symptom Y/N Comments  Symptom Y/N Comments   Fever/Chills n   Chest Pain n    Poor Appetite n Edema n    Cough    Abdominal Pain n    Sputum    Joint Pain     SOB/ELIZONDO    Pruritis/Rash     Nausea/vomit y  improving  Tolerating PT/OT     Diarrhea n   Tolerating Diet     Constipation    Other       Could NOT obtain due to:      Objective:     VITALS:   Last 24hrs VS reviewed since prior progress note. Most recent are:  Patient Vitals for the past 24 hrs:   Temp Pulse Resp BP SpO2   04/15/20 1028 98.2 °F (36.8 °C) 73 18 (!) 155/92 99 %   04/15/20 0722 98.9 °F (37.2 °C) 77 16 146/82 98 %   04/15/20 0400  75      04/15/20 0328  77 18 136/85 93 %   04/15/20 0000  75      04/14/20 2329 99.1 °F (37.3 °C) 79 18 136/82 92 %   04/14/20 2000  75      04/14/20 1912 98.7 °F (37.1 °C) 75 18 142/90 99 %   04/14/20 1530 98 °F (36.7 °C) 76 20 (!) 133/94 97 %       Intake/Output Summary (Last 24 hours) at 4/15/2020 1300  Last data filed at 4/15/2020 1026  Gross per 24 hour   Intake    Output 1290 ml   Net -1290 ml        PHYSICAL EXAM:  General: WD, WN. Alert, cooperative, no acute distress    EENT:  EOMI. Anicteric sclerae. MMM  Resp:  CTA bilaterally, no wheezing or rales. No accessory muscle use  CV:  Regular  rhythm,  No edema  GI:  Soft, Non distended, Non tender.  +Bowel sounds  Neurologic:  Alert and oriented X 3, normal speech,   Psych:   Good insight. Not anxious nor agitated  Skin:  No rashes. No jaundice    Reviewed most current lab test results and cultures  YES  Reviewed most current radiology test results   YES  Review and summation of old records today    NO  Reviewed patient's current orders and MAR    YES  PMH/SH reviewed - no change compared to H&P  ________________________________________________________________________  Care Plan discussed with:    Comments   Patient x    Family      RN x    Care Manager     Consultant                        Multidiciplinary team rounds were held today with , nursing, pharmacist and clinical coordinator.   Patient's plan of care was discussed; medications were reviewed and discharge planning was addressed. ________________________________________________________________________  Total NON critical care TIME:  35 Minutes    Total CRITICAL CARE TIME Spent:   Minutes non procedure based      Comments   >50% of visit spent in counseling and coordination of care     ________________________________________________________________________  Jocelyn Knight MD     Procedures: see electronic medical records for all procedures/Xrays and details which were not copied into this note but were reviewed prior to creation of Plan. LABS:  I reviewed today's most current labs and imaging studies.   Pertinent labs include:  Recent Labs     04/15/20  0517 04/14/20  0431 04/13/20  0435   WBC 5.3 6.3 6.4   HGB 12.5 12.8 13.7   HCT 37.7 37.9 40.5    262 224     Recent Labs     04/15/20  0517 04/14/20  0431 04/13/20  0435   * 132* 132*   K 3.2* 3.1* 3.2*   CL 93* 93* 93*   CO2 33* 35* 33*   GLU 93 92 94   BUN 25* 28* 26*   CREA 1.66* 1.90* 1.84*   CA 8.5 8.2* 8.1*       Signed: Jocelyn Knight MD

## 2020-04-15 NOTE — PROGRESS NOTES
0930-Pt refusing to take any morning medications until discussed with MD.    1010-Dr. Reyes at bedside to discuss. 1900-Bedside shift change report given to Stacey Nieto (oncoming nurse) by Geoff Kilgore RN (offgoing nurse). Report included the following information SBAR, Kardex, ED Summary, [facility] and Recent Results.

## 2020-04-16 LAB
ANION GAP SERPL CALC-SCNC: 4 MMOL/L (ref 5–15)
BACTERIA SPEC CULT: NORMAL
BASOPHILS # BLD: 0 K/UL (ref 0–0.1)
BASOPHILS NFR BLD: 0 % (ref 0–1)
BUN SERPL-MCNC: 21 MG/DL (ref 6–20)
BUN/CREAT SERPL: 14 (ref 12–20)
CALCIUM SERPL-MCNC: 8.1 MG/DL (ref 8.5–10.1)
CHLORIDE SERPL-SCNC: 94 MMOL/L (ref 97–108)
CO2 SERPL-SCNC: 34 MMOL/L (ref 21–32)
CREAT SERPL-MCNC: 1.5 MG/DL (ref 0.7–1.3)
DIFFERENTIAL METHOD BLD: ABNORMAL
EOSINOPHIL # BLD: 0 K/UL (ref 0–0.4)
EOSINOPHIL NFR BLD: 0 % (ref 0–7)
ERYTHROCYTE [DISTWIDTH] IN BLOOD BY AUTOMATED COUNT: 13.1 % (ref 11.5–14.5)
GLUCOSE SERPL-MCNC: 92 MG/DL (ref 65–100)
HCT VFR BLD AUTO: 36.3 % (ref 36.6–50.3)
HGB BLD-MCNC: 12.1 G/DL (ref 12.1–17)
IMM GRANULOCYTES # BLD AUTO: 0 K/UL (ref 0–0.04)
IMM GRANULOCYTES NFR BLD AUTO: 1 % (ref 0–0.5)
LYMPHOCYTES # BLD: 0.9 K/UL (ref 0.8–3.5)
LYMPHOCYTES NFR BLD: 19 % (ref 12–49)
MCH RBC QN AUTO: 30.6 PG (ref 26–34)
MCHC RBC AUTO-ENTMCNC: 33.3 G/DL (ref 30–36.5)
MCV RBC AUTO: 91.9 FL (ref 80–99)
MONOCYTES # BLD: 0.5 K/UL (ref 0–1)
MONOCYTES NFR BLD: 11 % (ref 5–13)
NEUTS SEG # BLD: 3.4 K/UL (ref 1.8–8)
NEUTS SEG NFR BLD: 69 % (ref 32–75)
NRBC # BLD: 0 K/UL (ref 0–0.01)
NRBC BLD-RTO: 0 PER 100 WBC
PLATELET # BLD AUTO: 259 K/UL (ref 150–400)
PMV BLD AUTO: 11.1 FL (ref 8.9–12.9)
POTASSIUM SERPL-SCNC: 3.2 MMOL/L (ref 3.5–5.1)
RBC # BLD AUTO: 3.95 M/UL (ref 4.1–5.7)
SERVICE CMNT-IMP: NORMAL
SODIUM SERPL-SCNC: 132 MMOL/L (ref 136–145)
WBC # BLD AUTO: 4.8 K/UL (ref 4.1–11.1)

## 2020-04-16 PROCEDURE — 74011250637 HC RX REV CODE- 250/637: Performed by: INTERNAL MEDICINE

## 2020-04-16 PROCEDURE — 80048 BASIC METABOLIC PNL TOTAL CA: CPT

## 2020-04-16 PROCEDURE — 94760 N-INVAS EAR/PLS OXIMETRY 1: CPT

## 2020-04-16 PROCEDURE — 85025 COMPLETE CBC W/AUTO DIFF WBC: CPT

## 2020-04-16 PROCEDURE — 36415 COLL VENOUS BLD VENIPUNCTURE: CPT

## 2020-04-16 PROCEDURE — 65660000000 HC RM CCU STEPDOWN

## 2020-04-16 RX ORDER — GUAIFENESIN 100 MG/5ML
100 SOLUTION ORAL
Status: DISCONTINUED | OUTPATIENT
Start: 2020-04-16 | End: 2020-04-17 | Stop reason: HOSPADM

## 2020-04-16 RX ADMIN — Medication 10 ML: at 22:00

## 2020-04-16 RX ADMIN — GUAIFENESIN 100 MG: 200 SOLUTION ORAL at 14:39

## 2020-04-16 RX ADMIN — Medication 10 ML: at 05:32

## 2020-04-16 RX ADMIN — GUAIFENESIN 100 MG: 200 SOLUTION ORAL at 18:39

## 2020-04-16 RX ADMIN — MONTELUKAST 10 MG: 10 TABLET, FILM COATED ORAL at 17:39

## 2020-04-16 RX ADMIN — GUAIFENESIN 100 MG: 200 SOLUTION ORAL at 22:18

## 2020-04-16 RX ADMIN — RIVAROXABAN 20 MG: 20 TABLET, FILM COATED ORAL at 10:08

## 2020-04-16 RX ADMIN — Medication 10 ML: at 17:40

## 2020-04-16 NOTE — PROGRESS NOTES
Bedside shift change report given to [address] [place] [address] (oncoming nurse) by Shaun Henning (offgoing nurse). Report included the following information SBAR, Kardex, Intake/Output, MAR, Accordion and Recent Results.

## 2020-04-16 NOTE — CDMP QUERY
Patient admitted with Elevated troponin/non-STEMI, noted to have malnutrition on 4/15 by Registered Dietitian. If possible, please document in progress notes and d/c summary if you are evaluating and/or treating any of the following: 
 
=> Acute severe Protein-Calorie Malnutrition 
=> Acute Protein-Calorie Malnutrition 
=> Other Explanation of clinical findings 
=> Clinically Undetermined (no explanation for clinical findings) The medical record reflects the following: 
   Risk Factors: presents with N/V. Clinical Indicators: Patient meets criteria for as evidenced by: ASPEN Malnutrition Criteria Acute Illness, Chronic Illness, or Social/Enviornmental: Acute illness, Energy Intake: Less than/equal to 50% est energy req for greater than/equal to 5 days, Weight Loss: Greater than 1-2% x 1 wk, ASPEN Malnutrition Score - Acute Illness: 12, Acute Illness - Malnutrition Diagnosis: Severe malnutrition Treatment: small frequent meals encouraged. Please clarify and document your clinical opinion in the progress notes and discharge summary including the definitive and/or presumptive diagnosis, (suspected or probable), related to the above clinical findings. Please include clinical findings supporting your diagnosis.

## 2020-04-16 NOTE — PROGRESS NOTES
2100: Pt wants to hold off on taking metoprolol until he discusses with MD. Pt reporting dry cough after taking xarelto. Pt refused miralax, stated he had a BM 4/14. Pt needs CPAP order, home CPAP at bedside. 0700:Bedside shift change report given to [address] [place] [address] (oncoming nurse) by Augie Yeung (offgoing nurse). Report included the following information SBAR, Kardex, Intake/Output, MAR, Recent Results and Cardiac Rhythm PACED.

## 2020-04-16 NOTE — PROGRESS NOTES
Hospitalist Progress Note    NAME: Lesa Hoang   :  1960   MRN:  [id number]       Assessment / Plan:    Elevated troponin/non-STEMI  Asymptomatic  His elevated troponin likely secondary to the recent pacemaker placement. [address] [place] [address] Cardiology consultation, resume PO AC  -cont statin   Patient received 325 mg aspirin on admission, and he was on heparin drip initially.     Intractable nausea and vomiting cause unknown  Likely dyspepsia versus PUD versus side effects of Eliquis   Started patient on Zofran   CT abdomen  show chronic gallbladder disease  GI consultation, input is appreciated. Recommended KUB and changing Eliquis to alternative agent if possible.  KUB revealed Moderate stool burden. No evidence of mechanical bowel obstruction   Symptoms are better today     CHF diastolic chronic   On metolazone 5 mg daily     Atrial fibrillation  Changeed Eliquis to Xarelto   Add Protonix 40 mg daily  Patient reporting cough associated with Xarelto for now. With adequate diffusing and he said if it continues to give him cough then he wants to be off Xarelto and he understands the risk of stroke and he still wanting to be off Xarelto if he continues to cough. Patient refused to take Xarelto today. Long discussion with the patient and the nurse.  s/p pacemaker last week      Hypertension  continue home antihypertensive medication        History of cellulitis   Started on IV Rocephin, change to keflex     30.0 - 39.9 Obese / Body mass index is 35.95 kg/m². Code status: Full  Prophylaxis: eliquis  Recommended Disposition: Home w/Family     Subjective:     Chief Complaint / Reason for Physician Visit  Will be troponin with NSTEMI/intractable nausea and vomiting  Discussed with RN events overnight. Patient was sitting in chair watching TV. Patient reported that he is having cough every time he is taking Xarelto.   Long discussion with the patient about the necessity of anticoagulation given his high risk for having stroke. Patient reported that he is willing to try cough medicine for today but if it did not work he does not want to take Xarelto and he understands risk and benefits of that including fatal stroke. Review of Systems:  Symptom Y/N Comments  Symptom Y/N Comments   Fever/Chills n   Chest Pain n    Poor Appetite n   Edema n    Cough y   Abdominal Pain n    Sputum    Joint Pain     SOB/ELIZONDO    Pruritis/Rash     Nausea/vomit y  improving  Tolerating PT/OT     Diarrhea n   Tolerating Diet     Constipation    Other       Could NOT obtain due to:      Objective:     VITALS:   Last 24hrs VS reviewed since prior progress note. Most recent are:  Patient Vitals for the past 24 hrs:   Temp Pulse Resp BP SpO2   04/16/20 1118 97.7 °F (36.5 °C) 75 18 (!) 150/98 92 %   04/16/20 0733 97.5 °F (36.4 °C) 78 16 150/75 91 %   04/16/20 0330 97.5 °F (36.4 °C) 77 16 138/87 94 %   04/15/20 2304 98.1 °F (36.7 °C) 76 18 (!) 148/95 94 %   04/15/20 2000 98.6 °F (37 °C) 72 16 (!) 139/91 94 %   04/15/20 1520 98 °F (36.7 °C) 77 16 138/84 95 %       Intake/Output Summary (Last 24 hours) at 4/16/2020 1408  Last data filed at 4/16/2020 0800  Gross per 24 hour   Intake 200 ml   Output 2200 ml   Net -2000 ml        PHYSICAL EXAM:  General: WD, WN. Alert, cooperative, no acute distress    EENT:  EOMI. Anicteric sclerae. MMM  Resp:  CTA bilaterally, no wheezing or rales. No accessory muscle use  CV:  Regular  rhythm,  No edema  GI:  Soft, Non distended, Non tender.  +Bowel sounds  Neurologic:  Alert and oriented X 3, normal speech,   Psych:   Good insight. Not anxious nor agitated  Skin:  No rashes.   No jaundice    Reviewed most current lab test results and cultures  YES  Reviewed most current radiology test results   YES  Review and summation of old records today    NO  Reviewed patient's current orders and MAR    YES  PMH/SH reviewed - no change compared to H&P  ________________________________________________________________________  Care Plan discussed with:    Comments   Patient x    Family      RN x    Care Manager     Consultant                        Multidiciplinary team rounds were held today with , nursing, pharmacist and clinical coordinator. Patient's plan of care was discussed; medications were reviewed and discharge planning was addressed. ________________________________________________________________________  Total NON critical care TIME:  35 Minutes    Total CRITICAL CARE TIME Spent:   Minutes non procedure based      Comments   >50% of visit spent in counseling and coordination of care     ________________________________________________________________________  Sma Kiser MD     Procedures: see electronic medical records for all procedures/Xrays and details which were not copied into this note but were reviewed prior to creation of Plan. LABS:  I reviewed today's most current labs and imaging studies.   Pertinent labs include:  Recent Labs     04/16/20  0406 04/15/20  0517 04/14/20  0431   WBC 4.8 5.3 6.3   HGB 12.1 12.5 12.8   HCT 36.3* 37.7 37.9    236 262     Recent Labs     04/16/20  0406 04/15/20  0517 04/14/20  0431   * 131* 132*   K 3.2* 3.2* 3.1*   CL 94* 93* 93*   CO2 34* 33* 35*   GLU 92 93 92   BUN 21* 25* 28*   CREA 1.50* 1.66* 1.90*   CA 8.1* 8.5 8.2*       Signed: Sam Kiser MD

## 2020-04-16 NOTE — PROGRESS NOTES
Problem: Patient Education: Go to Patient Education Activity  Goal: Patient/Family Education  Outcome: Not Progressing Towards Goal   Pt refusing medications after education and explanation of benefits.

## 2020-04-17 VITALS
WEIGHT: 279.98 LBS | BODY MASS INDEX: 35.93 KG/M2 | SYSTOLIC BLOOD PRESSURE: 152 MMHG | TEMPERATURE: 97.4 F | RESPIRATION RATE: 20 BRPM | HEART RATE: 84 BPM | OXYGEN SATURATION: 98 % | HEIGHT: 74 IN | DIASTOLIC BLOOD PRESSURE: 92 MMHG

## 2020-04-17 LAB
ANION GAP SERPL CALC-SCNC: 6 MMOL/L (ref 5–15)
APTT PPP: 33.6 SEC (ref 22.1–32)
BASOPHILS # BLD: 0 K/UL (ref 0–0.1)
BASOPHILS NFR BLD: 1 % (ref 0–1)
BUN SERPL-MCNC: 19 MG/DL (ref 6–20)
BUN/CREAT SERPL: 14 (ref 12–20)
CALCIUM SERPL-MCNC: 8.4 MG/DL (ref 8.5–10.1)
CHLORIDE SERPL-SCNC: 98 MMOL/L (ref 97–108)
CO2 SERPL-SCNC: 31 MMOL/L (ref 21–32)
CREAT SERPL-MCNC: 1.36 MG/DL (ref 0.7–1.3)
DIFFERENTIAL METHOD BLD: ABNORMAL
EOSINOPHIL # BLD: 0 K/UL (ref 0–0.4)
EOSINOPHIL NFR BLD: 0 % (ref 0–7)
ERYTHROCYTE [DISTWIDTH] IN BLOOD BY AUTOMATED COUNT: 13.2 % (ref 11.5–14.5)
GLUCOSE SERPL-MCNC: 95 MG/DL (ref 65–100)
HCT VFR BLD AUTO: 38.5 % (ref 36.6–50.3)
HGB BLD-MCNC: 13 G/DL (ref 12.1–17)
IMM GRANULOCYTES # BLD AUTO: 0 K/UL (ref 0–0.04)
IMM GRANULOCYTES NFR BLD AUTO: 1 % (ref 0–0.5)
LYMPHOCYTES # BLD: 1 K/UL (ref 0.8–3.5)
LYMPHOCYTES NFR BLD: 20 % (ref 12–49)
MCH RBC QN AUTO: 31 PG (ref 26–34)
MCHC RBC AUTO-ENTMCNC: 33.8 G/DL (ref 30–36.5)
MCV RBC AUTO: 91.9 FL (ref 80–99)
MONOCYTES # BLD: 0.6 K/UL (ref 0–1)
MONOCYTES NFR BLD: 10 % (ref 5–13)
NEUTS SEG # BLD: 3.6 K/UL (ref 1.8–8)
NEUTS SEG NFR BLD: 68 % (ref 32–75)
NRBC # BLD: 0 K/UL (ref 0–0.01)
NRBC BLD-RTO: 0 PER 100 WBC
PLATELET # BLD AUTO: 301 K/UL (ref 150–400)
PMV BLD AUTO: 10.8 FL (ref 8.9–12.9)
POTASSIUM SERPL-SCNC: 3.3 MMOL/L (ref 3.5–5.1)
RBC # BLD AUTO: 4.19 M/UL (ref 4.1–5.7)
SODIUM SERPL-SCNC: 135 MMOL/L (ref 136–145)
THERAPEUTIC RANGE,PTTT: ABNORMAL SECS (ref 58–77)
WBC # BLD AUTO: 5.3 K/UL (ref 4.1–11.1)

## 2020-04-17 PROCEDURE — 94760 N-INVAS EAR/PLS OXIMETRY 1: CPT

## 2020-04-17 PROCEDURE — 85730 THROMBOPLASTIN TIME PARTIAL: CPT

## 2020-04-17 PROCEDURE — 80048 BASIC METABOLIC PNL TOTAL CA: CPT

## 2020-04-17 PROCEDURE — 36415 COLL VENOUS BLD VENIPUNCTURE: CPT

## 2020-04-17 PROCEDURE — 85025 COMPLETE CBC W/AUTO DIFF WBC: CPT

## 2020-04-17 RX ORDER — GUAIFENESIN 100 MG/5ML
100 SOLUTION ORAL
Qty: 5 BOTTLE | Refills: 0 | Status: SHIPPED | OUTPATIENT
Start: 2020-04-17 | End: 2020-05-27

## 2020-04-17 RX ORDER — PANTOPRAZOLE SODIUM 40 MG/1
40 TABLET, DELAYED RELEASE ORAL
Qty: 30 TAB | Refills: 0 | Status: SHIPPED | OUTPATIENT
Start: 2020-04-18 | End: 2020-06-24

## 2020-04-17 RX ADMIN — Medication 10 ML: at 05:12

## 2020-04-17 NOTE — PROGRESS NOTES
Left chest incision S/P pacemaker placement. Earlier today Pt requested sutures be removed. Steri-stripes to incision site, no sutures or staples at incision site. Steri-stripes removed, incision approximated, with no bleeding or drainage. Sterile 4X4  applied to site.

## 2020-04-17 NOTE — DISCHARGE SUMMARY
Hospitalist Discharge Summary     Patient ID:  Fernando Huertas  [id number]  3 y.o.  1960 4/11/2020    PCP on record: Allison Helms MD    Admit date: 4/11/2020  Discharge date and time: 4/17/2020    DISCHARGE DIAGNOSIS:    Elevated troponin/non-STEMI     Intractable nausea and vomiting cause unknown      CONSULTATIONS:  IP CONSULT TO CARDIOLOGY  IP CONSULT TO GASTROENTEROLOGY    Excerpted HPI from H&P of Alinda Peabody, MD:    60years old male with past medical history significant for CHF, atrial fibrillation, hypertension, cellulitis, elevated LFT presented to the hospital for evaluation of intractable nausea and vomiting started 2 days ago, patient stated he been complaining from nausea and vomiting before but is worsening for the last 2 days patient denies any fever any chills, patient was recently admitted to the hospital and had pacemaker placed last Sunday, troponin was noticed to be elevated 0.68.  ______________________________________________________________________  DISCHARGE SUMMARY/HOSPITAL COURSE:  for full details see H&P, daily progress notes, labs, consult notes. Elevated troponin/non-STEMI  Asymptomatic  His elevated troponin likely secondary to the recent pacemaker placement. [address] [place] [address] Cardiology consultation, resume PO AC  -cont statin   Patient received 325 mg aspirin on admission, and he was on heparin drip initially.     Intractable nausea and vomiting cause unknown  Likely dyspepsia versus PUD versus side effects of Eliquis   Started patient on Zofran   CT abdomen March 29 show chronic gallbladder disease  GI consultation, input is appreciated. Recommended KUB and changing Eliquis to alternative agent if possible.  KUB revealed Moderate stool burden. No evidence of mechanical bowel obstruction   Symptoms are better     Atrial fibrillation  Changeed Eliquis to Xarelto   Add Protonix 40 mg daily  Patient reporting cough associated with Xarelto for now. Patient refused to take Xarelto today. Long discussion with the patient and the nurse.  s/p pacemaker   -Patient reported that he is not going to take his Xarelto and metoprolol because he thinks that all medications have side effects. Patient reports that he understands risk and benefits of not taking Xarelto metoprolol including stroke. He still wishes not to take medications and he say he will give it a try but he does not think he needs them\" I know I do not need medications because I am not going to have stroke\". Patient was educated about the necessity of his medications and compliance.    _______________________________________________________________________  Patient seen and examined by me on discharge day. Pertinent Findings:  Gen:    Not in distress  Chest: Clear lungs  CVS:   Regular rhythm. No edema  Abd:  Soft, not distended, not tender  Neuro:  Alert  _______________________________________________________________________  DISCHARGE MEDICATIONS:   Current Discharge Medication List      START taking these medications    Details   guaiFENesin (ROBITUSSIN) 100 mg/5 mL liquid Take 5 mL by mouth every four (4) hours as needed for Cough. Qty: 5 Bottle, Refills: 0      pantoprazole (PROTONIX) 40 mg tablet Take 1 Tab by mouth Daily (before breakfast). Qty: 30 Tab, Refills: 0      rivaroxaban (XARELTO) 20 mg tab tablet Take 1 Tab by mouth daily (with breakfast). Qty: 90 Tab, Refills: 0         CONTINUE these medications which have NOT CHANGED    Details   potassium chloride (K-DUR, KLOR-CON) 20 mEq tablet Take 2 Tabs by mouth daily. Qty: 60 Tab, Refills: 1      metOLazone (ZAROXOLYN) 5 mg tablet Take 1 Tab by mouth daily. Qty: 30 Tab, Refills: 0      metoprolol tartrate (LOPRESSOR) 25 mg tablet Take 1 Tab by mouth every twelve (12) hours. Qty: 60 Tab, Refills: 0      naltrexone HCl (NALTREXONE PO) Take  by mouth daily.  Dose unknown prescribed Dr. Aguilera Left      HCG 8000IU/4 ML 2,000 Units by SubCUTAneous route. HCG   Benzyl Alcohol   Sodium phosphate   Sterile water   (may use HCl or NaOH  to make adjustments to the [facility])      testosterone cypionate (Depo-Testosterone) 100 mg/mL injection 200 mg by IntraMUSCular route Once every 2 weeks. furosemide (LASIX) 40 mg tablet take 1 tablet by mouth twice a day  Qty: 60 Tab, Refills: 1      anastrozole (ARIMIDEX) 1 mg tablet Take 1 mg by mouth every seven (7) days. Refills: 0      desloratadine (CLARINEX) 5 mg tablet Take 5 mg by mouth daily. montelukast (SINGULAIR) 10 mg tablet Take 10 mg by mouth every evening. dextroamphetamine-amphetamine (ADDERALL) 30 mg tablet Take 30 mg by mouth two (2) times a day. STOP taking these medications       doxycycline (MONODOX) 100 mg capsule Comments:   Reason for Stopping:         apixaban (ELIQUIS) 5 mg tablet Comments:   Reason for Stopping:                 Patient Follow Up Instructions:    Activity: Activity as tolerated  Diet: Cardiac Diet  Wound Care: None needed    Follow-up Information     Follow up With Specialties Details Why Meghan Valdez MD General Practice   [address] [place] [address]/ [address]  [phone]          ________________________________________________________________    Risk of deterioration: Low    Condition at Discharge:  Stable  __________________________________________________________________    Disposition  Home with family, no needs    ____________________________________________________________________    Code Status: Full Code  ___________________________________________________________________      Total time in minutes spent coordinating this discharge (includes going over instructions, follow-up, prescriptions, and preparing report for sign off to her PCP) :  35 minutes    Signed:  Sergo Schaeffer MD

## 2020-04-17 NOTE — PROGRESS NOTES
Plan:  -Home  -PCP f/u appt  -Friend/family to transport       11:17AM  D/c order acknowledged by CM. Pt to d/c home. PCP appt scheduled and added to AVS. Pt non-compliant with medications and medical recommendations. No HH ordered at this time. Pt independent at baseline. Family/friend to transport at d/c. Pt ready for d/c from CM perspective. CM available for any additional needs. Care Management Interventions  PCP Verified by CM: Yes  Mode of Transport at Discharge:  Other (see comment)(Family/friend)  Transition of Care Consult (CM Consult): Discharge Planning  Current Support Network: Lives Alone  Confirm Follow Up Transport: Self  Discharge Location  Discharge Placement: Home      Armida Montes MSW  Care Manager

## 2020-04-17 NOTE — PROGRESS NOTES
PCP BRANDEE appt scheduled with Dr. Santiago Batista on 4/20/2020 at 10:40am Appt added to AVS. Leanne Burton CM Specialist

## 2020-04-17 NOTE — PROGRESS NOTES
Bedside and Verbal shift change report given to Chetan Najera (oncoming nurse) by Lucio Laurent RN (offgoing nurse). Report included the following information Kardex, MAR and Recent Results.

## 2020-04-17 NOTE — PROGRESS NOTES
0800 Pt refuses all medications. He states, \"all medications cause side effects\" and the doctor is aware of his aversion to taking any medication. I attempted to explain the importance of the prescribed care. Russ Betancourt \"I am not interested\". 1040 D/c instructions given to pt with time allowed to ask questions. Pt states, \"I doubt I will get medications filled\". He is also given appt time to follow up with PCP. Pt refuses to sign d/c instructions. Copy provided. 1139 Pt taken via w/c by volunteer to lobby for d/c. Alert and oriented x 4, resp even & unlabored at discharge. Pt states he has all of his belongings.

## 2020-04-18 ENCOUNTER — PATIENT OUTREACH (OUTPATIENT)
Dept: CARDIOLOGY CLINIC | Age: 60
End: 2020-04-18

## 2020-04-18 NOTE — PROGRESS NOTES
Patient contacted regarding recent discharge and COVID-19 risk   Care Transition Nurse/ Ambulatory Care Manager contacted the patient by telephone to perform post discharge assessment. Verified name and  with patient as identifiers. Patient has following risk factors of: heart failure and immunocompromised. CTN/ACM reviewed discharge instructions, medical action plan and red flags related to discharge diagnosis. Reviewed and educated them on any new and changed medications related to discharge diagnosis. Advised obtaining a 90-day supply of all daily and as-needed medications. Education provided regarding infection prevention, and signs and symptoms of COVID-19 and when to seek medical attention with patient who verbalized understanding. Discussed exposure protocols and quarantine from [address] you at higher risk for severe illness  and given an opportunity for questions and concerns. The patient agrees to contact the COVID-19 hotline 400-130-3088 or PCP office for questions related to their healthcare. CTN/ACM provided contact information for future reference. From CDC: Are you at higher risk for severe illness?  Wash your hands often.  Avoid close contact (6 feet, which is about two arm lengths) with people who are sick.  Put distance between yourself and other people if COVID-19 is spreading in your community.  Clean and disinfect frequently touched surfaces.  Avoid all cruise travel and non-essential air travel.  Call your healthcare professional if you have concerns about COVID-19 and your underlying condition or if you are sick. For more information on steps you can take to protect yourself, see CDC's How to Protect Yourself      Patient/family/caregiver given information for Nina Aaron and agrees to enroll no    Plan for follow-up call in 7-14 days based on severity of symptoms and risk factors.

## 2020-04-23 ENCOUNTER — TELEPHONE (OUTPATIENT)
Dept: CARDIOLOGY CLINIC | Age: 60
End: 2020-04-23

## 2020-04-23 LAB
ALBUMIN SERPL-MCNC: 3.9 G/DL (ref 3.8–4.9)
ALBUMIN/GLOB SERPL: 1.5 {RATIO} (ref 1.2–2.2)
ALP SERPL-CCNC: 84 IU/L (ref 39–117)
ALT SERPL-CCNC: 25 IU/L (ref 0–44)
AST SERPL-CCNC: 25 IU/L (ref 0–40)
BILIRUB SERPL-MCNC: 0.6 MG/DL (ref 0–1.2)
BUN SERPL-MCNC: 18 MG/DL (ref 8–27)
BUN/CREAT SERPL: 13 (ref 10–24)
CALCIUM SERPL-MCNC: 9.1 MG/DL (ref 8.6–10.2)
CHLORIDE SERPL-SCNC: 95 MMOL/L (ref 96–106)
CO2 SERPL-SCNC: 30 MMOL/L (ref 20–29)
CREAT SERPL-MCNC: 1.39 MG/DL (ref 0.76–1.27)
GLOBULIN SER CALC-MCNC: 2.6 G/DL (ref 1.5–4.5)
GLUCOSE SERPL-MCNC: 95 MG/DL (ref 65–99)
INTERPRETATION: NORMAL
MAGNESIUM SERPL-MCNC: 1.6 MG/DL (ref 1.6–2.3)
POTASSIUM SERPL-SCNC: 4.1 MMOL/L (ref 3.5–5.2)
PROT SERPL-MCNC: 6.5 G/DL (ref 6–8.5)
SODIUM SERPL-SCNC: 141 MMOL/L (ref 134–144)

## 2020-04-23 NOTE — TELEPHONE ENCOUNTER
Verified patient with 2 identifiers. advised labs were good and to continue current medications. Patient verifies understanding. He states he is taking the medication prescribed at the hospital. States they make him sick and won't be able to function. Doesn't feel he needs any of them. Feels he should be on a baby asa as opposed to xarelto. Also states his BP is better off his meds than when taking. He states he will continue meds for now. .. Sabina Boyle ..

## 2020-04-23 NOTE — TELEPHONE ENCOUNTER
----- Message from Fay Weston ANP sent at 4/23/2020 10:14 AM EDT -----  Please let him know that his kidneys look stable - good. His potassium is perfect (better than before). He should continue same medications.

## 2020-04-23 NOTE — TELEPHONE ENCOUNTER
Please let him know that his kidneys look stable - good. His potassium is perfect (better than before). He should continue same medications.

## 2020-04-27 ENCOUNTER — OFFICE VISIT (OUTPATIENT)
Dept: CARDIOLOGY CLINIC | Age: 60
End: 2020-04-27

## 2020-04-27 DIAGNOSIS — I48.91 ATRIAL FIBRILLATION WITH RVR (HCC): ICD-10-CM

## 2020-04-27 DIAGNOSIS — Z95.0 CARDIAC PACEMAKER IN SITU: Primary | ICD-10-CM

## 2020-05-02 ENCOUNTER — PATIENT OUTREACH (OUTPATIENT)
Dept: CARDIOLOGY CLINIC | Age: 60
End: 2020-05-02

## 2020-05-02 NOTE — PROGRESS NOTES
Patient resolved from Transition of Care episode on 5/2/20  Patient/family has been provided the following resources and education related to COVID-19:                         Signs, symptoms and red flags related to COVID-19            CDC exposure and quarantine guidelines            Conduit exposure contact - [phone]            Contact for their local Department of Health                 Patient currently reports that the following symptoms have improved:  no new symptoms. No further outreach scheduled with this CTN/ACM. Episode of Care resolved. Patient has this CTN/ACM contact information if future needs arise.

## 2020-05-13 NOTE — CARDIO/PULMONARY
Cardiac Rehab Note: chart review Pt is a 64yo admitted with CHF, a-fib with RVR. Consult has been acknowledged for cardiac teaching. EF 60%  on 3/30/2020 per echo. Smoking history assessed. Patient is a never smoker. Smoking Cessation Program link has not been added to the AVS. Current inpatient diet: DIET CARDIAC Due to the risk of possible COVID19 exposure pt was not seen at the bedside. Pt was contacted via in-patient room telephone to provide pt education. Educated using teach back method. Instruction given on s/s of CHF, checking weight every am and calling MD if weight is up 2-3 lbs in a day or 5 lbs in a week (or as directed by the physician), fluid/Na restrictions, s/s of worsening CHF and when to call MD. Reviewed activity as tolerated with frequent rest periods as needed, taking medications as prescribed, and the importance of follow up visits with physician. Mr. 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 He elaborated on the cause, treatments, injury and loss to self and family. He has his own diet recommendations related to therapy and treatments he is receiving concurrently. He is agreeable to device insertion, aware that it is a necessity at the present time. 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 Pt allowed to verbalize concerns, attempted to allay his concerns. Shy Blanchard verbalized understanding with questions answered.   Glenn Wilkins RN 
 

n/a

## 2020-05-14 ENCOUNTER — TELEPHONE (OUTPATIENT)
Dept: CARDIOLOGY CLINIC | Age: 60
End: 2020-05-14

## 2020-05-14 DIAGNOSIS — I10 ESSENTIAL HYPERTENSION: Primary | ICD-10-CM

## 2020-05-14 DIAGNOSIS — E78.2 MIXED HYPERLIPIDEMIA: ICD-10-CM

## 2020-05-14 DIAGNOSIS — I50.30 DIASTOLIC CONGESTIVE HEART FAILURE, UNSPECIFIED HF CHRONICITY (HCC): ICD-10-CM

## 2020-05-15 ENCOUNTER — TELEPHONE (OUTPATIENT)
Dept: CARDIOLOGY CLINIC | Age: 60
End: 2020-05-15

## 2020-05-15 RX ORDER — METOPROLOL TARTRATE 25 MG/1
25 TABLET, FILM COATED ORAL EVERY 12 HOURS
Qty: 180 TAB | Refills: 3 | Status: SHIPPED | OUTPATIENT
Start: 2020-05-15 | End: 2021-05-20 | Stop reason: SDUPTHER

## 2020-05-18 NOTE — TELEPHONE ENCOUNTER
Verified patient with two identifiers. Spoke with pt, advising him he will need to get labs done before he is advised to take metolazone. Pt stated he will be in office tomorrow to  lab slip,  Advised him to weigh daily to determine how much weigh has been gained,  Pt was to take 40 mg of lasix bid. Pt stated he has been taking   2 tabs of 40 mg bid and the last 2 days took 3 tabs. Pt has a VV on Thursday 5/21. LUISANA Hernandez LPN   Caller: Unspecified (Today, 11:14 AM)             Pt has AV pat ablation done, so if he has had recurrent A fib, he should not be affected by his. I suspect he likely has fluid overload. Please ask if he has gained any weight in the past week. Also, please confirm if he is still taking Lasix 40 mg BID.  If so and he has had weight gain, then would recommend to resume previous dose of Metolazone 5 mg daily for 1 week. He should follow up with Dr. Leonor Dc in 1-2 weeks.

## 2020-05-18 NOTE — TELEPHONE ENCOUNTER
Verified patient with two identifiers. Spoke with pt he thinks he is back in a-fib. He is having a lot of SOB just standing from a sitting position. No BP monitor. Has a VV on 5/21. Do you want to change anything?

## 2020-05-19 RX ORDER — METOLAZONE 5 MG/1
5 TABLET ORAL DAILY
Qty: 30 TAB | Refills: 1 | Status: SHIPPED | OUTPATIENT
Start: 2020-05-19 | End: 2020-05-27 | Stop reason: SDUPTHER

## 2020-05-19 RX ORDER — POTASSIUM CHLORIDE 20 MEQ/1
40 TABLET, EXTENDED RELEASE ORAL 2 TIMES DAILY
Qty: 60 TAB | Refills: 1 | Status: SHIPPED | OUTPATIENT
Start: 2020-05-19 | End: 2020-06-29

## 2020-05-19 RX ORDER — FUROSEMIDE 40 MG/1
TABLET ORAL
Qty: 60 TAB | Refills: 1 | Status: SHIPPED | OUTPATIENT
Start: 2020-05-19 | End: 2020-09-08

## 2020-05-19 NOTE — TELEPHONE ENCOUNTER
Patient presented to the office today, requesting clarification of his medications and also to  lab slip for labs requested. Patient reports he is very short of breath, but does not appear dyspneic with conversation. Reports increased swelling of his hands and legs, not examined by this practitioner. Patient advised to obtain labs today, and to resume taking Metolazone 5 mg daily x 1 week. He will reduce his Lasix dose to 40 mg twice daily x 1 week. Patient will continue to take potassium chloride tablets 2 tablets twice a day. Will have him f/u with Dr. Blanca Minaya in the office next week to reassess edema, symptoms. Appt made for 5/27 at 9AM.    Patient given lab slip and states he will obtain labwork today. Patient verbalized understanding.     Ricardo Disla NP  5/19/2020  9:41 AM

## 2020-05-21 DIAGNOSIS — I50.30 DIASTOLIC CONGESTIVE HEART FAILURE, UNSPECIFIED HF CHRONICITY (HCC): ICD-10-CM

## 2020-05-21 DIAGNOSIS — E78.2 MIXED HYPERLIPIDEMIA: ICD-10-CM

## 2020-05-21 DIAGNOSIS — I50.33 ACUTE ON CHRONIC DIASTOLIC HF (HEART FAILURE) (HCC): Primary | ICD-10-CM

## 2020-05-21 DIAGNOSIS — I10 ESSENTIAL HYPERTENSION: ICD-10-CM

## 2020-05-21 LAB
BUN SERPL-MCNC: 22 MG/DL (ref 8–27)
BUN/CREAT SERPL: 17 (ref 10–24)
CALCIUM SERPL-MCNC: 8.7 MG/DL (ref 8.6–10.2)
CHLORIDE SERPL-SCNC: 102 MMOL/L (ref 96–106)
CO2 SERPL-SCNC: 24 MMOL/L (ref 20–29)
CREAT SERPL-MCNC: 1.31 MG/DL (ref 0.76–1.27)
GLUCOSE SERPL-MCNC: 107 MG/DL (ref 65–99)
INTERPRETATION: NORMAL
POTASSIUM SERPL-SCNC: 3.7 MMOL/L (ref 3.5–5.2)
SODIUM SERPL-SCNC: 143 MMOL/L (ref 134–144)

## 2020-05-21 NOTE — PROGRESS NOTES
Verified patient with two identifiers. Spoke with patient regarding LAB results. Pt will  lab slip on Tuesday.

## 2020-05-21 NOTE — PROGRESS NOTES
Minoo Astorga,    Please call the patient and inform that his labwork shows kidney function is back to baseline. His potassium level is also back to normal.  Since he got this labwork, he has started taking metolazone and has continued with Lasix. He should continue to take those medications as I have instructed. He should also continue to take his potassium supplement. I am ordering another set of labs for him to get done 1-2 days before his follow-up appt with Dr. Danielle Martínez on Wednesday, 5/27. Please make sure these are done. He does not need to come to the office to  lab slips. The orders will be faxed to [facility], he just needs to go to [facility] to get these done.     Thanks,  [employer]

## 2020-05-25 DIAGNOSIS — I50.33 ACUTE ON CHRONIC DIASTOLIC HF (HEART FAILURE) (HCC): ICD-10-CM

## 2020-05-26 NOTE — PROGRESS NOTES
1. Have you been to the ER, urgent care clinic since your last visit? Hospitalized since your last visit? NO    2. Have you seen or consulted any other health care providers outside of the [address] [place] [address] since your last visit? Include any pap smears or colon screening. NO    C/O SOB.

## 2020-05-27 ENCOUNTER — OFFICE VISIT (OUTPATIENT)
Dept: CARDIOLOGY CLINIC | Age: 60
End: 2020-05-27

## 2020-05-27 VITALS
HEIGHT: 74 IN | DIASTOLIC BLOOD PRESSURE: 70 MMHG | HEART RATE: 76 BPM | WEIGHT: 286.5 LBS | SYSTOLIC BLOOD PRESSURE: 128 MMHG | BODY MASS INDEX: 36.77 KG/M2 | OXYGEN SATURATION: 96 % | RESPIRATION RATE: 18 BRPM

## 2020-05-27 DIAGNOSIS — I50.32 CHRONIC HEART FAILURE WITH PRESERVED EJECTION FRACTION (HCC): Primary | ICD-10-CM

## 2020-05-27 DIAGNOSIS — I83.893 VARICOSE VEINS OF BILATERAL LOWER EXTREMITIES WITH OTHER COMPLICATIONS: ICD-10-CM

## 2020-05-27 DIAGNOSIS — I10 ESSENTIAL HYPERTENSION: Chronic | ICD-10-CM

## 2020-05-27 DIAGNOSIS — Z95.0 PACEMAKER: ICD-10-CM

## 2020-05-27 DIAGNOSIS — Z98.890 S/P AV (ATRIOVENTRICULAR) NODAL ABLATION: ICD-10-CM

## 2020-05-27 DIAGNOSIS — I48.21 PERMANENT ATRIAL FIBRILLATION (HCC): ICD-10-CM

## 2020-05-27 PROBLEM — N17.9 AKI (ACUTE KIDNEY INJURY) (HCC): Status: RESOLVED | Noted: 2019-11-04 | Resolved: 2020-05-27

## 2020-05-27 PROBLEM — I48.91 ATRIAL FIBRILLATION WITH RVR (HCC): Status: RESOLVED | Noted: 2019-11-04 | Resolved: 2020-05-27

## 2020-05-27 PROBLEM — I48.91 ATRIAL FIBRILLATION WITH RAPID VENTRICULAR RESPONSE (HCC): Status: RESOLVED | Noted: 2019-11-05 | Resolved: 2020-05-27

## 2020-05-27 PROBLEM — I21.4 NSTEMI (NON-ST ELEVATED MYOCARDIAL INFARCTION) (HCC): Status: RESOLVED | Noted: 2020-04-12 | Resolved: 2020-05-27

## 2020-05-27 LAB
BUN SERPL-MCNC: 31 MG/DL (ref 8–27)
BUN/CREAT SERPL: 22 (ref 10–24)
CALCIUM SERPL-MCNC: 10.2 MG/DL (ref 8.6–10.2)
CHLORIDE SERPL-SCNC: 96 MMOL/L (ref 96–106)
CO2 SERPL-SCNC: 24 MMOL/L (ref 20–29)
CREAT SERPL-MCNC: 1.4 MG/DL (ref 0.76–1.27)
GLUCOSE SERPL-MCNC: 107 MG/DL (ref 65–99)
INTERPRETATION: NORMAL
MAGNESIUM SERPL-MCNC: 1.9 MG/DL (ref 1.6–2.3)
POTASSIUM SERPL-SCNC: 4.4 MMOL/L (ref 3.5–5.2)
SODIUM SERPL-SCNC: 141 MMOL/L (ref 134–144)

## 2020-05-27 RX ORDER — METOLAZONE 5 MG/1
5 TABLET ORAL
Qty: 45 TAB | Refills: 2 | Status: SHIPPED | OUTPATIENT
Start: 2020-05-27 | End: 2021-05-28 | Stop reason: SDUPTHER

## 2020-05-27 NOTE — PROGRESS NOTES
Leyda Francis, GRISELDA-BC    Subjective/HPI:     Aaron Carballo is a 60 y.o. male is here for routine f/u. He has a PMHx of PAF s/p AV pat ablation with PPM, HTN, obesity, ALYSSA on CPAP and galodinium poisoning. He reported complaints of increased shortness of breath and upper extremity swelling, felt he was back in A fib. His metolazone was resumed since. PCP Provider  Ar Montenegro MD    Past Medical History:   Diagnosis Date    A-fib [facility])     Allergic reaction to contrast material     galodinium    Hypertension     Irregular heart rhythm     Mercury poisoning     Morbid obesity ([facility] [address] 11/4/2019    ALYSSA on CPAP     ALYSSA on CPAP 11/4/2019    Post concussion syndrome         Allergies   Allergen Reactions    Ace Inhibitors Cough    Gadolinium-Containing Contrast Media Rash    Iodinated Contrast Media Rash     No contrast injection dye        Outpatient Encounter Medications as of 5/27/2020   Medication Sig Dispense Refill    furosemide (LASIX) 40 mg tablet take 1 tablet by mouth twice a day 60 Tab 1    potassium chloride (K-DUR, KLOR-CON) 20 mEq tablet Take 2 Tabs by mouth two (2) times a day. 60 Tab 1    metOLazone (ZAROXOLYN) 5 mg tablet Take 1 Tab by mouth daily. 30 Tab 1    metoprolol tartrate (LOPRESSOR) 25 mg tablet Take 1 Tab by mouth every twelve (12) hours. 180 Tab 3    apixaban (Eliquis) 5 mg tablet Take 5 mg by mouth two (2) times a day.  pantoprazole (PROTONIX) 40 mg tablet Take 1 Tab by mouth Daily (before breakfast). 30 Tab 0    testosterone cypionate (Depo-Testosterone) 100 mg/mL injection 200 mg by IntraMUSCular route Once every 2 weeks.  anastrozole (ARIMIDEX) 1 mg tablet Take 1 mg by mouth every seven (7) days. 0    desloratadine (CLARINEX) 5 mg tablet Take 5 mg by mouth daily.  montelukast (SINGULAIR) 10 mg tablet Take 10 mg by mouth every evening.       dextroamphetamine-amphetamine (ADDERALL) 30 mg tablet Take 30 mg by mouth two (2) times a day.  [DISCONTINUED] guaiFENesin (ROBITUSSIN) 100 mg/5 mL liquid Take 5 mL by mouth every four (4) hours as needed for Cough. 5 Bottle 0    [DISCONTINUED] naltrexone HCl (NALTREXONE PO) Take  by mouth daily. Dose unknown prescribed Dr. Logan Case [DISCONTINUED] HCG 8000IU/4 ML 2,000 Units by SubCUTAneous route. HCG   Benzyl Alcohol   Sodium phosphate   Sterile water   (may use HCl or NaOH  to make adjustments to the [facility])       No facility-administered encounter medications on file as of 5/27/2020. Review of Symptoms:    Review of Systems   Constitutional: Positive for malaise/fatigue. Negative for chills, fever and weight loss. HENT: Negative for nosebleeds. Eyes: Negative for blurred vision and double vision. Respiratory: Positive for shortness of breath. Negative for cough and wheezing. Cardiovascular: Negative for chest pain, palpitations, orthopnea, leg swelling and PND. Gastrointestinal: Negative for abdominal pain, blood in stool, diarrhea, nausea and vomiting. Musculoskeletal: Negative for joint pain. Skin: Negative for rash. Neurological: Negative for dizziness, tingling and loss of consciousness. Endo/Heme/Allergies: Does not bruise/bleed easily. Physical Exam:      General: Well developed, in no acute distress, cooperative and alert  HEENT: No carotid bruits, no JVD, trach is midline. Neck Supple, PEERL, EOM intact. Heart:  reg rate and rhythm; normal S1/S2; no murmurs, no gallops or rubs. Respiratory: Clear bilaterally x 4, no wheezing or rales  Abdomen:   Soft, non-tender, no distention, no masses. + BS. Extremities:  Normal cap refill, no cyanosis, atraumatic. No edema. Neuro: A&Ox3, speech clear, gait stable. Skin: Skin color is normal. No rashes or lesions.  Non diaphoretic  Vascular: 2+ pulses symmetric in all extremities    Visit Vitals  /70 (BP 1 Location: Right arm, BP Patient Position: Sitting)   Pulse 76   Resp 18   Ht 6' 2\" (1.88 m)   Wt 286 lb 8 oz (130 kg)   SpO2 96%   BMI 36.78 kg/m²       ECG: V-paced rhythm    Cardiology Labs:    Lab Results   Component Value Date/Time    Cholesterol, total 167 11/05/2019 04:41 AM    HDL Cholesterol 38 11/05/2019 04:41 AM    LDL, calculated 113.6 (H) 11/05/2019 04:41 AM    VLDL, calculated 15.4 11/05/2019 04:41 AM    CHOL/HDL Ratio 4.4 11/05/2019 04:41 AM       Lab Results   Component Value Date/Time    Hemoglobin A1c 6.1 (H) 04/02/2020 03:36 AM       Lab Results   Component Value Date/Time    Sodium 141 05/26/2020 08:54 AM    Potassium 4.4 05/26/2020 08:54 AM    Chloride 96 05/26/2020 08:54 AM    CO2 24 05/26/2020 08:54 AM    Glucose 107 (H) 05/26/2020 08:54 AM    BUN 31 (H) 05/26/2020 08:54 AM    Creatinine 1.40 (H) 05/26/2020 08:54 AM    BUN/Creatinine ratio 22 05/26/2020 08:54 AM    GFR est AA 63 05/26/2020 08:54 AM    GFR est non-AA 54 (L) 05/26/2020 08:54 AM    Calcium 10.2 05/26/2020 08:54 AM    Anion gap 6 04/17/2020 05:15 AM    Bilirubin, total 0.6 04/22/2020 10:32 AM    ALT (SGPT) 25 04/22/2020 10:32 AM    Alk. phosphatase 84 04/22/2020 10:32 AM    Protein, total 6.5 04/22/2020 10:32 AM    Albumin 3.9 04/22/2020 10:32 AM    Globulin 4.0 04/11/2020 11:08 PM    A-G Ratio 1.5 04/22/2020 10:32 AM          Assessment:       ICD-10-CM ICD-9-CM    1. Chronic heart failure with preserved ejection fraction (HCC) I50.32 428.9 AMB POC EKG ROUTINE W/ 12 LEADS, INTER & REP   2. Permanent atrial fibrillation (HCC) I48.21 427.31 AMB POC EKG ROUTINE W/ 12 LEADS, INTER & REP   3. Essential hypertension I10 401.9 AMB POC EKG ROUTINE W/ 12 LEADS, INTER & REP   4. Pacemaker Z95.0 V45.01 AMB POC EKG ROUTINE W/ 12 LEADS, INTER & REP   5. S/P AV (atrioventricular) pat ablation Z98.890 V45.89 AMB POC EKG ROUTINE W/ 12 LEADS, INTER & REP        Plan:     1. Chronic heart failure with preserved ejection fraction (HCC)  BMP done yesterday with stable renal function.   Will reduce Metolazone to 5 mg MWF, continue Lasix 40 mg BID. Discussed importance of fluid restrictions, sodium restriction. Recommend daily weights and can increase Metolazone to daily use if weight gain > 3 lbs/day or > 6 lbs/week. Dry weight at home 290 lbs. Will repeat echocardiogram since RV-lead PPM placement. 2. Permanent atrial fibrillation (HCC)  S/p AV pat ablation with PPM  Continue metoprolol and Eliquis    3. Essential hypertension  BP controlled. Continue anti-hypertensive therapy and low sodium diet    4. Pacemaker  Continue with routine device interrogation with Dr. Ashanti Pond    5. S/P AV (atrioventricular) pat ablation  S/p AV pat ablation in 4/2020 with PPM placement    6. Varicose veins  Will evaluate with venous duplex    Follow-up with Dr. Colton Courtney in 6 months    Will Hart NP       [facility]    5/27/2020         Patient seen, examined by me personally. Plan discussed as detailed. Agree with note as outlined by  NP. I confirm findings in history and physical exam. No additional findings noted. Agree with plan as outlined above. He is looking into disability due to his heavy metal poisoning. Advised to discuss with pcp. May benefit from endocrinology consult for his hormonal issues .     Vaughn Ovalles MD

## 2020-05-27 NOTE — LETTER
5/27/20 Patient: Keena Olsen YOB: 1960 Date of Visit: 5/27/2020 Robyn Tobar MD 
[address] [address] VIA Facsimile: [phone] Dear Robyn Tobar MD, Thank you for referring Mr. Simon Peralta to [address] [place] [address] for evaluation. My notes for this consultation are attached. If you have questions, please do not hesitate to call me. I look forward to following your patient along with you. Sincerely, Devora Greco MD

## 2020-06-10 LAB
LEFT CFV RFX: 1.2 S
LEFT GSV AT KNEE DIAM: 0.43 CM
LEFT GSV AT KNEE RFX: 2916 S
LEFT GSV BK MID DIAM: 0.28 CM
LEFT GSV BK MID RFX: 3005 S
LEFT GSV JUNC DIAM: 0.79 CM
LEFT GSV JUNC RFX: 2539 S
LEFT GSV THIGH PROX DIAM: 0.55 CM
LEFT GSV THIGH PROX RFX: 3094 S
LEFT SSV PROX DIAM: 0.19 CM
LEFT SSV PROX RFX: 0 S
RIGHT GSV AK RFX: 820 S
RIGHT GSV AT KNEE DIAM: 0.28 CM
RIGHT GSV BK MID DIAM: 0.43 CM
RIGHT GSV BK MID RFX: 2950 S
RIGHT GSV JUNC DIAM: 0.8 CM
RIGHT GSV JUNC RFX: 1097 S
RIGHT GSV THIGH PROX DIAM: 0.65 CM
RIGHT GSV THIGH PROX RFX: 2229 S
RIGHT SSV PROX DIAM: 0.35 CM
RIGHT SSV PROX RFX: 1131 S

## 2020-06-11 NOTE — PROGRESS NOTES
Fab Quiñonez,    Please call the patient and inform that venous ultrasound shows leaky veins in both legs. If he would like, he can make an appt to discuss further with Dr. Krish Coleman.     Thanks,  [employer]

## 2020-06-12 ENCOUNTER — TELEPHONE (OUTPATIENT)
Dept: CARDIOLOGY CLINIC | Age: 60
End: 2020-06-12

## 2020-06-12 ENCOUNTER — DOCUMENTATION ONLY (OUTPATIENT)
Dept: CARDIOLOGY CLINIC | Age: 60
End: 2020-06-12

## 2020-06-12 DIAGNOSIS — I48.21 PERMANENT ATRIAL FIBRILLATION (HCC): ICD-10-CM

## 2020-06-12 DIAGNOSIS — I50.30 DIASTOLIC CONGESTIVE HEART FAILURE, UNSPECIFIED HF CHRONICITY (HCC): ICD-10-CM

## 2020-06-12 DIAGNOSIS — I50.32 CHRONIC HEART FAILURE WITH PRESERVED EJECTION FRACTION (HCC): Primary | ICD-10-CM

## 2020-06-12 RX ORDER — SACUBITRIL AND VALSARTAN 24; 26 MG/1; MG/1
1 TABLET, FILM COATED ORAL 2 TIMES DAILY
COMMUNITY
End: 2020-06-12 | Stop reason: SDUPTHER

## 2020-06-12 RX ORDER — SACUBITRIL AND VALSARTAN 24; 26 MG/1; MG/1
1 TABLET, FILM COATED ORAL 2 TIMES DAILY
Qty: 180 TAB | Refills: 3 | Status: SHIPPED | OUTPATIENT
Start: 2020-06-12 | End: 2020-06-24 | Stop reason: DRUGHIGH

## 2020-06-12 NOTE — TELEPHONE ENCOUNTER
Patient walked into the office, rakan. He stated he has no idea what is going on with his disability and is not sure why we are not putting him on disability when he is symptomatic and cannot work. He has been told he can go back to work by his PCP, but says he doesn't feel he can work at this time. He is also concerned about his medical status once his insurance runs out, because he will not be able to afford his medications or co-pays, as his disability claim has not yet been approved. Patient has new onset cardiomyopathy, per echo on 6/10/2020 with EF 35-40%. Patient continues to complain of shortness of breath symptoms. Patient was informed that his ejection fraction was newly reduced and we would like for him to start [facility] for further management. Patient was recommended to follow-up in the office in 4 weeks to assess response to medication changes. Patient told me today that he doesn't like when we call him over the telephone because \"the staff just hangs up on me\" and that he cannot understand the information accurately due to his ADD. Advised patient that we would be happy for him to make an appointment to discuss all his test results in the future, so we can limit telephone calls for him. However, patient stated that making frequent appointments with us is a financial strain for him as he lives 55 miles away from the office, so making frequent trips into the town for our appointments is not desirable. Advised patient that we can try and work with him, but discussing test result options can be done either via phone or by appointments made in the office. I told the patient that he cannot walk into the office at his convenience and request to speak with a provider. Patient ultimately agreed to have all correspondence done by office visits only. He is agreeable to start medication as prescribed, will follow-up with us in the office in 4 weeks and obtain labwork a few days prior to. Patient was given co-pay card and 1 month free card for [facility], along with orders for labwork. Advised patient that we can determine his eligibility for disability once we repeat his echocardiogram after an appropriate time of medical therapy.

## 2020-06-16 NOTE — PROGRESS NOTES
Received fax from [facility]/[employer], Entresto 24/26 mg tab has been approved through 6/12/2021

## 2020-06-19 LAB
BUN SERPL-MCNC: 26 MG/DL (ref 8–27)
BUN/CREAT SERPL: 20 (ref 10–24)
CALCIUM SERPL-MCNC: 9.8 MG/DL (ref 8.6–10.2)
CHLORIDE SERPL-SCNC: 97 MMOL/L (ref 96–106)
CO2 SERPL-SCNC: 25 MMOL/L (ref 20–29)
CREAT SERPL-MCNC: 1.31 MG/DL (ref 0.76–1.27)
GLUCOSE SERPL-MCNC: 97 MG/DL (ref 65–99)
INTERPRETATION: NORMAL
POTASSIUM SERPL-SCNC: 4.3 MMOL/L (ref 3.5–5.2)
SODIUM SERPL-SCNC: 141 MMOL/L (ref 134–144)

## 2020-06-19 NOTE — PROGRESS NOTES
Colleen Westfall,    Please call patient and inform that his kidney function remains stable. No changes to his meds. Will discuss further at his f/u appt with Dr. Errol Hamman.     Thanks,  [employer]

## 2020-06-23 ENCOUNTER — OFFICE VISIT (OUTPATIENT)
Dept: CARDIOLOGY CLINIC | Age: 60
End: 2020-06-23

## 2020-06-23 VITALS
SYSTOLIC BLOOD PRESSURE: 132 MMHG | DIASTOLIC BLOOD PRESSURE: 82 MMHG | OXYGEN SATURATION: 98 % | HEART RATE: 77 BPM | HEIGHT: 74 IN | WEIGHT: 303.2 LBS | BODY MASS INDEX: 38.91 KG/M2 | RESPIRATION RATE: 16 BRPM

## 2020-06-23 DIAGNOSIS — I10 ESSENTIAL HYPERTENSION: Chronic | ICD-10-CM

## 2020-06-23 DIAGNOSIS — I48.21 PERMANENT ATRIAL FIBRILLATION (HCC): ICD-10-CM

## 2020-06-23 DIAGNOSIS — E78.2 MIXED HYPERLIPIDEMIA: ICD-10-CM

## 2020-06-23 DIAGNOSIS — I50.32 CHRONIC HEART FAILURE WITH PRESERVED EJECTION FRACTION (HCC): ICD-10-CM

## 2020-06-23 DIAGNOSIS — I73.9 PERIPHERAL VASCULAR DISEASE (HCC): ICD-10-CM

## 2020-06-23 DIAGNOSIS — I83.893 VARICOSE VEINS OF BILATERAL LOWER EXTREMITIES WITH OTHER COMPLICATIONS: Primary | ICD-10-CM

## 2020-06-23 DIAGNOSIS — E66.01 MORBID OBESITY (HCC): Chronic | ICD-10-CM

## 2020-06-23 NOTE — PROGRESS NOTES
Chief Complaint   Patient presents with    Results     LE venous reflux study     1. Have you been to the ER, urgent care clinic since your last visit? Hospitalized since your last visit? No    2. Have you seen or consulted any other health care providers outside of the [address] [place] [address] since your last visit? Include any pap smears or colon screening.  Yes PCP and [facility]

## 2020-06-23 NOTE — PROGRESS NOTES
6/23/2020 1:18 PM      Subjective:     Sweetie Evans is a 59 YO M with pmhx HTN HLD HFrEF AF who presents to vascular clinic to discuss venous reflux study. He reports bilateral leg swelling, skin color changes, itching, varicosities, leg cramps and restless leg syndrome. He also endorses unchanged sob, feels better after taking diuretic therapy. He  denies chest pain, chest pressure/discomfort, palpitations, irregular heart beats, near-syncope, syncope, fatigue, orthopnea, paroxysmal nocturnal dyspnea, exertional chest pressure/discomfort, claudication, tachypnea, dizziness. 1.  Have you ever had vein stripping surgery NO       2. Have you ever had vein injections? NO        3. Have you ever had a blood clot? NO       4. Have you ever had phlebitis? NO                                                                        Does anyone in your family have (or used to have) varicose veins, spider veins, leg ulcers or swollen legs? Father  NO  Mother NO  Brother(s) NO  Sister(s) NO  Other NO           1. Do you experience any of the following in your legs? Aching/pain? [] One leg [] Both legs  Heaviness? [] One leg [x] Both legs  Tiredness/fatigue? [] One leg [] Both legs  Itching/burning? [] One leg [x] Both legs  Swollen ankles? [] One leg [x] Both legs  Leg cramps? [] One leg [x] Both legs  Restless legs? [] One leg [x] Both legs  Throbbing? [] One leg [] Both legs  Other? 2.  Have your veins gotten worse in recent months? YES        3. Do you take any medication for pain (i.e., Advil, Motrin)  NO           4. Do you elevate your legs to relieve discomfort? YES        5. Do you exercise? NO        6. Do you wear prescription compression stockings? NO           7. Do you wear light support hose (i.e., [employer])? NO                    8.    Do you have any problem walking?    NO                                 9. What type of work do you do? , has been out of work since november      10. Have you ever had any test(s) done on your veins? YES          11. Were you diagnosed with saphenous vein reflux? No         Visit Vitals  /90 (BP 1 Location: Left arm, BP Patient Position: Sitting)   Pulse 77   Resp 16   Ht 6' 2\" (1.88 m)   Wt 303 lb 3.2 oz (137.5 kg)   SpO2 98%   BMI 38.93 kg/m²       Current Outpatient Medications   Medication Sig    sacubitriL-valsartan (Entresto) 24-26 mg tablet Take 1 Tab by mouth two (2) times a day.  metOLazone (ZAROXOLYN) 5 mg tablet Take 1 Tab by mouth every Monday, Wednesday, Friday. (Patient taking differently: Take 5 mg by mouth every other day.)    furosemide (LASIX) 40 mg tablet take 1 tablet by mouth twice a day    potassium chloride (K-DUR, KLOR-CON) 20 mEq tablet Take 2 Tabs by mouth two (2) times a day.  metoprolol tartrate (LOPRESSOR) 25 mg tablet Take 1 Tab by mouth every twelve (12) hours.  apixaban (Eliquis) 5 mg tablet Take 5 mg by mouth two (2) times a day.  testosterone cypionate (Depo-Testosterone) 100 mg/mL injection 200 mg by IntraMUSCular route Once every 2 weeks.  anastrozole (ARIMIDEX) 1 mg tablet Take 1 mg by mouth every seven (7) days.  desloratadine (CLARINEX) 5 mg tablet Take 5 mg by mouth daily.  montelukast (SINGULAIR) 10 mg tablet Take 10 mg by mouth every evening.  dextroamphetamine-amphetamine (ADDERALL) 30 mg tablet Take 30 mg by mouth two (2) times a day.  pantoprazole (PROTONIX) 40 mg tablet Take 1 Tab by mouth Daily (before breakfast). (Patient not taking: Reported on 6/23/2020)     No current facility-administered medications for this visit.           Objective:      Visit Vitals  /90 (BP 1 Location: Left arm, BP Patient Position: Sitting)   Pulse 77   Resp 16   Ht 6' 2\" (1.88 m)   Wt 303 lb 3.2 oz (137.5 kg)   SpO2 98%   BMI 38.93 kg/m²       Data Review:         Past Medical History:   Diagnosis Date    A-fib [facility])     Allergic reaction to contrast material     galodinium    Hypertension     Irregular heart rhythm     Mercury poisoning     Morbid obesity ([address] 11/4/2019    ALYSSA on CPAP     ALYSSA on CPAP 11/4/2019    Post concussion syndrome       Past Surgical History:   Procedure Laterality Date    HX SHOULDER ARTHROSCOPY      HX UROLOGICAL Left     hydrocoele resection    MD ICAR CATHETER ABLATION ATRIOVENTR NODE FUNCTION N/A 4/3/2020    ABLATION AV NODE performed by Leticia Silva MD at [facility] CARDIAC CATH LAB    MD INS NEW/RPLC PRM PACEMAKER W/TRANSV ELTRD VENTR N/A 4/3/2020    INSERT PPM SINGLE VENTRICULAR performed by Leticia Silva MD at [facility] CARDIAC CATH LAB     Allergies   Allergen Reactions    Ace Inhibitors Cough    Gadolinium-Containing Contrast Media Rash    Iodinated Contrast Media Rash     No contrast injection dye      Family History   Problem Relation Age of Onset    Colon Cancer Mother     Hypertension Father     Other Father         multiple birth defects    Hypertension Paternal Grandfather       Social History     Socioeconomic History    Marital status:      Spouse name: Not on file    Number of children: Not on file    Years of education: Not on file    Highest education level: Not on file   Occupational History    Occupation:    Social Needs    Financial resource strain: Not on file    Food insecurity     Worry: Not on file     Inability: Not on file    Transportation needs     Medical: Not on file     Non-medical: Not on file   Tobacco Use    Smoking status: Never Smoker    Smokeless tobacco: Never Used   Substance and Sexual Activity    Alcohol use: Not Currently    Drug use: Never    Sexual activity: Not on file   Lifestyle    Physical activity     Days per week: Not on file     Minutes per session: Not on file    Stress: Not on file   Relationships    Social connections     Talks on phone: Not on file     Gets together: Not on file     Attends Orthodoxy service: Not on file     Active member of club or organization: Not on file     Attends meetings of clubs or organizations: Not on file     Relationship status: Not on file    Intimate partner violence     Fear of current or ex partner: Not on file     Emotionally abused: Not on file     Physically abused: Not on file     Forced sexual activity: Not on file   Other Topics Concern    Not on file   Social History Narrative    Not on file       Review of Systems     General: Not Present- Anorexia, Chills, Dietary Changes, Fatigue, Fever, Medication Changes, Night Sweats, Weight Gain > 10lbs. and Weight Loss > 10lbs. .  Skin: Not Present- Bruising and Excessive Sweating. HEENT: Not Present- Headache, Visual Loss and Vertigo. Respiratory: Not Present- Cough, Decreased Exercise Tolerance, Difficulty Breathing, Snoring and Wheezing. Cardiovascular: Not Present- Abnormal Blood Pressure, Chest Pain, Fainting / Blacking Out, Orthopnea, Palpitations, Paroxysmal Nocturnal Dyspnea, Rapid Heart Rate, . Gastrointestinal: Not Present- Black, Tarry Stool, Bloody Stool, Diarrhea, Hematemesis, Rectal Bleeding and Vomiting. Musculoskeletal: Not Present- Muscle Pain and Muscle Weakness. Neurological: Not Present- Dizziness. Psychiatric: Not Present- Depression. Endocrine: Not Present- Cold Intolerance, Heat Intolerance and Thyroid Problems. Hematology: Not Present- Abnormal Bleeding, Anemia, Blood Clots and Easy Bruising. Physical Exam   The physical exam findings are as follows:       General   Mental Status - Alert. General Appearance - Not in acute distress. Chest and Lung Exam   Inspection: Accessory muscles - No use of accessory muscles in breathing. Auscultation:   Breath sounds: - Normal.      Cardiovascular   Inspection: Jugular vein - Bilateral - Inspection Normal.  Palpation/Percussion:   Apical Impulse: - Normal.  Auscultation: Rhythm - Regular.  Heart Sounds - S1 WNL and S2 WNL. No S3 or S4. Murmurs & Other Heart Sounds: Auscultation of the heart reveals - No Murmurs. Carotid arteries - No Carotid bruit. Abdomen   Palpation/Percussion: Palpation and Percussion of the abdomen reveal - No Palpable abdominal masses. Auscultation: Auscultation of the abdomen reveals - Bowel sounds normal.      Neurologic   Mental Status: Affect - normal.  Motor: - Normal. Gait - Normal.      Peripheral Vascular   Upper Extremity: Inspection - Bilateral - No Cyanotic nailbeds or Digital clubbing. Lower Extremity:   Palpation:   Femoral pulse - Rt.  [] normal  [] weak  [] non palpable     Lt.   [] normal  [] weak  [] non palpable                             Popliteal pulse - Rt.  [] normal  [] weak  [] non palpable     Lt.   [] normal  [] weak  [] non palpable       Dorsalis pedis pulse - Rt.  [] normal  [x] weak  [] non palpable     Lt.   [] normal  [x] weak  [] non palpable    Posterior tibia pulse - Rt.  [] normal  [x] weak  [] non palpable     Lt.   [] normal  [x] weak  [] non palpable                                             Edema:       Rt. Leg  [x]             Lt. Leg  [x]  Telangiectasia & spider veins: Rt. Leg  []             Lt. Leg  []  Varicose veins:   Rt. Leg  [x]             Lt. Leg  [x]   Skin pigmentation:   Rt. Leg  [x]             Lt. Leg  [x]   Healed venous ulcer:   Rt. Leg  []             Lt. Leg  []                                            Assessment:       ICD-10-CM ICD-9-CM    1. Varicose veins of bilateral lower extremities with other complications Y64.342 356.7    2. Chronic heart failure with preserved ejection fraction (HCC) I50.32 428.9    3. Permanent atrial fibrillation (HCC) I48.21 427.31    4. Mixed hyperlipidemia E78.2 272.2    5. Peripheral vascular disease ([facility]) I73.9 443.9        Plan: This is a 59 YO M with pmhx HTN HLD HFrEF AF who presents to vascular clinic to discuss venous reflux study.   He reports bilateral leg swelling, skin color changes, itching, varicosities, leg cramps and restless leg syndrome. He also endorses unchanged sob, feels better after taking diuretic therapy    1. Venous insufficiency: LE dopplers 6/2020. Continue daily leg elevation, mild exercise and wt reduction. Recommend compression stockings 20-30 mmHg x 3 mos  He is on Furosemide and Metolazone for HFrEF. 2. RLE arterial dopplers 4/2020.  3. HTN: Controlled with current therapy  4. HLD: 11/19 LDL at 113.   5. PAF s/p AVN ablation/PPM; new onset cardiomyopathy, per echo on 6/10/2020 with EF 35-40%. : Other cardiac care per Dr Prudencio Khan  6. ALYSSA: On cpap therapy    Continue current care and f/u in  3 mos    Eyad Sultana NP  6/23/2020      Patient seen and examined by me with nurse practitioner in vascular clinic. I personally performed all components of the history, physical, and medical decision making and agree with the assessment and plan as noted. CEAP 4 b class. LE LINDSEY noted. Compression stocking for 3 months, along with leg elevation, exercise and wt loss. Management of HF per Dr. Prudencio Khan.      Jaimie Spencer MD

## 2020-06-24 ENCOUNTER — OFFICE VISIT (OUTPATIENT)
Dept: CARDIOLOGY CLINIC | Age: 60
End: 2020-06-24

## 2020-06-24 VITALS
OXYGEN SATURATION: 100 % | HEART RATE: 83 BPM | RESPIRATION RATE: 18 BRPM | WEIGHT: 301 LBS | HEIGHT: 74 IN | DIASTOLIC BLOOD PRESSURE: 80 MMHG | BODY MASS INDEX: 38.63 KG/M2 | SYSTOLIC BLOOD PRESSURE: 144 MMHG

## 2020-06-24 DIAGNOSIS — Z98.890 S/P AV (ATRIOVENTRICULAR) NODAL ABLATION: ICD-10-CM

## 2020-06-24 DIAGNOSIS — I10 ESSENTIAL HYPERTENSION: Chronic | ICD-10-CM

## 2020-06-24 DIAGNOSIS — I50.41 ACUTE COMBINED SYSTOLIC AND DIASTOLIC CONGESTIVE HEART FAILURE (HCC): Primary | ICD-10-CM

## 2020-06-24 DIAGNOSIS — Z95.0 PACEMAKER: ICD-10-CM

## 2020-06-24 DIAGNOSIS — I48.21 PERMANENT ATRIAL FIBRILLATION (HCC): ICD-10-CM

## 2020-06-24 RX ORDER — SACUBITRIL AND VALSARTAN 49; 51 MG/1; MG/1
1 TABLET, FILM COATED ORAL 2 TIMES DAILY
Qty: 60 TAB | Refills: 1 | Status: SHIPPED | OUTPATIENT
Start: 2020-06-24 | End: 2020-08-31

## 2020-06-24 NOTE — PROGRESS NOTES
Identified pt with two pt identifiers(name and ). Reviewed record in preparation for visit and have obtained necessary documentation. Jesús Foster is a 60 y.o. male here today for:   Chief Complaint   Patient presents with    Hypertension     f/u    Breathing Problem     pt still c/o dyspnea on exertion    Other     return to work clearance/disability paperwork       Visit Vitals  /80 (BP 1 Location: Left arm, BP Patient Position: Sitting)   Pulse 83   Resp 18   Ht 6' 2\" (1.88 m)   Wt 301 lb (136.5 kg)   SpO2 100%   BMI 38.65 kg/m²       Pain Scale: /10    Health Maintenance Review: Patient reminded of \"due or due soon\" health maintenance. I have asked the patient to contact his/her primary care provider (PCP) for follow-up on his/her health maintenance. Coordination of Care Questionnaire:  :   1) Have you been to an emergency room, urgent care, or hospitalized since your last visit? If yes, where when, and reason for visit? no       2. Have seen or consulted any other health care provider since your last visit? If yes, where when, and reason for visit? NO      Patient is accompanied by self I have received verbal consent from Jesús Foster to discuss any/all medical information while they are present in the room.

## 2020-06-24 NOTE — LETTER
6/24/20 Patient: Flavia Krishnan YOB: 1960 Date of Visit: 6/24/2020 Millie Lynn MD 
[address] [address] VIA Facsimile: [phone] Dear Mlilie Lynn MD, Thank you for referring Mr. Linus Dyer to [address] [place] [address] for evaluation. My notes for this consultation are attached. If you have questions, please do not hesitate to call me. I look forward to following your patient along with you. Sincerely, Lachelle Spencer MD

## 2020-06-24 NOTE — PROGRESS NOTES
Reece Clement, GRISELDA-BC    Subjective/HPI:     Brodie Negron is a 60 y.o. male is here for routine f/u. He has a PMHx of PAF s/p AV pat ablation with PPM, HTN, obesity, ALYSSA on CPAP and galodinium poisoning. Since last visit, patient underwent limited echocardiogram due to shortness of breath symptoms. He was noted to have new cardiomyopathy with EF 35-40%. He was started on Entresto for management. He would like to know if he can go back to work. He works as a . 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 He says this is impossible to do. 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 He would like to continue working but his employer will not allow him to return to work until he has cardiac clearance. He is also currently furloughed due to the coronavirus. He is concerned about the cost of his medical care and medications after his insurance runs out in September. He has been tolerating Entresto. He remains short of breath. His fluid status is managed. PCP Provider  Deshaun Sewell MD    Past Medical History:   Diagnosis Date    A-fib [facility])     Allergic reaction to contrast material     galodinium    Hypertension     Irregular heart rhythm     Mercury poisoning     Morbid obesity [address] [address] 11/4/2019    ALYSSA on CPAP     ALYSSA on CPAP 11/4/2019    Post concussion syndrome         Allergies   Allergen Reactions    Ace Inhibitors Cough    Gadolinium-Containing Contrast Media Rash    Iodinated Contrast Media Rash     No contrast injection dye        Outpatient Encounter Medications as of 6/24/2020   Medication Sig Dispense Refill    sacubitriL-valsartan (Entresto) 24-26 mg tablet Take 1 Tab by mouth two (2) times a day. 180 Tab 3    metOLazone (ZAROXOLYN) 5 mg tablet Take 1 Tab by mouth every Monday, Wednesday, Friday.  (Patient taking differently: Take 5 mg by mouth every other day.) 45 Tab 2    furosemide (LASIX) 40 mg tablet take 1 tablet by mouth twice a day (Patient taking differently: Take 40 mg by mouth daily. take 1 tablet by mouth twice a day) 60 Tab 1    potassium chloride (K-DUR, KLOR-CON) 20 mEq tablet Take 2 Tabs by mouth two (2) times a day. 60 Tab 1    metoprolol tartrate (LOPRESSOR) 25 mg tablet Take 1 Tab by mouth every twelve (12) hours. 180 Tab 3    apixaban (Eliquis) 5 mg tablet Take 5 mg by mouth two (2) times a day.  testosterone cypionate (Depo-Testosterone) 100 mg/mL injection 200 mg by IntraMUSCular route Once every 2 weeks.  anastrozole (ARIMIDEX) 1 mg tablet Take 1 mg by mouth every seven (7) days. 0    desloratadine (CLARINEX) 5 mg tablet Take 5 mg by mouth daily.  montelukast (SINGULAIR) 10 mg tablet Take 10 mg by mouth every evening.  dextroamphetamine-amphetamine (ADDERALL) 30 mg tablet Take 30 mg by mouth two (2) times a day.  [DISCONTINUED] pantoprazole (PROTONIX) 40 mg tablet Take 1 Tab by mouth Daily (before breakfast). (Patient not taking: Reported on 6/23/2020) 30 Tab 0     No facility-administered encounter medications on file as of 6/24/2020. Review of Symptoms:    Review of Systems   Constitutional: Positive for malaise/fatigue. Negative for chills, fever and weight loss. HENT: Negative for nosebleeds. Eyes: Negative for blurred vision and double vision. Respiratory: Positive for shortness of breath. Negative for cough and wheezing. Cardiovascular: Negative for chest pain, palpitations, orthopnea, leg swelling and PND. Gastrointestinal: Negative for abdominal pain, blood in stool, diarrhea, nausea and vomiting. Musculoskeletal: Negative for joint pain. Skin: Negative for rash. Neurological: Negative for dizziness, tingling and loss of consciousness. Endo/Heme/Allergies: Does not bruise/bleed easily.        Physical Exam:      General: Well developed, in no acute distress, cooperative and alert.  Clammy. HEENT: No carotid bruits, no JVD, trach is midline. Neck Supple, PEERL, EOM intact. Heart:  reg rate and rhythm; normal S1/S2; no murmurs, no gallops or rubs. Respiratory: Clear bilaterally x 4, no wheezing or rales  Abdomen:   Soft, non-tender, no distention, no masses. + BS. Extremities:  Normal cap refill, no cyanosis, atraumatic. No edema. Neuro: A&Ox3, speech clear, gait stable. Skin: Skin color is normal. No rashes or lesions. Non diaphoretic  Vascular: 2+ pulses symmetric in all extremities    Vitals:    06/24/20 1116 06/24/20 1141   BP: 140/88 144/80   Pulse: 83    Resp: 18    SpO2: 100%    Weight: 301 lb (136.5 kg)    Height: 6' 2\" (1.88 m)        Cardiology Labs:    Lab Results   Component Value Date/Time    Cholesterol, total 167 11/05/2019 04:41 AM    HDL Cholesterol 38 11/05/2019 04:41 AM    LDL, calculated 113.6 (H) 11/05/2019 04:41 AM    VLDL, calculated 15.4 11/05/2019 04:41 AM    CHOL/HDL Ratio 4.4 11/05/2019 04:41 AM       Lab Results   Component Value Date/Time    Hemoglobin A1c 6.1 (H) 04/02/2020 03:36 AM       Lab Results   Component Value Date/Time    Sodium 141 06/18/2020 10:13 AM    Potassium 4.3 06/18/2020 10:13 AM    Chloride 97 06/18/2020 10:13 AM    CO2 25 06/18/2020 10:13 AM    Glucose 97 06/18/2020 10:13 AM    BUN 26 06/18/2020 10:13 AM    Creatinine 1.31 (H) 06/18/2020 10:13 AM    BUN/Creatinine ratio 20 06/18/2020 10:13 AM    GFR est AA 68 06/18/2020 10:13 AM    GFR est non-AA 59 (L) 06/18/2020 10:13 AM    Calcium 9.8 06/18/2020 10:13 AM    Anion gap 6 04/17/2020 05:15 AM    Bilirubin, total 0.6 04/22/2020 10:32 AM    ALT (SGPT) 25 04/22/2020 10:32 AM    Alk. phosphatase 84 04/22/2020 10:32 AM    Protein, total 6.5 04/22/2020 10:32 AM    Albumin 3.9 04/22/2020 10:32 AM    Globulin 4.0 04/11/2020 11:08 PM    A-G Ratio 1.5 04/22/2020 10:32 AM          Assessment:       ICD-10-CM ICD-9-CM    1.  Acute combined systolic and diastolic congestive heart failure ([address] I50.41 428.41      428.0    2. Permanent atrial fibrillation (HCC) I48.21 427.31    3. Essential hypertension I10 401.9    4. Pacemaker Z95.0 V45.01    5. S/P AV (atrioventricular) pat ablation Z98.890 V45.89         Plan:     1. Combined systolic and diastolic heart failure  Limited echo done showed reduced LVEF 35-40%, new from 3/2020 (before AV pat ablation)  Entresto started, renal function remains stable  Will continue Lopressor; increase Entresto 49/51 mg BID  Contineu Lasix BID and Metolazone MWF for fluid management  Plan to repeat echo in 3 months to reassess EF  Discussed with patient we cannot determine eligibility for disability as we don't know long-term outcomes yet. 2. Permanent atrial fibrillation (HCC)  S/p AV pat ablation with PPM  Continue metoprolol and Eliquis    3. Essential hypertension  BP controlled. Continue anti-hypertensive therapy and low sodium diet    4. Pacemaker  Continue with routine device interrogation with Dr. Gomez Shows  Will confirm with EP whether he can continue working as a  -- if able, then he may return to work until reassessment of his EF. 5. S/P AV (atrioventricular) pat ablation  S/p AV pat ablation in 4/2020 with PPM placement    Follow-up with Dr. Sharona Higgins in 3 months with repeat echo at that time. Wanda Shoemaker NP       [address] [facility]    6/24/2020         Patient seen, examined by me personally. Plan discussed as detailed. Agree with note as outlined by  NP. I confirm findings in history and physical exam. No additional findings noted. Agree with plan as outlined above.      Liv Dan MD

## 2020-07-12 DIAGNOSIS — I48.21 PERMANENT ATRIAL FIBRILLATION (HCC): ICD-10-CM

## 2020-07-12 DIAGNOSIS — I50.30 DIASTOLIC CONGESTIVE HEART FAILURE, UNSPECIFIED HF CHRONICITY (HCC): ICD-10-CM

## 2020-07-12 DIAGNOSIS — I50.32 CHRONIC HEART FAILURE WITH PRESERVED EJECTION FRACTION (HCC): ICD-10-CM

## 2020-08-04 ENCOUNTER — OFFICE VISIT (OUTPATIENT)
Dept: CARDIOLOGY CLINIC | Age: 60
End: 2020-08-04
Payer: COMMERCIAL

## 2020-08-04 ENCOUNTER — CLINICAL SUPPORT (OUTPATIENT)
Dept: CARDIOLOGY CLINIC | Age: 60
End: 2020-08-04
Payer: COMMERCIAL

## 2020-08-04 VITALS
HEIGHT: 74 IN | DIASTOLIC BLOOD PRESSURE: 82 MMHG | WEIGHT: 310 LBS | RESPIRATION RATE: 20 BRPM | BODY MASS INDEX: 39.78 KG/M2 | SYSTOLIC BLOOD PRESSURE: 116 MMHG

## 2020-08-04 DIAGNOSIS — E78.2 MIXED HYPERLIPIDEMIA: ICD-10-CM

## 2020-08-04 DIAGNOSIS — I10 ESSENTIAL HYPERTENSION: ICD-10-CM

## 2020-08-04 DIAGNOSIS — I42.9 CARDIOMYOPATHY, UNSPECIFIED TYPE (HCC): ICD-10-CM

## 2020-08-04 DIAGNOSIS — Z95.0 CARDIAC PACEMAKER IN SITU: Primary | ICD-10-CM

## 2020-08-04 DIAGNOSIS — Z98.890 S/P AV (ATRIOVENTRICULAR) NODAL ABLATION: ICD-10-CM

## 2020-08-04 DIAGNOSIS — I48.21 PERMANENT ATRIAL FIBRILLATION (HCC): ICD-10-CM

## 2020-08-04 DIAGNOSIS — I50.41 ACUTE COMBINED SYSTOLIC AND DIASTOLIC CONGESTIVE HEART FAILURE (HCC): Primary | ICD-10-CM

## 2020-08-04 PROCEDURE — 99214 OFFICE O/P EST MOD 30 MIN: CPT | Performed by: INTERNAL MEDICINE

## 2020-08-04 PROCEDURE — 93000 ELECTROCARDIOGRAM COMPLETE: CPT | Performed by: INTERNAL MEDICINE

## 2020-08-04 PROCEDURE — 93293 PM PHONE R-STRIP DEVICE EVAL: CPT

## 2020-08-04 PROCEDURE — 93279 PRGRMG DEV EVAL PM/LDLS PM: CPT | Performed by: INTERNAL MEDICINE
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
## 2020-08-04 NOTE — LETTER
8/4/20 Patient: Betty Guerrero YOB: 1960 Date of Visit: 8/4/2020 Ayush Caballero MD 
[address] [address] VIA Facsimile: [phone] Dear Ayush Caballero MD, Thank you for referring Mr. Radha Diaz to [address] [place] [address] Mimi for evaluation. My notes for this consultation are attached. If you have questions, please do not hesitate to call me. I look forward to following your patient along with you. Sincerely, Karen Romero MD

## 2020-08-04 NOTE — PROGRESS NOTES
Chief Complaint   Patient presents with    Post OP Follow Up     PPM insert on 4/3/20 - has been having palpitations and flutters since insert     Pacemaker Check    Shortness of Breath     very sob since PPM insert      1. Have you been to the ER, urgent care clinic since your last visit? Hospitalized since your last visit? No     2. Have you seen or consulted any other health care providers outside of the [address] [place] [address] since your last visit? Include any pap smears or colon screening.  No

## 2020-08-06 ENCOUNTER — DOCUMENTATION ONLY (OUTPATIENT)
Dept: CARDIOLOGY CLINIC | Age: 60
End: 2020-08-06

## 2020-08-06 ENCOUNTER — TELEPHONE (OUTPATIENT)
Dept: CARDIOLOGY CLINIC | Age: 60
End: 2020-08-06

## 2020-08-06 NOTE — TELEPHONE ENCOUNTER
Called pt to inform him short term disability paperwork had been completed. Message left for him requesting he call back to inform if he wants paperwork faxed or if he would like to pick it up.

## 2020-08-18 ENCOUNTER — ANCILLARY PROCEDURE (OUTPATIENT)
Dept: CARDIOLOGY CLINIC | Age: 60
End: 2020-08-18
Payer: COMMERCIAL

## 2020-08-18 ENCOUNTER — OFFICE VISIT (OUTPATIENT)
Dept: CARDIOLOGY CLINIC | Age: 60
End: 2020-08-18
Payer: COMMERCIAL

## 2020-08-18 VITALS
WEIGHT: 310 LBS | DIASTOLIC BLOOD PRESSURE: 82 MMHG | HEIGHT: 74 IN | BODY MASS INDEX: 39.78 KG/M2 | SYSTOLIC BLOOD PRESSURE: 116 MMHG

## 2020-08-18 VITALS
HEART RATE: 70 BPM | DIASTOLIC BLOOD PRESSURE: 80 MMHG | WEIGHT: 315 LBS | BODY MASS INDEX: 40.43 KG/M2 | OXYGEN SATURATION: 96 % | HEIGHT: 74 IN | SYSTOLIC BLOOD PRESSURE: 118 MMHG | RESPIRATION RATE: 20 BRPM

## 2020-08-18 DIAGNOSIS — I10 ESSENTIAL HYPERTENSION: Chronic | ICD-10-CM

## 2020-08-18 DIAGNOSIS — Z98.890 S/P AV (ATRIOVENTRICULAR) NODAL ABLATION: ICD-10-CM

## 2020-08-18 DIAGNOSIS — I48.21 PERMANENT ATRIAL FIBRILLATION (HCC): ICD-10-CM

## 2020-08-18 DIAGNOSIS — I50.41 ACUTE COMBINED SYSTOLIC AND DIASTOLIC CONGESTIVE HEART FAILURE (HCC): ICD-10-CM

## 2020-08-18 DIAGNOSIS — Z95.0 PACEMAKER: ICD-10-CM

## 2020-08-18 DIAGNOSIS — I50.42 CHRONIC COMBINED SYSTOLIC AND DIASTOLIC CONGESTIVE HEART FAILURE (HCC): Primary | ICD-10-CM

## 2020-08-18 LAB
ECHO AO ROOT DIAM: 3.57 CM
ECHO AV PEAK GRADIENT: 5.51 MMHG
ECHO AV PEAK VELOCITY: 117.33 CM/S
ECHO EST RA PRESSURE: 3 MMHG
ECHO LA AREA 4C: 31.47 CM2
ECHO LA MAJOR AXIS: 6.14 CM
ECHO LA MINOR AXIS: 2.35 CM
ECHO LA VOL 2C: 116.03 ML (ref 18–58)
ECHO LA VOL 4C: 109.05 ML (ref 18–58)
ECHO LA VOL BP: 116.25 ML (ref 18–58)
ECHO LA VOL/BSA BIPLANE: 44.4 ML/M2 (ref 16–28)
ECHO LA VOLUME INDEX A2C: 44.32 ML/M2 (ref 16–28)
ECHO LA VOLUME INDEX A4C: 41.65 ML/M2 (ref 16–28)
ECHO LV E' LATERAL VELOCITY: 9.16 CM/S
ECHO LV E' SEPTAL VELOCITY: 6.53 CM/S
ECHO LV INTERNAL DIMENSION DIASTOLIC: 6.08 CM (ref 4.2–5.9)
ECHO LV INTERNAL DIMENSION SYSTOLIC: 4.84 CM
ECHO LV ISOVOLUMETRIC RELAXATION TIME (IVRT): 0.06 S
ECHO LV IVSD: 1.67 CM (ref 0.6–1)
ECHO LV MASS 2D: 506.9 G (ref 88–224)
ECHO LV MASS INDEX 2D: 193.6 G/M2 (ref 49–115)
ECHO LV POSTERIOR WALL DIASTOLIC: 1.66 CM (ref 0.6–1)
ECHO LVOT PEAK GRADIENT: 2.21 MMHG
ECHO LVOT PEAK VELOCITY: 74.32 CM/S
ECHO MV "A" WAVE DURATION: 0.14 S
ECHO MV A VELOCITY: 32.24 CM/S
ECHO MV AREA PHT: 3.19 CM2
ECHO MV E DECELERATION TIME (DT): 0.24 S
ECHO MV E VELOCITY: 123.19 CM/S
ECHO MV E/A RATIO: 3.82
ECHO MV E/E' LATERAL: 13.45
ECHO MV E/E' RATIO (AVERAGED): 16.16
ECHO MV E/E' SEPTAL: 18.87
ECHO MV EROA PISA: 0.15 CM2
ECHO MV PRESSURE HALF TIME (PHT): 0.07 S
ECHO MV REGURGITANT RADIUS PISA: 0.6 CM
ECHO MV REGURGITANT VOLUME: 18.6 ML
ECHO MV REGURGITANT VTIA: 127.12 CM
ECHO PV REGURGITANT MAX VELOCITY: 408.31 CM/S
ECHO RIGHT VENTRICULAR SYSTOLIC PRESSURE (RVSP): 28.35 MMHG
ECHO TV REGURGITANT MAX VELOCITY: 251.72 CM/S
ECHO TV REGURGITANT PEAK GRADIENT: 25.35 MMHG

## 2020-08-18 PROCEDURE — 93306 TTE W/DOPPLER COMPLETE: CPT | Performed by: INTERNAL MEDICINE

## 2020-08-18 PROCEDURE — 93000 ELECTROCARDIOGRAM COMPLETE: CPT | Performed by: INTERNAL MEDICINE

## 2020-08-18 PROCEDURE — 99213 OFFICE O/P EST LOW 20 MIN: CPT | Performed by: INTERNAL MEDICINE

## 2020-08-18 NOTE — PROGRESS NOTES
1. Have you been to the ER, urgent care clinic since your last visit? Hospitalized since your last visit? No    2. Have you seen or consulted any other health care providers outside of the [address] [place] [address] since your last visit? Include any pap smears or colon screening.  No    Chief Complaint   Patient presents with    Irregular Heart Beat     follow up    Hypertension     follow up    CHF     follow up    Shortness of Breath     on exertion    Chest Pain     per pt, not frequent; last episode x 3 days ago

## 2020-08-18 NOTE — LETTER
8/18/20 Patient: Clement Posadas YOB: 1960 Date of Visit: 8/18/2020 Lawrence Sahni MD 
[address] [address] VIA Facsimile: [phone] Dear Lawrence Sahni MD, Thank you for referring Mr. Inderjit Rosales to [address] [place] [address] for evaluation. My notes for this consultation are attached. If you have questions, please do not hesitate to call me. I look forward to following your patient along with you. Sincerely, Cristina Collins MD

## 2020-08-18 NOTE — PROGRESS NOTES
Subjective/HPI:     Prince Ortega is a 60 y.o. male is here for routine f/u. He has a PMHx of PAF s/p AV pat ablation with PPM, HTN, obesity, ALYSSA on CPAP and galodinium poisoning. Since last visit, he was seen by EP and recommended BiV PPM upgrade for pacemaker induced cardiomyopathy. Patient has not yet decided whether he wants to proceed. He had echo done today to reassess EF. PCP Provider  Luis Rodriguez MD    Past Medical History:   Diagnosis Date    A-fib [facility])     Allergic reaction to contrast material     galodinium    Hypertension     Irregular heart rhythm     Mercury poisoning     Morbid obesity ([facility] 11/4/2019    ALYSSA on CPAP     ALYSSA on CPAP 11/4/2019    Post concussion syndrome         Allergies   Allergen Reactions    Ace Inhibitors Cough    Gadolinium-Containing Contrast Media Rash    Iodinated Contrast Media Rash     No contrast injection dye        Outpatient Encounter Medications as of 8/18/2020   Medication Sig Dispense Refill    potassium chloride (K-DUR, KLOR-CON) 20 mEq tablet take 2 tablets by mouth twice a day 90 Tab 3    sacubitriL-valsartan (Entresto) 49-51 mg tab tablet Take 1 Tab by mouth two (2) times a day. 60 Tab 1    metOLazone (ZAROXOLYN) 5 mg tablet Take 1 Tab by mouth every Monday, Wednesday, Friday. (Patient taking differently: Take 5 mg by mouth every other day.) 45 Tab 2    furosemide (LASIX) 40 mg tablet take 1 tablet by mouth twice a day (Patient taking differently: Take 40 mg by mouth daily. take 1 tablet by mouth twice a day) 60 Tab 1    metoprolol tartrate (LOPRESSOR) 25 mg tablet Take 1 Tab by mouth every twelve (12) hours. 180 Tab 3    apixaban (Eliquis) 5 mg tablet Take 5 mg by mouth two (2) times a day.  testosterone cypionate (Depo-Testosterone) 100 mg/mL injection 200 mg by IntraMUSCular route Once every 2 weeks.  anastrozole (ARIMIDEX) 1 mg tablet Take 1 mg by mouth every seven (7) days.   0    desloratadine (CLARINEX) 5 mg tablet Take 5 mg by mouth daily.  montelukast (SINGULAIR) 10 mg tablet Take 10 mg by mouth every evening.  dextroamphetamine-amphetamine (ADDERALL) 30 mg tablet Take 30 mg by mouth two (2) times a day. No facility-administered encounter medications on file as of 8/18/2020. Review of Symptoms:    Review of Systems   Constitutional: Positive for malaise/fatigue. Negative for chills, fever and weight loss. HENT: Negative. Negative for hearing loss and nosebleeds. Eyes: Negative for blurred vision and double vision. Respiratory: Positive for shortness of breath. Negative for cough, hemoptysis and wheezing. Cardiovascular: Negative. Negative for chest pain, palpitations, orthopnea, claudication, leg swelling and PND. Gastrointestinal: Negative. Negative for abdominal pain, blood in stool, diarrhea, nausea and vomiting. Musculoskeletal: Negative for joint pain and myalgias. Skin: Negative. Negative for rash. Neurological: Negative. Negative for dizziness, tingling, loss of consciousness and headaches. Endo/Heme/Allergies: Does not bruise/bleed easily. Physical Exam:      General: Well developed, in no acute distress, cooperative and alert. Clammy. HEENT: No carotid bruits, no JVD, trach is midline. Neck Supple, PEERL, EOM intact. Heart:  reg rate and rhythm; normal S1/S2; no murmurs, no gallops or rubs. Respiratory: Clear bilaterally x 4, no wheezing or rales  Abdomen:   Soft, non-tender, no distention, no masses. + BS. Extremities:  Normal cap refill, no cyanosis, atraumatic. No edema. Neuro: A&Ox3, speech clear, gait stable. Skin: Skin color is normal. No rashes or lesions. Non diaphoretic  Vascular: 2+ pulses symmetric in all extremities    ECG: paced    There were no vitals filed for this visit.     Cardiology Labs:    Lab Results   Component Value Date/Time    Cholesterol, total 167 11/05/2019 04:41 AM    HDL Cholesterol 38 11/05/2019 04:41 AM    LDL, calculated 113.6 (H) 11/05/2019 04:41 AM    VLDL, calculated 15.4 11/05/2019 04:41 AM    CHOL/HDL Ratio 4.4 11/05/2019 04:41 AM       Lab Results   Component Value Date/Time    Hemoglobin A1c 6.1 (H) 04/02/2020 03:36 AM       Lab Results   Component Value Date/Time    Sodium 141 06/18/2020 10:13 AM    Potassium 4.3 06/18/2020 10:13 AM    Chloride 97 06/18/2020 10:13 AM    CO2 25 06/18/2020 10:13 AM    Glucose 97 06/18/2020 10:13 AM    BUN 26 06/18/2020 10:13 AM    Creatinine 1.31 (H) 06/18/2020 10:13 AM    BUN/Creatinine ratio 20 06/18/2020 10:13 AM    GFR est AA 68 06/18/2020 10:13 AM    GFR est non-AA 59 (L) 06/18/2020 10:13 AM    Calcium 9.8 06/18/2020 10:13 AM    Anion gap 6 04/17/2020 05:15 AM    Bilirubin, total 0.6 04/22/2020 10:32 AM    ALT (SGPT) 25 04/22/2020 10:32 AM    Alk. phosphatase 84 04/22/2020 10:32 AM    Protein, total 6.5 04/22/2020 10:32 AM    Albumin 3.9 04/22/2020 10:32 AM    Globulin 4.0 04/11/2020 11:08 PM    A-G Ratio 1.5 04/22/2020 10:32 AM          Assessment:       ICD-10-CM ICD-9-CM    1. Chronic combined systolic and diastolic congestive heart failure (HCC)  I50.42 428.42      428.0    2. Permanent atrial fibrillation (HCC)  I48.21 427.31    3. Essential hypertension  I10 401.9    4. Pacemaker  Z95.0 V45.01    5. S/P AV (atrioventricular) pat ablation  Z98.890 V45.89         Plan:     1. Combined systolic and diastolic heart failure  Limited echo in 6/2020 showed reduced LVEF 35-40%, new from 3/2020 (before AV pat ablation)  Remains symptomatic; recommended BiV PPM upgrade or consider ICD as his EF is < 35.   Echo done today shows EF of 25-30%. He has been taking higher dose of entresto for 2 weeks only. 2. Permanent atrial fibrillation (HCC)  S/p AV pat ablation with PPM  Continue metoprolol and Eliquis    3. Essential hypertension  BP controlled. Continue anti-hypertensive therapy and low sodium diet    4.  Pacemaker  Continue with routine device interrogation with Dr. Marilee Prado    5. S/P AV (atrioventricular) pat ablation  S/p AV pat ablation in 4/2020 with PPM placement    He has had disability forms filled out by EP. He would like to wait until all his insurance is taken care of. F/u in 3 months.     Melissa Christiansen MD

## 2020-09-30 ENCOUNTER — TELEPHONE (OUTPATIENT)
Dept: CARDIOLOGY CLINIC | Age: 60
End: 2020-09-30

## 2020-09-30 DIAGNOSIS — Z95.0 PACEMAKER: ICD-10-CM

## 2020-09-30 DIAGNOSIS — I50.41 ACUTE COMBINED SYSTOLIC AND DIASTOLIC CONGESTIVE HEART FAILURE (HCC): ICD-10-CM

## 2020-09-30 DIAGNOSIS — Z98.890 S/P AV (ATRIOVENTRICULAR) NODAL ABLATION: ICD-10-CM

## 2020-09-30 DIAGNOSIS — I10 ESSENTIAL HYPERTENSION: ICD-10-CM

## 2020-09-30 DIAGNOSIS — I50.41 ACUTE COMBINED SYSTOLIC AND DIASTOLIC CONGESTIVE HEART FAILURE (HCC): Primary | ICD-10-CM

## 2020-09-30 DIAGNOSIS — I83.893 VARICOSE VEINS OF BILATERAL LOWER EXTREMITIES WITH OTHER COMPLICATIONS: ICD-10-CM

## 2020-10-08 ENCOUNTER — HOSPITAL ENCOUNTER (OUTPATIENT)
Dept: GENERAL RADIOLOGY | Age: 60
Discharge: HOME | End: 2020-10-08
Payer: COMMERCIAL

## 2020-10-08 ENCOUNTER — TRANSCRIBE ORDER (OUTPATIENT)
Dept: GENERAL RADIOLOGY | Age: 60
End: 2020-10-08

## 2020-10-08 DIAGNOSIS — I50.41 ACUTE COMBINED SYSTOLIC AND DIASTOLIC HEART FAILURE (HCC): ICD-10-CM

## 2020-10-08 DIAGNOSIS — I50.41 ACUTE COMBINED SYSTOLIC AND DIASTOLIC HEART FAILURE (HCC): Primary | ICD-10-CM

## 2020-10-08 PROCEDURE — 71046 X-RAY EXAM CHEST 2 VIEWS: CPT

## 2020-10-09 ENCOUNTER — TELEPHONE (OUTPATIENT)
Dept: CARDIOLOGY CLINIC | Age: 60
End: 2020-10-09

## 2020-10-09 LAB
ALBUMIN SERPL-MCNC: 4.5 G/DL (ref 3.8–4.9)
ALBUMIN/GLOB SERPL: 1.9 {RATIO} (ref 1.2–2.2)
ALP SERPL-CCNC: 80 IU/L (ref 39–117)
ALT SERPL-CCNC: 17 IU/L (ref 0–44)
AST SERPL-CCNC: 23 IU/L (ref 0–40)
BILIRUB SERPL-MCNC: 0.5 MG/DL (ref 0–1.2)
BUN SERPL-MCNC: 23 MG/DL (ref 8–27)
BUN/CREAT SERPL: 13 (ref 10–24)
CALCIUM SERPL-MCNC: 10.1 MG/DL (ref 8.6–10.2)
CHLORIDE SERPL-SCNC: 98 MMOL/L (ref 96–106)
CO2 SERPL-SCNC: 26 MMOL/L (ref 20–29)
CREAT SERPL-MCNC: 1.73 MG/DL (ref 0.76–1.27)
ERYTHROCYTE [DISTWIDTH] IN BLOOD BY AUTOMATED COUNT: 14 % (ref 11.6–15.4)
GLOBULIN SER CALC-MCNC: 2.4 G/DL (ref 1.5–4.5)
GLUCOSE SERPL-MCNC: 176 MG/DL (ref 65–99)
HCT VFR BLD AUTO: 44.6 % (ref 37.5–51)
HGB BLD-MCNC: 15.2 G/DL (ref 13–17.7)
INR PPP: 1 (ref 0.9–1.2)
INTERPRETATION: NORMAL
MCH RBC QN AUTO: 30 PG (ref 26.6–33)
MCHC RBC AUTO-ENTMCNC: 34.1 G/DL (ref 31.5–35.7)
MCV RBC AUTO: 88 FL (ref 79–97)
PLATELET # BLD AUTO: 230 X10E3/UL (ref 150–450)
POTASSIUM SERPL-SCNC: 4 MMOL/L (ref 3.5–5.2)
PROT SERPL-MCNC: 6.9 G/DL (ref 6–8.5)
PROTHROMBIN TIME: 10.4 SEC (ref 9.1–12)
RBC # BLD AUTO: 5.06 X10E6/UL (ref 4.14–5.8)
SODIUM SERPL-SCNC: 141 MMOL/L (ref 134–144)
WBC # BLD AUTO: 10.3 X10E3/UL (ref 3.4–10.8)

## 2020-10-09 NOTE — TELEPHONE ENCOUNTER
Verified patient with 2 identifiers   Patient states he is taking his both as ordered  Lasix 40 mg 1 tab twice daily and Metolazone 5 mg every other day  Patient states he had labs and cxr yesterday and is scheduled for his covid test on 10/19/20.

## 2020-10-09 NOTE — TELEPHONE ENCOUNTER
----- Message from MATEO Mckeon sent at 10/9/2020 10:56 AM EDT -----  Can you confirm how much lasix he is taking and is he taking metolazone every other day?

## 2020-10-19 ENCOUNTER — HOSPITAL ENCOUNTER (OUTPATIENT)
Dept: PREADMISSION TESTING | Age: 60
Discharge: HOME | End: 2020-10-19
Payer: COMMERCIAL

## 2020-10-19 PROCEDURE — 87635 SARS-COV-2 COVID-19 AMP PRB: CPT

## 2020-10-19 NOTE — TELEPHONE ENCOUNTER
Verified patient with 2 identifiers   Advised to stop Metolazone for 2 days and start back at a half tab -2.5mg- every other day. Patient verified understanding. It is still every other day - correct?

## 2020-10-20 LAB
HEALTH STATUS, XMCV2T: NORMAL
SARS-COV-2, COV2NT: NOT DETECTED
SOURCE, COVRS: NORMAL
SPECIMEN SOURCE, FCOV2M: NORMAL
SPECIMEN TYPE, XMCV1T: NORMAL

## 2020-10-23 ENCOUNTER — HOSPITAL ENCOUNTER (OUTPATIENT)
Age: 60
Discharge: HOME | End: 2020-10-23
Attending: INTERNAL MEDICINE | Admitting: INTERNAL MEDICINE
Payer: COMMERCIAL

## 2020-10-23 ENCOUNTER — ANESTHESIA EVENT (OUTPATIENT)
Dept: CARDIAC CATH/INVASIVE PROCEDURES | Age: 60
End: 2020-10-23
Payer: COMMERCIAL

## 2020-10-23 ENCOUNTER — APPOINTMENT (OUTPATIENT)
Dept: GENERAL RADIOLOGY | Age: 60
End: 2020-10-23
Attending: INTERNAL MEDICINE
Payer: COMMERCIAL

## 2020-10-23 ENCOUNTER — ANESTHESIA (OUTPATIENT)
Dept: CARDIAC CATH/INVASIVE PROCEDURES | Age: 60
End: 2020-10-23
Payer: COMMERCIAL

## 2020-10-23 VITALS
SYSTOLIC BLOOD PRESSURE: 151 MMHG | RESPIRATION RATE: 18 BRPM | TEMPERATURE: 97.8 F | HEIGHT: 74 IN | BODY MASS INDEX: 40.43 KG/M2 | OXYGEN SATURATION: 97 % | HEART RATE: 70 BPM | DIASTOLIC BLOOD PRESSURE: 81 MMHG | WEIGHT: 315 LBS

## 2020-10-23 DIAGNOSIS — I42.0 DILATED CARDIOMYOPATHY (HCC): Chronic | ICD-10-CM

## 2020-10-23 DIAGNOSIS — I10 ESSENTIAL HYPERTENSION: Chronic | ICD-10-CM

## 2020-10-23 DIAGNOSIS — I48.21 PERMANENT ATRIAL FIBRILLATION (HCC): Chronic | ICD-10-CM

## 2020-10-23 DIAGNOSIS — I42.9 CARDIOMYOPATHY, UNSPECIFIED TYPE (HCC): ICD-10-CM

## 2020-10-23 DIAGNOSIS — I50.42 CHRONIC COMBINED SYSTOLIC AND DIASTOLIC CONGESTIVE HEART FAILURE (HCC): Chronic | ICD-10-CM

## 2020-10-23 DIAGNOSIS — Z98.890 S/P AV (ATRIOVENTRICULAR) NODAL ABLATION: Chronic | ICD-10-CM

## 2020-10-23 PROCEDURE — 74011000250 HC RX REV CODE- 250: Performed by: NURSE ANESTHETIST, CERTIFIED REGISTERED

## 2020-10-23 PROCEDURE — 77030037400 HC ADH TISS HI VISC EXOFIN CHMP -B: Performed by: INTERNAL MEDICINE

## 2020-10-23 PROCEDURE — 74011250637 HC RX REV CODE- 250/637: Performed by: INTERNAL MEDICINE

## 2020-10-23 PROCEDURE — C1882 AICD, OTHER THAN SING/DUAL: HCPCS | Performed by: INTERNAL MEDICINE

## 2020-10-23 PROCEDURE — 77030002996 HC SUT SLK J&J -A: Performed by: INTERNAL MEDICINE

## 2020-10-23 PROCEDURE — 77030012468 HC VLV BLEEDBK CNTRL ABBT -B: Performed by: INTERNAL MEDICINE

## 2020-10-23 PROCEDURE — 71045 X-RAY EXAM CHEST 1 VIEW: CPT

## 2020-10-23 PROCEDURE — C1887 CATHETER, GUIDING: HCPCS | Performed by: INTERNAL MEDICINE

## 2020-10-23 PROCEDURE — C1769 GUIDE WIRE: HCPCS | Performed by: INTERNAL MEDICINE

## 2020-10-23 PROCEDURE — 2709999900 HC NON-CHARGEABLE SUPPLY: Performed by: INTERNAL MEDICINE

## 2020-10-23 PROCEDURE — 77030028698 HC BLD TISS PLSM MEDT -D: Performed by: INTERNAL MEDICINE

## 2020-10-23 PROCEDURE — 33233 REMOVAL OF PM GENERATOR: CPT | Performed by: INTERNAL MEDICINE

## 2020-10-23 PROCEDURE — 77030019580 HC CBL PACE MEDT -B: Performed by: INTERNAL MEDICINE

## 2020-10-23 PROCEDURE — C1777 LEAD, AICD, ENDO SINGLE COIL: HCPCS | Performed by: INTERNAL MEDICINE

## 2020-10-23 PROCEDURE — C1781 MESH (IMPLANTABLE): HCPCS | Performed by: INTERNAL MEDICINE

## 2020-10-23 PROCEDURE — 74011000636 HC RX REV CODE- 636: Performed by: INTERNAL MEDICINE

## 2020-10-23 PROCEDURE — 74011000272 HC RX REV CODE- 272: Performed by: INTERNAL MEDICINE

## 2020-10-23 PROCEDURE — 33249 INSJ/RPLCMT DEFIB W/LEAD(S): CPT | Performed by: INTERNAL MEDICINE

## 2020-10-23 PROCEDURE — 74011000258 HC RX REV CODE- 258: Performed by: NURSE ANESTHETIST, CERTIFIED REGISTERED

## 2020-10-23 PROCEDURE — 76210000000 HC OR PH I REC 2 TO 2.5 HR: Performed by: INTERNAL MEDICINE

## 2020-10-23 PROCEDURE — 74011250636 HC RX REV CODE- 250/636: Performed by: INTERNAL MEDICINE

## 2020-10-23 PROCEDURE — 77030039825 HC MSK NSL PAP SUPERNO2VA VYRM -B: Performed by: ANESTHESIOLOGY

## 2020-10-23 PROCEDURE — 77030002933 HC SUT MCRYL J&J -A: Performed by: INTERNAL MEDICINE

## 2020-10-23 PROCEDURE — 76060000034 HC ANESTHESIA 1.5 TO 2 HR: Performed by: INTERNAL MEDICINE

## 2020-10-23 PROCEDURE — 77030031139 HC SUT VCRL2 J&J -A: Performed by: INTERNAL MEDICINE

## 2020-10-23 PROCEDURE — 74011250636 HC RX REV CODE- 250/636: Performed by: NURSE ANESTHETIST, CERTIFIED REGISTERED

## 2020-10-23 PROCEDURE — 74011000250 HC RX REV CODE- 250: Performed by: INTERNAL MEDICINE

## 2020-10-23 PROCEDURE — 77030018729 HC ELECTRD DEFIB PAD CARD -B: Performed by: INTERNAL MEDICINE

## 2020-10-23 PROCEDURE — 77030037875 HC DRSG MEPILEX <16IN BORD MOLN -A: Performed by: INTERNAL MEDICINE

## 2020-10-23 PROCEDURE — C1893 INTRO/SHEATH, FIXED,NON-PEEL: HCPCS | Performed by: INTERNAL MEDICINE

## 2020-10-23 PROCEDURE — C1751 CATH, INF, PER/CENT/MIDLINE: HCPCS | Performed by: INTERNAL MEDICINE

## 2020-10-23 PROCEDURE — 77030010507 HC ADH SKN DERMBND J&J -B: Performed by: INTERNAL MEDICINE

## 2020-10-23 PROCEDURE — C1894 INTRO/SHEATH, NON-LASER: HCPCS | Performed by: INTERNAL MEDICINE

## 2020-10-23 PROCEDURE — 33225 L VENTRIC PACING LEAD ADD-ON: CPT | Performed by: INTERNAL MEDICINE

## 2020-10-23 PROCEDURE — 76210000006 HC OR PH I REC 0.5 TO 1 HR

## 2020-10-23 PROCEDURE — 33234 REMOVAL OF PACEMAKER SYSTEM: CPT | Performed by: INTERNAL MEDICINE

## 2020-10-23 PROCEDURE — C1900 LEAD, CORONARY VENOUS: HCPCS | Performed by: INTERNAL MEDICINE

## 2020-10-23 DEVICE — PACE/SENSE LEAD
Type: IMPLANTABLE DEVICE | Status: NON-FUNCTIONAL
Brand: ACUITY™ X4 SPIRAL L
Removed: 2020-12-21

## 2020-10-23 DEVICE — CARDIAC RESYNCHRONIZATION THERAPY DEFIBRILLATOR
Type: IMPLANTABLE DEVICE | Status: FUNCTIONAL
Brand: VIGILANT™ X4 CRT-D

## 2020-10-23 DEVICE — LEAD DEFIB 64CM MODEL 0273 -- ENDOTAK RELIANCE S: Type: IMPLANTABLE DEVICE | Status: FUNCTIONAL

## 2020-10-23 DEVICE — ENVELOPE CMRM6133 ABSORB LRG MR
Type: IMPLANTABLE DEVICE | Status: FUNCTIONAL
Brand: TYRX™

## 2020-10-23 RX ORDER — ONDANSETRON 2 MG/ML
INJECTION INTRAMUSCULAR; INTRAVENOUS AS NEEDED
Status: DISCONTINUED | OUTPATIENT
Start: 2020-10-23 | End: 2020-10-23 | Stop reason: HOSPADM

## 2020-10-23 RX ORDER — SODIUM CHLORIDE 0.9 % (FLUSH) 0.9 %
5-40 SYRINGE (ML) INJECTION AS NEEDED
Status: DISCONTINUED | OUTPATIENT
Start: 2020-10-23 | End: 2020-10-24 | Stop reason: HOSPADM

## 2020-10-23 RX ORDER — NALOXONE HYDROCHLORIDE 0.4 MG/ML
0.4 INJECTION, SOLUTION INTRAMUSCULAR; INTRAVENOUS; SUBCUTANEOUS AS NEEDED
Status: DISCONTINUED | OUTPATIENT
Start: 2020-10-23 | End: 2020-10-24 | Stop reason: HOSPADM

## 2020-10-23 RX ORDER — PROPOFOL 10 MG/ML
INJECTION, EMULSION INTRAVENOUS
Status: DISCONTINUED | OUTPATIENT
Start: 2020-10-23 | End: 2020-10-23 | Stop reason: HOSPADM

## 2020-10-23 RX ORDER — FENTANYL CITRATE 50 UG/ML
INJECTION, SOLUTION INTRAMUSCULAR; INTRAVENOUS AS NEEDED
Status: DISCONTINUED | OUTPATIENT
Start: 2020-10-23 | End: 2020-10-23 | Stop reason: HOSPADM

## 2020-10-23 RX ORDER — HYDROCODONE BITARTRATE AND ACETAMINOPHEN 5; 325 MG/1; MG/1
1 TABLET ORAL
Status: DISCONTINUED | OUTPATIENT
Start: 2020-10-23 | End: 2020-10-24 | Stop reason: HOSPADM

## 2020-10-23 RX ORDER — HEPARIN SODIUM 200 [USP'U]/100ML
INJECTION, SOLUTION INTRAVENOUS
Status: COMPLETED | OUTPATIENT
Start: 2020-10-23 | End: 2020-10-23

## 2020-10-23 RX ORDER — MIDAZOLAM HYDROCHLORIDE 1 MG/ML
INJECTION, SOLUTION INTRAMUSCULAR; INTRAVENOUS AS NEEDED
Status: DISCONTINUED | OUTPATIENT
Start: 2020-10-23 | End: 2020-10-23 | Stop reason: HOSPADM

## 2020-10-23 RX ORDER — KETAMINE HYDROCHLORIDE 100 MG/ML
INJECTION, SOLUTION INTRAMUSCULAR; INTRAVENOUS AS NEEDED
Status: DISCONTINUED | OUTPATIENT
Start: 2020-10-23 | End: 2020-10-23 | Stop reason: HOSPADM

## 2020-10-23 RX ORDER — ACETAMINOPHEN 325 MG/1
650 TABLET ORAL
Status: DISCONTINUED | OUTPATIENT
Start: 2020-10-23 | End: 2020-10-24 | Stop reason: HOSPADM

## 2020-10-23 RX ORDER — LIDOCAINE HYDROCHLORIDE 10 MG/ML
INJECTION INFILTRATION; PERINEURAL AS NEEDED
Status: DISCONTINUED | OUTPATIENT
Start: 2020-10-23 | End: 2020-10-23 | Stop reason: HOSPADM

## 2020-10-23 RX ORDER — SODIUM CHLORIDE 0.9 % (FLUSH) 0.9 %
5-40 SYRINGE (ML) INJECTION EVERY 8 HOURS
Status: DISCONTINUED | OUTPATIENT
Start: 2020-10-23 | End: 2020-10-24 | Stop reason: HOSPADM

## 2020-10-23 RX ORDER — SODIUM CHLORIDE 9 MG/ML
INJECTION, SOLUTION INTRAVENOUS
Status: DISCONTINUED | OUTPATIENT
Start: 2020-10-23 | End: 2020-10-23 | Stop reason: HOSPADM

## 2020-10-23 RX ORDER — PROPOFOL 10 MG/ML
INJECTION, EMULSION INTRAVENOUS AS NEEDED
Status: DISCONTINUED | OUTPATIENT
Start: 2020-10-23 | End: 2020-10-23 | Stop reason: HOSPADM

## 2020-10-23 RX ADMIN — ONDANSETRON HYDROCHLORIDE 4 MG: 2 INJECTION, SOLUTION INTRAMUSCULAR; INTRAVENOUS at 09:40

## 2020-10-23 RX ADMIN — PROPOFOL 50 MCG/KG/MIN: 10 INJECTION, EMULSION INTRAVENOUS at 08:40

## 2020-10-23 RX ADMIN — FENTANYL CITRATE 50 MCG: 50 INJECTION, SOLUTION INTRAMUSCULAR; INTRAVENOUS at 08:54

## 2020-10-23 RX ADMIN — PROPOFOL 30 MG: 10 INJECTION, EMULSION INTRAVENOUS at 08:55

## 2020-10-23 RX ADMIN — KETAMINE HYDROCHLORIDE 20 MG: 100 INJECTION, SOLUTION, CONCENTRATE INTRAMUSCULAR; INTRAVENOUS at 08:34

## 2020-10-23 RX ADMIN — CEFAZOLIN 3 G: 10 INJECTION, POWDER, FOR SOLUTION INTRAVENOUS; PARENTERAL at 08:51

## 2020-10-23 RX ADMIN — HYDROCODONE BITARTRATE AND ACETAMINOPHEN 1 TABLET: 5; 325 TABLET ORAL at 11:43

## 2020-10-23 RX ADMIN — MIDAZOLAM 2 MG: 1 INJECTION INTRAMUSCULAR; INTRAVENOUS at 08:35

## 2020-10-23 RX ADMIN — KETAMINE HYDROCHLORIDE 20 MG: 100 INJECTION, SOLUTION, CONCENTRATE INTRAMUSCULAR; INTRAVENOUS at 08:50

## 2020-10-23 RX ADMIN — MIDAZOLAM 2 MG: 1 INJECTION INTRAMUSCULAR; INTRAVENOUS at 08:53

## 2020-10-23 RX ADMIN — FENTANYL CITRATE 50 MCG: 50 INJECTION, SOLUTION INTRAMUSCULAR; INTRAVENOUS at 08:38

## 2020-10-23 RX ADMIN — SODIUM CHLORIDE: 9 INJECTION, SOLUTION INTRAVENOUS at 08:22

## 2020-10-23 NOTE — Clinical Note
TRANSFER - OUT REPORT:     Verbal report given to: Brenda Bower RN. Report consisted of patient's Situation, Background, Assessment and   Recommendations(SBAR). Opportunity for questions and clarification was provided. Patient transported with a Registered Nurse, Monitor and Oxygen. Oxygen used for patient = nasal cannula, @ 2 - 6 Liters. Patient transported to: PACU.

## 2020-10-23 NOTE — H&P
Subjective:                   [address], [place], [address] [facility]  [phone]    Date of  Admission: 10/23/2020  7:02 AM     Admission type:Elective    Luis Hassan is a 60 y.o. male admitted for Cardiomyopathy, unspecified type ([facility] [I42.9]. Pt with chronic chf on med rx. His cardiomyopathy has worsened - lvef 35-40 in June and 25-30 per echo in august. Referred for upgrade to biv icd. He has permanent af and is sp av node ablation/ppm earlier this year. He has sob and tucker. He denies cp. .        Patient Active Problem List    Diagnosis Date Noted    Dilated cardiomyopathy ([facility] 10/23/2020    Varicose veins of bilateral lower extremities with other complications 16/46/0987    Nausea & vomiting 04/14/2020    Elevated troponin 04/12/2020    Permanent atrial fibrillation ([facility] 04/12/2020    Gout involving toe 04/04/2020    S/P AV (atrioventricular) pat ablation 04/03/2020    Pacemaker 04/03/2020    Peripheral vascular disease ([facility] 02/25/2020    Essential hypertension 12/04/2019    Chronic combined systolic and diastolic congestive heart failure ([address] 11/05/2019    Acute kidney injury ([address] 11/05/2019    Acute on chronic diastolic HF (heart failure) ([address] 11/04/2019    Morbid obesity ([address] 11/04/2019    ALYSSA on CPAP 11/04/2019      Sean Amaya MD  Past Medical History:   Diagnosis Date    A-fib [facility])     Allergic reaction to contrast material     galodinium    Hypertension     Irregular heart rhythm     Mercury poisoning     Morbid obesity ([facility] 11/4/2019    ALYSSA on CPAP     ALYSSA on CPAP 11/4/2019    Post concussion syndrome       Past Surgical History:   Procedure Laterality Date    HX SHOULDER ARTHROSCOPY      HX UROLOGICAL Left     hydrocoele resection    ME ICAR CATHETER ABLATION ATRIOVENTR NODE FUNCTION N/A 4/3/2020    ABLATION AV NODE performed by Mack Brennan MD at [address]  CARDIAC CATH LAB    ME INS NEW/RPLC PRM PACEMAKER W/TRANSV ELTRD VENTR N/A 4/3/2020 INSERT PPM SINGLE VENTRICULAR performed by Mack Brennan MD at [facility] CARDIAC CATH LAB     Allergies   Allergen Reactions    Ace Inhibitors Cough    Gadolinium-Containing Contrast Media Rash    Iodinated Contrast Media Rash     No contrast injection dye      Social History     Tobacco Use    Smoking status: Never Smoker    Smokeless tobacco: Never Used   Substance Use Topics    Alcohol use: Not Currently    Drug use: Never     Family History   Problem Relation Age of Onset    Colon Cancer Mother     Hypertension Father     Other Father         multiple birth defects    Hypertension Paternal Grandfather       No current facility-administered medications for this encounter. Review of Symptoms:  Constitutional: negative  Eyes: negative  Ears, nose, mouth, throat, and face: negative  Respiratory: sob, tucker  Cardiovascular: negative  Gastrointestinal: negative  Genitourinary: negative  Musculoskeletal: negative  Neurological: negative  Behvioral/Psych: negative  Endocrine: negative     Subjective:      Visit Vitals  Temp 97.7 °F (36.5 °C)   Ht 6' 2\" (1.88 m)   Wt 320 lb (145.2 kg)   BMI 41.09 kg/m²       Physical Exam  Abdomen: soft, non-tender. Extremities: extremities normal  Heart: regular rate and rhythm  Lungs: clear to auscultation bilaterally  Pulses: 2+ and symmetric    Cardiographics    Telemetry: v paced      Echocardiogram: lvef 25-30    Labs:  No results for input(s): WBC, HGB, HCT, PLT, HGBEXT, HCTEXT, PLTEXT in the last 72 hours. No results for input(s): NA, K, CL, CO2, GLU, BUN, CREA, CA, MG, PHOS, ALB, TBIL, TBILI, ALT, INR, INREXT in the last 72 hours. No lab exists for component: SGOT,  BNP    No results for input(s): TROIQ, CPK, CKMB in the last 72 hours.     No intake or output data in the 24 hours ending 10/23/20 0827      Assessment:     Assessment:       Active Problems:    Chronic combined systolic and diastolic congestive heart failure ([facility] (11/5/2019) Essential hypertension (12/4/2019)      S/P AV (atrioventricular) pat ablation (4/3/2020)      Overview: 4/3/20      Permanent atrial fibrillation ([facility] (4/12/2020)      Dilated cardiomyopathy ([facility] (10/23/2020)         Plan:     Catherin Dance is a pleasant gentleman with permanent af sp av node abl/ppm in April who presented with worsening chf. His cardiomyopathy has worsened despite being compliant with med rx. lvef has dropped from 35-40 in June to 25-30 in august. He is a candidate for an upgrade to a biv icd. Based on risk of sudden cardiac death, a formal shared decision has been made to proceed with implantation of a cardiac defibrillator for primary prevention of sudden cardiac death using the [address] [place] [address] patient decision aid tool. The patient verbalizes understanding of indication, risks and benefits of ICD procedure. He states that he has an allergy to all 'iv dyes' but he received iv dye November 2019 without any premedication and without any issues. D/w anesthesia.  Will proceed with iv dye for venogram.      Salvador Bull MD, [facility]    10/23/2020

## 2020-10-23 NOTE — PROGRESS NOTES
Cardiac Cath Lab Recovery Arrival Note:      Makenzie Hanna arrived to Cardiac Cath Lab, Recovery Area. Staff introduced to patient. Patient identifiers verified with NAME and DATE OF BIRTH. Procedure verified with patient. Consent forms reviewed and signed by patient or authorized representative and verified. Allergies verified. Patient and family oriented to department. Patient and family informed of procedure and plan of care. Questions answered with review. Patient prepped for procedure, per orders from physician, prior to arrival.    Patient on cardiac monitor, non-invasive blood pressure, SPO2 monitor. On room air. Patient is A&Ox 4. Patient reports no complaints. Patient in stretcher, in low position, with side rails up, call bell within reach, patient instructed to call if assistance as needed. Patient prep in: [address], [place] [address]. Family not present. Prep by: Tank Harden RN and KILEY Kaminski RN

## 2020-10-23 NOTE — ANESTHESIA PREPROCEDURE EVALUATION
Relevant Problems   No relevant active problems       Anesthetic History   No history of anesthetic complications            Review of Systems / Medical History  Patient summary reviewed, nursing notes reviewed and pertinent labs reviewed    Pulmonary        Sleep apnea: CPAP           Neuro/Psych   Within defined limits           Cardiovascular    Hypertension      CHF  Dysrhythmias : atrial fibrillation  Pacemaker and PAD    Exercise tolerance: <4 METS  Comments: EF 30%   GI/Hepatic/Renal         Renal disease: CRI       Endo/Other        Morbid obesity and arthritis     Other Findings   Comments: Hx Mercury poisoning            Physical Exam    Airway  Mallampati: IV  TM Distance: 4 - 6 cm  Neck ROM: decreased range of motion, short neck   Mouth opening: Diminished (comment)     Cardiovascular  Regular rate and rhythm,  S1 and S2 normal,  no murmur, click, rub, or gallop  Rhythm: regular  Rate: normal         Dental  No notable dental hx       Pulmonary  Breath sounds clear to auscultation               Abdominal  GI exam deferred       Other Findings            Anesthetic Plan    ASA: 4  Anesthesia type: general - backup and MAC          Induction: Intravenous  Anesthetic plan and risks discussed with: Patient

## 2020-10-23 NOTE — PERIOP NOTES
Handoff Report from Operating Room to PACU    Report received from  and [address] regarding Aislinn Dodson. Surgeon(s):  Anesthesia, Case  And Procedure(s) (LRB):  SPECIAL PROCEDURE OUTSIDE OF OR (N/A)  confirmed   with allergies and dressings discussed. Anesthesia type, drugs, patient history, complications, estimated blood loss, vital signs, intake and output, and last pain medication, lines and temperature were reviewed. 10:56 AM  TRANSFER - OUT REPORT:    Verbal report given to Brooks(name) on Aislinn Dodson  being transferred to  Recovery (unit) for routine progression of care       Report consisted of patients Situation, Background, Assessment and   Recommendations(SBAR). Information from the following report(s) SBAR, Kardex, Procedure Summary, Intake/Output, MAR, Accordion and Cardiac Rhythm Paced was reviewed with the receiving nurse. Lines:   Peripheral IV 10/23/20 Left Arm (Active)   Site Assessment Clean, dry, & intact 10/23/20 1045   Phlebitis Assessment 0 10/23/20 1045   Infiltration Assessment 0 10/23/20 1045   Dressing Status Clean, dry, & intact 10/23/20 1045   Dressing Type Transparent;Tape 10/23/20 1045   Hub Color/Line Status Pink; Infusing 10/23/20 1045        Opportunity for questions and clarification was provided.       Patient transported with:   Monitor  O2 @ 2 liters  Registered Nurse

## 2020-10-23 NOTE — DISCHARGE INSTRUCTIONS
[address]  [phone]        [address] INSTRUCTIONS    Patient ID:  Karen Kelley  [id number]  89 y.o.  1960    Admit Date: 10/23/2020    Discharge Date: 10/23/2020     Admitting Physician: Kiki Gonsales MD     Discharge Physician: Kiki Gonsales MD    Admission Diagnoses:   Cardiomyopathy, unspecified type ([address] [I42.9]  Dilated cardiomyopathy ([address] [I42.0]    Discharge Diagnoses: Active Problems:    Chronic combined systolic and diastolic congestive heart failure ([address] (11/5/2019)      Essential hypertension (12/4/2019)      S/P AV (atrioventricular) pat ablation (4/3/2020)      Overview: 4/3/20      Permanent atrial fibrillation ([address] (4/12/2020)      Dilated cardiomyopathy ([address] (10/23/2020)        Discharge Condition: Good    Cardiology Procedures this Admission:  S/P ICD implantation. Disposition: home    Reference discharge instructions provided by nursing for diet and activity. Follow-up with ICD/PM clinic in 3 weeks. Call [phone] to make an appointment. Signed:  Kiki Gonsales MD  10/23/2020  9:58 AM      DISCHARGE INSTRUCTIONS FOR PATIENTS WITH ICD'S    1. Remember to call for an appointment in 3 weeks [phone]. 2. Medic Alert Bracelets are available from your pharmacist to wear at all times if you choose to wear one. 3. Carry your ID card for your ICD with you at all times. This card will be given to you in the hospital or mailed to you. 4. The ICD will bulge slightly under your skin. The bulge will decrease in size over the next few weeks. Please notify the doctor's office if you notice any of the following around your ICD site:   A.  A bruise that does not go away. B.  Soreness or yellow, green, or brown drainage from the site. C. Any swelling from the site. D. If you have a fever of 100 degrees or higher that lasts for a few days.     INCISION CARE       1.  Leave the dressing over your site until your follow up visit. 2.  You may not shower until after follow up visit. 3.  For comfort, wear loose fitting clothing. 1. 4.  Ice pack to affected shoulder for first 24 hours, wear your sling for 2 days. 2. 5.  Report any signs of infection, fever, pain, swelling, redness, oozing, or heat at site especially if these symptoms increase after the first 3 to 4 days. ACTIVITY PRECAUTIONS     1. Avoid rough contact with the implant site. 2. No driving for 14 days. 3. Avoid lifting your arm over your head, carrying anything on the affected side, or lifting over 10 pounds for 90 days. For the first 2 days only bend your arm at the elbow. 4. Any extreme activity such as golf, weight lifting or exercise biking should be restricted for 60 days. 5. Do not carry objects by holding them against your implant site. 6.  No shooting rifles or any type of gun with the affected shoulder permanently. 7.  Welding and chainsaws are prohibited. SPECIAL PRECAUTIONS     1. You should avoid all strong magnetic fields, such as arc welding, large transformers, large motors. Some ICD devices will beep if it detects a strong magnet. If this occurs, move out of the area. 2.  You may or may not have an MRI which uses a strong magnet to take pictures. 3.  Treatments or surgery that requires diathermy or electrocautery should be discussed with your doctor before scheduled. 4.  Avoid radio frequency transmitters, including radar. 5.  Advise dentist or other medical personnel you see that you have an ICD. 6.  Cell phones and microwave oven use is okay. 7.  If you plan to move or take a trip to a new area, the doctor's office will give you a name of a doctor to contact for any problems. SPECIAL INSTRUCTIONS ON SHOCKS     1. Notify your doctor for any of the following:       A. Anytime a shock is received in a 24 hour period. An office visit is not usually required for a single shock.        B.  Two or more shocks in a row. If you do not feel well, call the [facility], otherwise call your doctor. This may require an office visit. C. Two or more shocks spaced apart by several hours. This may require an office visit. 2.  Keep a record of events. Include date, time, symptoms and activity at that time. ANTIBIOTIC THERAPY    During the first 8 weeks after your ICD insertion, you may need antibiotics before any dental work or certain tests or operations. Let the dentist or doctor who is caring for you know that you have had an implanted device.

## 2020-10-23 NOTE — Clinical Note
TRANSFER - IN REPORT:     Verbal report received from: recovery RN. Report consisted of patient's Situation, Background, Assessment and   Recommendations(SBAR). Opportunity for questions and clarification was provided. Assessment completed upon patient's arrival to unit and care assumed. Patient transported with a Registered Nurse.

## 2020-10-23 NOTE — ANESTHESIA POSTPROCEDURE EVALUATION
Procedure(s):  INSERT ICD BIV MULTI  Remove Pacemaker Single Lead. general - backup    Anesthesia Post Evaluation        Patient location during evaluation: PACU  Patient participation: complete - patient participated  Level of consciousness: awake and alert  Pain management: adequate  Airway patency: patent  Anesthetic complications: no  Cardiovascular status: acceptable  Respiratory status: acceptable  Hydration status: acceptable  Comments: Seen, no complaints   Post anesthesia nausea and vomiting:  none  Final Post Anesthesia Temperature Assessment:  Normothermia (36.0-37.5 degrees C)      INITIAL Post-op Vital signs:   Vitals Value Taken Time   /40 10/23/2020 10:45 AM   Temp 36.5 °C (97.7 °F) 10/23/2020 10:45 AM   Pulse 70 10/23/2020 10:54 AM   Resp 24 10/23/2020 10:54 AM   SpO2 99 % 10/23/2020 10:54 AM   Vitals shown include unvalidated device data.

## 2020-11-06 ENCOUNTER — APPOINTMENT (OUTPATIENT)
Dept: ULTRASOUND IMAGING | Age: 60
End: 2020-11-06
Attending: STUDENT IN AN ORGANIZED HEALTH CARE EDUCATION/TRAINING PROGRAM
Payer: COMMERCIAL

## 2020-11-06 ENCOUNTER — HOSPITAL ENCOUNTER (EMERGENCY)
Age: 60
Discharge: HOME | End: 2020-11-06
Attending: STUDENT IN AN ORGANIZED HEALTH CARE EDUCATION/TRAINING PROGRAM | Admitting: EMERGENCY MEDICINE
Payer: COMMERCIAL

## 2020-11-06 ENCOUNTER — TELEPHONE (OUTPATIENT)
Dept: CARDIOLOGY CLINIC | Age: 60
End: 2020-11-06

## 2020-11-06 ENCOUNTER — APPOINTMENT (OUTPATIENT)
Dept: GENERAL RADIOLOGY | Age: 60
End: 2020-11-06
Attending: STUDENT IN AN ORGANIZED HEALTH CARE EDUCATION/TRAINING PROGRAM
Payer: COMMERCIAL

## 2020-11-06 VITALS
DIASTOLIC BLOOD PRESSURE: 73 MMHG | RESPIRATION RATE: 24 BRPM | TEMPERATURE: 97.2 F | WEIGHT: 315 LBS | HEIGHT: 74 IN | SYSTOLIC BLOOD PRESSURE: 176 MMHG | HEART RATE: 71 BPM | BODY MASS INDEX: 40.43 KG/M2 | OXYGEN SATURATION: 100 %

## 2020-11-06 DIAGNOSIS — M10.9 ACUTE GOUT OF LEFT ANKLE, UNSPECIFIED CAUSE: Primary | ICD-10-CM

## 2020-11-06 LAB
ANION GAP SERPL CALC-SCNC: 3 MMOL/L (ref 5–15)
BASOPHILS # BLD: 0.1 K/UL (ref 0–0.1)
BASOPHILS NFR BLD: 0 % (ref 0–1)
BUN SERPL-MCNC: 18 MG/DL (ref 6–20)
BUN/CREAT SERPL: 11 (ref 12–20)
CALCIUM SERPL-MCNC: 8.9 MG/DL (ref 8.5–10.1)
CHLORIDE SERPL-SCNC: 107 MMOL/L (ref 97–108)
CO2 SERPL-SCNC: 28 MMOL/L (ref 21–32)
CREAT SERPL-MCNC: 1.66 MG/DL (ref 0.7–1.3)
DIFFERENTIAL METHOD BLD: ABNORMAL
EOSINOPHIL # BLD: 0 K/UL (ref 0–0.4)
EOSINOPHIL NFR BLD: 0 % (ref 0–7)
ERYTHROCYTE [DISTWIDTH] IN BLOOD BY AUTOMATED COUNT: 14.3 % (ref 11.5–14.5)
GLUCOSE SERPL-MCNC: 95 MG/DL (ref 65–100)
HCT VFR BLD AUTO: 39.6 % (ref 36.6–50.3)
HGB BLD-MCNC: 13.4 G/DL (ref 12.1–17)
IMM GRANULOCYTES # BLD AUTO: 0.1 K/UL (ref 0–0.04)
IMM GRANULOCYTES NFR BLD AUTO: 1 % (ref 0–0.5)
LYMPHOCYTES # BLD: 1.5 K/UL (ref 0.8–3.5)
LYMPHOCYTES NFR BLD: 11 % (ref 12–49)
MCH RBC QN AUTO: 30.4 PG (ref 26–34)
MCHC RBC AUTO-ENTMCNC: 33.8 G/DL (ref 30–36.5)
MCV RBC AUTO: 89.8 FL (ref 80–99)
MONOCYTES # BLD: 1.2 K/UL (ref 0–1)
MONOCYTES NFR BLD: 9 % (ref 5–13)
NEUTS SEG # BLD: 10.9 K/UL (ref 1.8–8)
NEUTS SEG NFR BLD: 79 % (ref 32–75)
NRBC # BLD: 0 K/UL (ref 0–0.01)
NRBC BLD-RTO: 0 PER 100 WBC
PLATELET # BLD AUTO: 229 K/UL (ref 150–400)
PMV BLD AUTO: 11.8 FL (ref 8.9–12.9)
POTASSIUM SERPL-SCNC: 4.5 MMOL/L (ref 3.5–5.1)
RBC # BLD AUTO: 4.41 M/UL (ref 4.1–5.7)
SODIUM SERPL-SCNC: 138 MMOL/L (ref 136–145)
URATE SERPL-MCNC: 8.8 MG/DL (ref 3.5–7.2)
WBC # BLD AUTO: 13.6 K/UL (ref 4.1–11.1)

## 2020-11-06 PROCEDURE — 80048 BASIC METABOLIC PNL TOTAL CA: CPT

## 2020-11-06 PROCEDURE — 99282 EMERGENCY DEPT VISIT SF MDM: CPT

## 2020-11-06 PROCEDURE — 85025 COMPLETE CBC W/AUTO DIFF WBC: CPT

## 2020-11-06 PROCEDURE — 73610 X-RAY EXAM OF ANKLE: CPT

## 2020-11-06 PROCEDURE — 84550 ASSAY OF BLOOD/URIC ACID: CPT

## 2020-11-06 PROCEDURE — 36415 COLL VENOUS BLD VENIPUNCTURE: CPT

## 2020-11-06 PROCEDURE — 93971 EXTREMITY STUDY: CPT

## 2020-11-06 RX ORDER — PREDNISONE 20 MG/1
40 TABLET ORAL DAILY
Qty: 8 TAB | Refills: 0 | Status: SHIPPED | OUTPATIENT
Start: 2020-11-06 | End: 2020-11-10

## 2020-11-06 NOTE — ED NOTES
Initially assigned after pt placed in a room but pt was reassigned back to the waiting room. Pt never evaluated by this provider.

## 2020-11-06 NOTE — ED PROVIDER NOTES
HPI     Mr. Darius Barry is a 60year old gentleman with a  Past medical history of HTN, afib s/p pacemaker 3 weeks ago, mercury poisoning and ALYSSA who presents to the ED with sudden onset left ankle pain that began 2 days ago. He states that he has injured that ankle in the past. He denies any recent trauma. Has not had any fevers, chills, cough, shortness of breath or chest pain, new rash or any other symptoms. He denies hx of gout. He does report that his cardiologist has been adjusting his metolazone and lasix lately. He was unaware of his lower extremity edema.          Past Medical History:   Diagnosis Date    A-fib [facility])     Allergic reaction to contrast material     galodinium    Hypertension     Irregular heart rhythm     Mercury poisoning     Morbid obesity ([facility] 11/4/2019    ALYSSA on CPAP     ALSYSA on CPAP 11/4/2019    Post concussion syndrome        Past Surgical History:   Procedure Laterality Date    HX SHOULDER ARTHROSCOPY      HX UROLOGICAL Left     hydrocoele resection    CO ICAR CATHETER ABLATION ATRIOVENTR NODE FUNCTION N/A 4/3/2020    ABLATION AV NODE performed by Tong Roper MD at [facility] CARDIAC CATH LAB    CO INS NEW/RPLC PRM PACEMAKER W/TRANSV ELTRD VENTR N/A 4/3/2020    INSERT PPM SINGLE VENTRICULAR performed by Tong Roper MD at [facility] CARDIAC CATH LAB    CO INSJ/RPLCMT PERM DFB W/TRNSVNS LDS 1/DUAL CHMBR N/A 10/23/2020    INSERT ICD BIV MULTI performed by Tong Roper MD at [facility] CARDIAC CATH LAB    CO REMOVAL PERMANENT PACEMAKER PULSE GENERATOR ONLY N/A 10/23/2020    Remove Pacemaker Single Lead performed by Tong Roper MD at [facility] CARDIAC CATH LAB         Family History:   Problem Relation Age of Onset    Colon Cancer Mother     Hypertension Father     Other Father         multiple birth defects    Hypertension Paternal Grandfather        Social History     Socioeconomic History    Marital status:      Spouse name: Not on file    Number of children: Not on file    Years of education: Not on file    Highest education level: Not on file   Occupational History    Occupation:    Social Needs    Financial resource strain: Not on file    Food insecurity     Worry: Not on file     Inability: Not on file    Transportation needs     Medical: Not on file     Non-medical: Not on file   Tobacco Use    Smoking status: Never Smoker    Smokeless tobacco: Never Used   Substance and Sexual Activity    Alcohol use: Not Currently    Drug use: Never    Sexual activity: Not on file   Lifestyle    Physical activity     Days per week: Not on file     Minutes per session: Not on file    Stress: Not on file   Relationships    Social connections     Talks on phone: Not on file     Gets together: Not on file     Attends Muslim service: Not on file     Active member of club or organization: Not on file     Attends meetings of clubs or organizations: Not on file     Relationship status: Not on file    Intimate partner violence     Fear of current or ex partner: Not on file     Emotionally abused: Not on file     Physically abused: Not on file     Forced sexual activity: Not on file   Other Topics Concern    Not on file   Social History Narrative    Not on file         ALLERGIES: Ace inhibitors; Gadolinium-containing contrast media; and Iodinated contrast media    Review of Systems   Constitutional: Negative for chills and fever. HENT: Negative for congestion. Eyes: Negative for visual disturbance. Respiratory: Negative for cough and shortness of breath. Cardiovascular: Negative for chest pain. Gastrointestinal: Negative for abdominal pain. Genitourinary: Negative for dysuria. Musculoskeletal: Positive for joint swelling. Skin: Negative for rash. Neurological: Negative for headaches.        Vitals:    11/06/20 1457   BP: (!) 176/73   Pulse: 71   Resp: 24   Temp: 97.2 °F (36.2 °C)   SpO2: 100%   Weight: 152 kg (335 lb) Height: 6' 2\" (1.88 m)            Physical Exam  Vitals signs and nursing note reviewed. Constitutional:       Appearance: He is well-developed. HENT:      Head: Normocephalic and atraumatic. Eyes:      General:         Right eye: No discharge. Left eye: No discharge. Conjunctiva/sclera: Conjunctivae normal.      Pupils: Pupils are equal, round, and reactive to light. Neck:      Musculoskeletal: Normal range of motion and neck supple. Trachea: No tracheal deviation. Cardiovascular:      Rate and Rhythm: Normal rate and regular rhythm. Heart sounds: Normal heart sounds. No murmur. Pulmonary:      Effort: Pulmonary effort is normal. No respiratory distress. Breath sounds: Normal breath sounds. No wheezing or rales. Abdominal:      General: Bowel sounds are normal.      Palpations: Abdomen is soft. Tenderness: There is no abdominal tenderness. There is no guarding or rebound. Musculoskeletal:      Comments: Patient has bilateral 3+ lower extremity edema. He has tenderness diffusely to the left ankle, no bony step offs. No deformity. Palpable dorsalis pedis pulses. No increased warmth or redness when compared to the contralateral side   Skin:     General: Skin is warm and dry. Findings: No erythema or rash. Neurological:      Mental Status: He is alert and oriented to person, place, and time. Psychiatric:         Behavior: Behavior normal.          MDM     Patient is a 60year old gentleman with a history of diffuse arthritis and recent pacemaker placement on eliquis (compliant) who reports 2 days of sudden onset left ankle pain. On exam he has 3+ pitting edema bilaterally, no increased warmth no deformity no history of trauma. DDX includes but is not limited to fracture vs gout vs pseudogout vs arthritis worsened by lower extremity edema. Patient has had multiple changes in his diuretics which could put him at risk for gout despite no hx.  No hx of trauma however will obtain xray. Patient has taken tylenol with minimal relief. Also given his hx will check basic labs to rule out electrolyte abnormalities. Elevated wbc however no other infectious symptoms. Ankle is not more warm or swollen than the counterpart. Low suspicion for septic joint. Given elevated uric acid and hx of aggressive diuresis suspect gout. No evidence of fx on imaging.      Procedures

## 2020-11-06 NOTE — TELEPHONE ENCOUNTER
Please call patient regarding pain in ankle wondering if he could have blood clot.     [phone]    Thanks  Sandip Juarez

## 2020-11-06 NOTE — DISCHARGE INSTRUCTIONS
Return to the ER if you develop fevers, worsening pain, increased redness or any other new or concerning symptoms

## 2020-11-06 NOTE — ED NOTES
Assumed care of pt via triage. Pt states he had a pacemaker placed about 3 weeks ago. Pt states today he woke up today and couldn't walk on his left foot. Pt denies any injury/trauma. Pt has bilateral ankle/leg swelling along with redness. Pt denies CP or SOB. Pt resting comfortably on stretcher in position of comfort. Pt in no apparent distress at this time. Call bell within reach. Pt a/o x4. Stretcher locked in lowest position. Pt aware of plan to await for MD/PA-C/NP assessment, and pt/family verbalizes understanding. Will continue to monitor pt condition.

## 2020-11-09 ENCOUNTER — TELEPHONE (OUTPATIENT)
Dept: CARDIOLOGY CLINIC | Age: 60
End: 2020-11-09

## 2020-11-09 NOTE — TELEPHONE ENCOUNTER
Patient needs to speak with the nurse in reference to the following medication furosemide (LASIX) 40 mg .               Thanks,

## 2020-11-10 NOTE — TELEPHONE ENCOUNTER
Spoke to patient using 2 identifiers. Patient stated that he may be reacting to metolazone and/or lasix - giving him gout. Was in the ED on 11/6/20 for foot and ankle pain. He would like to know alternatives for metolazone/lasix. Please advise.

## 2020-11-10 NOTE — TELEPHONE ENCOUNTER
Spoke to patient using 2 identifiers. Per Mian Burton NP that alternatives to lasix and metolazone could also cause gout. If he wants, he can hold metolazone and take it only as needed for swelling >3 lbs/day or > 5 lbs/week.  Continue lasix daily as already prescribed. There are particularly types of foods that can increase risk for gout, so he should avoid those types of foods -- don't eat a lot of protein (meat and seafood), avoid sugary drinks and high sources of carbohydrates and he should focus on plenty fresh fruits and vegetables. Patient verbalized understanding.

## 2020-11-20 NOTE — PROGRESS NOTES
Subjective/HPI:     Catherin Dance is a 60 y.o. male is here for routine f/u. He has a PMHx of PAF s/p AV pat ablation with BiV PPM, HTN, obesity, ALYSSA on CPAP and gadolinium poisoning. Recently had an episode of gout and feels his diuretics were causing this, asked for alternatives. He had BiV ICD upgrade in 10/2020. He reports fatigue, sleeping most of the day. He is concerned about diuretics causing gout, has times where he is urinating often and other days where he hardly urinates at all. Does not think it corresponds to days when he takes Zaroxolyn. He denies orthopnea, PND, edema, CP, SOB/ELIZONDO. He has occasional positional lightheadedness, less often. He reports some depression, denies SI. Sees psychiatrist.     PCP Provider  Wolfgang Loya MD    Past Medical History:   Diagnosis Date    A-fib [facility])     Allergic reaction to contrast material     galodinium    Congestive heart failure ([facility]     Hypertension     Irregular heart rhythm     Mercury poisoning     Morbid obesity ([facility] [address] 11/4/2019    ALYSSA on CPAP     ALYSSA on CPAP 11/4/2019    Post concussion syndrome         Allergies   Allergen Reactions    Ace Inhibitors Cough    Gadolinium-Containing Contrast Media Rash    Iodinated Contrast Media Rash     No contrast injection dye        Outpatient Encounter Medications as of 11/24/2020   Medication Sig Dispense Refill    allopurinoL (ZYLOPRIM) 300 mg tablet take 1 tablet by mouth twice a day      colchicine 0.6 mg tablet take 2 tablets by mouth INITIALLY. THEN take 1 tablet in 1 hour      furosemide (LASIX) 40 mg tablet take 1 tablet by mouth twice a day 180 Tab 3    potassium chloride (K-DUR, KLOR-CON) 20 mEq tablet Take 2 Tabs by mouth two (2) times a day. 360 Tab 3    Entresto 49-51 mg tab tablet take 1 tablet by mouth twice a day 180 Tab 3    metoprolol tartrate (LOPRESSOR) 25 mg tablet Take 1 Tab by mouth every twelve (12) hours.  180 Tab 3    apixaban (Eliquis) 5 mg tablet Take 5 mg by mouth two (2) times a day.  testosterone cypionate (Depo-Testosterone) 100 mg/mL injection 200 mg by IntraMUSCular route Once every 2 weeks.  anastrozole (ARIMIDEX) 1 mg tablet Take 1 mg by mouth every seven (7) days. 0    dextroamphetamine-amphetamine (ADDERALL) 30 mg tablet Take 30 mg by mouth two (2) times a day.  metOLazone (ZAROXOLYN) 5 mg tablet Take 1 Tab by mouth every Monday, Wednesday, Friday. (Patient taking differently: Take 5 mg by mouth every other day.) 45 Tab 2    desloratadine (CLARINEX) 5 mg tablet Take 5 mg by mouth daily.  montelukast (SINGULAIR) 10 mg tablet Take 10 mg by mouth every evening. No facility-administered encounter medications on file as of 11/24/2020. Review of Symptoms:    Review of Systems   Constitutional: Positive for malaise/fatigue. Negative for chills, fever and weight loss. HENT: Negative. Negative for hearing loss and nosebleeds. Eyes: Negative for blurred vision and double vision. Respiratory: Negative for cough, hemoptysis, shortness of breath and wheezing. Cardiovascular: Negative. Negative for chest pain, palpitations, orthopnea, claudication, leg swelling and PND. Gastrointestinal: Negative. Negative for abdominal pain, blood in stool, diarrhea, nausea and vomiting. Musculoskeletal: Positive for joint pain. Negative for myalgias. Skin: Negative. Negative for rash. Neurological: Negative. Negative for dizziness, tingling, loss of consciousness and headaches. Endo/Heme/Allergies: Does not bruise/bleed easily. Physical Exam:      General: Well developed, in no acute distress, cooperative and alert. Clammy. HEENT: No carotid bruits, no JVD, trach is midline. Neck Supple, PEERL, EOM intact. Heart:  reg rate and rhythm; normal S1/S2; no murmurs, no gallops or rubs.    Respiratory: Clear bilaterally x 4, no wheezing or rales  Abdomen:   Soft, non-tender, no distention, no masses. + BS. Extremities:  Normal cap refill, no cyanosis, atraumatic. No edema. Neuro: A&Ox3, speech clear, gait stable. Skin: Skin color is normal. No rashes or lesions. Non diaphoretic  Vascular: 2+ pulses symmetric in all extremities    Vitals:    11/24/20 0906 11/24/20 0920   BP: (!) 158/100 (!) 162/98   Pulse: 72    Resp: 18    Weight: 336 lb 12.8 oz (152.8 kg)    Height: 6' 2\" (1.88 m)        Cardiology Labs:    Lab Results   Component Value Date/Time    Cholesterol, total 167 11/05/2019 04:41 AM    HDL Cholesterol 38 11/05/2019 04:41 AM    LDL, calculated 113.6 (H) 11/05/2019 04:41 AM    VLDL, calculated 15.4 11/05/2019 04:41 AM    CHOL/HDL Ratio 4.4 11/05/2019 04:41 AM       Lab Results   Component Value Date/Time    Hemoglobin A1c 6.1 (H) 04/02/2020 03:36 AM       Lab Results   Component Value Date/Time    Sodium 138 11/06/2020 05:43 PM    Potassium 4.5 11/06/2020 05:43 PM    Chloride 107 11/06/2020 05:43 PM    CO2 28 11/06/2020 05:43 PM    Glucose 95 11/06/2020 05:43 PM    BUN 18 11/06/2020 05:43 PM    Creatinine 1.66 (H) 11/06/2020 05:43 PM    BUN/Creatinine ratio 11 (L) 11/06/2020 05:43 PM    GFR est AA 51 (L) 11/06/2020 05:43 PM    GFR est non-AA 42 (L) 11/06/2020 05:43 PM    Calcium 8.9 11/06/2020 05:43 PM    Anion gap 3 (L) 11/06/2020 05:43 PM    Bilirubin, total 0.5 10/08/2020 10:55 AM    ALT (SGPT) 17 10/08/2020 10:55 AM    Alk. phosphatase 80 10/08/2020 10:55 AM    Protein, total 6.9 10/08/2020 10:55 AM    Albumin 4.5 10/08/2020 10:55 AM    Globulin 4.0 04/11/2020 11:08 PM    A-G Ratio 1.9 10/08/2020 10:55 AM          Assessment:       ICD-10-CM ICD-9-CM    1. Chronic combined systolic and diastolic congestive heart failure (HCC)  I50.42 428.42      428.0    2. Permanent atrial fibrillation (HCC)  I48.21 427.31    3. Essential hypertension  I10 401.9    4. S/P AV (atrioventricular) pat ablation  Z98.890 V45.89    5. Pacemaker  Z95.0 V45.01         Plan:     1.  Combined systolic and diastolic heart failure  Echo done 8/2020 with reduced LVEF 30-35% with grade 3 DD, mod MR. S/P implantation of biV ICD 10/2020. Recent ED visit for ankle pain felt likely gout. Discussed with patient increasing water intake when he is exerting himself d/t being a heavy sweater. He reports he knows to do this. Weight trending up though likely more from poor eating habits then fluid. No orthpnea or PND, on exam clear lungs. He do try to reduce frequency of Metolazone, increase water intake with exertion. Continue for now lasix dose for fluid management  BP elevated, increase Entresto 97/103, cont lopressor  Recheck echo in 3 months    2. Permanent atrial fibrillation (HCC)  S/p AV pat ablation, now with BiV ICD implant  Continue metoprolol and Eliquis    3. Essential hypertension  BP not well controlled. Increase Entresto which may help reduce need for diuretics. Continue low sodium diet    4. Biventricular ICD (implantable cardioverter-defibrillator) present  S/p BiV ICD upgrade in 10/2020  Continue with routine device interrogation with Dr. Marisa White    5. S/P AV (atrioventricular) pat ablation  S/p AV pat ablation in 4/2020    6. Depression  Reports depression, sleeping 23hrs/day. Denies SI. Sees reportedly a psychiatrist. Not on medication for depression. Recommended f/u with PCP and psychiatrist.    F/u with Dr. Haynes in 6 months      [facility]    11/24/2020         Patient seen, examined by me personally. Plan discussed as detailed. Agree with note as outlined by  NP. I confirm findings in history and physical exam. No additional findings noted. Agree with plan as outlined above. Most of the time spent discussing his various medical issues. Advised to discuss gout with pcp. May benefit from rheumatology consult.     Elizabeth Fraga MD

## 2020-11-24 ENCOUNTER — OFFICE VISIT (OUTPATIENT)
Dept: CARDIOLOGY CLINIC | Age: 60
End: 2020-11-24
Payer: COMMERCIAL

## 2020-11-24 VITALS
BODY MASS INDEX: 40.43 KG/M2 | SYSTOLIC BLOOD PRESSURE: 162 MMHG | RESPIRATION RATE: 18 BRPM | WEIGHT: 315 LBS | HEART RATE: 72 BPM | DIASTOLIC BLOOD PRESSURE: 98 MMHG | HEIGHT: 74 IN

## 2020-11-24 DIAGNOSIS — I50.41 ACUTE COMBINED SYSTOLIC AND DIASTOLIC CONGESTIVE HEART FAILURE (HCC): ICD-10-CM

## 2020-11-24 DIAGNOSIS — I50.42 CHRONIC COMBINED SYSTOLIC AND DIASTOLIC CONGESTIVE HEART FAILURE (HCC): Primary | Chronic | ICD-10-CM

## 2020-11-24 DIAGNOSIS — I10 ESSENTIAL HYPERTENSION: Chronic | ICD-10-CM

## 2020-11-24 DIAGNOSIS — Z98.890 S/P AV (ATRIOVENTRICULAR) NODAL ABLATION: Chronic | ICD-10-CM

## 2020-11-24 DIAGNOSIS — Z95.0 PACEMAKER: ICD-10-CM

## 2020-11-24 DIAGNOSIS — I48.21 PERMANENT ATRIAL FIBRILLATION (HCC): Chronic | ICD-10-CM

## 2020-11-24 PROCEDURE — 99214 OFFICE O/P EST MOD 30 MIN: CPT | Performed by: INTERNAL MEDICINE

## 2020-11-24 RX ORDER — COLCHICINE 0.6 MG/1
TABLET ORAL
COMMUNITY
Start: 2020-11-20 | End: 2021-05-27

## 2020-11-24 RX ORDER — ALLOPURINOL 300 MG/1
TABLET ORAL
COMMUNITY
Start: 2020-11-20

## 2020-11-24 RX ORDER — SACUBITRIL AND VALSARTAN 97; 103 MG/1; MG/1
1 TABLET, FILM COATED ORAL 2 TIMES DAILY
Qty: 60 TAB | Refills: 5 | Status: SHIPPED | OUTPATIENT
Start: 2020-11-24 | End: 2021-09-30 | Stop reason: SDUPTHER

## 2020-11-24 NOTE — LETTER
11/24/20 Patient: Glennette Peabody YOB: 1960 Date of Visit: 11/24/2020 Jarad Reddy MD 
[address] [address] VIA Facsimile: [phone] Dear Jarad Reddy MD, Thank you for referring Mr. Rashida Catalan to [address] [place] [address] for evaluation. My notes for this consultation are attached. If you have questions, please do not hesitate to call me. I look forward to following your patient along with you. Sincerely, Sophia Corbett MD

## 2020-11-24 NOTE — PROGRESS NOTES
1. Have you been to the ER, urgent care clinic since your last visit? Hospitalized since your last visit? 11-6-2020 ED [facility] ER gout. 2. Have you seen or consulted any other health care providers outside of the [address] [place] [address] since your last visit? Include any pap smears or colon screening.  No

## 2020-11-30 RX ORDER — APIXABAN 5 MG/1
TABLET, FILM COATED ORAL
Qty: 60 TAB | Refills: 4 | Status: SHIPPED | OUTPATIENT
Start: 2020-11-30 | End: 2021-05-20 | Stop reason: SDUPTHER

## 2020-12-02 ENCOUNTER — TELEPHONE (OUTPATIENT)
Dept: CARDIOLOGY CLINIC | Age: 60
End: 2020-12-02

## 2020-12-02 DIAGNOSIS — I50.42 CHRONIC COMBINED SYSTOLIC AND DIASTOLIC CONGESTIVE HEART FAILURE (HCC): ICD-10-CM

## 2020-12-02 DIAGNOSIS — I50.42 CHRONIC COMBINED SYSTOLIC AND DIASTOLIC CONGESTIVE HEART FAILURE (HCC): Primary | ICD-10-CM

## 2020-12-03 ENCOUNTER — TELEPHONE (OUTPATIENT)
Dept: CARDIOLOGY CLINIC | Age: 60
End: 2020-12-03

## 2020-12-03 NOTE — TELEPHONE ENCOUNTER
Called patient cell phone, Monique Quiroz is full. Left a message on home phone stating to call me back in regards to recent remote transmission.

## 2020-12-04 ENCOUNTER — CLINICAL SUPPORT (OUTPATIENT)
Dept: CARDIOLOGY CLINIC | Age: 60
End: 2020-12-04
Payer: COMMERCIAL

## 2020-12-04 ENCOUNTER — HOSPITAL ENCOUNTER (OUTPATIENT)
Dept: GENERAL RADIOLOGY | Age: 60
Discharge: HOME | End: 2020-12-04
Payer: COMMERCIAL

## 2020-12-04 ENCOUNTER — TELEPHONE (OUTPATIENT)
Dept: CARDIOLOGY CLINIC | Age: 60
End: 2020-12-04

## 2020-12-04 DIAGNOSIS — Z98.890 S/P AV (ATRIOVENTRICULAR) NODAL ABLATION: ICD-10-CM

## 2020-12-04 DIAGNOSIS — I48.21 PERMANENT ATRIAL FIBRILLATION (HCC): ICD-10-CM

## 2020-12-04 DIAGNOSIS — T82.110A FAILURE OF PACEMAKER LEAD, INITIAL ENCOUNTER: ICD-10-CM

## 2020-12-04 DIAGNOSIS — I10 ESSENTIAL HYPERTENSION: ICD-10-CM

## 2020-12-04 DIAGNOSIS — Z95.0 CARDIAC PACEMAKER IN SITU: ICD-10-CM

## 2020-12-04 DIAGNOSIS — I42.9 CARDIOMYOPATHY, UNSPECIFIED TYPE (HCC): ICD-10-CM

## 2020-12-04 DIAGNOSIS — Z95.810 AICD (AUTOMATIC CARDIOVERTER/DEFIBRILLATOR) PRESENT: ICD-10-CM

## 2020-12-04 DIAGNOSIS — T82.110A FAILURE OF PACEMAKER LEAD, INITIAL ENCOUNTER: Primary | ICD-10-CM

## 2020-12-04 PROCEDURE — 71046 X-RAY EXAM CHEST 2 VIEWS: CPT

## 2020-12-04 PROCEDURE — 93283 PRGRMG EVAL IMPLANTABLE DFB: CPT | Performed by: INTERNAL MEDICINE

## 2020-12-04 NOTE — TELEPHONE ENCOUNTER
Patient is reporting worsening swelling in his legs and sob. He has stopped Metolazone and has reduced his Lasix to once daily. Advised he will likely not be able to cut back on his diuretics that much given his propensity to hold onto fluid. Take extra lasix today, take Metolazone 30 minutes prior to taking the Lasix. Continue to repeat until fluid/sob is improved. Balancing act with his gout.

## 2020-12-05 LAB
ALBUMIN SERPL-MCNC: 4.5 G/DL (ref 3.8–4.9)
ALBUMIN/GLOB SERPL: 2.1 {RATIO} (ref 1.2–2.2)
ALP SERPL-CCNC: 70 IU/L (ref 39–117)
ALT SERPL-CCNC: 17 IU/L (ref 0–44)
AST SERPL-CCNC: 21 IU/L (ref 0–40)
BILIRUB SERPL-MCNC: 0.7 MG/DL (ref 0–1.2)
BUN SERPL-MCNC: 11 MG/DL (ref 8–27)
BUN/CREAT SERPL: 7 (ref 10–24)
CALCIUM SERPL-MCNC: 9.6 MG/DL (ref 8.6–10.2)
CHLORIDE SERPL-SCNC: 103 MMOL/L (ref 96–106)
CO2 SERPL-SCNC: 22 MMOL/L (ref 20–29)
CREAT SERPL-MCNC: 1.51 MG/DL (ref 0.76–1.27)
ERYTHROCYTE [DISTWIDTH] IN BLOOD BY AUTOMATED COUNT: 13.6 % (ref 11.6–15.4)
GLOBULIN SER CALC-MCNC: 2.1 G/DL (ref 1.5–4.5)
GLUCOSE SERPL-MCNC: 107 MG/DL (ref 65–99)
HCT VFR BLD AUTO: 43.2 % (ref 37.5–51)
HGB BLD-MCNC: 15 G/DL (ref 13–17.7)
INR PPP: 1 (ref 0.9–1.2)
INTERPRETATION: NORMAL
MCH RBC QN AUTO: 30.4 PG (ref 26.6–33)
MCHC RBC AUTO-ENTMCNC: 34.7 G/DL (ref 31.5–35.7)
MCV RBC AUTO: 87 FL (ref 79–97)
PLATELET # BLD AUTO: 223 X10E3/UL (ref 150–450)
POTASSIUM SERPL-SCNC: 4.7 MMOL/L (ref 3.5–5.2)
PROT SERPL-MCNC: 6.6 G/DL (ref 6–8.5)
PROTHROMBIN TIME: 10.6 SEC (ref 9.1–12)
RBC # BLD AUTO: 4.94 X10E6/UL (ref 4.14–5.8)
SODIUM SERPL-SCNC: 140 MMOL/L (ref 134–144)
WBC # BLD AUTO: 9.5 X10E3/UL (ref 3.4–10.8)

## 2020-12-05 NOTE — TELEPHONE ENCOUNTER
Spoke with patient. Verified patient with two patient identifiers. States he saw Dr. Gloria Madera yesterday. Found PM lead not attached. They are handing this. States they adjusted his meds so they are working on adjusting everything and did not need us at this time. States he will call us if he doesn't improve. Patient verbalized understanding.

## 2020-12-12 ENCOUNTER — HOSPITAL ENCOUNTER (OUTPATIENT)
Dept: PREADMISSION TESTING | Age: 60
Discharge: HOME | End: 2020-12-12
Payer: COMMERCIAL

## 2020-12-12 PROCEDURE — 87635 SARS-COV-2 COVID-19 AMP PRB: CPT

## 2020-12-16 ENCOUNTER — TELEPHONE (OUTPATIENT)
Dept: CARDIOLOGY CLINIC | Age: 60
End: 2020-12-16

## 2020-12-16 NOTE — PERIOP NOTES
Patient denied any COVID-19 symptoms or possible exposure that would require a pre-procedural COVID-19 test for the Cath Lab procedure scheduled on 12/21.

## 2020-12-21 ENCOUNTER — APPOINTMENT (OUTPATIENT)
Dept: GENERAL RADIOLOGY | Age: 60
End: 2020-12-21
Attending: INTERNAL MEDICINE
Payer: COMMERCIAL

## 2020-12-21 ENCOUNTER — ANESTHESIA EVENT (OUTPATIENT)
Dept: CARDIAC CATH/INVASIVE PROCEDURES | Age: 60
End: 2020-12-21
Payer: COMMERCIAL

## 2020-12-21 ENCOUNTER — ANESTHESIA (OUTPATIENT)
Dept: CARDIAC CATH/INVASIVE PROCEDURES | Age: 60
End: 2020-12-21
Payer: COMMERCIAL

## 2020-12-21 ENCOUNTER — HOSPITAL ENCOUNTER (OUTPATIENT)
Age: 60
Discharge: HOME | End: 2020-12-21
Attending: INTERNAL MEDICINE | Admitting: INTERNAL MEDICINE
Payer: COMMERCIAL

## 2020-12-21 VITALS
TEMPERATURE: 97.8 F | BODY MASS INDEX: 40.43 KG/M2 | RESPIRATION RATE: 18 BRPM | HEIGHT: 74 IN | SYSTOLIC BLOOD PRESSURE: 157 MMHG | WEIGHT: 315 LBS | HEART RATE: 70 BPM | OXYGEN SATURATION: 95 % | DIASTOLIC BLOOD PRESSURE: 89 MMHG

## 2020-12-21 DIAGNOSIS — Z98.890 S/P AV (ATRIOVENTRICULAR) NODAL ABLATION: Chronic | ICD-10-CM

## 2020-12-21 DIAGNOSIS — I42.0 DILATED CARDIOMYOPATHY (HCC): Chronic | ICD-10-CM

## 2020-12-21 DIAGNOSIS — I48.21 PERMANENT ATRIAL FIBRILLATION (HCC): Chronic | ICD-10-CM

## 2020-12-21 DIAGNOSIS — I10 ESSENTIAL HYPERTENSION: Chronic | ICD-10-CM

## 2020-12-21 DIAGNOSIS — I50.42 CHRONIC COMBINED SYSTOLIC AND DIASTOLIC CONGESTIVE HEART FAILURE (HCC): Chronic | ICD-10-CM

## 2020-12-21 DIAGNOSIS — I42.9 CARDIOMYOPATHY, UNSPECIFIED TYPE (HCC): ICD-10-CM

## 2020-12-21 PROBLEM — T82.198A MALFUNCTION OF IMPLANTABLE DEFIBRILLATOR VENTRICULAR (ICD) LEAD: Status: ACTIVE | Noted: 2020-12-21

## 2020-12-21 PROCEDURE — C1781 MESH (IMPLANTABLE): HCPCS | Performed by: INTERNAL MEDICINE

## 2020-12-21 PROCEDURE — C1894 INTRO/SHEATH, NON-LASER: HCPCS | Performed by: INTERNAL MEDICINE

## 2020-12-21 PROCEDURE — 33234 REMOVAL OF PACEMAKER SYSTEM: CPT | Performed by: INTERNAL MEDICINE

## 2020-12-21 PROCEDURE — 33216 INSERT 1 ELECTRODE PM-DEFIB: CPT | Performed by: INTERNAL MEDICINE

## 2020-12-21 PROCEDURE — C1900 LEAD, CORONARY VENOUS: HCPCS | Performed by: INTERNAL MEDICINE

## 2020-12-21 PROCEDURE — 33224 INSERT PACING LEAD & CONNECT: CPT | Performed by: INTERNAL MEDICINE

## 2020-12-21 PROCEDURE — 77030012468 HC VLV BLEEDBK CNTRL ABBT -B: Performed by: INTERNAL MEDICINE

## 2020-12-21 PROCEDURE — C1887 CATHETER, GUIDING: HCPCS | Performed by: INTERNAL MEDICINE

## 2020-12-21 PROCEDURE — 74011000258 HC RX REV CODE- 258: Performed by: NURSE ANESTHETIST, CERTIFIED REGISTERED

## 2020-12-21 PROCEDURE — 77030002996 HC SUT SLK J&J -A: Performed by: INTERNAL MEDICINE

## 2020-12-21 PROCEDURE — 71045 X-RAY EXAM CHEST 1 VIEW: CPT

## 2020-12-21 PROCEDURE — C1769 GUIDE WIRE: HCPCS | Performed by: INTERNAL MEDICINE

## 2020-12-21 PROCEDURE — 74011250636 HC RX REV CODE- 250/636: Performed by: NURSE ANESTHETIST, CERTIFIED REGISTERED

## 2020-12-21 PROCEDURE — 2709999900 HC NON-CHARGEABLE SUPPLY: Performed by: INTERNAL MEDICINE

## 2020-12-21 PROCEDURE — 74011000636 HC RX REV CODE- 636: Performed by: INTERNAL MEDICINE

## 2020-12-21 PROCEDURE — 33233 REMOVAL OF PM GENERATOR: CPT | Performed by: INTERNAL MEDICINE

## 2020-12-21 PROCEDURE — 76060000033 HC ANESTHESIA 1 TO 1.5 HR: Performed by: INTERNAL MEDICINE

## 2020-12-21 PROCEDURE — 77030018729 HC ELECTRD DEFIB PAD CARD -B: Performed by: INTERNAL MEDICINE

## 2020-12-21 PROCEDURE — 74011000250 HC RX REV CODE- 250: Performed by: INTERNAL MEDICINE

## 2020-12-21 PROCEDURE — 74011250636 HC RX REV CODE- 250/636: Performed by: INTERNAL MEDICINE

## 2020-12-21 PROCEDURE — 77030028698 HC BLD TISS PLSM MEDT -D: Performed by: INTERNAL MEDICINE

## 2020-12-21 PROCEDURE — C1751 CATH, INF, PER/CENT/MIDLINE: HCPCS | Performed by: INTERNAL MEDICINE

## 2020-12-21 PROCEDURE — 77030039825 HC MSK NSL PAP SUPERNO2VA VYRM -B: Performed by: ANESTHESIOLOGY

## 2020-12-21 PROCEDURE — 74011000250 HC RX REV CODE- 250: Performed by: NURSE ANESTHETIST, CERTIFIED REGISTERED

## 2020-12-21 PROCEDURE — 74011000272 HC RX REV CODE- 272: Performed by: INTERNAL MEDICINE

## 2020-12-21 PROCEDURE — 77030022704 HC SUT VLOC COVD -B: Performed by: INTERNAL MEDICINE

## 2020-12-21 DEVICE — ENVELOPE CMRM6133 ABSORB LRG MR
Type: IMPLANTABLE DEVICE | Status: FUNCTIONAL
Brand: TYRX™

## 2020-12-21 RX ORDER — SODIUM CHLORIDE 0.9 % (FLUSH) 0.9 %
5-40 SYRINGE (ML) INJECTION AS NEEDED
Status: DISCONTINUED | OUTPATIENT
Start: 2020-12-21 | End: 2020-12-21 | Stop reason: HOSPADM

## 2020-12-21 RX ORDER — MIDAZOLAM HYDROCHLORIDE 1 MG/ML
INJECTION, SOLUTION INTRAMUSCULAR; INTRAVENOUS AS NEEDED
Status: DISCONTINUED | OUTPATIENT
Start: 2020-12-21 | End: 2020-12-21 | Stop reason: HOSPADM

## 2020-12-21 RX ORDER — GLYCOPYRROLATE 0.2 MG/ML
INJECTION INTRAMUSCULAR; INTRAVENOUS AS NEEDED
Status: DISCONTINUED | OUTPATIENT
Start: 2020-12-21 | End: 2020-12-21 | Stop reason: HOSPADM

## 2020-12-21 RX ORDER — DEXAMETHASONE SODIUM PHOSPHATE 4 MG/ML
INJECTION, SOLUTION INTRA-ARTICULAR; INTRALESIONAL; INTRAMUSCULAR; INTRAVENOUS; SOFT TISSUE AS NEEDED
Status: DISCONTINUED | OUTPATIENT
Start: 2020-12-21 | End: 2020-12-21 | Stop reason: HOSPADM

## 2020-12-21 RX ORDER — SODIUM CHLORIDE 9 MG/ML
INJECTION, SOLUTION INTRAVENOUS
Status: DISCONTINUED | OUTPATIENT
Start: 2020-12-21 | End: 2020-12-21 | Stop reason: HOSPADM

## 2020-12-21 RX ORDER — DEXMEDETOMIDINE HYDROCHLORIDE 100 UG/ML
INJECTION, SOLUTION INTRAVENOUS AS NEEDED
Status: DISCONTINUED | OUTPATIENT
Start: 2020-12-21 | End: 2020-12-21 | Stop reason: HOSPADM

## 2020-12-21 RX ORDER — PHENYLEPHRINE HCL IN 0.9% NACL 0.4MG/10ML
SYRINGE (ML) INTRAVENOUS AS NEEDED
Status: DISCONTINUED | OUTPATIENT
Start: 2020-12-21 | End: 2020-12-21 | Stop reason: HOSPADM

## 2020-12-21 RX ORDER — HEPARIN SODIUM 200 [USP'U]/100ML
INJECTION, SOLUTION INTRAVENOUS
Status: COMPLETED | OUTPATIENT
Start: 2020-12-21 | End: 2020-12-21

## 2020-12-21 RX ORDER — SODIUM CHLORIDE 0.9 % (FLUSH) 0.9 %
5-40 SYRINGE (ML) INJECTION EVERY 8 HOURS
Status: DISCONTINUED | OUTPATIENT
Start: 2020-12-21 | End: 2020-12-21 | Stop reason: HOSPADM

## 2020-12-21 RX ORDER — ONDANSETRON 2 MG/ML
INJECTION INTRAMUSCULAR; INTRAVENOUS AS NEEDED
Status: DISCONTINUED | OUTPATIENT
Start: 2020-12-21 | End: 2020-12-21 | Stop reason: HOSPADM

## 2020-12-21 RX ORDER — NALOXONE HYDROCHLORIDE 0.4 MG/ML
0.4 INJECTION, SOLUTION INTRAMUSCULAR; INTRAVENOUS; SUBCUTANEOUS AS NEEDED
Status: DISCONTINUED | OUTPATIENT
Start: 2020-12-21 | End: 2020-12-21 | Stop reason: HOSPADM

## 2020-12-21 RX ORDER — LIDOCAINE HYDROCHLORIDE 10 MG/ML
INJECTION INFILTRATION; PERINEURAL AS NEEDED
Status: DISCONTINUED | OUTPATIENT
Start: 2020-12-21 | End: 2020-12-21 | Stop reason: HOSPADM

## 2020-12-21 RX ORDER — FENTANYL CITRATE 50 UG/ML
INJECTION, SOLUTION INTRAMUSCULAR; INTRAVENOUS AS NEEDED
Status: DISCONTINUED | OUTPATIENT
Start: 2020-12-21 | End: 2020-12-21 | Stop reason: HOSPADM

## 2020-12-21 RX ORDER — PROPOFOL 10 MG/ML
INJECTION, EMULSION INTRAVENOUS
Status: DISCONTINUED | OUTPATIENT
Start: 2020-12-21 | End: 2020-12-21 | Stop reason: HOSPADM

## 2020-12-21 RX ORDER — HYDROMORPHONE HYDROCHLORIDE 2 MG/ML
INJECTION, SOLUTION INTRAMUSCULAR; INTRAVENOUS; SUBCUTANEOUS AS NEEDED
Status: DISCONTINUED | OUTPATIENT
Start: 2020-12-21 | End: 2020-12-21 | Stop reason: HOSPADM

## 2020-12-21 RX ADMIN — FENTANYL CITRATE 50 MCG: 50 INJECTION, SOLUTION INTRAMUSCULAR; INTRAVENOUS at 09:41

## 2020-12-21 RX ADMIN — DEXMEDETOMIDINE HYDROCHLORIDE 2 MCG: 100 INJECTION, SOLUTION, CONCENTRATE INTRAVENOUS at 10:19

## 2020-12-21 RX ADMIN — PROPOFOL 35 MCG/KG/MIN: 10 INJECTION, EMULSION INTRAVENOUS at 09:33

## 2020-12-21 RX ADMIN — DEXMEDETOMIDINE HYDROCHLORIDE 2 MCG: 100 INJECTION, SOLUTION, CONCENTRATE INTRAVENOUS at 10:09

## 2020-12-21 RX ADMIN — ONDANSETRON HYDROCHLORIDE 4 MG: 2 INJECTION, SOLUTION INTRAMUSCULAR; INTRAVENOUS at 09:57

## 2020-12-21 RX ADMIN — HYDROMORPHONE HYDROCHLORIDE 0.2 MG: 2 INJECTION, SOLUTION INTRAMUSCULAR; INTRAVENOUS; SUBCUTANEOUS at 09:39

## 2020-12-21 RX ADMIN — DEXAMETHASONE SODIUM PHOSPHATE 4 MG: 4 INJECTION, SOLUTION INTRAMUSCULAR; INTRAVENOUS at 09:33

## 2020-12-21 RX ADMIN — DEXMEDETOMIDINE HYDROCHLORIDE 2 MCG: 100 INJECTION, SOLUTION, CONCENTRATE INTRAVENOUS at 09:33

## 2020-12-21 RX ADMIN — DEXMEDETOMIDINE HYDROCHLORIDE 2 MCG: 100 INJECTION, SOLUTION, CONCENTRATE INTRAVENOUS at 09:57

## 2020-12-21 RX ADMIN — FENTANYL CITRATE 50 MCG: 50 INJECTION, SOLUTION INTRAMUSCULAR; INTRAVENOUS at 09:30

## 2020-12-21 RX ADMIN — MIDAZOLAM 2 MG: 1 INJECTION INTRAMUSCULAR; INTRAVENOUS at 09:25

## 2020-12-21 RX ADMIN — DEXMEDETOMIDINE HYDROCHLORIDE 2 MCG: 100 INJECTION, SOLUTION, CONCENTRATE INTRAVENOUS at 09:43

## 2020-12-21 RX ADMIN — HYDROMORPHONE HYDROCHLORIDE 0.6 MG: 2 INJECTION, SOLUTION INTRAMUSCULAR; INTRAVENOUS; SUBCUTANEOUS at 10:09

## 2020-12-21 RX ADMIN — HYDROMORPHONE HYDROCHLORIDE 0.2 MG: 2 INJECTION, SOLUTION INTRAMUSCULAR; INTRAVENOUS; SUBCUTANEOUS at 09:53

## 2020-12-21 RX ADMIN — SODIUM CHLORIDE: 9 INJECTION, SOLUTION INTRAVENOUS at 07:16

## 2020-12-21 RX ADMIN — DIPHENHYDRAMINE HYDROCHLORIDE 25 MG: 50 INJECTION, SOLUTION INTRAMUSCULAR; INTRAVENOUS at 09:40

## 2020-12-21 RX ADMIN — DEXAMETHASONE SODIUM PHOSPHATE 4 MG: 4 INJECTION, SOLUTION INTRAMUSCULAR; INTRAVENOUS at 09:43

## 2020-12-21 RX ADMIN — Medication 200 MCG: at 10:15

## 2020-12-21 RX ADMIN — CEFAZOLIN 3 G: 10 INJECTION, POWDER, FOR SOLUTION INTRAVENOUS; PARENTERAL at 09:37

## 2020-12-21 RX ADMIN — GLYCOPYRROLATE 0.1 MG: 0.2 INJECTION, SOLUTION INTRAMUSCULAR; INTRAVENOUS at 07:46

## 2020-12-21 NOTE — PROGRESS NOTES
Cardiac Cath Lab Recovery Arrival Note:      Chitra Catalan arrived to Cardiac Cath Lab, Recovery Area. Staff introduced to patient. Patient identifiers verified with NAME and DATE OF BIRTH. Procedure verified with patient. Consent forms reviewed and signed by patient or authorized representative and verified. Allergies verified. Patient and family oriented to department. Patient and family informed of procedure and plan of care. Questions answered with review. Patient prepped for procedure, per orders from physician, prior to arrival.    Patient on cardiac monitor, non-invasive blood pressure, SPO2 monitor. On room air. Patient is A&Ox 3. Patient reports c/o left shoulder pain. Patient in stretcher, in low position, with side rails up, call bell within reach, patient instructed to call if assistance as needed. Patient prep in: [address], [place]. Patient family has pager # none  Family in: [facility] waiting area.    Prep by: Jm Ng RN

## 2020-12-21 NOTE — ANESTHESIA PREPROCEDURE EVALUATION
Relevant Problems   RESPIRATORY SYSTEM   (+) ALYSSA on CPAP      CARDIOVASCULAR   (+) Chronic combined systolic and diastolic congestive heart failure (HCC)   (+) Essential hypertension   (+) Pacemaker   (+) Permanent atrial fibrillation (HCC)      RENAL FAILURE   (+) Acute kidney injury ([address]      ENDOCRINE   (+) Gout involving toe   (+) Morbid obesity (HCC)       Anesthetic History     Increased risk of difficult airway and PONV          Review of Systems / Medical History  Patient summary reviewed and pertinent labs reviewed    Pulmonary        Sleep apnea: CPAP           Neuro/Psych   Within defined limits           Cardiovascular    Hypertension      CHF  Dysrhythmias : atrial fibrillation  Pacemaker and PAD    Exercise tolerance: <4 METS  Comments: EF 30%   GI/Hepatic/Renal         Renal disease: CRI       Endo/Other        Morbid obesity and arthritis     Other Findings   Comments:     Hx Mercury poisoning            Physical Exam    Airway  Mallampati: IV  TM Distance: 4 - 6 cm  Neck ROM: decreased range of motion, short neck   Mouth opening: Diminished (comment)     Cardiovascular  Regular rate and rhythm,  S1 and S2 normal,  no murmur, click, rub, or gallop  Rhythm: regular  Rate: normal         Dental  No notable dental hx       Pulmonary  Breath sounds clear to auscultation               Abdominal  GI exam deferred       Other Findings            Anesthetic Plan    ASA: 4  Anesthesia type: MAC and total IV anesthesia          Induction: Intravenous  Anesthetic plan and risks discussed with: Patient

## 2020-12-21 NOTE — PROGRESS NOTES
Patient ambulated in hallway with steady gait. No complaints of discomfort, dizziness or sob. Surgical site clean dry and intact. Discharge instructions reviewed with patient and answered questions as needed. RN transported patient in wheelchair to patient car; neighbor Kiki drove patient home.

## 2020-12-21 NOTE — H&P
Subjective:                  [address], [place], [address] [facility]  [phone]    Date of  Admission: 12/21/2020  6:17 AM     Admission type:Elective    Anup Eduardo is a 60 y.o. male admitted for Cardiomyopathy, unspecified type ([address] [I42.9]; Malfunction of implantable defibrillator ventricular (ICD) lead [T82.198A]. Mr Lisseth Coon had an upgrade to a biv icd performed in October. He presented with worsening sob and lower ext edema. His device check revealed failure to capture of the LV lead and cxr demonstrated the lv lead to be in the RA. He is referred for LV lead revision. Denies cp.       Patient Active Problem List    Diagnosis Date Noted    Malfunction of implantable defibrillator ventricular (ICD) lead 12/21/2020    Dilated cardiomyopathy ([address] 10/23/2020    Varicose veins of bilateral lower extremities with other complications 01/26/4192    Nausea & vomiting 04/14/2020    Elevated troponin 04/12/2020    Permanent atrial fibrillation ([address] 04/12/2020    Gout involving toe 04/04/2020    S/P AV (atrioventricular) pat ablation 04/03/2020    Pacemaker 04/03/2020    Peripheral vascular disease ([address] 02/25/2020    Essential hypertension 12/04/2019    Chronic combined systolic and diastolic congestive heart failure ([address] 11/05/2019    Acute kidney injury ([address] 11/05/2019    Acute on chronic diastolic HF (heart failure) ([address] 11/04/2019    Morbid obesity ([address] 11/04/2019    ALYSSA on CPAP 11/04/2019      Bindu Bueno MD  Past Medical History:   Diagnosis Date    A-fib [facility])     Allergic reaction to contrast material     galodinium    Congestive heart failure ([address]     Hypertension     Irregular heart rhythm     Mercury poisoning     Morbid obesity ([address] 11/4/2019    ALYSSA on CPAP     ALYSSA on CPAP 11/4/2019    Post concussion syndrome       Past Surgical History:   Procedure Laterality Date    HX SHOULDER ARTHROSCOPY      HX UROLOGICAL Left     hydrocoele resection    LA ICAR CATHETER ABLATION ATRIOVENTR NODE FUNCTION N/A 4/3/2020    ABLATION AV NODE performed by Jake Vogel MD at \[facility]"" CARDIAC CATH LAB    MN INS NEW/RPLC PRM PACEMAKER W/TRANSV ELTRD VENTR N/A 4/3/2020    INSERT PPM SINGLE VENTRICULAR performed by Jake Vogel MD at \[facility]\"" CARDIAC CATH LAB    MN INSJ/RPLCMT PERM DFB W/TRNSVNS LDS 1/DUAL CHMBR N/A 10/23/2020    INSERT ICD BIV MULTI performed by Jake Vogel MD at \[facility]\"" CARDIAC CATH LAB    MN REMOVAL PERMANENT PACEMAKER PULSE GENERATOR ONLY N/A 10/23/2020    Remove Pacemaker Single Lead performed by Jake Vogel MD at \[facility]\"" CARDIAC CATH LAB     Allergies   Allergen Reactions    Ace Inhibitors Cough    Gadolinium-Containing Contrast Media Rash    Iodinated Contrast Media Rash     No contrast injection dye     Social History     Tobacco Use    Smoking status: Never Smoker    Smokeless tobacco: Never Used   Substance Use Topics    Alcohol use: Not Currently    Drug use: Never     Family History   Problem Relation Age of Onset    Colon Cancer Mother     Hypertension Father     Other Father         multiple birth defects    Hypertension Paternal Grandfather       Current Facility-Administered Medications   Medication Dose Route Frequency    heparinized saline 2 units/mL infusion    CONTINUOUS    heparinized saline 2 units/mL infusion    CONTINUOUS    lidocaine (XYLOCAINE) 10 mg/mL (1 %) injection    PRN    iopamidoL (ISOVUE-370) 76 % injection    PRN    sodium chloride (NS) flush 5-40 mL  5-40 mL IntraVENous Q8H    sodium chloride (NS) flush 5-40 mL  5-40 mL IntraVENous PRN    naloxone (NARCAN) injection 0.4 mg  0.4 mg IntraVENous PRN    ceFAZolin 1 g in sodium chloride irrigation 0.9 % 500 mL Irrigation    PRN     Facility-Administered Medications Ordered in Other Encounters   Medication Dose Route Frequency    ceFAZolin (ANCEF) 3 g in 0.9% sodium chloride 100 mL IVPB   IntraVENous CONTINUOUS    dexamethasone (DECADRON) 4 mg/mL injection   IntraVENous PRN    midazolam (VERSED) injection   IntraVENous PRN    fentaNYL citrate (PF) injection   IntraVENous PRN    propofoL (DIPRIVAN) 10 mg/mL injection   IntraVENous CONTINUOUS    diphenhydrAMINE (BENADRYL) 50 mg in 0.9% sodium chloride 50 mL IVPB    CONTINUOUS    HYDROmorphone (PF) (DILAUDID) injection    PRN    glycopyrrolate (ROBINUL) injection   IntraVENous PRN    dexmedeTOMidine (PRECEDEX) 100 mcg/mL iv solution   IntraVENous PRN    ondansetron (ZOFRAN) injection   IntraVENous PRN    0.9% sodium chloride infusion   IntraVENous CONTINUOUS    PHENYLephrine (NEOSYNEPHRINE) in NS syringe   IntraVENous PRN         Review of Symptoms:  Constitutional: negative  Eyes: negative  Ears, nose, mouth, throat, and face: negative  Respiratory: sob  Cardiovascular: +edema  Gastrointestinal: negative  Genitourinary: negative  Musculoskeletal: negative  Neurological: negative  Behvioral/Psych: negative  Endocrine: negative     Subjective:      Visit Vitals  /79 (BP 1 Location: Right arm, BP Patient Position: At rest)   Pulse 70   Temp 97.7 °F (36.5 °C)   Resp 15   Ht 6' 2\" (1.88 m)   Wt 340 lb (154.2 kg)   SpO2 97%   BMI 43.65 kg/m²       Physical Exam  Abdomen: soft, non-tender. Extremities: extremities normal  Heart: regular rate and rhythm  Lungs: clear to auscultation bilaterally  Pulses: 2+ and symmetric    Cardiographics    Telemetry: v paced    ECG: v paced    Echocardiogram: ef 25-30%    Labs:  No results for input(s): WBC, HGB, HCT, PLT, HGBEXT, HCTEXT, PLTEXT in the last 72 hours. No results for input(s): NA, K, CL, CO2, GLU, BUN, CREA, CA, MG, PHOS, ALB, TBIL, TBILI, ALT, INR, INREXT in the last 72 hours. No lab exists for component: SGOT,  BNP    No results for input(s): TROIQ, CPK, CKMB in the last 72 hours.       Intake/Output Summary (Last 24 hours) at 12/21/2020 1037  Last data filed at 12/21/2020 1030  Gross per 24 hour   Intake 400 ml   Output 85 ml   Net 315 ml Assessment:     Assessment:       Active Problems:    Chronic combined systolic and diastolic congestive heart failure (HCC) (11/5/2019)      S/P AV (atrioventricular) pat ablation (4/3/2020)      Overview: 4/3/20      Permanent atrial fibrillation ([address] (4/12/2020)      Dilated cardiomyopathy ([address] (10/23/2020)      Malfunction of implantable defibrillator ventricular (ICD) lead (12/21/2020)         Plan:     Lashawn Bateman is with biv icd for dilated cardiomyopathy (ef 25-30%) on med rx, class III CHF sp av node abl for permanent AF with rvr. His LV lead is not capturing and the LV lead appears to be in the right atrium. He is a candidate for a LV lead revision. I discussed the risks/benefits/alternatives of the procedure with the patient. Risks include (but are not limited to) bleeding, heart block, infection, cva/mi/tamponade/death. The patient understands and agrees to proceed. Thank you for this interesting consultation.         Devora Romero MD, [facility]    12/21/2020

## 2020-12-21 NOTE — PROGRESS NOTES
TRANSFER - IN REPORT:    Verbal report received from [facility],Shawn GARCIA  on Makenzie Hanna  being received from EP Lab for routine progression of care. Report consisted of patients Situation, Background, Assessment and Recommendations(SBAR). Information from the following report(s) SBAR was reviewed with the receiving clinician. Opportunity for questions and clarification was provided. Assessment completed upon patients arrival to [facility] and care assumed. Cardiac Cath Lab Recovery Arrival Note:    Makenzie Hanna arrived to [facility] recovery area. Patient procedure= lead revision. Patient on cardiac monitor, non-invasive blood pressure, SPO2 monitor. O2 @ 4 lpm via face mask. IV  of [facility] at [facility] . Patient status doing well without problems. Patient is A&Ox 3. Patient reports no complaints. PROCEDURE SITE CHECK:    Procedure site:without any bleeding and no hematoma, no pain/discomfort reported at procedure site. No change in patient status. Continue to monitor patient and status.

## 2020-12-21 NOTE — ANESTHESIA POSTPROCEDURE EVALUATION
Procedure(s):  LV LEAD PLACEMENT TO PREVIOUS GENERATOR  Remove Pacemaker Single Lead. MAC, total IV anesthesia    Anesthesia Post Evaluation        Patient location during evaluation: PACU  Note status: Adequate. Level of consciousness: responsive to verbal stimuli and sleepy but conscious  Pain management: satisfactory to patient  Airway patency: patent  Anesthetic complications: no  Cardiovascular status: acceptable  Respiratory status: acceptable  Hydration status: acceptable  Comments: +Post-Anesthesia Evaluation and Assessment    Patient: Aislinn Dodson MRN: [id number]  SSN: [id number]   YOB: 1960  Age: 60 y.o. Sex: male      Cardiovascular Function/Vital Signs    BP 97/74 (BP 1 Location: Right arm, BP Patient Position: At rest)   Pulse 71   Temp 36.6 °C (97.8 °F)   Resp 17   Ht 6' 2\" (1.88 m)   Wt 154.2 kg (340 lb)   SpO2 91%   BMI 43.65 kg/m²     Patient is status post Procedure(s):  LV LEAD PLACEMENT TO PREVIOUS GENERATOR  Remove Pacemaker Single Lead. Nausea/Vomiting: Controlled. Postoperative hydration reviewed and adequate. Pain:  Pain Scale 1: Numeric (0 - 10) (12/21/20 1100)  Pain Intensity 1: 0 (12/21/20 1100)   Managed. Neurological Status: At baseline. Mental Status and Level of Consciousness: Arousable. Pulmonary Status:   O2 Device: Nasal cannula (12/21/20 1100)   Adequate oxygenation and airway patent. Complications related to anesthesia: None    Post-anesthesia assessment completed. No concerns. Signed By: Juanito Lee MD    12/21/2020  Post anesthesia nausea and vomiting:  controlled      INITIAL Post-op Vital signs:   Vitals Value Taken Time   BP 97/74 12/21/20 1100   Temp 36.6 °C (97.8 °F) 12/21/20 1038   Pulse 71 12/21/20 1117   Resp 20 12/21/20 1115   SpO2 94 % 12/21/20 1114   Vitals shown include unvalidated device data.

## 2020-12-21 NOTE — DISCHARGE INSTRUCTIONS
[facility], [address], [address] [facility]  [phone]        ICD LEAD REVISION  DISCHARGE INSTRUCTIONS    Patient ID:  Eliot Melton  [id number]  78 y.o.  1960    Admit Date: 12/21/2020    Discharge Date: 12/21/2020     Admitting Physician: Phoenix Miller MD     Discharge Physician: Phoenix Miller MD    Admission Diagnoses:   Cardiomyopathy, unspecified type [facility]) [I42.9]    Discharge Diagnoses: Active Problems:    * No active hospital problems. *      Discharge Condition: Good    Cardiology Procedures this Admission:  S/P ICD implantation. Disposition: home    Reference discharge instructions provided by nursing for diet and activity. Follow-up with ICD/PM clinic in 3 weeks. Call [phone] to make an appointment. Signed:  MATEO Xavier  12/21/2020  10:13 AM      DISCHARGE INSTRUCTIONS FOR PATIENTS WITH ICD'S    1. Remember to call for an appointment in 3 weeks [phone]. 2. Medic Alert Bracelets are available from your pharmacist to wear at all times if you choose to wear one. 3. Carry your ID card for your ICD with you at all times. This card will be given to you in the hospital or mailed to you. 4. The ICD will bulge slightly under your skin. The bulge will decrease in size over the next few weeks. Please notify the doctor's office if you notice any of the following around your ICD site:   A.  A bruise that does not go away. B.  Soreness or yellow, green, or brown drainage from the site. C. Any swelling from the site. D. If you have a fever of 100 degrees or higher that lasts for a few days. INCISION CARE       1.  Leave the dressing over your site until your follow up visit. 2.  You may not shower until after follow up visit. 3.  For comfort, wear loose fitting clothing. 1. 4.  Ice pack to affected shoulder for first 24 hours, wear your sling for 2 days.   2. 5.  Report any signs of infection, fever, pain, swelling, redness, oozing, or heat at site especially if these symptoms increase after the first 3 to 4 days. ACTIVITY PRECAUTIONS     1. Avoid rough contact with the implant site. 2. No driving for 14 days. 3. Avoid lifting your arm over your head, carrying anything on the affected side, or lifting over 10 pounds for 90 days. For the first 2 days only bend your arm at the elbow. 4. Any extreme activity such as golf, weight lifting or exercise biking should be restricted for 60 days. 5. Do not carry objects by holding them against your implant site. 6.  No shooting rifles or any type of gun with the affected shoulder permanently. 7.  Welding and chainsaws are prohibited. SPECIAL PRECAUTIONS     1. You should avoid all strong magnetic fields, such as arc welding, large transformers, large motors. Some ICD devices will beep if it detects a strong magnet. If this occurs, move out of the area. 2.  You may or may not have an MRI which uses a strong magnet to take pictures. 3.  Treatments or surgery that requires diathermy or electrocautery should be discussed with your doctor before scheduled. 4.  Avoid radio frequency transmitters, including radar. 5.  Advise dentist or other medical personnel you see that you have an ICD. 6.  Cell phones and microwave oven use is okay. 7.  If you plan to move or take a trip to a new area, the doctor's office will give you a name of a doctor to contact for any problems. SPECIAL INSTRUCTIONS ON SHOCKS     1. Notify your doctor for any of the following:       A. Anytime a shock is received in a 24 hour period. An office visit is not usually required for a single shock. B.  Two or more shocks in a row. If you do not feel well, call the [facility], otherwise call your doctor. This may require an office visit. C. Two or more shocks spaced apart by several hours. This may require an office visit. 2.  Keep a record of events.   Include date, time, symptoms and activity at that time.        ANTIBIOTIC THERAPY    During the first 8 weeks after your ICD insertion, you may need antibiotics before any dental work or certain tests or operations. Let the dentist or doctor who is caring for you know that you have had an implanted device.

## 2020-12-21 NOTE — Clinical Note
TRANSFER - OUT REPORT:     Verbal report given to: Carl Tineo RN. Report consisted of patient's Situation, Background, Assessment and   Recommendations(SBAR). Opportunity for questions and clarification was provided. Patient transported to: Recovery.

## 2020-12-28 ENCOUNTER — TELEPHONE (OUTPATIENT)
Dept: CARDIOLOGY CLINIC | Age: 60
End: 2020-12-28

## 2020-12-28 ENCOUNTER — CLINICAL SUPPORT (OUTPATIENT)
Dept: CARDIOLOGY CLINIC | Age: 60
End: 2020-12-28

## 2020-12-28 DIAGNOSIS — Z95.810 AICD (AUTOMATIC CARDIOVERTER/DEFIBRILLATOR) PRESENT: Primary | ICD-10-CM

## 2020-12-28 DIAGNOSIS — I42.9 CARDIOMYOPATHY, UNSPECIFIED TYPE (HCC): ICD-10-CM

## 2020-12-28 NOTE — TELEPHONE ENCOUNTER
Spoke with patient. Reports blistering at device site and diaphragmatic stim from new LV lead. Appointment made.

## 2020-12-28 NOTE — TELEPHONE ENCOUNTER
Please call patient bandage is bothering him and the device might need to be adjustment.       [phone]    Thanks    Carlos Wilkins

## 2020-12-28 NOTE — PROGRESS NOTES
Patient came to office today complaining of blistering around device site dressing and diaphragmatic stim from new LV lead. Subclavian ICD site appears to have evidence of blisters at edges of tegaderm with some bloody drainage. Dressing removed. Steristrips intact. Reapplied a Mepilex Border Post op dressing using sterile technique. Blistered area at distal end of dressing was cleansed with alcohol swab and bandaid applied. No erythema or heat. LV output adjusted so no diaphragmatic stim. Follow up as planned next week. Non chargeable visit. See scanned ICD report in chart.        Winston Padilla RN

## 2021-01-05 ENCOUNTER — CLINICAL SUPPORT (OUTPATIENT)
Dept: CARDIOLOGY CLINIC | Age: 61
End: 2021-01-05
Payer: COMMERCIAL

## 2021-01-05 DIAGNOSIS — Z95.810 AICD (AUTOMATIC CARDIOVERTER/DEFIBRILLATOR) PRESENT: Primary | ICD-10-CM

## 2021-01-05 DIAGNOSIS — I42.9 CARDIOMYOPATHY, UNSPECIFIED TYPE (HCC): ICD-10-CM

## 2021-01-05 PROCEDURE — 93283 PRGRMG EVAL IMPLANTABLE DFB: CPT | Performed by: INTERNAL MEDICINE

## 2021-01-05 NOTE — PROGRESS NOTES
Patient feels well following AICD implantation. Subclavian AICD site approximated well, no discharge. No erythema or heat. Device interrogation WNL. Follow up as planned on 2/25/20.      Winifred Sandoval RN

## 2021-02-25 ENCOUNTER — ANCILLARY PROCEDURE (OUTPATIENT)
Dept: CARDIOLOGY CLINIC | Age: 61
End: 2021-02-25

## 2021-02-25 ENCOUNTER — CLINICAL SUPPORT (OUTPATIENT)
Dept: CARDIOLOGY CLINIC | Age: 61
End: 2021-02-25

## 2021-02-25 ENCOUNTER — TELEPHONE (OUTPATIENT)
Dept: CARDIOLOGY CLINIC | Age: 61
End: 2021-02-25

## 2021-02-25 ENCOUNTER — OFFICE VISIT (OUTPATIENT)
Dept: CARDIOLOGY CLINIC | Age: 61
End: 2021-02-25
Payer: COMMERCIAL

## 2021-02-25 VITALS
OXYGEN SATURATION: 97 % | SYSTOLIC BLOOD PRESSURE: 160 MMHG | HEIGHT: 74 IN | DIASTOLIC BLOOD PRESSURE: 90 MMHG | BODY MASS INDEX: 40.43 KG/M2 | WEIGHT: 315 LBS | RESPIRATION RATE: 18 BRPM | HEART RATE: 70 BPM

## 2021-02-25 VITALS
DIASTOLIC BLOOD PRESSURE: 89 MMHG | BODY MASS INDEX: 40.43 KG/M2 | HEIGHT: 74 IN | WEIGHT: 315 LBS | SYSTOLIC BLOOD PRESSURE: 157 MMHG

## 2021-02-25 DIAGNOSIS — I50.42 CHRONIC COMBINED SYSTOLIC AND DIASTOLIC CONGESTIVE HEART FAILURE (HCC): ICD-10-CM

## 2021-02-25 DIAGNOSIS — Z95.810 AICD (AUTOMATIC CARDIOVERTER/DEFIBRILLATOR) PRESENT: Primary | ICD-10-CM

## 2021-02-25 DIAGNOSIS — I48.21 PERMANENT ATRIAL FIBRILLATION (HCC): Primary | Chronic | ICD-10-CM

## 2021-02-25 DIAGNOSIS — I10 ESSENTIAL HYPERTENSION: ICD-10-CM

## 2021-02-25 DIAGNOSIS — I42.9 CARDIOMYOPATHY, UNSPECIFIED TYPE (HCC): ICD-10-CM

## 2021-02-25 DIAGNOSIS — I42.0 DILATED CARDIOMYOPATHY (HCC): ICD-10-CM

## 2021-02-25 PROCEDURE — 93283 PRGRMG EVAL IMPLANTABLE DFB: CPT | Performed by: INTERNAL MEDICINE

## 2021-02-25 PROCEDURE — 99215 OFFICE O/P EST HI 40 MIN: CPT | Performed by: INTERNAL MEDICINE

## 2021-02-25 PROCEDURE — 93306 TTE W/DOPPLER COMPLETE: CPT | Performed by: INTERNAL MEDICINE

## 2021-02-25 PROCEDURE — 93000 ELECTROCARDIOGRAM COMPLETE: CPT | Performed by: INTERNAL MEDICINE

## 2021-02-25 NOTE — LETTER
2/25/2021 Patient: Frank Cho YOB: 1960 Date of Visit: 2/25/2021 Cristopher Starks MD 
[address] [facility] D [address] [place] [address] Via Fax: [phone] Dear Cristopher Starks MD, Thank you for referring Mr. Juliana Coleman to [address] [place] [address] for evaluation. My notes for this consultation are attached. If you have questions, please do not hesitate to call me. I look forward to following your patient along with you. Sincerely, Angela Whatley MD

## 2021-02-25 NOTE — PROGRESS NOTES
Chief Complaint   Patient presents with    Irregular Heart Beat     AFib, f/u pacemaker replacement 12/2020    Hand Swelling     bilateral, severe - pt cannot close left hand    Foot Swelling     bilateral    Dizziness     upon changing position    Cough     pt relates this to blood pressure medications, states he coughs up blood occasionally    Shortness of Breath     intermittently at rest/while laying down, also with activity     1. Have you been to the ER, urgent care clinic since your last visit? Hospitalized since your last visit? Yes - pacemaker replacement procedure, now has ICD; also went [facility] for gout in December 2. Have you seen or consulted any other health care providers outside of the [address] [place] [address] since your last visit? Include any pap smears or colon screening.  Yes - PCP

## 2021-02-25 NOTE — PROGRESS NOTES
Subjective:      Aaron Carballo is a 63 y.o. male is here for EP consult. He c/o sob with metoprolol and stopped that. He also c/o gout with oac. He denies suicidal ideations. Does admit to sleeping a lot and lack of motivation.       Patient Active Problem List    Diagnosis Date Noted    Malfunction of implantable defibrillator ventricular (ICD) lead 12/21/2020    Dilated cardiomyopathy ([address] 10/23/2020    Varicose veins of bilateral lower extremities with other complications 46/95/6275    Nausea & vomiting 04/14/2020    Elevated troponin 04/12/2020    Permanent atrial fibrillation ([address] 04/12/2020    Gout involving toe 04/04/2020    S/P AV (atrioventricular) pat ablation 04/03/2020    Pacemaker 04/03/2020    Peripheral vascular disease ([address] 02/25/2020    Essential hypertension 12/04/2019    Chronic combined systolic and diastolic congestive heart failure ([address] 11/05/2019    Acute kidney injury ([address] 11/05/2019    Acute on chronic diastolic HF (heart failure) ([address] 11/04/2019    Morbid obesity ([address] 11/04/2019    ALYSSA on CPAP 11/04/2019      Ar Montenegro MD  Past Medical History:   Diagnosis Date    A-fib [facility])     Allergic reaction to contrast material     galodinium    Congestive heart failure ([address]     Hypertension     Irregular heart rhythm     Mercury poisoning     Morbid obesity ([address] 11/4/2019    ALYSSA on CPAP     ALYSSA on CPAP 11/4/2019    Post concussion syndrome       Past Surgical History:   Procedure Laterality Date    HX SHOULDER ARTHROSCOPY      HX UROLOGICAL Left     hydrocoele resection    MS ICAR CATHETER ABLATION ATRIOVENTR NODE FUNCTION N/A 4/3/2020    ABLATION AV NODE performed by Ray Ireland MD at hospitals CARDIAC CATH LAB    MS INS NEW/RPLC PRM PACEMAKER W/TRANSV ELTRD VENTR N/A 4/3/2020    INSERT PPM SINGLE VENTRICULAR performed by Ray Ireland MD at hospitals CARDIAC CATH LAB    MS INSJ ELTRD CAR SHARRON SYS ATTCH PREV PM/DFB PLS GEN N/A 12/21/2020 LV LEAD PLACEMENT TO PREVIOUS GENERATOR performed by Cheri Albert MD at [facility] CARDIAC CATH LAB    ND INSJ/RPLCMT PERM DFB W/TRNSVNS LDS 1/DUAL CHMBR N/A 10/23/2020    INSERT ICD BIV MULTI performed by Cheri Albert MD at [facility] CARDIAC CATH LAB    ND REMOVAL PERMANENT PACEMAKER PULSE GENERATOR ONLY N/A 10/23/2020    Remove Pacemaker Single Lead performed by Cheri Albert MD at [facility] CARDIAC CATH LAB    ND REMOVAL PERMANENT PACEMAKER PULSE GENERATOR ONLY N/A 12/21/2020    Remove Pacemaker Single Lead performed by Cheri Albert MD at [facility] CARDIAC CATH LAB     Allergies   Allergen Reactions    Ace Inhibitors Cough    Gadolinium-Containing Contrast Media Rash    Beta-Blockers (Beta-Adrenergic Blocking Agts) Cough and Other (comments)     Erectile dysfunction    Iodinated Contrast Media Rash     No contrast injection dye      Family History   Problem Relation Age of Onset    Colon Cancer Mother     Hypertension Father     Other Father         multiple birth defects    Hypertension Paternal Grandfather     negative for cardiac disease  Social History     Socioeconomic History    Marital status:      Spouse name: Not on file    Number of children: Not on file    Years of education: Not on file    Highest education level: Not on file   Occupational History    Occupation:    Tobacco Use    Smoking status: Never Smoker    Smokeless tobacco: Never Used   Substance and Sexual Activity    Alcohol use: Not Currently    Drug use: Never     Current Outpatient Medications   Medication Sig    Eliquis 5 mg tablet take 1 tablet by mouth twice a day    allopurinoL (ZYLOPRIM) 300 mg tablet take 1 tablet by mouth twice a day    furosemide (LASIX) 40 mg tablet take 1 tablet by mouth twice a day    potassium chloride (K-DUR, KLOR-CON) 20 mEq tablet Take 2 Tabs by mouth two (2) times a day.     testosterone cypionate (Depo-Testosterone) 100 mg/mL injection 200 mg by IntraMUSCular route Once every 2 weeks.  dextroamphetamine-amphetamine (ADDERALL) 30 mg tablet Take 30 mg by mouth two (2) times a day.  colchicine 0.6 mg tablet take 2 tablets by mouth INITIALLY. THEN take 1 tablet in 1 hour    sacubitriL-valsartan (Entresto)  mg tablet Take 1 Tab by mouth two (2) times a day.  metOLazone (ZAROXOLYN) 5 mg tablet Take 1 Tab by mouth every Monday, Wednesday, Friday. (Patient taking differently: Take 5 mg by mouth every other day.)    metoprolol tartrate (LOPRESSOR) 25 mg tablet Take 1 Tab by mouth every twelve (12) hours.  anastrozole (ARIMIDEX) 1 mg tablet Take 1 mg by mouth every seven (7) days.  desloratadine (CLARINEX) 5 mg tablet Take 5 mg by mouth daily.  montelukast (SINGULAIR) 10 mg tablet Take 10 mg by mouth every evening. No current facility-administered medications for this visit. Vitals:    02/25/21 0950 02/25/21 1003 02/25/21 1005   BP: (!) 150/80 (!) 158/82 (!) 160/90   Pulse: 70 70 70   Resp: 18     SpO2: 97%     Weight: 340 lb (154.2 kg)     Height: 6' 2\" (1.88 m)         I have reviewed the nurses notes, vitals, problem list, allergy list, medical history, family, social history and medications. Review of Symptoms:    General: Pt denies excessive weight gain or loss. Pt is able to conduct ADL's  HEENT: Denies blurred vision, headaches, hearing loss, epistaxis and difficulty swallowing. Respiratory: Denies cough, congestion, shortness of breath, ELIZONDO, wheezing or stridor.   Cardiovascular: Denies precordial pain, palpitations, edema or PND  Gastrointestinal: Denies poor appetite, indigestion, abdominal pain or blood in stool  Genitourinary: Denies hematuria, dysuria, increased urinary frequency  Musculoskeletal: Denies joint pain or swelling from muscles or joints  Neurologic: Denies tremor, paresthesias, headache, or sensory motor disturbance  Psychiatric: Denies confusion, insomnia, depression  Integumentray: Denies rash, itching or ulcers. Hematologic: Denies easy bruising, bleeding    Physical Exam:      General: Well developed, in no acute distress. HEENT: Eyes - PERRL, no jvd  Heart:  Normal S1/S2 negative S3 or S4. Regular, no murmur, gallop or rub. Respiratory: Clear bilaterally x 4, no wheezing or rales  Abdomen:   Soft, non-tender, bowel sounds are active. Extremities:  No edema, normal cap refill, no cyanosis. Musculoskeletal: No clubbing  Neuro: A&Ox3, speech clear, gait stable. Skin: Skin color is normal. No rashes or lesions. Non diaphoretic, no ulcers or subcutaneous nodule  Vascular: 2+ pulses symmetric in all extremities  Psych - judgement intact and orientation is wnl     Cardiographics    Ekg: biv paced    Echo - lvef 50-55    Results for orders placed or performed during the hospital encounter of 04/11/20   EKG, 12 LEAD, INITIAL   Result Value Ref Range    Ventricular Rate 77 BPM    Atrial Rate 73 BPM    QRS Duration 228 ms    Q-T Interval 508 ms    QTC Calculation (Bezet) 574 ms    Calculated R Axis -64 degrees    Calculated T Axis 111 degrees    Diagnosis       Ventricular-paced rhythm  When compared with ECG of 04-APR-2020 04:20,  Vent.  rate has increased BY   2 BPM  Confirmed by Christina Cleveland, P.V. ([id number]) on 4/12/2020 3:18:40 PM           Lab Results   Component Value Date/Time    WBC 9.5 12/04/2020 12:52 PM    HGB 15.0 12/04/2020 12:52 PM    HCT 43.2 12/04/2020 12:52 PM    PLATELET 878 79/24/8170 12:52 PM    MCV 87 12/04/2020 12:52 PM      Lab Results   Component Value Date/Time    Sodium 140 12/04/2020 12:52 PM    Potassium 4.7 12/04/2020 12:52 PM    Chloride 103 12/04/2020 12:52 PM    CO2 22 12/04/2020 12:52 PM    Anion gap 3 (L) 11/06/2020 05:43 PM    Glucose 107 (H) 12/04/2020 12:52 PM    BUN 11 12/04/2020 12:52 PM    Creatinine 1.51 (H) 12/04/2020 12:52 PM    BUN/Creatinine ratio 7 (L) 12/04/2020 12:52 PM    GFR est AA 57 (L) 12/04/2020 12:52 PM    GFR est non-AA 49 (L) 12/04/2020 12:52 PM    Calcium 9.6 12/04/2020 12:52 PM    Bilirubin, total 0.7 12/04/2020 12:52 PM    Alk. phosphatase 70 12/04/2020 12:52 PM    Protein, total 6.6 12/04/2020 12:52 PM    Albumin 4.5 12/04/2020 12:52 PM    Globulin 4.0 04/11/2020 11:08 PM    A-G Ratio 2.1 12/04/2020 12:52 PM    ALT (SGPT) 17 12/04/2020 12:52 PM         Assessment:     Assessment:        ICD-10-CM ICD-9-CM    1. Permanent atrial fibrillation (HCC)  I48.21 427.31 AMB POC EKG ROUTINE W/ 12 LEADS, INTER & REP   2. Essential hypertension  I10 401.9    3. Dilated cardiomyopathy (HCC)  I42.0 425.4    4. Chronic combined systolic and diastolic congestive heart failure (HCC)  I50.42 428.42      428.0      Orders Placed This Encounter    AMB POC EKG ROUTINE W/ 12 LEADS, INTER & REP     Order Specific Question:   Reason for Exam:     Answer:   routine        Plan:   Gracia Camarillo is biv paced sp av node abl for permanent AF with rvr. He is on oac for AF. We discussed he is at increased risk of TE from the AF (elevated chadsvasc) and recommended that he stay on it. He will think about it. He will f/u with Dr Ava Roque for his med issues for his cardiomyopathy. I recommended he start his bb due to his cardiomyopathy. His lvef has improved post biv upgrade. Cont med rx for chf. F/u in one year. During this visit,  the patient and I had a comprehensive discussion of arrhythmia management using principles of shared decision making. This included a discussion of anti-arrhythmic medications including class I agents (e.g. flecainide) and class III agents (e.g. amiodarone, sotalol etc) and both class II and IV agents (beta-blockers and calcium channel blockers). We reviewed the potentially life-threatening side effects of these medications, including (but not limited to) fatal tachyarrhythmias, pulmonary toxicity, liver toxicity and thyroid disorders. We also reviewed the requisite monitoring required of some of these medications.  In addition, we reviewed ablative therapy, including the potential benefits and risks of catheter ablation. These risks include death, myocardial infarction, stroke, cardiac perforation, vascular injury, and other less severe complications. The patient demonstrated a clear understanding of these issues during out discussion. Our plans, determined together after thorough consideration, are outlined else where in this note. Continue medical management for cardiomyopathy, chf, htn and AF. Thank you for allowing me to participate in Lesa Hoang 's care.     Cynthia Fermin MD, Mana Cobb

## 2021-02-26 ENCOUNTER — TELEPHONE (OUTPATIENT)
Dept: CARDIOLOGY CLINIC | Age: 61
End: 2021-02-26

## 2021-02-26 LAB
ECHO AO ASC DIAM: 3.88 CM
ECHO AO ROOT DIAM: 3.09 CM
ECHO AV AREA PEAK VELOCITY: 2.86 CM2
ECHO AV AREA/BSA PEAK VELOCITY: 1.1 CM2/M2
ECHO AV PEAK GRADIENT: 6.06 MMHG
ECHO AV PEAK VELOCITY: 123.05 CM/S
ECHO LA AREA 4C: 38.75 CM2
ECHO LA MAJOR AXIS: 5.06 CM
ECHO LA MINOR AXIS: 1.86 CM
ECHO LA VOL 2C: 111.46 ML (ref 18–58)
ECHO LA VOL 4C: 148.98 ML (ref 18–58)
ECHO LA VOL BP: 146.57 ML (ref 18–58)
ECHO LA VOL/BSA BIPLANE: 53.83 ML/M2 (ref 16–28)
ECHO LA VOLUME INDEX A2C: 40.93 ML/M2 (ref 16–28)
ECHO LA VOLUME INDEX A4C: 54.71 ML/M2 (ref 16–28)
ECHO LV E' LATERAL VELOCITY: 10.38 CM/S
ECHO LV E' SEPTAL VELOCITY: 8.93 CM/S
ECHO LV INTERNAL DIMENSION DIASTOLIC: 5.79 CM (ref 4.2–5.9)
ECHO LV INTERNAL DIMENSION SYSTOLIC: 3.66 CM
ECHO LV IVSD: 1.41 CM (ref 0.6–1)
ECHO LV MASS 2D: 390.8 G (ref 88–224)
ECHO LV MASS INDEX 2D: 143.5 G/M2 (ref 49–115)
ECHO LV POSTERIOR WALL DIASTOLIC: 1.52 CM (ref 0.6–1)
ECHO LVOT DIAM: 2.02 CM
ECHO LVOT PEAK GRADIENT: 4.79 MMHG
ECHO LVOT PEAK VELOCITY: 109.46 CM/S
ECHO LVOT SV: 63.4 ML
ECHO LVOT VTI: 19.7 CM
ECHO MV A VELOCITY: 36.28 CM/S
ECHO MV AREA VTI: 1.36 CM2
ECHO MV E VELOCITY: 111.82 CM/S
ECHO MV E/A RATIO: 3.08
ECHO MV E/E' LATERAL: 10.77
ECHO MV E/E' RATIO (AVERAGED): 11.65
ECHO MV E/E' SEPTAL: 12.52
ECHO MV EROA PISA: 0.24 CM2
ECHO MV MAX VELOCITY: 183.93 CM/S
ECHO MV MEAN GRADIENT: 3.97 MMHG
ECHO MV PEAK GRADIENT: 13.53 MMHG
ECHO MV REGURGITANT RADIUS PISA: 0.97 CM
ECHO MV REGURGITANT VOLUME: 45.14 ML
ECHO MV REGURGITANT VTIA: 185.05 CM
ECHO MV VTI: 46.59 CM
ECHO RA AREA 4C: 26.13 CM2
ECHO RV TAPSE: 1.97 CM (ref 1.5–2)
LA VOL DISK BP: 139.02 ML (ref 18–58)
LVOT MG: 2.25 MMHG
MR PISA PV: 581.26 CM/S

## 2021-02-26 NOTE — PROGRESS NOTES
Please let pt know that the echo shows improvement in his heart strength. Now low normal which is great news. No changes to medications.

## 2021-02-26 NOTE — TELEPHONE ENCOUNTER
----- Message from Rayna Goldmann, NP sent at 2/26/2021  1:31 PM EST -----  Please let pt know that the echo shows improvement in his heart strength. Now low normal which is great news. No changes to medications.

## 2021-03-01 NOTE — TELEPHONE ENCOUNTER
Finally reached pt. Verified patient with two patient identifiers. States he did not know why I was calling, as he saw Dr. Danna Lafleur and discussed everything with him. Upset that we give him medications that cause side effects, and don't listen to him that he can't take certain drugs. Now has gout, and throat inflammation, but states Dr. Danna Lafleur and Dr. Shayna Mcginnis is aware of all also. States he will keep his upcoming appts anyway.

## 2021-05-20 RX ORDER — POTASSIUM CHLORIDE 20 MEQ/1
40 TABLET, EXTENDED RELEASE ORAL 2 TIMES DAILY
Qty: 360 TABLET | Refills: 0 | Status: SHIPPED | OUTPATIENT
Start: 2021-05-20 | End: 2021-09-03

## 2021-05-20 RX ORDER — METOPROLOL TARTRATE 25 MG/1
25 TABLET, FILM COATED ORAL EVERY 12 HOURS
Qty: 180 TABLET | Refills: 0 | Status: SHIPPED | OUTPATIENT
Start: 2021-05-20 | End: 2021-07-08

## 2021-05-20 RX ORDER — FUROSEMIDE 40 MG/1
TABLET ORAL
Qty: 180 TABLET | Refills: 0 | Status: SHIPPED | OUTPATIENT
Start: 2021-05-20 | End: 2021-08-02

## 2021-05-20 NOTE — TELEPHONE ENCOUNTER
Pt calling in refills for the Eliquis, Furosemide, potassium chloride and metoprolol  Tartrate and it needs to be for 90 day supply changing pharmacy to [facility] in [place], [place].

## 2021-05-27 ENCOUNTER — OFFICE VISIT (OUTPATIENT)
Dept: CARDIOLOGY CLINIC | Age: 61
End: 2021-05-27

## 2021-05-27 VITALS
WEIGHT: 315 LBS | BODY MASS INDEX: 40.43 KG/M2 | OXYGEN SATURATION: 99 % | HEART RATE: 68 BPM | SYSTOLIC BLOOD PRESSURE: 170 MMHG | HEIGHT: 74 IN | RESPIRATION RATE: 16 BRPM | DIASTOLIC BLOOD PRESSURE: 96 MMHG

## 2021-05-27 DIAGNOSIS — Z95.810 ICD (IMPLANTABLE CARDIOVERTER-DEFIBRILLATOR) IN PLACE: ICD-10-CM

## 2021-05-27 DIAGNOSIS — I10 ESSENTIAL HYPERTENSION: Chronic | ICD-10-CM

## 2021-05-27 DIAGNOSIS — I50.42 CHRONIC COMBINED SYSTOLIC AND DIASTOLIC CONGESTIVE HEART FAILURE (HCC): Primary | Chronic | ICD-10-CM

## 2021-05-27 DIAGNOSIS — I48.21 PERMANENT ATRIAL FIBRILLATION (HCC): Chronic | ICD-10-CM

## 2021-05-27 DIAGNOSIS — Z98.890 S/P AV (ATRIOVENTRICULAR) NODAL ABLATION: Chronic | ICD-10-CM

## 2021-05-27 PROBLEM — T82.198A MALFUNCTION OF IMPLANTABLE DEFIBRILLATOR VENTRICULAR (ICD) LEAD: Status: RESOLVED | Noted: 2020-12-21 | Resolved: 2021-05-27

## 2021-05-27 PROBLEM — I50.33 ACUTE ON CHRONIC DIASTOLIC HF (HEART FAILURE) (HCC): Status: RESOLVED | Noted: 2019-11-04 | Resolved: 2021-05-27

## 2021-05-27 PROCEDURE — 93000 ELECTROCARDIOGRAM COMPLETE: CPT | Performed by: INTERNAL MEDICINE

## 2021-05-27 PROCEDURE — 99215 OFFICE O/P EST HI 40 MIN: CPT | Performed by: INTERNAL MEDICINE

## 2021-05-27 NOTE — PROGRESS NOTES
Jett Haider, [facility]    Subjective/HPI:     Karlene Freed is a 63 y.o. male is here for routine f/u. He has a PMHx of PAF s/p AV pat ablation with PPM, HTN, obesity, ALYSSA on CPAP and galodinium poisoning    Since last visit, had lead revision for misplaced LV lead. He has multiple complaints. Continues to complain of gout flare ups, blames this on the eliquis and diuretic therapies. Also stopped taking his metoprolol as he claims it makes him cough up blood. Stopped Entresto because he believes it is also causing him to cough. Reports coughing fits that result in coughing up blood. Admits that symptoms did not improve once stopping the medications. Feels he cannot take Entresto because he was intolerant to lisinopril about 20 years ago. Wants to know if he has a sulfa allergy. Claims that he started to break out into blisters and nodules all over his arms when they tried to obtain routine labwork. Claims he was having an anaphylactic reaction in the parking lot today because he felt out of breath and felt like he was going to pass out, but never did. He is not in any respiratory distress during our visit today. Still continues to complain of symptoms of excessive sleepiness, no energy, fatigue. He does not have much quality of life -- sleeps more than 20 hours a day, doesn't do anything but stay at home. Also claims he only sleeps for an hour at a stretch because he is up all night going to the bathroom. Does use CPAP mask every night religiously. He is frustrated because other providers have told him he is depressed, which is feels is obvious due to his lack of energy and symptoms limiting lifestyle. He is not interested in getting treatment for depression specifically, instead claims if his symptoms resolve then he will no longer feel depressed. Wants to get tested for COVID-19 antibodies because he believes he had the first case early in 2019.   Does not believe in getting the vaccine because it has preservatives and mercury and cites a history of mercury poisoning. Current Outpatient Medications on File Prior to Visit   Medication Sig Dispense Refill    apixaban (Eliquis) 5 mg tablet take 1 tablet by mouth twice a day. Keep 5- appt. 180 Tablet 0    potassium chloride (K-DUR, KLOR-CON) 20 mEq tablet Take 2 Tablets by mouth two (2) times a day. Keep 5- appt. 360 Tablet 0    furosemide (LASIX) 40 mg tablet take 1 tablet by mouth twice a day. Keep 5- appt. 180 Tablet 0    allopurinoL (ZYLOPRIM) 300 mg tablet take 1 tablet by mouth twice a day      metOLazone (ZAROXOLYN) 5 mg tablet Take 1 Tab by mouth every Monday, Wednesday, Friday. (Patient taking differently: Take 5 mg by mouth every other day.) 45 Tab 2    testosterone cypionate (Depo-Testosterone) 100 mg/mL injection 200 mg by IntraMUSCular route Once every 2 weeks.  dextroamphetamine-amphetamine (ADDERALL) 30 mg tablet Take 30 mg by mouth two (2) times a day.  metoprolol tartrate (LOPRESSOR) 25 mg tablet Take 1 Tablet by mouth every twelve (12) hours. Keep 5- appt. (Patient not taking: Reported on 5/27/2021) 180 Tablet 0    sacubitriL-valsartan (Entresto)  mg tablet Take 1 Tab by mouth two (2) times a day. (Patient not taking: Reported on 5/27/2021) 60 Tab 5    [DISCONTINUED] colchicine 0.6 mg tablet take 2 tablets by mouth INITIALLY. THEN take 1 tablet in 1 hour (Patient not taking: Reported on 5/27/2021)      [DISCONTINUED] anastrozole (ARIMIDEX) 1 mg tablet Take 1 mg by mouth every seven (7) days. (Patient not taking: Reported on 5/27/2021)  0    [DISCONTINUED] desloratadine (CLARINEX) 5 mg tablet Take 5 mg by mouth daily. (Patient not taking: Reported on 5/27/2021)      [DISCONTINUED] montelukast (SINGULAIR) 10 mg tablet Take 10 mg by mouth every evening.  (Patient not taking: Reported on 5/27/2021)       No current facility-administered medications on file prior to visit. Review of Symptoms:    Review of Systems   Constitutional: Positive for malaise/fatigue. Negative for chills, fever and weight loss. HENT: Negative for nosebleeds. + Blood in sputum   Eyes: Negative for blurred vision and double vision. Respiratory: Positive for shortness of breath. Negative for cough and wheezing. Cardiovascular: Negative for chest pain, palpitations, orthopnea, leg swelling and PND. Skin: Positive for itching and rash. Neurological: Negative for dizziness and loss of consciousness. Psychiatric/Behavioral: Positive for depression. + hypersomnia       Physical Exam:      Physical Exam deferred as patient left before exam could be performed. Vitals:    05/27/21 1417   BP: (!) 170/96   Pulse: 68   Resp: 16   SpO2: 99%   Weight: 342 lb (155.1 kg)   Height: 6' 2\" (1.88 m)       ECG done today shows V-paced rhythm; underlying atrial flutter     Assessment:       ICD-10-CM ICD-9-CM    1. Chronic combined systolic and diastolic congestive heart failure (HCC)  I50.42 428.42      428.0    2. Permanent atrial fibrillation (HCC)  I48.21 427.31    3. Essential hypertension  I10 401.9 AMB POC EKG ROUTINE W/ 12 LEADS, INTER & REP   4. ICD (implantable cardioverter-defibrillator) in place  Z95.810 V45.02    5. S/P AV (atrioventricular) pat ablation  Z98.890 V45.89         Plan:     1. Combined systolic and diastolic heart failure  Echo done 2/2021 with improved LVEF 50-55%, grade 2 DD, sev dil LA, mild MR with mod dil RA   On Entresto, lasix, metolazone. Discussed benefits of BB in relation to cardiomyopathy, PAF, recommended he continue therapy, advised there are no known cases of hemoptysis with BB.   He declines resuming medications     2. Permanent atrial fibrillation (HCC)  S/p AV pat ablation with PPM  Continue Eliquis, discussed importance of anti-coagulation therapy  Encouraged BB therapy for further rate control -- he declines, stating beta blockers cause hemoptysis     3. Essential hypertension  Encouraged to resume anti-hypertensives. I did offer alternatives but he is not interested. He claims his blood pressure is \"normal for him\" and claims the concept of high blood pressure was made up by pharmaceutical companies to sell drugs. Discussed risk for stroke with high blood pressure, he continues to decline anti-hypertensive therapy.     4. ICD (implantable cardioverter-defibrillator) in place  Continue with routine device interrogation with Dr. Jenifer Navarro     5. S/P AV (atrioventricular) pat ablation  S/p AV pat ablation in 4/2020    Advised patient his multiple concerns are not cardiac in origin, recommended he see pulmonary for hemoptysis, neurology for evaluation of sleep disorder/narcolepsy, and allergist/immunology for allergy testing if desired. Patient left abruptly, without acknowledging treatment plan. Edwin Hazel NP      On this date 05/27/2021 I have spent 65 minutes reviewing previous notes, test results and face to face with the patient discussing the diagnosis and importance of compliance with the treatment plan as well as documenting on the day of the visit.

## 2021-05-27 NOTE — PROGRESS NOTES
Benedicto Adames is a 63 y.o. male  Chief Complaint   Patient presents with    Follow-up     6 month     Health Maintenance Due   Topic Date Due    Pneumococcal 0-64 years (1 of 2 - PPSV23) Never done    COVID-19 Vaccine (1) Never done    DTaP/Tdap/Td series (1 - Tdap) Never done    Shingrix Vaccine Age 50> (1 of 2) Never done    Colorectal Cancer Screening Combo  Never done     Visit Vitals  BP (!) 170/96   Pulse 68   Resp 16   Ht 6' 2\" (1.88 m)   Wt 342 lb (155.1 kg)   SpO2 99%   BMI 43.91 kg/m²

## 2021-05-28 RX ORDER — METOLAZONE 5 MG/1
5 TABLET ORAL
Qty: 45 TABLET | Refills: 2 | Status: SHIPPED | OUTPATIENT
Start: 2021-05-28 | End: 2021-11-08

## 2021-05-28 NOTE — TELEPHONE ENCOUNTER
Patient needs refill on Metolazone for a 90 day supply send to     [facility] in [facility]

## 2021-06-01 ENCOUNTER — OFFICE VISIT (OUTPATIENT)
Dept: CARDIOLOGY CLINIC | Age: 61
End: 2021-06-01
Payer: COMMERCIAL

## 2021-06-01 DIAGNOSIS — Z95.810 ICD (IMPLANTABLE CARDIOVERTER-DEFIBRILLATOR) IN PLACE: Primary | ICD-10-CM

## 2021-06-01 DIAGNOSIS — I50.42 CHRONIC COMBINED SYSTOLIC AND DIASTOLIC CONGESTIVE HEART FAILURE (HCC): ICD-10-CM

## 2021-06-01 PROCEDURE — 93296 REM INTERROG EVL PM/IDS: CPT | Performed by: INTERNAL MEDICINE

## 2021-06-01 PROCEDURE — 93295 DEV INTERROG REMOTE 1/2/MLT: CPT | Performed by: INTERNAL MEDICINE

## 2021-08-02 RX ORDER — FUROSEMIDE 40 MG/1
TABLET ORAL
Qty: 180 TABLET | Refills: 1 | Status: SHIPPED | OUTPATIENT
Start: 2021-08-02 | End: 2021-11-08

## 2021-08-17 ENCOUNTER — TELEPHONE (OUTPATIENT)
Dept: CARDIOLOGY CLINIC | Age: 61
End: 2021-08-17
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
## 2021-09-02 ENCOUNTER — OFFICE VISIT (OUTPATIENT)
Dept: CARDIOLOGY CLINIC | Age: 61
End: 2021-09-02
Payer: COMMERCIAL

## 2021-09-02 DIAGNOSIS — Z95.810 ICD (IMPLANTABLE CARDIOVERTER-DEFIBRILLATOR) IN PLACE: Primary | ICD-10-CM

## 2021-09-02 DIAGNOSIS — I50.42 CHRONIC COMBINED SYSTOLIC AND DIASTOLIC CONGESTIVE HEART FAILURE (HCC): ICD-10-CM

## 2021-09-02 PROCEDURE — 93296 REM INTERROG EVL PM/IDS: CPT | Performed by: INTERNAL MEDICINE

## 2021-09-02 PROCEDURE — 93295 DEV INTERROG REMOTE 1/2/MLT: CPT | Performed by: INTERNAL MEDICINE

## 2021-09-03 RX ORDER — POTASSIUM CHLORIDE 1500 MG/1
TABLET, EXTENDED RELEASE ORAL
Qty: 360 TABLET | Refills: 0 | Status: SHIPPED | OUTPATIENT
Start: 2021-09-03 | End: 2021-11-08

## 2021-09-30 RX ORDER — METOPROLOL TARTRATE 25 MG/1
25 TABLET, FILM COATED ORAL EVERY 12 HOURS
Qty: 180 TABLET | Refills: 3 | Status: SHIPPED | OUTPATIENT
Start: 2021-09-30

## 2021-09-30 RX ORDER — SACUBITRIL AND VALSARTAN 97; 103 MG/1; MG/1
1 TABLET, FILM COATED ORAL 2 TIMES DAILY
Qty: 180 TABLET | Refills: 3 | Status: SHIPPED | OUTPATIENT
Start: 2021-09-30 | End: 2022-03-11 | Stop reason: SDUPTHER

## 2021-09-30 NOTE — TELEPHONE ENCOUNTER
Pt needs refills on entresto, and metoprolol 25 mg sent to [facility].    [phone]      Thanks,  Alice Ricardo

## 2021-09-30 NOTE — TELEPHONE ENCOUNTER
Elena     Pt needs refills on entresto, and metoprolol 25 mg sent to [facility]. Last OV with Jamil 5/27/21, no future appts scheduled at this time. yes

## 2021-10-01 NOTE — TELEPHONE ENCOUNTER
Spoke with patient. Verified with two patient identifiers. Advised pt that [address] refills into the pharmacy. Patient verbalized understanding.

## 2021-10-18 ENCOUNTER — TELEPHONE (OUTPATIENT)
Dept: CARDIOLOGY CLINIC | Age: 61
End: 2021-10-18
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
## 2021-10-18 NOTE — TELEPHONE ENCOUNTER
Message  Received: Today  Vishnu Spencer NP sent to Manon Dubin, LPN  Cc: Albino Asha  Caller: Unspecified (Today,  1:49 PM)  Noted.    He doesn't always do as we ask, so making an appt with us is fine   With Dr. Sanchez Loges only, please         appt 12/17/21

## 2021-11-08 RX ORDER — METOLAZONE 5 MG/1
5 TABLET ORAL
Qty: 45 TABLET | Refills: 2 | Status: SHIPPED | OUTPATIENT
Start: 2021-11-08

## 2021-11-08 RX ORDER — FUROSEMIDE 40 MG/1
TABLET ORAL
Qty: 180 TABLET | Refills: 1 | Status: SHIPPED | OUTPATIENT
Start: 2021-11-08

## 2021-11-11 ENCOUNTER — TELEPHONE (OUTPATIENT)
Dept: CARDIOLOGY CLINIC | Age: 61
End: 2021-11-11

## 2021-11-11 NOTE — TELEPHONE ENCOUNTER
Spoke with Estrellita, with [facility] on behalf of patient in reference to Eliquis coverage. Patient identifiers verified. Representative states Xarelto will be the alternative for Eliquis. Please advise if you want to change medication.

## 2021-11-11 NOTE — TELEPHONE ENCOUNTER
Patient called and [facility] natan sent letter saying as of Jan 1st 2022 they will no longer cover Eliquis and want him to take [address] and he can not take warfrin because of past issues within the family history. YASMANY gama says he needs a prior Miguel Stade. For the eliquis. Please call the patient [address] [place] [address]

## 2021-11-12 NOTE — TELEPHONE ENCOUNTER
Per Delora Belleville, NP I am fine switching to Xarelto instead of Eliquis. Please let patient know that his insurance prefers Xarelto over Eliquis and I am fine with this.  If he doesn't want to take Xarelto, he will have to either pay higher cost for Eliquis or take warfarin.

## 2021-11-12 NOTE — TELEPHONE ENCOUNTER
.Verified Patient with two identifiers. Patient does not want to take Xarelto since he was told and have a dct,that says that Xarelto is the same thing as Warfarin. Writer tried to explain that medications were different but patient still believes medication is all the same. Mr. Roman requested to remain on Eliquis. Pre Authorization will be sent.

## 2021-11-12 NOTE — TELEPHONE ENCOUNTER
Patient returning Shari's phone call. Please call him back.           Callback Number: [phone]        Thanks,  Feroz Rubio

## 2021-11-12 NOTE — TELEPHONE ENCOUNTER
This writer attempted to submit prior auth via [employer]. Per outcome no PA is required at this time.  Coverage is effective until January 2022

## 2021-12-09 ENCOUNTER — OFFICE VISIT (OUTPATIENT)
Dept: CARDIOLOGY CLINIC | Age: 61
End: 2021-12-09
Payer: COMMERCIAL

## 2021-12-09 DIAGNOSIS — Z95.810 ICD (IMPLANTABLE CARDIOVERTER-DEFIBRILLATOR) IN PLACE: Primary | ICD-10-CM

## 2021-12-09 DIAGNOSIS — I50.42 CHRONIC COMBINED SYSTOLIC AND DIASTOLIC CONGESTIVE HEART FAILURE (HCC): ICD-10-CM

## 2021-12-09 PROCEDURE — 93295 DEV INTERROG REMOTE 1/2/MLT: CPT | Performed by: INTERNAL MEDICINE

## 2021-12-09 PROCEDURE — 93296 REM INTERROG EVL PM/IDS: CPT | Performed by: INTERNAL MEDICINE

## 2021-12-22 NOTE — PROGRESS NOTES
Received a fax from [facility] that patient was switching to a Resmed P10 mask. Basilia COLBY signed the order and it was faxed to [facility] on 12/22/2021.   Transition of Care Plan:           1- D/C Home with HH   2- F/U OP appointments  3-Neighbor / Friend to provide transportation. Reason for Admission:   [place] Fib RVR                  RUR Score:    19%              PCP: First and Last name:  Madhavi Haile. MD   Name of Practice:  Family Practice   Are you a current patient: Yes/No:  Yes   Approximate date of last visit:  3/10/20    Do you (patient/family) have any concerns for transition/discharge? Pt lives alone with close Johanna Rose- [phone] for support. Plan for utilizing home health:   No    Current Advanced Directive/Advance Care Plan:  Pt is a full code. No ACD on fill. Does not wish to complete at this time. Has Daughter, Janette Galvin- [phone] and Neighbor/ Friend. Nadeen La- [phone] as support. Transition of Care Plan:           1- D/C Home with HH   2- F/U OP appointments  3-Neighbor / Friend to provide transportation. CM verified pt demographics, insurance and emergency contact with patient . Pt lives alone single story home. Independent with ADL'S, ambulating and drives self. No HH in the past. SNF in th past for shoulder surgery. Uses [facility] in [place], [place]. No DME. Has DaughterJanette- [phone] and Neighbor/ Friend. Nadeen Tovar- [phone] as support. Care Management Interventions  PCP Verified by CM: Yes  Last Visit to PCP: 03/10/20  Mode of Transport at Discharge:  Other (see comment)(Friend, Vonzella Boeck  to transport)  Discharge Durable Medical Equipment: No  Physical Therapy Consult: No  Occupational Therapy Consult: No  Current Support Network: Lives Alone  Confirm Follow Up Transport: [facility] [employer]  Phone: [phone]

## 2022-01-27 ENCOUNTER — TELEPHONE (OUTPATIENT)
Dept: CARDIOLOGY CLINIC | Age: 62
End: 2022-01-27

## 2022-01-27 NOTE — TELEPHONE ENCOUNTER
Attempted a Prior Auth on Eliquis however patient needs to try and fail xarelto in order for Eliquis to be covered. Unable to reach patient. No answer and no option to leave VM. Will off patient a $10 co pay extend card. Will try to contact patient again later.

## 2022-03-04 NOTE — TELEPHONE ENCOUNTER
Pt needs a refill on Elquis . Pl ease call pt at [phone].   Pt has a appointment on 4/4/2022  Thanks Nhi Zacarias

## 2022-03-07 NOTE — TELEPHONE ENCOUNTER
Patient requesting refill of Eliquis 5 mg tab. Last office Visit was 02-  Next visit 04-. Please advise.

## 2022-03-08 ENCOUNTER — TELEPHONE (OUTPATIENT)
Dept: CARDIOLOGY CLINIC | Age: 62
End: 2022-03-08

## 2022-03-10 ENCOUNTER — TELEPHONE (OUTPATIENT)
Dept: CARDIOLOGY CLINIC | Age: 62
End: 2022-03-10

## 2022-03-10 PROCEDURE — 93295 DEV INTERROG REMOTE 1/2/MLT: CPT | Performed by: INTERNAL MEDICINE

## 2022-03-10 PROCEDURE — 93296 REM INTERROG EVL PM/IDS: CPT | Performed by: INTERNAL MEDICINE

## 2022-03-10 NOTE — TELEPHONE ENCOUNTER
PA submitted through covermymeds for Eliquis 5 mg. Awaiting for response. Key: [id number]   Patient wants to be on Eliquis and did not tried Xarelto as recommended previously.

## 2022-03-11 RX ORDER — SACUBITRIL AND VALSARTAN 97; 103 MG/1; MG/1
1 TABLET, FILM COATED ORAL 2 TIMES DAILY
Qty: 180 TABLET | Refills: 3 | Status: SHIPPED | OUTPATIENT
Start: 2022-03-11

## 2022-03-11 NOTE — TELEPHONE ENCOUNTER
Refill Request Received for the Following Medication     Requested Prescriptions     Pending Prescriptions Disp Refills    sacubitriL-valsartan (Entresto)  mg tablet 180 Tablet 3     Sig: Take 1 Tablet by mouth two (2) times a day. Last Prescribed: 09-3-2021    Last Appointment With Me:  05-    Future Appointments:  Future Appointments   Date Time Provider Scottie Velasco   4/4/2022  9:15 AM Patria Duarte MD [facility] AMB     Please send prescription to [facility] on file.

## 2022-03-14 ENCOUNTER — TELEPHONE (OUTPATIENT)
Dept: CARDIOLOGY CLINIC | Age: 62
End: 2022-03-14

## 2022-03-14 NOTE — LETTER
3/14/2022 3:46 PM    Mr. Berg [place] [phone]            To whom it may concern:      He has been well controlled on Eliquis, and should remain on this to reduce his stroke risk.                      Sincerely,      Oziel Mcfadden NP

## 2022-03-14 NOTE — TELEPHONE ENCOUNTER
Fax received from [facility] with the PA denial for Eliquis. Reason: Patient does not meet the requirements of his plan. Your plans covers this drug when your doctor submits documentation that you cannot use Xarelto. Patient was called and messages left explaining the Pt needs to try and fail Xarelto in order for Eliquis to be covered. Patient was called, verified with 2 identifiers. Patient stated that he won't take Xarelto since his mother  after 2 days of taking above medication. Mr Ashanti Villagomez called his insurance company and stated that Eliquis will be cover if Provider  Write a letter stating reasons why he cannot take Xarelto, stated. Please advise.

## 2022-03-14 NOTE — TELEPHONE ENCOUNTER
Letter printed for patient with following information. Thee Hamm NP   Yes, please write him a letter. He has been well controlled on Eliquis, and should remain on this to reduce his stroke risk. Thanks,   Bethany Dickson left a msge for patient to call us back at [phone].

## 2022-03-15 ENCOUNTER — OFFICE VISIT (OUTPATIENT)
Dept: CARDIOLOGY CLINIC | Age: 62
End: 2022-03-15
Payer: COMMERCIAL

## 2022-03-15 DIAGNOSIS — I50.42 CHRONIC COMBINED SYSTOLIC AND DIASTOLIC CONGESTIVE HEART FAILURE (HCC): ICD-10-CM

## 2022-03-15 DIAGNOSIS — Z95.810 ICD (IMPLANTABLE CARDIOVERTER-DEFIBRILLATOR) IN PLACE: Primary | ICD-10-CM

## 2022-03-15 NOTE — TELEPHONE ENCOUNTER
Left msge on voice mail to call office back at [phone]  Documents were sent for Eliquis and denied. This nurse called patient one more time and left a message for him to  a letter stating that he has to stay on eliquis. Patient did request letter to submit to insurance company.

## 2022-03-15 NOTE — TELEPHONE ENCOUNTER
Pt called very upset stating that the letter for a prior auth was sent in for entresto and not eliquis. H e wants to know when the next letter can be sent and would like to speak with you about it.     Callback is [phone]    Thanks  [employer]

## 2022-03-16 ENCOUNTER — TELEPHONE (OUTPATIENT)
Dept: CARDIOLOGY CLINIC | Age: 62
End: 2022-03-16

## 2022-03-16 NOTE — TELEPHONE ENCOUNTER
Nurse practitioner letter stating the need for patient to remain on Eliquis were mailed to address on file. Patient not returning phone calls.

## 2022-03-17 NOTE — TELEPHONE ENCOUNTER
Pt returning your call. Pt not feeling well will take care of this when he comes it for his appointment.     Thanks Jana

## 2022-03-18 PROBLEM — G47.33 OSA ON CPAP: Status: ACTIVE | Noted: 2019-11-04

## 2022-03-18 PROBLEM — M10.9 GOUT INVOLVING TOE: Status: ACTIVE | Noted: 2020-04-04

## 2022-03-18 PROBLEM — Z99.89 OSA ON CPAP: Status: ACTIVE | Noted: 2019-11-04

## 2022-03-18 PROBLEM — I83.893 VARICOSE VEINS OF BILATERAL LOWER EXTREMITIES WITH OTHER COMPLICATIONS: Status: ACTIVE | Noted: 2020-06-23

## 2022-03-19 PROBLEM — R77.8 ELEVATED TROPONIN: Status: ACTIVE | Noted: 2020-04-12

## 2022-03-19 PROBLEM — Z95.810 ICD (IMPLANTABLE CARDIOVERTER-DEFIBRILLATOR) IN PLACE: Status: ACTIVE | Noted: 2020-04-03

## 2022-03-19 PROBLEM — I73.9 PERIPHERAL VASCULAR DISEASE (HCC): Status: ACTIVE | Noted: 2020-02-25

## 2022-03-19 PROBLEM — I48.21 PERMANENT ATRIAL FIBRILLATION (HCC): Status: ACTIVE | Noted: 2020-04-12

## 2022-03-19 PROBLEM — I50.42 CHRONIC COMBINED SYSTOLIC AND DIASTOLIC CONGESTIVE HEART FAILURE (HCC): Status: ACTIVE | Noted: 2019-11-05

## 2022-03-19 PROBLEM — Z98.890 S/P AV (ATRIOVENTRICULAR) NODAL ABLATION: Status: ACTIVE | Noted: 2020-04-03

## 2022-03-19 PROBLEM — R11.2 NAUSEA & VOMITING: Status: ACTIVE | Noted: 2020-04-14

## 2022-03-19 PROBLEM — N17.9 ACUTE KIDNEY INJURY (HCC): Status: ACTIVE | Noted: 2019-11-05

## 2022-03-19 PROBLEM — R79.89 ELEVATED TROPONIN: Status: ACTIVE | Noted: 2020-04-12

## 2022-03-19 PROBLEM — I10 ESSENTIAL HYPERTENSION: Status: ACTIVE | Noted: 2019-12-04

## 2022-03-20 PROBLEM — E66.01 MORBID OBESITY (HCC): Status: ACTIVE | Noted: 2019-11-04

## 2022-03-21 RX ORDER — POTASSIUM CHLORIDE 20 MEQ/1
40 TABLET, EXTENDED RELEASE ORAL 2 TIMES DAILY
Qty: 180 TABLET | Refills: 3 | Status: CANCELLED | OUTPATIENT
Start: 2022-03-21

## 2022-03-21 RX ORDER — METOLAZONE 5 MG/1
5 TABLET ORAL
Qty: 45 TABLET | Refills: 2 | Status: CANCELLED | OUTPATIENT
Start: 2022-03-21

## 2022-03-21 RX ORDER — FUROSEMIDE 40 MG/1
TABLET ORAL
Qty: 180 TABLET | Refills: 1 | Status: CANCELLED | OUTPATIENT
Start: 2022-03-21

## 2022-03-21 NOTE — TELEPHONE ENCOUNTER
Refill Request Received from [facility] for the Following Medication     Requested Prescriptions     Pending Prescriptions Disp Refills    metOLazone (ZAROXOLYN) 5 mg tablet 45 Tablet 2     Sig: Take 1 Tablet by mouth every Monday, Wednesday, Friday.        Last Prescribed: 01-    Last Appointment :  05-    Future Appointments:  Future Appointments   Date Time Provider Scottie Velasco   4/4/2022  9:15 AM Nohemi Negron MD [facility] AM[facility]   [facility]16[facility]20[facility]  [facility]

## 2022-03-22 NOTE — TELEPHONE ENCOUNTER
Pt returning your jason. Please send it to [address] [place] [address] [address] [place] [address].     Thanks Jana

## 2022-03-22 NOTE — TELEPHONE ENCOUNTER
Verified Patient with two identifiers  Patient went to see other cardiologist and get everything fixed, stated. Patient does not need anything from [facility] and please do not jason him more, stated.

## 2022-03-23 NOTE — TELEPHONE ENCOUNTER
This nurse called patient several times to confirm pharmacy of choice, patient responded    on 03-. Verified Patient with two identifiers  Patient went to see other cardiologist and get everything fixed, stated. Patient does not need anything from [facility] and please do not jason him more, stated.

## 2022-06-16 NOTE — PROGRESS NOTES
[employer] [email or web address] [facility]        NAME: Luis Evans is a 23 y o  male  : 2002    MRN: [id number]  DATE: 2022  TIME: 9:08 AM    Assessment and Plan   Encounter for screening for COVID-19 [Z11 52]  1  Encounter for screening for COVID-19  Poct Covid 19 Rapid Antigen Test    Covid/Flu-Office Collect   2  Body aches  Poct Covid 19 Rapid Antigen Test    Covid/Flu-Office Collect     - Rapid Covid here negative  - PCR sent as patient needs documentation for enVerid and his cohort has tested positive   - Supportive care with fluids and OTC cough suppressants, decongestants, nasal sprays, Tylenol/Ibuprofen PRN for pain    Patient Instructions       Follow up with PCP in 3-5 days  Proceed to  ER if symptoms worsen  Chief Complaint     Chief Complaint   Patient presents with    Headache     Fatigue, congestion, cough, body aches x 3-4 days  Took at home COVID last night, positive         History of Present Illness       Patient is a 24 yo male who presents for evaluation of fatigue, nasal congestion, cough, body aches, headache x 4 days  Had positive home Covid test last night  Has not taken anything for his symptoms  Denies CP and SOB  Needs test from  for the enVerid  Review of Systems   Review of Systems   Constitutional: Positive for chills and fatigue  Negative for fever  HENT: Positive for congestion  Respiratory: Positive for cough  Negative for shortness of breath and wheezing  Cardiovascular: Negative for chest pain  Gastrointestinal: Negative for abdominal pain, diarrhea, nausea and vomiting  Musculoskeletal: Positive for arthralgias and myalgias  Skin: Negative for color change and rash  Neurological: Positive for headaches  Negative for dizziness and light-headedness  Psychiatric/Behavioral: Negative for confusion  Current Medications     No current outpatient medications on file      Current Allergies     Allergies as of 2022 - Reviewed Went over uses and side effects of Monodox, Zaroxolyn Lopressor. 06/16/2022   Allergen Reaction Noted    Penicillins Swelling 06/16/2022            The following portions of the patient's history were reviewed and updated as appropriate: allergies, current medications, past family history, past medical history, past social history, past surgical history and problem list      No past medical history on file  No past surgical history on file  No family history on file  Medications have been verified  Objective   /63   Pulse 78   Temp 98 1 °F (36 7 °C)   Resp 20   Wt 87 1 kg (192 lb)   SpO2 98%        Physical Exam     Physical Exam  Constitutional:       General: He is not in acute distress  Appearance: Normal appearance  He is not ill-appearing or diaphoretic  HENT:      Right Ear: Tympanic membrane, ear canal and external ear normal       Left Ear: Tympanic membrane, ear canal and external ear normal       Nose: Nose normal       Mouth/Throat:      Mouth: Mucous membranes are moist       Pharynx: Oropharynx is clear  Posterior oropharyngeal erythema present  Eyes:      Conjunctiva/sclera: Conjunctivae normal    Cardiovascular:      Rate and Rhythm: Normal rate and regular rhythm  Heart sounds: Normal heart sounds  Pulmonary:      Effort: Pulmonary effort is normal       Breath sounds: Normal breath sounds  No wheezing, rhonchi or rales  Skin:     General: Skin is warm and dry  Neurological:      Mental Status: He is alert     Psychiatric:         Mood and Affect: Mood normal          Behavior: Behavior normal

## 2023-05-29 RX ORDER — SACUBITRIL AND VALSARTAN 97; 103 MG/1; MG/1
1 TABLET, FILM COATED ORAL 2 TIMES DAILY
COMMUNITY
Start: 2022-03-11

## 2023-05-29 RX ORDER — ALLOPURINOL 300 MG/1
1 TABLET ORAL 2 TIMES DAILY
COMMUNITY
Start: 2020-11-20

## 2023-05-29 RX ORDER — METOLAZONE 5 MG/1
5 TABLET ORAL
COMMUNITY
Start: 2021-11-08

## 2023-05-29 RX ORDER — POTASSIUM CHLORIDE 20 MEQ/1
2 TABLET, EXTENDED RELEASE ORAL 2 TIMES DAILY
COMMUNITY
Start: 2021-11-08

## 2023-05-29 RX ORDER — FUROSEMIDE 40 MG/1
1 TABLET ORAL 2 TIMES DAILY
COMMUNITY
Start: 2021-11-08

## 2023-05-29 RX ORDER — DEXTROAMPHETAMINE SACCHARATE, AMPHETAMINE ASPARTATE, DEXTROAMPHETAMINE SULFATE AND AMPHETAMINE SULFATE 7.5; 7.5; 7.5; 7.5 MG/1; MG/1; MG/1; MG/1
30 TABLET ORAL 2 TIMES DAILY
COMMUNITY

## 2023-05-29 RX ORDER — TESTOSTERONE CYPIONATE 100 MG/ML
200 INJECTION, SOLUTION INTRAMUSCULAR
COMMUNITY

## 2024-07-25 ENCOUNTER — OFFICE VISIT (OUTPATIENT)
Age: 64
End: 2024-07-25
Payer: MEDICARE

## 2024-07-25 VITALS
HEART RATE: 71 BPM | TEMPERATURE: 97.2 F | SYSTOLIC BLOOD PRESSURE: 176 MMHG | HEIGHT: 74 IN | WEIGHT: 315 LBS | DIASTOLIC BLOOD PRESSURE: 102 MMHG | OXYGEN SATURATION: 95 % | BODY MASS INDEX: 40.43 KG/M2

## 2024-07-25 DIAGNOSIS — E11.22 TYPE 2 DIABETES MELLITUS WITH STAGE 3B CHRONIC KIDNEY DISEASE, WITH LONG-TERM CURRENT USE OF INSULIN (HCC): Primary | ICD-10-CM

## 2024-07-25 DIAGNOSIS — Z79.4 TYPE 2 DIABETES MELLITUS WITH STAGE 3B CHRONIC KIDNEY DISEASE, WITH LONG-TERM CURRENT USE OF INSULIN (HCC): Primary | ICD-10-CM

## 2024-07-25 DIAGNOSIS — N18.32 TYPE 2 DIABETES MELLITUS WITH STAGE 3B CHRONIC KIDNEY DISEASE, WITH LONG-TERM CURRENT USE OF INSULIN (HCC): Primary | ICD-10-CM

## 2024-07-25 LAB — GLUCOSE, POC: 108 MG/DL

## 2024-07-25 PROCEDURE — 3080F DIAST BP >= 90 MM HG: CPT | Performed by: INTERNAL MEDICINE

## 2024-07-25 PROCEDURE — 3077F SYST BP >= 140 MM HG: CPT | Performed by: INTERNAL MEDICINE

## 2024-07-25 PROCEDURE — 82962 GLUCOSE BLOOD TEST: CPT | Performed by: INTERNAL MEDICINE

## 2024-07-25 PROCEDURE — G8417 CALC BMI ABV UP PARAM F/U: HCPCS | Performed by: INTERNAL MEDICINE

## 2024-07-25 PROCEDURE — 3046F HEMOGLOBIN A1C LEVEL >9.0%: CPT | Performed by: INTERNAL MEDICINE

## 2024-07-25 PROCEDURE — 2022F DILAT RTA XM EVC RTNOPTHY: CPT | Performed by: INTERNAL MEDICINE

## 2024-07-25 PROCEDURE — 3017F COLORECTAL CA SCREEN DOC REV: CPT | Performed by: INTERNAL MEDICINE

## 2024-07-25 PROCEDURE — 4004F PT TOBACCO SCREEN RCVD TLK: CPT | Performed by: INTERNAL MEDICINE

## 2024-07-25 PROCEDURE — 99204 OFFICE O/P NEW MOD 45 MIN: CPT | Performed by: INTERNAL MEDICINE

## 2024-07-25 PROCEDURE — G8427 DOCREV CUR MEDS BY ELIG CLIN: HCPCS | Performed by: INTERNAL MEDICINE

## 2024-07-25 RX ORDER — INSULIN GLARGINE-YFGN 100 [IU]/ML
INJECTION, SOLUTION SUBCUTANEOUS
COMMUNITY
Start: 2024-05-25 | End: 2024-07-25

## 2024-07-25 RX ORDER — GLIPIZIDE 5 MG/1
5 TABLET ORAL
Qty: 60 TABLET | Refills: 4 | Status: SHIPPED | OUTPATIENT
Start: 2024-07-25

## 2024-07-25 RX ORDER — INSULIN GLARGINE 100 [IU]/ML
65 INJECTION, SOLUTION SUBCUTANEOUS NIGHTLY
Qty: 30 ADJUSTABLE DOSE PRE-FILLED PEN SYRINGE | Refills: 3 | Status: SHIPPED | OUTPATIENT
Start: 2024-07-25

## 2024-07-25 RX ORDER — INSULIN ASPART 100 [IU]/ML
INJECTION, SOLUTION INTRAVENOUS; SUBCUTANEOUS
COMMUNITY
Start: 2024-05-28 | End: 2024-07-25

## 2024-07-25 NOTE — PATIENT INSTRUCTIONS
Stop Semglee, start Basaglar 65 units nightly   Stop Novolog   Start Glipizide 5 mg with breakfast and 5 mg and dinner     Diabetes general guidelines:   Target glucose  Fastin-130  2 hours after meals: less than 180    Check glucose levels as directed   Have a small snack if glucose is less than 80 at bedtime    Seek urgent care if glucose levels above 300s and not coming down, especially if associated with excessive thirst, urination, confusion or generally feeling unwell.     For Blood Glucose < 70 mg/dl treat with 4 ounces of juice or 4 glucose tablets (15 g). Recheck Blood Glucose in 15 minutes. Repeat until blood glucose is > 70.

## 2024-07-25 NOTE — PROGRESS NOTES
Consultation - Diabetes     PCP: Thong Celestin MD    Chief Complaint   Patient presents with    Diabetes    New Patient       HPI:  Nahum Barboza is a 63 y.o. male with  has a past medical history of A-fib (HCC), Allergic reaction to contrast material, Congestive heart failure (HCC), Hypertension, Irregular heart rhythm, Mercury poisoning, Morbid obesity (HCC), YAIMA on CPAP, YAIMA on CPAP, and Post concussion syndrome. who is seen in consultation at the request of Thong Celestin MD for evaluation of diabetes.     Diagnosed with Type 2 diabetes: 1.5 years ago following groin infection    A1c 8.2% 6/2024    Ct 3/2020   Pancreas, adrenal glands, spleen and aorta show no significant enlargement.  Gallbladder is thick-walled, nondistended, containing hyperdense bile--features  suggesting chronic gallbladder disease    Complications:  +systolic and diastolic HF  CKD, egfr 39 6/2024   No known history of DR    No personal or family history of MEN or MTC  No personal history of pancreatitis  No known history of thyroid disorder  No prior amputations    Testoserone - managed/treated by pcp     Current DM medications:  Semglee 30 units BID   Novolog SS TID with meals 14-40 with meals     BG trends:  Checking few times a day 90-upper 100s   No hypoglycemia     No high sugar beverages   Activity - limited to ADL's     Full ROS completed this visit:  +urinary frequency  +shortness of breath with exertion  +weight gain and loss  Negative for  fevers, chills, blurry vision, changes in vision, chest pain, palpitations, leg swelling, wheezing, snoring, abdominal pain, nausea, vomiting, diarrhea, constipation, dysuria, tremors, fainting, shakes, cold intolerance, hot intolerance, rash.    LABS/STUDIES:   No results found for: \"FUB8LNPI\"  Lab Results   Component Value Date/Time    LABA1C 6.1 04/02/2020 03:36 AM    CREATININE 1.51 12/04/2020 12:52 PM     Lab Results   Component Value Date/Time    CHOL 167 11/05/2019 04:41 AM    TRIG

## 2024-07-25 NOTE — PROGRESS NOTES
I have reviewed all needed documentation in preparation for visit. Verified patient by name and date of birth  Nahum Barboza is a 63 y.o. male Diabetes and New Patient  .    Vitals:    07/25/24 0832 07/25/24 0842   BP: (!) 170/94 (!) 176/102   Pulse: 71    Temp: 97.2 °F (36.2 °C)    SpO2: 95%    Weight: (!) 149.7 kg (330 lb)    Height: 1.88 m (6' 2\")        No LMP for male patient.         Health Maintenance Review: Patient reminded of \"due or due soon\" health maintenance. I have asked the patient to contact his/her primary care provider (PCP) for follow-up on his/her health maintenance.    \"Have you been to the ER, urgent care clinic since your last visit?  Hospitalized since your last visit?\"    NO    “Have you seen or consulted any other health care providers outside of [facility] since your last visit?”    Yes seen by PCP .

## 2024-08-14 LAB
BUN SERPL-MCNC: 17 MG/DL (ref 8–27)
BUN/CREAT SERPL: 11 (ref 10–24)
CALCIUM SERPL-MCNC: 9.6 MG/DL (ref 8.6–10.2)
CHLORIDE SERPL-SCNC: 93 MMOL/L (ref 96–106)
CO2 SERPL-SCNC: 30 MMOL/L (ref 20–29)
CREAT SERPL-MCNC: 1.57 MG/DL (ref 0.76–1.27)
EGFRCR SERPLBLD CKD-EPI 2021: 49 ML/MIN/1.73
GLUCOSE SERPL-MCNC: 213 MG/DL (ref 70–99)
Lab: NORMAL
POTASSIUM SERPL-SCNC: 3.5 MMOL/L (ref 3.5–5.2)
REPORT: NORMAL
SODIUM SERPL-SCNC: 140 MMOL/L (ref 134–144)

## 2024-08-15 LAB — C PEPTIDE SERPL-MCNC: 18.4 NG/ML (ref 1.1–4.4)

## 2024-08-22 ENCOUNTER — OFFICE VISIT (OUTPATIENT)
Age: 64
End: 2024-08-22
Payer: MEDICARE

## 2024-08-22 VITALS
HEIGHT: 74 IN | TEMPERATURE: 97.6 F | WEIGHT: 315 LBS | BODY MASS INDEX: 40.43 KG/M2 | DIASTOLIC BLOOD PRESSURE: 89 MMHG | OXYGEN SATURATION: 95 % | SYSTOLIC BLOOD PRESSURE: 136 MMHG | HEART RATE: 70 BPM

## 2024-08-22 DIAGNOSIS — N18.32 TYPE 2 DIABETES MELLITUS WITH STAGE 3B CHRONIC KIDNEY DISEASE, WITH LONG-TERM CURRENT USE OF INSULIN (HCC): Primary | ICD-10-CM

## 2024-08-22 DIAGNOSIS — E11.22 TYPE 2 DIABETES MELLITUS WITH STAGE 3B CHRONIC KIDNEY DISEASE, WITH LONG-TERM CURRENT USE OF INSULIN (HCC): Primary | ICD-10-CM

## 2024-08-22 DIAGNOSIS — Z79.4 TYPE 2 DIABETES MELLITUS WITH STAGE 3B CHRONIC KIDNEY DISEASE, WITH LONG-TERM CURRENT USE OF INSULIN (HCC): Primary | ICD-10-CM

## 2024-08-22 DIAGNOSIS — E78.2 MIXED HYPERLIPIDEMIA: ICD-10-CM

## 2024-08-22 LAB — HBA1C MFR BLD: 7.6 %

## 2024-08-22 PROCEDURE — 3079F DIAST BP 80-89 MM HG: CPT | Performed by: INTERNAL MEDICINE

## 2024-08-22 PROCEDURE — 83036 HEMOGLOBIN GLYCOSYLATED A1C: CPT | Performed by: INTERNAL MEDICINE

## 2024-08-22 PROCEDURE — 99214 OFFICE O/P EST MOD 30 MIN: CPT | Performed by: INTERNAL MEDICINE

## 2024-08-22 PROCEDURE — 2022F DILAT RTA XM EVC RTNOPTHY: CPT | Performed by: INTERNAL MEDICINE

## 2024-08-22 PROCEDURE — G8417 CALC BMI ABV UP PARAM F/U: HCPCS | Performed by: INTERNAL MEDICINE

## 2024-08-22 PROCEDURE — 3046F HEMOGLOBIN A1C LEVEL >9.0%: CPT | Performed by: INTERNAL MEDICINE

## 2024-08-22 PROCEDURE — 3075F SYST BP GE 130 - 139MM HG: CPT | Performed by: INTERNAL MEDICINE

## 2024-08-22 PROCEDURE — 4004F PT TOBACCO SCREEN RCVD TLK: CPT | Performed by: INTERNAL MEDICINE

## 2024-08-22 PROCEDURE — 3017F COLORECTAL CA SCREEN DOC REV: CPT | Performed by: INTERNAL MEDICINE

## 2024-08-22 PROCEDURE — G8427 DOCREV CUR MEDS BY ELIG CLIN: HCPCS | Performed by: INTERNAL MEDICINE

## 2024-08-22 RX ORDER — INSULIN GLARGINE 100 [IU]/ML
70 INJECTION, SOLUTION SUBCUTANEOUS NIGHTLY
Qty: 30 ADJUSTABLE DOSE PRE-FILLED PEN SYRINGE | Refills: 3 | Status: SHIPPED | OUTPATIENT
Start: 2024-08-22

## 2024-08-22 RX ORDER — CEPHALEXIN 500 MG/1
500 CAPSULE ORAL 4 TIMES DAILY
COMMUNITY
Start: 2024-08-03

## 2024-08-22 NOTE — PROGRESS NOTES
Follow up - Diabetes     PCP: Thong Celestin MD    Chief Complaint   Patient presents with    Diabetes     HPI:  Nahmu Barboza is a 63 y.o. male with  has a past medical history of A-fib (HCC), Allergic reaction to contrast material, Congestive heart failure (HCC), Hypertension, Irregular heart rhythm, Mercury poisoning, Morbid obesity (HCC), YAIMA on CPAP, YAIMA on CPAP, and Post concussion syndrome. Here for follow up of diabetes.     Diagnosed with Type 2 diabetes: 1.5 years ago     A1c 8.2% 6/2024    Ct 3/2020   Pancreas, adrenal glands, spleen and aorta show no significant enlargement.  Gallbladder is thick-walled, nondistended, containing hyperdense bile--features  suggesting chronic gallbladder disease    Complications:  +systolic and diastolic HF  CKD, egfr 39 6/2024   No known history of DR    No personal or family history of MEN or MTC  No personal history of pancreatitis  No known history of thyroid disorder  No prior amputations    Testoserone - managed/treated by pcp     Current DM medications:  Basaglar 65 units nightly   Glipizide 5 mg with breakfast and 5 mg and dinner     BG trends:  A1C 7.6%     No hypoglycemia     No high sugar beverages   Activity - limited to ADL's     BRIEF ROS   Gen: no fevers/chills  CV: no chest pain  Resp: no shortness of breath, cough   GI: no nausea, emesis, abdominal pain  Neuro: no confusion     LABS/STUDIES:   Lab Results   Component Value Date/Time    QWD2ISZD 7.6 08/22/2024 11:39 AM     Lab Results   Component Value Date/Time    LABA1C 6.1 04/02/2020 03:36 AM    CREATININE 1.57 08/13/2024 10:25 AM    LABGLOM 49 08/13/2024 10:25 AM     Lab Results   Component Value Date/Time    CHOL 167 11/05/2019 04:41 AM    TRIG 77 11/05/2019 04:41 AM    HDL 38 11/05/2019 04:41 AM     Lab Results   Component Value Date/Time    TSH 2.43 03/29/2020 08:42 AM       Lab Results   Component Value Date/Time    CPEPTIDE 18.4 08/13/2024 10:25 AM       Current Outpatient Medications

## 2024-08-22 NOTE — PATIENT INSTRUCTIONS
Increase Basaglar 70 units nightly   C/W Glipizide 5 mg with breakfast and 5 mg and dinner     Diabetes general guidelines:   Target glucose  Fastin-130  2 hours after meals: less than 180    Check glucose levels as directed   Have a small snack if glucose is less than 80 at bedtime    Seek urgent care if glucose levels above 300s and not coming down, especially if associated with excessive thirst, urination, confusion or generally feeling unwell.     For Blood Glucose < 70 mg/dl treat with 4 ounces of juice or 4 glucose tablets (15 g). Recheck Blood Glucose in 15 minutes. Repeat until blood glucose is > 70.

## 2024-08-22 NOTE — PROGRESS NOTES
Nahum Barboza is a 63 y.o. male here for   Chief Complaint   Patient presents with    Diabetes       1. Have you been to the ER, urgent care clinic since your last visit?  Hospitalized since your last visit? -Pt stated he went to the ER for sore on his toe.    2. Have you seen or consulted any other health care providers outside of the [facility] since your last visit?  Include any pap smears or colon screening.-no

## 2024-08-29 ENCOUNTER — TELEPHONE (OUTPATIENT)
Age: 64
End: 2024-08-29

## 2024-09-26 DIAGNOSIS — E11.22 TYPE 2 DIABETES MELLITUS WITH STAGE 3B CHRONIC KIDNEY DISEASE, WITH LONG-TERM CURRENT USE OF INSULIN (HCC): ICD-10-CM

## 2024-09-26 DIAGNOSIS — N18.32 TYPE 2 DIABETES MELLITUS WITH STAGE 3B CHRONIC KIDNEY DISEASE, WITH LONG-TERM CURRENT USE OF INSULIN (HCC): ICD-10-CM

## 2024-09-26 DIAGNOSIS — Z79.4 TYPE 2 DIABETES MELLITUS WITH STAGE 3B CHRONIC KIDNEY DISEASE, WITH LONG-TERM CURRENT USE OF INSULIN (HCC): ICD-10-CM

## 2024-09-27 RX ORDER — GLIPIZIDE 5 MG/1
5 TABLET ORAL
Qty: 180 TABLET | Refills: 3 | Status: SHIPPED | OUTPATIENT
Start: 2024-09-27

## 2025-04-10 ENCOUNTER — TELEPHONE (OUTPATIENT)
Age: 65
End: 2025-04-10

## 2025-05-06 ENCOUNTER — OFFICE VISIT (OUTPATIENT)
Age: 65
End: 2025-05-06
Payer: MEDICARE

## 2025-05-06 VITALS
DIASTOLIC BLOOD PRESSURE: 85 MMHG | WEIGHT: 315 LBS | TEMPERATURE: 98.6 F | OXYGEN SATURATION: 98 % | BODY MASS INDEX: 40.43 KG/M2 | SYSTOLIC BLOOD PRESSURE: 140 MMHG | HEIGHT: 74 IN | HEART RATE: 70 BPM

## 2025-05-06 DIAGNOSIS — Z79.4 TYPE 2 DIABETES MELLITUS WITH STAGE 3B CHRONIC KIDNEY DISEASE, WITH LONG-TERM CURRENT USE OF INSULIN (HCC): Primary | ICD-10-CM

## 2025-05-06 DIAGNOSIS — R21 RASH: ICD-10-CM

## 2025-05-06 DIAGNOSIS — N18.32 TYPE 2 DIABETES MELLITUS WITH STAGE 3B CHRONIC KIDNEY DISEASE, WITH LONG-TERM CURRENT USE OF INSULIN (HCC): Primary | ICD-10-CM

## 2025-05-06 DIAGNOSIS — E11.22 TYPE 2 DIABETES MELLITUS WITH STAGE 3B CHRONIC KIDNEY DISEASE, WITH LONG-TERM CURRENT USE OF INSULIN (HCC): Primary | ICD-10-CM

## 2025-05-06 LAB — HBA1C MFR BLD: 9.1 %

## 2025-05-06 PROCEDURE — 3079F DIAST BP 80-89 MM HG: CPT | Performed by: INTERNAL MEDICINE

## 2025-05-06 PROCEDURE — 3077F SYST BP >= 140 MM HG: CPT | Performed by: INTERNAL MEDICINE

## 2025-05-06 PROCEDURE — 1123F ACP DISCUSS/DSCN MKR DOCD: CPT | Performed by: INTERNAL MEDICINE

## 2025-05-06 PROCEDURE — 83036 HEMOGLOBIN GLYCOSYLATED A1C: CPT | Performed by: INTERNAL MEDICINE

## 2025-05-06 PROCEDURE — 4004F PT TOBACCO SCREEN RCVD TLK: CPT | Performed by: INTERNAL MEDICINE

## 2025-05-06 PROCEDURE — PBSHW AMB POC HEMOGLOBIN A1C: Performed by: INTERNAL MEDICINE

## 2025-05-06 PROCEDURE — G8417 CALC BMI ABV UP PARAM F/U: HCPCS | Performed by: INTERNAL MEDICINE

## 2025-05-06 PROCEDURE — 99214 OFFICE O/P EST MOD 30 MIN: CPT | Performed by: INTERNAL MEDICINE

## 2025-05-06 PROCEDURE — 3017F COLORECTAL CA SCREEN DOC REV: CPT | Performed by: INTERNAL MEDICINE

## 2025-05-06 PROCEDURE — G8427 DOCREV CUR MEDS BY ELIG CLIN: HCPCS | Performed by: INTERNAL MEDICINE

## 2025-05-06 PROCEDURE — 3046F HEMOGLOBIN A1C LEVEL >9.0%: CPT | Performed by: INTERNAL MEDICINE

## 2025-05-06 PROCEDURE — 2022F DILAT RTA XM EVC RTNOPTHY: CPT | Performed by: INTERNAL MEDICINE

## 2025-05-06 RX ORDER — DOXYCYCLINE HYCLATE 100 MG
100 TABLET ORAL 2 TIMES DAILY WITH MEALS
COMMUNITY
Start: 2025-04-15

## 2025-05-06 RX ORDER — AMOXICILLIN AND CLAVULANATE POTASSIUM 500; 125 MG/1; 1/1
500 TABLET, FILM COATED ORAL 2 TIMES DAILY
COMMUNITY
Start: 2025-04-15

## 2025-05-06 NOTE — PROGRESS NOTES
Nahum Barboza is a 64 y.o. male here for   Chief Complaint   Patient presents with    Diabetes       1. Have you been to the ER, urgent care clinic since your last visit?  Hospitalized since your last visit? -Pt stated he went to the ER for wound on foot for 1 week    2. Have you seen or consulted any other health care providers outside of the [facility] since your last visit?  Include any pap smears or colon screening.-no      
2.43 03/29/2020 08:42 AM       Lab Results   Component Value Date/Time    CPEPTIDE 18.4 08/13/2024 10:25 AM       Current Outpatient Medications   Medication Sig Dispense Refill    doxycycline hyclate (VIBRA-TABS) 100 MG tablet Take 1 tablet by mouth 2 times daily (with meals)      AUGMENTIN 500-125 MG per tablet Take 1 tablet by mouth in the morning and at bedtime      glipiZIDE (GLUCOTROL) 5 MG tablet Take 1 tablet by mouth 2 times daily (before meals) 180 tablet 3    insulin glargine (BASAGLAR KWIKPEN) 100 UNIT/ML injection pen Inject 70 Units into the skin nightly 30 Adjustable Dose Pre-filled Pen Syringe 3    allopurinol (ZYLOPRIM) 300 MG tablet Take 1 tablet by mouth 2 times daily      amphetamine-dextroamphetamine (ADDERALL) 30 MG tablet Take 1 tablet by mouth 2 times daily.      apixaban (ELIQUIS) 5 MG TABS tablet TAKE 1 TABLET BY MOUTH TWICE A DAY. KEEP 5- APPT.      furosemide (LASIX) 40 MG tablet Take 1 tablet by mouth 2 times daily      metOLazone (ZAROXOLYN) 5 MG tablet Take 1 tablet by mouth      potassium chloride (KLOR-CON M20) 20 MEQ extended release tablet Take 2 tablets by mouth 2 times daily      testosterone cypionate (DEPO-TESTOSTERONE) 100 MG/ML injection Inject 2 mLs into the muscle.      cephALEXin (KEFLEX) 500 MG capsule Take 1 capsule by mouth in the morning, at noon, in the evening, and at bedtime (Patient not taking: Reported on 5/6/2025)      metoprolol tartrate (LOPRESSOR) 25 MG tablet Take 1 tablet by mouth in the morning and 1 tablet in the evening. (Patient not taking: Reported on 5/6/2025)      sacubitril-valsartan (ENTRESTO)  MG per tablet Take 1 tablet by mouth 2 times daily (Patient not taking: Reported on 5/6/2025)       No current facility-administered medications for this visit.        Past Medical History:   Diagnosis Date    A-fib (HCC)     Allergic reaction to contrast material     galodinium    Congestive heart failure (HCC)     Hypertension     Irregular

## 2025-05-06 NOTE — PATIENT INSTRUCTIONS
- Basaglar 70 units nightly     - Glipizide 5 mg with breakfast and 5 mg and dinner     - Start Ozempic 0.25 mg weekly for 4 weeks, then increase to 0.5 mg weekly thereafter. Monitor for any stomach issues. The symptoms should be mild and last only a couple weeks. If symptoms are more significant than that, please let me know.    Diabetes general guidelines:   Target glucose  Fastin-130  2 hours after meals: less than 180    Check glucose levels as directed   Have a small snack if glucose is less than 80 at bedtime    Seek urgent care if glucose levels above 300s and not coming down, especially if associated with excessive thirst, urination, confusion or generally feeling unwell.     For Blood Glucose < 70 mg/dl treat with 4 ounces of juice or 4 glucose tablets (15 g). Recheck Blood Glucose in 15 minutes. Repeat until blood glucose is > 70.

## 2025-05-13 DIAGNOSIS — E11.22 TYPE 2 DIABETES MELLITUS WITH STAGE 3B CHRONIC KIDNEY DISEASE, WITH LONG-TERM CURRENT USE OF INSULIN (HCC): Primary | ICD-10-CM

## 2025-05-13 DIAGNOSIS — Z79.4 TYPE 2 DIABETES MELLITUS WITH STAGE 3B CHRONIC KIDNEY DISEASE, WITH LONG-TERM CURRENT USE OF INSULIN (HCC): Primary | ICD-10-CM

## 2025-05-13 DIAGNOSIS — N18.32 TYPE 2 DIABETES MELLITUS WITH STAGE 3B CHRONIC KIDNEY DISEASE, WITH LONG-TERM CURRENT USE OF INSULIN (HCC): Primary | ICD-10-CM

## 2025-05-14 RX ORDER — SEMAGLUTIDE 1.34 MG/ML
INJECTION, SOLUTION SUBCUTANEOUS
Qty: 3 ML | Refills: 3 | Status: ACTIVE | OUTPATIENT
Start: 2025-05-14

## 2025-07-31 ENCOUNTER — TELEPHONE (OUTPATIENT)
Age: 65
End: 2025-07-31

## 2025-08-30 DIAGNOSIS — E11.22 TYPE 2 DIABETES MELLITUS WITH STAGE 3B CHRONIC KIDNEY DISEASE, WITH LONG-TERM CURRENT USE OF INSULIN (HCC): ICD-10-CM

## 2025-08-30 DIAGNOSIS — Z79.4 TYPE 2 DIABETES MELLITUS WITH STAGE 3B CHRONIC KIDNEY DISEASE, WITH LONG-TERM CURRENT USE OF INSULIN (HCC): ICD-10-CM

## 2025-08-30 DIAGNOSIS — N18.32 TYPE 2 DIABETES MELLITUS WITH STAGE 3B CHRONIC KIDNEY DISEASE, WITH LONG-TERM CURRENT USE OF INSULIN (HCC): ICD-10-CM

## 2025-09-02 RX ORDER — INSULIN GLARGINE 100 [IU]/ML
70 INJECTION, SOLUTION SUBCUTANEOUS NIGHTLY
Qty: 75 ML | Refills: 3 | Status: SHIPPED | OUTPATIENT
Start: 2025-09-02

## (undated) DEVICE — ANGIOGRAPHY KIT CUST [K0910930B] [MERIT MEDICAL SYSTEMS INC]

## (undated) DEVICE — RADIFOCUS OPTITORQUE ANGIOGRAPHIC CATHETER: Brand: OPTITORQUE

## (undated) DEVICE — (D)ADHESIVE TISS HI VISC 1ML -- DISC USE ITEM 346585

## (undated) DEVICE — STRIP,CLOSURE,WOUND,MEDI-STRIP,1/2X4: Brand: MEDLINE

## (undated) DEVICE — REM POLYHESIVE ADULT PATIENT RETURN ELECTRODE: Brand: VALLEYLAB

## (undated) DEVICE — PLASMABLADE PS200-040 4.0: Brand: PLASMABLADE™

## (undated) DEVICE — 3M™ IOBAN™ 2 ANTIMICROBIAL INCISE DRAPE 6650EZ: Brand: IOBAN™ 2

## (undated) DEVICE — 3M™ TEGADERM™ TRANSPARENT FILM DRESSING FRAME STYLE, 1626W, 4 IN X 4-3/4 IN (10 CM X 12 CM), 50/CT 4CT/CASE: Brand: 3M™ TEGADERM™

## (undated) DEVICE — SUTURE ABSORBABLE BRAIDED 2-0 CT-1 27 IN UD VICRYL J259H

## (undated) DEVICE — KIT ACCS INTRO 4FR L10CM NDL 21GA L7CM GWIRE L40CM

## (undated) DEVICE — GUIDEWIRE VASC L40CM DIA0018IN NDL 21GA L7CM Z S STL MAK

## (undated) DEVICE — DRAPE SURG W25XL50IN E OPN CIR BND BG

## (undated) DEVICE — STOPCOCK IV 4 W TRNSPAR

## (undated) DEVICE — MEDI-TRACE CADENCE ADULT, DEFIBRILLATION ELECTRODE -RTS  (10 PR/PK) - PHYSIO-CONTROL: Brand: MEDI-TRACE CADENCE

## (undated) DEVICE — DERMABOND SKIN ADH 0.7ML -- DERMABOND ADVANCED 12/BX

## (undated) DEVICE — IRRIGATION KT PIST SYR 60ML -- CONVERT TO ITEM 116415

## (undated) DEVICE — CATHETER ETER ANGIO L110CM OD5FR ID046IN L75CM 038IN 145DEG CARD

## (undated) DEVICE — ABSORBABLE WOUND CLOSURE DEVICE: Brand: V-LOC 90

## (undated) DEVICE — SUTURE VCRL SZ 2-0 L36IN ABSRB UD L40MM CT 1/2 CIR J957H

## (undated) DEVICE — DRESSING FOAM W10XL15CM VISCOSE POLY SAFETAC NONWOVEN PERF

## (undated) DEVICE — CATH GUID COR EB35 5FR 100CM -- LAUNCHER

## (undated) DEVICE — SUTURE COAT VCRL PC 5 PRECIS COSM CONVENTIONAL CUT PRIM 3 8 J823H

## (undated) DEVICE — APPLICATOR MEDICATED 10.5 CC SOLUTION CLR STRL CHLORAPREP

## (undated) DEVICE — GUIDE COR SNUS L40CM DIA9FR 0.035IN STD CRV ADV UNIQUE

## (undated) DEVICE — GDWIRE WHISPER HITORQ EDS CSJ -- ACUITY SOLD BY BX ONLY 4648

## (undated) DEVICE — SUTURE COAT VCRL SZ 4-0 L18IN ABSRB UD L19MM PS-2 1/2 CIR J496G

## (undated) DEVICE — CATH RMFG SUP 6F 5MM 120 --

## (undated) DEVICE — CATHETER DIAG 6FR L110CM INTRO 6FR BLLN DIA9MM 1CC PULM ART

## (undated) DEVICE — SUT SLK 0 30IN SH BLK --

## (undated) DEVICE — (D)STRIP SKN CLSR 0.5X4IN WHT --

## (undated) DEVICE — LIMB HOLDER, WRIST/ANKLE: Brand: DEROYAL

## (undated) DEVICE — PACK PROCEDURE SURG HRT CATH

## (undated) DEVICE — Device

## (undated) DEVICE — KENDALL RADIOLUCENT FOAM MONITORING ELECTRODE RECTANGULAR SHAPE: Brand: KENDALL

## (undated) DEVICE — COPILOT BLEEDBACK CONTROL VALVE: Brand: COPILOT

## (undated) DEVICE — APPLICATOR MEDICATED 10.5 CC SOLUTION HI LT ORNG CHLORAPREP

## (undated) DEVICE — DRESSING HEMOSTATIC SFT INTVENT W/O SLT DBL WRP QUIKCLOT LF

## (undated) DEVICE — CABLE CATH L10FT RED PIN CONN 25-34 FOR NAVISTAR CARTO 3

## (undated) DEVICE — SPLINT WR POS F/ARTERIAL ACC -- BX/10

## (undated) DEVICE — RADIFOCUS GLIDEWIRE: Brand: GLIDEWIRE

## (undated) DEVICE — ROCKER SWITCH PENCIL HOLSTER: Brand: VALLEYLAB

## (undated) DEVICE — BASIC SINGLE BASIN BTC-LF: Brand: MEDLINE INDUSTRIES, INC.

## (undated) DEVICE — GLIDESHEATH SLENDER ACCESS KIT: Brand: GLIDESHEATH SLENDER

## (undated) DEVICE — PREP CHLORAPREP 10.5 ML ORG --

## (undated) DEVICE — SYSTEM INTRO 9.5FR L13CM STD WHT CAP HEMSTAT SPLITTABLE

## (undated) DEVICE — PACEMAKER PACK: Brand: MEDLINE INDUSTRIES, INC.

## (undated) DEVICE — SUTURE MCRYL SZ 4 0 L18IN ABSRB VLT PS 1 L24MM 3 8 CIR REV Y682H

## (undated) DEVICE — MEDI-TRACE CADENCE ADULT, DEFIBRILLATION ELECTRODE -RTS  (10 PR/PK) - PHILIPS: Brand: MEDI-TRACE CADENCE

## (undated) DEVICE — DRAPE STRL ANGIO W/ 2 FLD COLLCTN PCH 86X135 218X343 CM 2

## (undated) DEVICE — SYR ART 700 CLEAR MARK 7 -- ARTERION

## (undated) DEVICE — CABLE PACE ALGTR CLP SAF 12FT --

## (undated) DEVICE — TOWEL,OR,DSP,ST,BLUE,STD,2/PK,40PK/CS: Brand: MEDLINE

## (undated) DEVICE — 3M™ IOBAN™ 2 ANTIMICROBIAL INCISE DRAPE 6640EZ: Brand: IOBAN™ 2

## (undated) DEVICE — CATH NAVISTAR BIDIR FJ 8MM --

## (undated) DEVICE — TUBING PRSS MON L6IN PVC M FEM CONN

## (undated) DEVICE — PINNACLE INTRODUCER SHEATH: Brand: PINNACLE

## (undated) DEVICE — INTRO SHTH 7FR 13X20CM -- TEARAWAY

## (undated) DEVICE — TR BAND RADIAL ARTERY COMPRESSION DEVICE: Brand: TR BAND

## (undated) DEVICE — PACEMAKER CRD UPLR/BPLR 50.8X42.6X7.4 MM 12.25 CC 22.5 GM
Type: IMPLANTABLE DEVICE | Status: NON-FUNCTIONAL
Removed: 2020-10-23

## (undated) DEVICE — INTRODUCER SHTH 8FR L63CM 8FR DIL GWIRE L145CM DIA0.038IN

## (undated) DEVICE — GUIDEWIRE VASC L260CM 0.035IN J TIP L3MM PTFE FIX COR NAMIC

## (undated) DEVICE — PATCH CARTO 3 EXT REF --

## (undated) DEVICE — CABLE RMFG SUPREME BPTPLR/QPLR --

## (undated) DEVICE — SUTURE VCRL SZ 4-0 L18IN ABSRB UD L19MM PS-2 3/8 CIR PRIM J496H

## (undated) DEVICE — COVADERM: Brand: DEROYAL